# Patient Record
Sex: FEMALE | Race: OTHER | HISPANIC OR LATINO | Employment: OTHER | URBAN - METROPOLITAN AREA
[De-identification: names, ages, dates, MRNs, and addresses within clinical notes are randomized per-mention and may not be internally consistent; named-entity substitution may affect disease eponyms.]

---

## 2017-02-14 ENCOUNTER — ALLSCRIPTS OFFICE VISIT (OUTPATIENT)
Dept: OTHER | Facility: OTHER | Age: 67
End: 2017-02-14

## 2017-02-15 ENCOUNTER — GENERIC CONVERSION - ENCOUNTER (OUTPATIENT)
Dept: OTHER | Facility: OTHER | Age: 67
End: 2017-02-15

## 2017-03-01 ENCOUNTER — GENERIC CONVERSION - ENCOUNTER (OUTPATIENT)
Dept: OTHER | Facility: OTHER | Age: 67
End: 2017-03-01

## 2017-03-09 ENCOUNTER — GENERIC CONVERSION - ENCOUNTER (OUTPATIENT)
Dept: OTHER | Facility: OTHER | Age: 67
End: 2017-03-09

## 2017-03-22 ENCOUNTER — GENERIC CONVERSION - ENCOUNTER (OUTPATIENT)
Dept: OTHER | Facility: OTHER | Age: 67
End: 2017-03-22

## 2017-03-27 ENCOUNTER — GENERIC CONVERSION - ENCOUNTER (OUTPATIENT)
Dept: OTHER | Facility: OTHER | Age: 67
End: 2017-03-27

## 2017-04-25 ENCOUNTER — ALLSCRIPTS OFFICE VISIT (OUTPATIENT)
Dept: OTHER | Facility: OTHER | Age: 67
End: 2017-04-25

## 2017-08-15 ENCOUNTER — GENERIC CONVERSION - ENCOUNTER (OUTPATIENT)
Dept: OTHER | Facility: OTHER | Age: 67
End: 2017-08-15

## 2017-10-05 ENCOUNTER — TRANSCRIBE ORDERS (OUTPATIENT)
Dept: ADMINISTRATIVE | Facility: HOSPITAL | Age: 67
End: 2017-10-05

## 2017-10-05 DIAGNOSIS — Z12.39 SCREENING BREAST EXAMINATION: Primary | ICD-10-CM

## 2017-10-11 ENCOUNTER — HOSPITAL ENCOUNTER (OUTPATIENT)
Dept: MAMMOGRAPHY | Facility: HOSPITAL | Age: 67
Discharge: HOME | End: 2017-10-11
Payer: MEDICARE

## 2017-10-11 DIAGNOSIS — Z12.39 SCREENING BREAST EXAMINATION: ICD-10-CM

## 2017-10-11 PROCEDURE — G0202 SCR MAMMO BI INCL CAD: HCPCS

## 2017-12-19 ENCOUNTER — GENERIC CONVERSION - ENCOUNTER (OUTPATIENT)
Dept: FAMILY MEDICINE CLINIC | Facility: CLINIC | Age: 67
End: 2017-12-19

## 2018-01-13 VITALS
DIASTOLIC BLOOD PRESSURE: 82 MMHG | WEIGHT: 162 LBS | BODY MASS INDEX: 30.58 KG/M2 | SYSTOLIC BLOOD PRESSURE: 138 MMHG | TEMPERATURE: 97.3 F | HEIGHT: 61 IN | HEART RATE: 80 BPM

## 2018-01-15 VITALS
OXYGEN SATURATION: 96 % | BODY MASS INDEX: 30.99 KG/M2 | RESPIRATION RATE: 16 BRPM | WEIGHT: 164.13 LBS | SYSTOLIC BLOOD PRESSURE: 126 MMHG | TEMPERATURE: 97.6 F | HEIGHT: 61 IN | DIASTOLIC BLOOD PRESSURE: 84 MMHG | HEART RATE: 78 BPM

## 2018-02-09 ENCOUNTER — OFFICE VISIT (OUTPATIENT)
Dept: FAMILY MEDICINE CLINIC | Facility: CLINIC | Age: 68
End: 2018-02-09
Payer: MEDICARE

## 2018-02-09 VITALS
HEART RATE: 80 BPM | DIASTOLIC BLOOD PRESSURE: 78 MMHG | TEMPERATURE: 96.7 F | SYSTOLIC BLOOD PRESSURE: 128 MMHG | BODY MASS INDEX: 31.37 KG/M2 | WEIGHT: 166 LBS | RESPIRATION RATE: 16 BRPM

## 2018-02-09 DIAGNOSIS — H00.022 HORDEOLUM INTERNUM OF RIGHT LOWER EYELID: ICD-10-CM

## 2018-02-09 DIAGNOSIS — Z23 NEED FOR INFLUENZA VACCINATION: Primary | ICD-10-CM

## 2018-02-09 PROBLEM — J30.9 ALLERGIC RHINITIS: Status: ACTIVE | Noted: 2017-04-25

## 2018-02-09 PROBLEM — N39.3 STRESS INCONTINENCE, FEMALE: Status: ACTIVE | Noted: 2017-04-25

## 2018-02-09 PROCEDURE — 90662 IIV NO PRSV INCREASED AG IM: CPT | Performed by: NURSE PRACTITIONER

## 2018-02-09 PROCEDURE — 90471 IMMUNIZATION ADMIN: CPT | Performed by: NURSE PRACTITIONER

## 2018-02-09 PROCEDURE — 99213 OFFICE O/P EST LOW 20 MIN: CPT | Performed by: NURSE PRACTITIONER

## 2018-02-09 RX ORDER — TOBRAMYCIN 3 MG/ML
2 SOLUTION/ DROPS OPHTHALMIC
Qty: 1 BOTTLE | Refills: 0 | Status: SHIPPED | OUTPATIENT
Start: 2018-02-09 | End: 2018-05-26

## 2018-02-09 RX ORDER — INSULIN GLARGINE 100 [IU]/ML
30 INJECTION, SOLUTION SUBCUTANEOUS
COMMUNITY
Start: 2012-03-06 | End: 2021-10-11

## 2018-02-09 RX ORDER — FLUTICASONE PROPIONATE 50 MCG
2 SPRAY, SUSPENSION (ML) NASAL DAILY
COMMUNITY
Start: 2017-04-25 | End: 2019-04-08

## 2018-02-09 RX ORDER — SERTRALINE HYDROCHLORIDE 100 MG/1
1 TABLET, FILM COATED ORAL DAILY
COMMUNITY
Start: 2013-07-30 | End: 2018-04-09 | Stop reason: SDUPTHER

## 2018-02-09 RX ORDER — BLOOD SUGAR DIAGNOSTIC
STRIP MISCELLANEOUS
COMMUNITY
Start: 2015-10-01 | End: 2021-10-11

## 2018-02-09 RX ORDER — FLUTICASONE FUROATE AND VILANTEROL 200; 25 UG/1; UG/1
POWDER RESPIRATORY (INHALATION)
COMMUNITY
Start: 2016-03-24 | End: 2018-09-11 | Stop reason: SDUPTHER

## 2018-02-09 RX ORDER — SIMVASTATIN 20 MG
1 TABLET ORAL DAILY
COMMUNITY
Start: 2015-10-01

## 2018-02-09 RX ORDER — LOSARTAN POTASSIUM 25 MG/1
50 TABLET ORAL 2 TIMES DAILY
COMMUNITY
Start: 2016-01-20 | End: 2022-07-27 | Stop reason: SDUPTHER

## 2018-02-09 RX ORDER — ALBUTEROL SULFATE 90 UG/1
2 AEROSOL, METERED RESPIRATORY (INHALATION)
COMMUNITY
Start: 2017-04-25 | End: 2019-04-08

## 2018-02-09 NOTE — PROGRESS NOTES
FAMILY PRACTICE OFFICE VISIT       NAME: Becki Roman  AGE: 78 y o  SEX: female       : 1950        MRN: [id number]    DATE: 2018  TIME: 6:41 PM    Assessment and Plan     Problem List Items Addressed This Visit     None      Visit Diagnoses     Need for influenza vaccination    -  Primary    Relevant Orders    Flu Vaccine High Dose Split Preservative Free IM (Completed)    Hordeolum internum of right lower eyelid        Relevant Medications    tobramycin (TOBREX) 0 3 % SOLN    Other Relevant Orders    Ambulatory referral to Ophthalmology            There are no Patient Instructions on file for this visit  1  Need for influenza vaccination  Flu Vaccine High Dose Split Preservative Free IM   2  Hordeolum internum of right lower eyelid  tobramycin (TOBREX) 0 3 % SOLN    Ambulatory referral to Ophthalmology     Suspect hordeolum that has spontaneously drained  Symptoms present for the past 2 weeks, therefore will treat with Tobramycin 0 3% ophthalmic solution, 2 drops every 4 hours while awake for 5 days  Warm compresses 15 minutes 4 times per day  If symptoms worsen over the weekend, she will go to the emergency room  If symptoms do not improve by Monday, she will call Dr Jeff Pérez office, as she is already an established patient  Chief Complaint     Chief Complaint   Patient presents with    Eye Pain     sty going on for over a week       History of Present Illness     Patient presents today with soreness in her left lower eyelid that has been present for 2 weeks  She pulled her eyelid down a few days ago and saw what looked like a stye that was coming to a head  Her eye feels itchy  Some increased tearing  She has not used warm compresses  She did use her grandson's old eye drops a few times that were prescribed for treating conjunctivitis, which seemed to help, but she has none left  Review of Systems   Review of Systems   Constitutional: Negative for chills, fatigue and fever  HENT: Negative for congestion, postnasal drip, rhinorrhea, sinus pressure and sore throat  Eyes:        As noted in HPI   Respiratory: Negative for cough, chest tightness and shortness of breath  Cardiovascular: Negative for chest pain  Active Problem List     Patient Active Problem List   Diagnosis    Allergic rhinitis    Asthma    Generalized anxiety disorder    Stress incontinence, female    Lumbar radiculopathy    [facility] hyperglycemia ([facility] )       Past Medical History:  Past Medical History:   Diagnosis Date    Diabetes mellitus ([facility] )     Hyperlipidemia        Past Surgical History:  No past surgical history on file  Family History:  No family history on file  Social History:  Social History     Social History    Marital status:      Spouse name: N/A    Number of children: N/A    Years of education: N/A     Occupational History    Not on file  Social History Main Topics    Smoking status: Former Smoker    Smokeless tobacco: Not on file    Alcohol use No    Drug use: No    Sexual activity: Not on file     Other Topics Concern    Not on file     Social History Narrative    No narrative on file       I have reviewed the patient's medical history in detail; there are no changes to the history as noted in the electronic medical record  Objective     Vitals:    02/09/18 1304   BP: 128/78   BP Location: Left arm   Patient Position: Sitting   Cuff Size: Standard   Pulse: 80   Resp: 16   Temp: (!) 96 7 °F (35 9 °C)   TempSrc: Tympanic   Weight: 75 3 kg (166 lb)     Wt Readings from Last 3 Encounters:   02/09/18 75 3 kg (166 lb)   04/25/17 73 5 kg (162 lb)   02/14/17 74 4 kg (164 lb 2 1 oz)       Physical Exam   Constitutional: She appears well-developed and well-nourished  Eyes: Pupils are equal, round, and reactive to light  Left eye exhibits hordeolum (inside of left lower lid with 1-2 mm erythematous base with whitish center,  )   Left conjunctiva is not injected  Cardiovascular: Normal rate, regular rhythm and normal heart sounds  Pulmonary/Chest: Effort normal and breath sounds normal    Nursing note and vitals reviewed  ALLERGIES:  Allergies   Allergen Reactions    Augmentin [Amoxicillin-Pot Clavulanate]     Codeine Drowsiness    Oxycodone-Acetaminophen Diarrhea and Vomiting       Current Medications     Current Outpatient Prescriptions   Medication Sig Dispense Refill    albuterol (PROAIR HFA) 90 mcg/act inhaler Inhale 2 puffs      fluticasone (FLONASE) 50 mcg/act nasal spray 2 sprays into each nostril daily      fluticasone furoate-vilanterol (BREO ELLIPTA) 200-25 MCG/INH inhaler Inhale      Glucose Blood (ONETOUCH ULTRA BLUE VI) 1 Squirt by In Vitro route 3 (three) times a day      insulin glargine (LANTUS) 100 units/mL subcutaneous injection Inject under the skin      Insulin Syringe-Needle U-100 (B-D INS SYRINGE 0 5CC/31GX5/16) 31G X 5/16" 0 5 ML MISC by Does not apply route      losartan (COZAAR) 25 mg tablet Take 1 tablet by mouth daily      metFORMIN (GLUCOPHAGE) 1000 MG tablet Take by mouth      sertraline (ZOLOFT) 100 mg tablet Take 1 tablet by mouth daily      simvastatin (ZOCOR) 20 mg tablet Take 1 tablet by mouth daily      insulin lispro (HUMALOG) 100 units/mL injection Inject under the skin      tobramycin (TOBREX) 0 3 % SOLN Administer 2 drops into the left eye every 4 (four) hours while awake 1 Bottle 0     No current facility-administered medications for this visit            Health Maintenance     Health Maintenance   Topic Date Due    Hepatitis C Screening  1950    DTaP,Tdap,and Td Vaccines (1 - Tdap) 10/29/1958    PNEUMOCOCCAL POLYSACCHARIDE VACCINE AGE 65 AND OVER  10/29/2016    INFLUENZA VACCINE  09/01/2017     Immunization History   Administered Date(s) Administered    Influenza Quadrivalent Preservative Free 3 years and older IM 11/08/2014    Influenza Split High Dose Preservative Free IM 02/09/2018       PAWAN Pérez

## 2018-02-23 ENCOUNTER — OFFICE VISIT (OUTPATIENT)
Dept: FAMILY MEDICINE CLINIC | Facility: CLINIC | Age: 68
End: 2018-02-23
Payer: MEDICARE

## 2018-02-23 VITALS
TEMPERATURE: 97.1 F | RESPIRATION RATE: 16 BRPM | SYSTOLIC BLOOD PRESSURE: 132 MMHG | BODY MASS INDEX: 30.84 KG/M2 | HEART RATE: 76 BPM | WEIGHT: 163.2 LBS | DIASTOLIC BLOOD PRESSURE: 88 MMHG

## 2018-02-23 DIAGNOSIS — G89.29 CHRONIC BILATERAL LOW BACK PAIN, WITH SCIATICA PRESENCE UNSPECIFIED: ICD-10-CM

## 2018-02-23 DIAGNOSIS — R53.82 CHRONIC FATIGUE: ICD-10-CM

## 2018-02-23 DIAGNOSIS — R53.83 FATIGUE, UNSPECIFIED TYPE: Primary | ICD-10-CM

## 2018-02-23 DIAGNOSIS — M54.5 CHRONIC BILATERAL LOW BACK PAIN, WITH SCIATICA PRESENCE UNSPECIFIED: ICD-10-CM

## 2018-02-23 PROCEDURE — 99214 OFFICE O/P EST MOD 30 MIN: CPT | Performed by: FAMILY MEDICINE

## 2018-02-23 NOTE — ASSESSMENT & PLAN NOTE
Fatigue  I discussed with the patient the fact that her symptoms could be multifactorial   They may be related to her abrupt stopping of her sertraline  She will obtain blood work as ordered for further evaluation  She does not appear to have symptoms related to sleep apnea    She will continue follow-up with her endocrinologist to continue monitoring her blood sugars for diabetes

## 2018-02-23 NOTE — PROGRESS NOTES
FAMILY PRACTICE OFFICE VISIT       NAME: Becki Roman  AGE: 78 y o  SEX: female       : 1950        MRN: [id number]    DATE: 2018  TIME: 5:27 PM    Assessment and Plan     Problem List Items Addressed This Visit     Chronic fatigue     Fatigue  I discussed with the patient the fact that her symptoms could be multifactorial   They may be related to her abrupt stopping of her sertraline  She will obtain blood work as ordered for further evaluation  She does not appear to have symptoms related to sleep apnea  She will continue follow-up with her endocrinologist to continue monitoring her blood sugars for diabetes         Fatigue - Primary    Relevant Orders    TSH, 3rd generation    T3, free    T4, free    Iron Panel    Lyme disease, western blot    Sedimentation rate, automated    SHAWNA Screen w/ Reflex to Titer/Pattern    RF Screen w/ Reflex to Titer      Other Visit Diagnoses     Chronic bilateral low back pain, with sciatica presence unspecified        Relevant Orders    TSH, 3rd generation    T3, free    T4, free    Iron Panel    Lyme disease, western blot    Sedimentation rate, automated    SHAWNA Screen w/ Reflex to Titer/Pattern    RF Screen w/ Reflex to Titer            There are no Patient Instructions on file for this visit  Chief Complaint     Chief Complaint   Patient presents with    Fatigue     Patient is here c/o fatigue x's 1 wk which is getting progressively worse  History of Present Illness     Patient states she has been feeling fatigued for the past couple weeks  She does get intermittent arthralgias of her knees back and shoulders  She does have a history of herniated disc of lumbar spine  Normally she sleeps from 9 p m  until 6 a m  Shelton Anguiano She does have nocturia x2 but feels she falls back asleep quickly after going to the bathroom  She denies any significant changes in weight  She denies any new medications    She does see her endocrinologist for her diabetes however her most recent hemoglobin A1c was 10 2  Patient describes feeling the need to rest or sleep after minimal activities but denies any shortness of breath or chest pain  Patient had an unremarkable nuclear stress test December 2016  Patient also reports that she abruptly stopped taking her sertraline medication 100 milligrams daily when she ran out of refills  Overall she had been feeling well and did not feel medication was needed or if active  We        Review of Systems   Review of Systems   Constitutional: Positive for activity change and fatigue  Negative for appetite change and fever  HENT: Negative  Respiratory: Negative  Cardiovascular: Negative  Gastrointestinal: Negative  Genitourinary: Negative  Musculoskeletal: Positive for arthralgias, back pain and myalgias  Active Problem List     Patient Active Problem List   Diagnosis    Allergic rhinitis    Asthma    Generalized anxiety disorder    Stress incontinence, female    Lumbar radiculopathy    Type 2 diabetes mellitus with hyperglycemia ([facility] )    Colitis    Chronic fatigue    Fatigue       Past Medical History:  Past Medical History:   Diagnosis Date    Diabetes mellitus ([facility] )     Hyperlipidemia        Past Surgical History:  No past surgical history on file  Family History:  No family history on file  Social History:  Social History     Social History    Marital status:      Spouse name: N/A    Number of children: N/A    Years of education: N/A     Occupational History    Not on file       Social History Main Topics    Smoking status: Former Smoker    Smokeless tobacco: Never Used    Alcohol use No    Drug use: No    Sexual activity: Not on file     Other Topics Concern    Not on file     Social History Narrative    No narrative on file       Objective     Vitals:    02/23/18 1503   BP: 132/88   Pulse: 76   Resp: 16   Temp: (!) 97 1 °F (36 2 °C)     Wt Readings from Last 3 Encounters:   02/23/18 74 kg (163 lb 3 2 oz)   02/09/18 75 3 kg (166 lb)   04/25/17 73 5 kg (162 lb)       Physical Exam   Constitutional:   Patient is in no acute distress   Cardiovascular:   Regular rate and rhythm with no murmurs   Pulmonary/Chest:   Lungs are clear to auscultation without wheezes,rales, or rhonchi   Abdominal:   Abdomen is soft, nontender with positive bowel sounds  There is no rebound or guarding  No masses palpated   Lymphadenopathy:     She has no cervical adenopathy  Psychiatric: She has a normal mood and affect  Her behavior is normal  Judgment and thought content normal        Pertinent Laboratory/Diagnostic Studies:  Lab Results   Component Value Date    GLUCOSE 105 11/10/2015    BUN 10 11/10/2015    CREATININE 0 63 11/10/2015    CALCIUM 8 8 11/10/2015     11/10/2015    K 4 0 11/10/2015    CO2 24 2 11/10/2015     11/10/2015     No results found for: ALT, AST, GGT, ALKPHOS, BILITOT    Lab Results   Component Value Date    WBC 8 02 11/10/2015    HGB 11 1 (L) 11/10/2015    HCT 35 8 11/10/2015    MCV 75 (L) 11/10/2015     11/10/2015       No results found for: TSH    No results found for: CHOL  No results found for: TRIG  No results found for: HDL  No results found for: LDLCALC  No results found for: HGBA1C    Results for orders placed or performed during the hospital encounter of 12/12/16   Stress strip   Result Value Ref Range    Protocol Name [address]            Time In Exercise Phase 180 S    MAX   SYSTOLIC  mmHg    Max Diastolic Bp 90 mmHg    Max Heart Rate 109 BPM    Max Predicted Heart Rate 155 BPM    Reason for Termination Test Complete     Test Indication Screening for CAD     Target Hr Formular (220 - Age)*100%     Arrhy During Ex      ECG Interp Before Ex      ECG Interp during Ex      Ex Summary Comment      Chest Pain Statement none     Overall Hr Response To Exercise      Overall BP Response To Exercise         Orders Placed This Encounter   Procedures    TSH, 3rd generation  T3, free    T4, free    Lyme disease, western blot    Sedimentation rate, automated    SHAWNA Screen w/ Reflex to Titer/Pattern    RF Screen w/ Reflex to Titer       ALLERGIES:  Allergies   Allergen Reactions    Augmentin [Amoxicillin-Pot Clavulanate]     Codeine Drowsiness    Oxycodone-Acetaminophen Diarrhea and Vomiting       Current Medications     Current Outpatient Prescriptions   Medication Sig Dispense Refill    albuterol (PROAIR HFA) 90 mcg/act inhaler Inhale 2 puffs      fluticasone (FLONASE) 50 mcg/act nasal spray 2 sprays into each nostril daily      fluticasone furoate-vilanterol (BREO ELLIPTA) 200-25 MCG/INH inhaler Inhale      Glucose Blood (ONETOUCH ULTRA BLUE VI) 1 Squirt by In Vitro route 3 (three) times a day      insulin glargine (LANTUS) 100 units/mL subcutaneous injection Inject under the skin      insulin lispro (HUMALOG) 100 units/mL injection Inject under the skin      Insulin Syringe-Needle U-100 (B-D INS SYRINGE 0 5CC/31GX5/16) 31G X 5/16" 0 5 ML MISC by Does not apply route      losartan (COZAAR) 25 mg tablet Take 1 tablet by mouth daily      metFORMIN (GLUCOPHAGE) 1000 MG tablet Take by mouth      sertraline (ZOLOFT) 100 mg tablet Take 1 tablet by mouth daily      simvastatin (ZOCOR) 20 mg tablet Take 1 tablet by mouth daily      tobramycin (TOBREX) 0 3 % SOLN Administer 2 drops into the left eye every 4 (four) hours while awake 1 Bottle 0     No current facility-administered medications for this visit            Health Maintenance     Health Maintenance   Topic Date Due    Hepatitis C Screening  1950    DTaP,Tdap,and Td Vaccines (1 - Tdap) 10/29/1958    Diabetic Foot Exam  10/29/1961    URINE MICROALBUMIN  10/29/1961    Depression Screening PHQ-9  10/29/1963    Fall Risk  10/29/2016    Urinary Incontinence Screening  10/29/2016    PNEUMOCOCCAL POLYSACCHARIDE VACCINE AGE 72 AND OVER  10/29/2016    OPHTHALMOLOGY EXAM  03/27/2018    INFLUENZA VACCINE Completed     Immunization History   Administered Date(s) Administered    Influenza Quadrivalent Preservative Free 3 years and older IM 11/08/2014    Influenza Split High Dose Preservative Free IM 28/03/0021       Sean Reynoso MD

## 2018-02-28 LAB
ANA PAT SER IF-IMP: ABNORMAL
ANA SER QL IF: POSITIVE
ANA TITR SER IF: ABNORMAL TITER
B BURGDOR IGG SER QL IB: POSITIVE
B BURGDOR IGM SER QL IB: POSITIVE
B BURGDOR18KD IGG SER QL IB: REACTIVE
B BURGDOR23KD IGG SER QL IB: REACTIVE
B BURGDOR23KD IGM SER QL IB: REACTIVE
B BURGDOR28KD IGG SER QL IB: REACTIVE
B BURGDOR30KD IGG SER QL IB: ABNORMAL
B BURGDOR39KD IGG SER QL IB: ABNORMAL
B BURGDOR39KD IGM SER QL IB: ABNORMAL
B BURGDOR41KD IGG SER QL IB: REACTIVE
B BURGDOR41KD IGM SER QL IB: REACTIVE
B BURGDOR45KD IGG SER QL IB: ABNORMAL
B BURGDOR58KD IGG SER QL IB: ABNORMAL
B BURGDOR66KD IGG SER QL IB: REACTIVE
B BURGDOR93KD IGG SER QL IB: ABNORMAL
ERYTHROCYTE [SEDIMENTATION RATE] IN BLOOD BY WESTERGREN METHOD: 51 MM/H
IRON SATN MFR SERPL: 6 % (CALC) (ref 11–50)
IRON SERPL-MCNC: 29 MCG/DL (ref 45–160)
RHEUMATOID FACT SERPL-ACNC: <14 IU/ML
T3FREE SERPL-MCNC: 2.8 PG/ML (ref 2.3–4.2)
T4 FREE SERPL-MCNC: 1.2 NG/DL (ref 0.8–1.8)
TIBC SERPL-MCNC: 465 MCG/DL (CALC) (ref 250–450)
TSH SERPL-ACNC: 3.19 MIU/L (ref 0.4–4.5)

## 2018-03-01 ENCOUNTER — TELEPHONE (OUTPATIENT)
Dept: FAMILY MEDICINE CLINIC | Facility: CLINIC | Age: 68
End: 2018-03-01

## 2018-03-01 NOTE — TELEPHONE ENCOUNTER
----- Message from Lyn Brown MD sent at 8/0/3788  7:37 AM EST -----  Patient's blood work had several abnormalities: 1) blood work was positive for Lyme disease  It appears she was infected some time within the past 3 months  I recommend taking doxycycline 100 mg twice daily for 30 days  I will send to pharmacy  2) blood work for anti nuclear antibody test was highly positive  This test could be positive with several autoimmune diseases such as lupus  I recommend consultation with rheumatologist such as Frederic Enciso at [facility]  Please give number for patient to make appointment  3) blood work for iron showed low level at 29  I recommend try to start over-the-counter ferrous sulfate 65 mg tablet once daily    Repeat blood work in 3 months

## 2018-03-02 ENCOUNTER — TELEPHONE (OUTPATIENT)
Dept: FAMILY MEDICINE CLINIC | Facility: CLINIC | Age: 68
End: 2018-03-02

## 2018-04-02 LAB
LEFT EYE DIABETIC RETINOPATHY: NORMAL
RIGHT EYE DIABETIC RETINOPATHY: NORMAL

## 2018-04-09 ENCOUNTER — TELEPHONE (OUTPATIENT)
Dept: FAMILY MEDICINE CLINIC | Facility: CLINIC | Age: 68
End: 2018-04-09

## 2018-04-09 DIAGNOSIS — F41.9 ANXIETY: Primary | ICD-10-CM

## 2018-04-09 NOTE — TELEPHONE ENCOUNTER
Patient called stating she would like to go back on Sertraline again she is having panick attacks  She was wondering if she could take 50 mg instead of 100mg  Patient uses [facility] in [place]  Patient can be reached at [phone]

## 2018-05-10 DIAGNOSIS — K52.9 COLITIS: Primary | ICD-10-CM

## 2018-05-10 RX ORDER — DOXYCYCLINE HYCLATE 100 MG/1
100 CAPSULE ORAL 2 TIMES DAILY
Refills: 0 | COMMUNITY
Start: 2018-03-02 | End: 2018-05-10 | Stop reason: SDUPTHER

## 2018-05-10 RX ORDER — DOXYCYCLINE HYCLATE 100 MG/1
100 CAPSULE ORAL 2 TIMES DAILY
Qty: 180 CAPSULE | Refills: 0 | Status: SHIPPED | OUTPATIENT
Start: 2018-05-10 | End: 2018-05-26

## 2018-05-26 ENCOUNTER — OFFICE VISIT (OUTPATIENT)
Dept: URGENT CARE | Age: 68
End: 2018-05-26
Payer: MEDICARE

## 2018-05-26 VITALS
HEIGHT: 63 IN | DIASTOLIC BLOOD PRESSURE: 76 MMHG | HEART RATE: 84 BPM | TEMPERATURE: 98.6 F | SYSTOLIC BLOOD PRESSURE: 139 MMHG | BODY MASS INDEX: 28.35 KG/M2 | WEIGHT: 160 LBS | OXYGEN SATURATION: 98 % | RESPIRATION RATE: 20 BRPM

## 2018-05-26 DIAGNOSIS — Z18.39: ICD-10-CM
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
## 2018-05-26 DIAGNOSIS — S80.852A: ICD-10-CM

## 2018-05-26 DIAGNOSIS — B37.3 VAGINAL YEAST INFECTION: ICD-10-CM

## 2018-05-26 PROCEDURE — G0463 HOSPITAL OUTPT CLINIC VISIT: HCPCS | Performed by: FAMILY MEDICINE

## 2018-05-26 PROCEDURE — 99214 OFFICE O/P EST MOD 30 MIN: CPT | Performed by: FAMILY MEDICINE

## 2018-05-26 RX ORDER — DOXYCYCLINE 100 MG/1
100 TABLET ORAL 2 TIMES DAILY
Qty: 28 TABLET | Refills: 0 | Status: SHIPPED | OUTPATIENT
Start: 2018-05-26 | End: 2018-06-09

## 2018-05-26 RX ORDER — FLUCONAZOLE 150 MG/1
150 TABLET ORAL ONCE
Qty: 2 TABLET | Refills: 0 | Status: SHIPPED | OUTPATIENT
Start: 2018-05-26 | End: 2018-05-26

## 2018-05-26 NOTE — PROGRESS NOTES
[address]        NAME: Leonard Ho is a 78 y o  female  : 1950    MRN: [id number]  DATE: May 26, 2018  TIME: 11:59 AM    Assessment and Plan   Tick bite, initial encounter [W57  XXXA]  1  Tick bite, initial encounter  doxycycline (ADOXA) 100 MG tablet   2  Embedded tick of lower leg, left, initial encounter  doxycycline (ADOXA) 100 MG tablet   3  Vaginal yeast infection  fluconazole (DIFLUCAN) 150 mg tablet         Patient Instructions       Take doxycycline twice daily for 14 days  Please take probiotics daily  Avoid excessive sun exposure  Follow up with your family physician in one week, or sooner if any signs or symptoms of lyme disease develop   Take diflucan if needed for yeast infection- repeat in one week if needed    Chief Complaint     Chief Complaint   Patient presents with    Tick Removal     Monday on her left lower leg she noticed a dark spot- she thinks the tick is still present- has had Lyme Disease in the past had a flare uop this March         History of Present Illness       77 y/o female presents with an embedded tick in her left lower leg x 5 days  She states she removed the tick, but the head is still embedded  H/o lyme with recent "flare up"  Requests diflucan with abx rx  Last Td 4 years ago  Review of Systems   Review of Systems   Constitutional: Negative for chills, fatigue and fever  Respiratory: Negative for cough and shortness of breath  Cardiovascular: Negative for chest pain  Musculoskeletal: Negative for arthralgias, back pain and joint swelling  Skin:        Embedded tick left ankle   Neurological: Negative for dizziness and headaches  All other systems reviewed and are negative          Current Medications       Current Outpatient Prescriptions:     albuterol (PROAIR HFA) 90 mcg/act inhaler, Inhale 2 puffs, Disp: , Rfl:     fluticasone (FLONASE) 50 mcg/act nasal spray, 2 sprays into each nostril daily, Disp: , Rfl:     fluticasone furoate-vilanterol (BREO ELLIPTA) 200-25 MCG/INH inhaler, Inhale, Disp: , Rfl:     Glucose Blood (ONETOUCH ULTRA BLUE VI), 1 Squirt by In Vitro route 3 (three) times a day, Disp: , Rfl:     insulin glargine (LANTUS) 100 units/mL subcutaneous injection, Inject under the skin, Disp: , Rfl:     insulin lispro (HUMALOG) 100 units/mL injection, Inject under the skin, Disp: , Rfl:     Insulin Syringe-Needle U-100 (B-D INS SYRINGE 0 5CC/31GX5/16) 31G X 5/16" 0 5 ML MISC, by Does not apply route, Disp: , Rfl:     losartan (COZAAR) 25 mg tablet, Take 1 tablet by mouth daily, Disp: , Rfl:     metFORMIN (GLUCOPHAGE) 1000 MG tablet, Take by mouth, Disp: , Rfl:     sertraline (ZOLOFT) 50 mg tablet, One tablet p o  q day, Disp: 30 tablet, Rfl: 5    simvastatin (ZOCOR) 20 mg tablet, Take 1 tablet by mouth daily, Disp: , Rfl:     doxycycline (ADOXA) 100 MG tablet, Take 1 tablet (100 mg total) by mouth 2 (two) times a day for 14 days, Disp: 28 tablet, Rfl: 0    fluconazole (DIFLUCAN) 150 mg tablet, Take 1 tablet (150 mg total) by mouth once for 1 dose, Disp: 2 tablet, Rfl: 0    Current Allergies     Allergies as of 05/26/2018 - Reviewed 05/26/2018   Allergen Reaction Noted    Augmentin [amoxicillin-pot clavulanate]  12/12/2016    Codeine Drowsiness 12/20/2012    Oxycodone-acetaminophen Diarrhea and Vomiting 12/20/2012            The following portions of the patient's history were reviewed and updated as appropriate: allergies, current medications, past family history, past medical history, past social history, past surgical history and problem list    Past Medical History:   Diagnosis Date    Diabetes mellitus ([address] )     Hyperlipidemia      No past surgical history on file          Objective   /76 (BP Location: Right arm, Patient Position: Sitting, Cuff Size: Standard)   Pulse 84   Temp 98 6 °F (37 °C) (Temporal)   Resp 20   Ht 5' 3" (1 6 m)   Wt 72 6 kg (160 lb)   SpO2 98%   BMI 28 34 kg/m² Physical Exam     Physical Exam   Constitutional: She is oriented to person, place, and time  She appears well-developed and well-nourished  Cardiovascular: Normal rate, regular rhythm and normal heart sounds  Pulmonary/Chest: Effort normal and breath sounds normal    Neurological: She is alert and oriented to person, place, and time  Skin:        Psychiatric: She has a normal mood and affect  Her behavior is normal    Nursing note and vitals reviewed  Foreign body removal  Date/Time: 5/26/2018 12:05 PM  Performed by: Yaa Westfall  Authorized by: Yaa Westfall   Consent: Verbal consent obtained    Risks and benefits: risks, benefits and alternatives were discussed  Consent given by: patient  Body area: skin  General location: lower extremity  Location details: left ankle  Anesthesia: local infiltration    Anesthesia:  Local Anesthetic: lidocaine 1% without epinephrine  Anesthetic total: 1 mL  Localization method: visualized  Removal mechanism: alligator forceps  Dressing: dressing applied  Tendon involvement: none  Depth: subcutaneous  Complexity: simple  Post-procedure assessment: foreign body removed  Patient tolerance: Patient tolerated the procedure well with no immediate complications

## 2018-05-26 NOTE — PATIENT INSTRUCTIONS
Take doxycycline twice daily for 14 days  Please take probiotics daily  Avoid excessive sun exposure  Follow up with your family physician in one week, or sooner if any signs or symptoms of lyme disease develop   Take diflucan if needed for yeast infection- repeat in one week if needed

## 2018-08-20 ENCOUNTER — OFFICE VISIT (OUTPATIENT)
Dept: FAMILY MEDICINE CLINIC | Facility: CLINIC | Age: 68
End: 2018-08-20
Payer: MEDICARE

## 2018-08-20 VITALS
DIASTOLIC BLOOD PRESSURE: 86 MMHG | SYSTOLIC BLOOD PRESSURE: 136 MMHG | BODY MASS INDEX: 28.84 KG/M2 | TEMPERATURE: 95.9 F | WEIGHT: 162.8 LBS | HEART RATE: 72 BPM | HEIGHT: 63 IN | RESPIRATION RATE: 16 BRPM

## 2018-08-20 DIAGNOSIS — N39.0 URINARY TRACT INFECTION WITHOUT HEMATURIA, SITE UNSPECIFIED: Primary | ICD-10-CM

## 2018-08-20 PROCEDURE — 81002 URINALYSIS NONAUTO W/O SCOPE: CPT | Performed by: FAMILY MEDICINE

## 2018-08-20 PROCEDURE — 99213 OFFICE O/P EST LOW 20 MIN: CPT | Performed by: FAMILY MEDICINE

## 2018-08-20 RX ORDER — NITROFURANTOIN 25; 75 MG/1; MG/1
100 CAPSULE ORAL 2 TIMES DAILY
Qty: 14 CAPSULE | Refills: 0 | Status: SHIPPED | OUTPATIENT
Start: 2018-08-20 | End: 2019-04-08

## 2018-08-20 NOTE — PROGRESS NOTES
FAMILY PRACTICE OFFICE VISIT       NAME: Becki Roman  AGE: 78 y o  SEX: female       : 1950        MRN: [id number]    DATE: 2018  TIME: 4:13 PM    Assessment and Plan     Problem List Items Addressed This Visit     Urinary tract infection without hematuria - Primary    Relevant Medications    nitrofurantoin (MACROBID) 100 mg capsule        Patient with suspected symptoms of urinary tract infection  Unfortunately she was unable to given of a urine sample to send for urine culture  Patient will use Macrobid 1 b i d  for 7 days  If symptoms persist she will repeat urinalysis for further evaluation  There are no Patient Instructions on file for this visit  Fall Risk Assesment      Most Recent Value   Fall Risk   One or more falls in the last year:  Yes   Advised to use a cane or walker to get around safely:  No   Feels unsteady when walking:  No   Steadies self on furniture while walking at home:  No   Worried about falling:  No          Chief Complaint     Chief Complaint   Patient presents with    Urinary Tract Infection     Patient c/o lower abdominal pain and urinary hesistancy x's 3+ days  History of Present Illness     Patient describes 2-3 day history of lower pelvic discomfort  She has also noticed decrease frequency of urinating despite feeling the urge to urinate  She denies any fevers  There has been no changes in bowel movements        Review of Systems   Review of Systems   Constitutional: Negative  Respiratory: Negative  Cardiovascular: Negative  Gastrointestinal: Negative      Genitourinary:        As per HPI       Active Problem List     Patient Active Problem List   Diagnosis    Allergic rhinitis    Asthma    Generalized anxiety disorder    Stress incontinence, female    Lumbar radiculopathy    Type 2 diabetes mellitus with hyperglycemia ([address] )    Colitis    Chronic fatigue    Fatigue    Urinary tract infection without hematuria       Past Medical History:  Past Medical History:   Diagnosis Date    Cellulitis     Contact dermatitis     Diabetes mellitus ([address] )     Hyperlipidemia     Lyme disease        Past Surgical History:  No past surgical history on file  Family History:  No family history on file  Social History:  Social History     Social History    Marital status:      Spouse name: N/A    Number of children: N/A    Years of education: N/A     Occupational History    Not on file  Social History Main Topics    Smoking status: Former Smoker    Smokeless tobacco: Never Used    Alcohol use No    Drug use: No    Sexual activity: Not on file     Other Topics Concern    Not on file     Social History Narrative    No narrative on file       Objective     Vitals:    08/20/18 1541   BP: 136/86   Pulse: 72   Resp: 16   Temp: (!) 95 9 °F (35 5 °C)     Wt Readings from Last 3 Encounters:   08/20/18 73 8 kg (162 lb 12 8 oz)   05/26/18 72 6 kg (160 lb)   02/23/18 74 kg (163 lb 3 2 oz)       Physical Exam   Constitutional: No distress  Cardiovascular:   Regular rate and rhythm with no murmurs   Pulmonary/Chest:   Lungs are clear to auscultation without wheezes,rales, or rhonchi   Abdominal:   Abdomen is soft, nontender with positive bowel sounds  There is no rebound or guarding  No masses palpated  Negative CVA tenderness to palpation   Musculoskeletal: She exhibits no edema         Pertinent Laboratory/Diagnostic Studies:  Lab Results   Component Value Date    GLUCOSE 105 11/10/2015    BUN 10 11/10/2015    CREATININE 0 63 11/10/2015    CALCIUM 8 8 11/10/2015     11/10/2015    K 4 0 11/10/2015    CO2 24 2 11/10/2015     11/10/2015     No results found for: ALT, AST, GGT, ALKPHOS, BILITOT    Lab Results   Component Value Date    WBC 8 02 11/10/2015    HGB 11 1 (L) 11/10/2015    HCT 35 8 11/10/2015    MCV 75 (L) 11/10/2015     11/10/2015       Lab Results   Component Value Date    TSH 3 19 02/26/2018       No results found for: CHOL  No results found for: TRIG  No results found for: HDL  No results found for: LDLCALC  No results found for: HGBA1C    Results for orders placed or performed in visit on 02/26/18   TIBC   Result Value Ref Range    Iron, Serum 29 (L) 45 - 160 mcg/dL    Total Iron Binding Capacity (TIBC) 465 (H) 250 - 450 mcg/dL (calc)    Iron Saturation 6 (L) 11 - 50 % (calc)   Sedimentation rate, automated   Result Value Ref Range    SL AMB SED RATE BY MODIFIED WESTERGREN 51 (H) < OR = 30 mm/h   SHAWNA Screen w/ Reflex to Titer/Pattern   Result Value Ref Range    SL SHAWNA SCREEN, IFA POSITIVE (A) NEGATIVE   Rheumatoid Factor   Result Value Ref Range    Rheumatoid Factor <14 <14 IU/mL   Lyme disease, western blot   Result Value Ref Range    Lyme Disease Ab (IgG), Blot POSITIVE (A) NEGATIVE    Lyme 18 kD IgG REACTIVE (A)     Lyme 23 kD IgG REACTIVE (A)     Lyme 28 kD IgG REACTIVE (A)     Lyme 30 kD IgG NON-REACTIVE     Lyme 39 kD IgG NON-REACTIVE     Lyme 41 kD IgG REACTIVE (A)     Lyme 45 kD IgG NON-REACTIVE     Lyme 58 kD IgG NON-REACTIVE     Lyme 66 kD IgG REACTIVE (A)     Lyme 93 kD IgG NON-REACTIVE     Lyme Disease Ab (IgM), Blot POSITIVE (A) NEGATIVE    Lyme 23 kD IgM REACTIVE (A)     Lyme 39 kD IgM NON-REACTIVE     Lyme 41 kD IgM REACTIVE (A)    T4, free   Result Value Ref Range    Free t4 1 2 0 8 - 1 8 ng/dL   TSH, 3rd generation   Result Value Ref Range    TSH 3 19 0 40 - 4 50 mIU/L   T3, free   Result Value Ref Range    Free T3 2 8 2 3 - 4 2 pg/mL   SHAWNA titer   Result Value Ref Range    SL AMB SHAWNA PATTERN HOMOGENEOUS (A)     SL AMB SHAWNA TITER > OR = 1:1280 (A) titer       No orders of the defined types were placed in this encounter        ALLERGIES:  Allergies   Allergen Reactions    Augmentin [Amoxicillin-Pot Clavulanate]     Codeine Drowsiness    Oxycodone-Acetaminophen Diarrhea and Vomiting       Current Medications     Current Outpatient Prescriptions   Medication Sig Dispense Refill    albuterol (PROAIR HFA) 90 mcg/act inhaler Inhale 2 puffs      fluticasone (FLONASE) 50 mcg/act nasal spray 2 sprays into each nostril daily      fluticasone furoate-vilanterol (BREO ELLIPTA) 200-25 MCG/INH inhaler Inhale      Glucose Blood (ONETOUCH ULTRA BLUE VI) 1 Squirt by In Vitro route 3 (three) times a day      insulin glargine (LANTUS) 100 units/mL subcutaneous injection Inject under the skin Inject 28 units at bedtime       insulin lispro (HUMALOG) 100 units/mL injection Inject under the skin Injection 10 units 3 times daily       Insulin Syringe-Needle U-100 (B-D INS SYRINGE 0 5CC/31GX5/16) 31G X 5/16" 0 5 ML MISC by Does not apply route      losartan (COZAAR) 25 mg tablet Take 1 tablet by mouth daily      metFORMIN (GLUCOPHAGE) 1000 MG tablet Take by mouth      sertraline (ZOLOFT) 50 mg tablet One tablet p o  q day 30 tablet 5    simvastatin (ZOCOR) 20 mg tablet Take 1 tablet by mouth daily      nitrofurantoin (MACROBID) 100 mg capsule Take 1 capsule (100 mg total) by mouth 2 (two) times a day 14 capsule 0     No current facility-administered medications for this visit            Health Maintenance     Health Maintenance   Topic Date Due    Hepatitis C Screening  1950    Depression Screening PHQ-9  1950    HEMOGLOBIN A1C  1950    CRC Screening: Colonoscopy  1950    Diabetic Foot Exam  10/29/1961    URINE MICROALBUMIN  10/29/1961    DTaP,Tdap,and Td Vaccines (1 - Tdap) 10/29/1972    Fall Risk  10/29/2016    Urinary Incontinence Screening  10/29/2016    Pneumococcal PPSV23/PCV13 65+ Years / Low and Medium Risk (1 of 2 - PCV13) 10/29/2016    DM Eye Exam  03/27/2018    INFLUENZA VACCINE  09/01/2018     Immunization History   Administered Date(s) Administered    Influenza Quadrivalent Preservative Free 3 years and older IM 11/08/2014    Influenza Split High Dose Preservative Free IM 35/12/4533       Josafat Hartmann MD

## 2018-08-21 ENCOUNTER — TELEPHONE (OUTPATIENT)
Dept: FAMILY MEDICINE CLINIC | Facility: CLINIC | Age: 68
End: 2018-08-21

## 2018-08-21 DIAGNOSIS — N39.0 URINARY TRACT INFECTION WITHOUT HEMATURIA, SITE UNSPECIFIED: Primary | ICD-10-CM

## 2018-08-21 LAB
SL AMB  POCT GLUCOSE, UA: NEGATIVE
SL AMB LEUKOCYTE ESTERASE,UA: NEGATIVE
SL AMB POCT BILIRUBIN,UA: NEGATIVE
SL AMB POCT BLOOD,UA: ABNORMAL
SL AMB POCT CLARITY,UA: CLEAR
SL AMB POCT COLOR,UA: YELLOW
SL AMB POCT KETONES,UA: NEGATIVE
SL AMB POCT NITRITE,UA: NEGATIVE
SL AMB POCT PH,UA: 7.5
SL AMB POCT SPECIFIC GRAVITY,UA: 1.02
SL AMB POCT URINE PROTEIN: NEGATIVE
SL AMB POCT UROBILINOGEN: 0.2

## 2018-08-21 RX ORDER — CIPROFLOXACIN 500 MG/1
500 TABLET, FILM COATED ORAL EVERY 12 HOURS SCHEDULED
Qty: 10 TABLET | Refills: 0 | Status: SHIPPED | OUTPATIENT
Start: 2018-08-21 | End: 2018-08-26

## 2018-09-11 DIAGNOSIS — R06.9 RESPIRATORY ABNORMALITIES: Primary | ICD-10-CM

## 2018-09-11 RX ORDER — FLUTICASONE FUROATE AND VILANTEROL 200; 25 UG/1; UG/1
1 POWDER RESPIRATORY (INHALATION) DAILY
Qty: 1 INHALER | Refills: 5 | Status: SHIPPED | OUTPATIENT
Start: 2018-09-11 | End: 2018-10-01 | Stop reason: SDUPTHER

## 2018-10-01 ENCOUNTER — TELEPHONE (OUTPATIENT)
Dept: FAMILY MEDICINE CLINIC | Facility: CLINIC | Age: 68
End: 2018-10-01

## 2018-10-01 DIAGNOSIS — R06.9 RESPIRATORY ABNORMALITIES: ICD-10-CM

## 2018-10-01 RX ORDER — FLUTICASONE FUROATE AND VILANTEROL 200; 25 UG/1; UG/1
1 POWDER RESPIRATORY (INHALATION) DAILY
Qty: 3 INHALER | Refills: 3 | Status: SHIPPED | OUTPATIENT
Start: 2018-10-01 | End: 2019-07-26 | Stop reason: SDUPTHER

## 2018-10-01 NOTE — TELEPHONE ENCOUNTER
Becki said her fluticasone furoate vilanterol 200-25 mcg never got faxed from 9/11/18  shei is now without for 2 weeks and needs it  Please resend to the correct fax number I put in    I spoke to william about this

## 2018-10-01 NOTE — TELEPHONE ENCOUNTER
Patient called back and stated she is very disappointed that she did not get her breo and that she is very much in need of this  Please call to advise when this is sent over to her mail order  Also can you please print out a script  to hold her over until she gets the mail order?  She needs to shop around

## 2018-10-03 DIAGNOSIS — F41.9 ANXIETY: ICD-10-CM

## 2018-10-04 ENCOUNTER — CLINICAL SUPPORT (OUTPATIENT)
Dept: FAMILY MEDICINE CLINIC | Facility: CLINIC | Age: 68
End: 2018-10-04
Payer: MEDICARE

## 2018-10-04 DIAGNOSIS — Z23 ENCOUNTER FOR IMMUNIZATION: ICD-10-CM

## 2018-10-04 PROCEDURE — G0008 ADMIN INFLUENZA VIRUS VAC: HCPCS

## 2018-10-04 PROCEDURE — 90662 IIV NO PRSV INCREASED AG IM: CPT

## 2018-10-06 ENCOUNTER — HOSPITAL ENCOUNTER (EMERGENCY)
Facility: HOSPITAL | Age: 68
Discharge: HOME | End: 2018-10-06
Attending: EMERGENCY MEDICINE | Admitting: EMERGENCY MEDICINE
Payer: MEDICARE

## 2018-10-06 ENCOUNTER — APPOINTMENT (EMERGENCY)
Dept: RADIOLOGY | Facility: HOSPITAL | Age: 68
End: 2018-10-06
Payer: MEDICARE

## 2018-10-06 VITALS
DIASTOLIC BLOOD PRESSURE: 83 MMHG | BODY MASS INDEX: 29.05 KG/M2 | WEIGHT: 164 LBS | RESPIRATION RATE: 18 BRPM | SYSTOLIC BLOOD PRESSURE: 159 MMHG | OXYGEN SATURATION: 95 % | HEART RATE: 74 BPM | TEMPERATURE: 98.1 F

## 2018-10-06 DIAGNOSIS — M17.11 ARTHRITIS OF RIGHT KNEE: Primary | ICD-10-CM

## 2018-10-06 DIAGNOSIS — S83.90XA KNEE SPRAIN: ICD-10-CM

## 2018-10-06 PROCEDURE — 96372 THER/PROPH/DIAG INJ SC/IM: CPT

## 2018-10-06 PROCEDURE — 99283 EMERGENCY DEPT VISIT LOW MDM: CPT

## 2018-10-06 PROCEDURE — 73560 X-RAY EXAM OF KNEE 1 OR 2: CPT

## 2018-10-06 RX ORDER — KETOROLAC TROMETHAMINE 30 MG/ML
15 INJECTION, SOLUTION INTRAMUSCULAR; INTRAVENOUS ONCE
Status: COMPLETED | OUTPATIENT
Start: 2018-10-06 | End: 2018-10-06

## 2018-10-06 RX ADMIN — KETOROLAC TROMETHAMINE 15 MG: 30 INJECTION, SOLUTION INTRAMUSCULAR at 12:39

## 2018-10-06 NOTE — ED NOTES
Requesting to have her R knee drained, no trauma to the extremity   States she has an appointment next Thursday to have it drained but the pain is too bad     Amairani Mesa, RN  10/06/18 3251

## 2018-10-06 NOTE — ED PROVIDER NOTES
History  Chief Complaint   Patient presents with    Knee Pain     right knee pain X one week, constant and throbbing in nature,"I think I have fluid" had other knee drained     Patient presents emergency department with right-sided knee pain over the past week  She reports she has had problems with her left knee she had been through PT and seen Orthopedics and ultimately had fluid drained and something injected into her knee which resolved her symptoms  Patient states that her right knee feels like the left did prior  No fevers or other symptoms, no fall or trauma  occ taking tylenol or alive   No injury or trauma  Prior to Admission Medications   Prescriptions Last Dose Informant Patient Reported? Taking?    Glucose Blood (ONETOUCH ULTRA BLUE VI)   Yes No   Si Squirt by In Vitro route 3 (three) times a day   Insulin Syringe-Needle U-100 (B-D INS SYRINGE 0 5CC/31GX5/16) 31G X 5/16" 0 5 ML MISC   Yes No   Sig: by Does not apply route   albuterol (PROAIR HFA) 90 mcg/act inhaler   Yes No   Sig: Inhale 2 puffs   fluticasone (FLONASE) 50 mcg/act nasal spray   Yes No   Si sprays into each nostril daily   fluticasone-vilanterol (BREO ELLIPTA) 200-25 MCG/INH inhaler   No No   Sig: Inhale 1 puff daily   insulin glargine (LANTUS) 100 units/mL subcutaneous injection  Self Yes No   Sig: Inject under the skin Inject 28 units at bedtime    insulin lispro (HUMALOG) 100 units/mL injection  Self Yes No   Sig: Inject under the skin Injection 10 units 3 times daily    losartan (COZAAR) 25 mg tablet   Yes No   Sig: Take 1 tablet by mouth daily   metFORMIN (GLUCOPHAGE) 1000 MG tablet   Yes No   Sig: Take by mouth   nitrofurantoin (MACROBID) 100 mg capsule   No No   Sig: Take 1 capsule (100 mg total) by mouth 2 (two) times a day   sertraline (ZOLOFT) 50 mg tablet   No No   Sig: TAKE 1 TABLET EVERY DAY   simvastatin (ZOCOR) 20 mg tablet   Yes No   Sig: Take 1 tablet by mouth daily      Facility-Administered Medications: None       Past Medical History:   Diagnosis Date    Cellulitis     Contact dermatitis     Diabetes mellitus ([address] )     Hyperlipidemia     Lyme disease        History reviewed  No pertinent surgical history  History reviewed  No pertinent family history  I have reviewed and agree with the history as documented  Social History   Substance Use Topics    Smoking status: Former Smoker    Smokeless tobacco: Never Used    Alcohol use No        Review of Systems   All other systems reviewed and are negative  Physical Exam  Physical Exam   Constitutional: She appears well-developed and well-nourished  HENT:   Head: Normocephalic and atraumatic  Mouth/Throat: Oropharynx is clear and moist    Eyes: Conjunctivae are normal    Neck: Neck supple  Cardiovascular: Normal rate, regular rhythm and normal heart sounds  Pulmonary/Chest: Effort normal and breath sounds normal    Musculoskeletal:        Right knee: She exhibits decreased range of motion, swelling, bony tenderness and abnormal meniscus  She exhibits no effusion, no ecchymosis, no laceration, no erythema, normal alignment, no LCL laxity and no MCL laxity  Tenderness found  Neurological: She is alert  Skin: Skin is warm  Psychiatric: She has a normal mood and affect  Her behavior is normal    Nursing note and vitals reviewed        Vital Signs  ED Triage Vitals   Temperature Pulse Respirations Blood Pressure SpO2   10/06/18 1042 10/06/18 1043 10/06/18 1043 10/06/18 1043 10/06/18 1043   98 1 °F (36 7 °C) 74 18 159/83 95 %      Temp Source Heart Rate Source Patient Position - Orthostatic VS BP Location FiO2 (%)   10/06/18 1042 -- -- -- --   Oral          Pain Score       10/06/18 1043       9           Vitals:    10/06/18 1043   BP: 159/83   Pulse: 74       Visual Acuity      ED Medications  Medications   ketorolac (TORADOL) injection 15 mg (15 mg Intramuscular Given 10/6/18 1239)       Diagnostic Studies  Results Reviewed None                 XR knee 1 or 2 vw right   ED Interpretation by Juliana Medina PA-C (10/06 1311)   djd no acute pathology   Possible small effusion      Final Result by Tiffany Eddy MD (10/06 4391)      Suspect patellofemoral osteoarthritis  If confirmation is needed, sunrise view may be helpful  Workstation performed: [id number]                    Procedures  Procedures       Phone Contacts  ED Phone Contact    ED Course  ED Course as of Oct 07 2238   Sat Oct 06, 2018   1121 Went to see patient  She is not in the room  I am told that she went to go see someone upstairs that is in the hospital currently and will come back soon  MDM  Number of Diagnoses or Management Options  Arthritis of right knee: new and requires workup  Knee sprain: new and requires workup  Risk of Complications, Morbidity, and/or Mortality  General comments: Advised cane Lonnie Snuffer  Offered knee imobilizer  Pt declined  Ace wrap applied to the right knee  Patient Progress  Patient progress: improved    CritCare Time    Disposition  Final diagnoses:   Arthritis of right knee   Knee sprain     Time reflects when diagnosis was documented in both MDM as applicable and the Disposition within this note     Time User Action Codes Description Comment    10/6/2018  1:29 PM Juliana Medina [M17 [id number]] Arthritis of right knee     10/6/2018  1:30 PM Juliana Medina [[id number]] Knee sprain       ED Disposition     ED Disposition Condition Comment    Discharge  Becki Abel discharge to home/self care      Condition at discharge: Good        Follow-up Information     Follow up With Specialties Details Why Contact Info Additional Information    Genna Bobo MD [address] [place] [address]  [phone] [address] [facility] Orthopedic Surgery   [address] [facility] [address]  [address] [facility]    [address] [place] [address]  [phone] [facility], [address], [place], [place], [address]          Discharge Medication List as of 10/6/2018  1:31 PM      CONTINUE these medications which have NOT CHANGED    Details   albuterol (PROAIR HFA) 90 mcg/act inhaler Inhale 2 puffs, Starting Tue 4/25/2017, Historical Med      fluticasone (FLONASE) 50 mcg/act nasal spray 2 sprays into each nostril daily, Starting Tue 4/25/2017, Historical Med      fluticasone-vilanterol (BREO ELLIPTA) 200-25 MCG/INH inhaler Inhale 1 puff daily, Starting Mon 10/1/2018, Normal      Glucose Blood (ONETOUCH ULTRA BLUE VI) 1 Squirt by In Vitro route 3 (three) times a day, Starting Thu 10/1/2015, Historical Med      insulin glargine (LANTUS) 100 units/mL subcutaneous injection Inject under the skin Inject 28 units at bedtime , Starting Tue 3/6/2012, Historical Med      insulin lispro (HUMALOG) 100 units/mL injection Inject under the skin Injection 10 units 3 times daily , Historical Med      Insulin Syringe-Needle U-100 (B-D INS SYRINGE 0 5CC/31GX5/16) 31G X 5/16" 0 5 ML MISC by Does not apply route, Starting Thu 10/1/2015, Historical Med      losartan (COZAAR) 25 mg tablet Take 1 tablet by mouth daily, Starting Wed 1/20/2016, Historical Med      metFORMIN (GLUCOPHAGE) 1000 MG tablet Take by mouth, Starting Tue 8/13/2013, Historical Med      nitrofurantoin (MACROBID) 100 mg capsule Take 1 capsule (100 mg total) by mouth 2 (two) times a day, Starting Mon 8/20/2018, Print      sertraline (ZOLOFT) 50 mg tablet TAKE 1 TABLET EVERY DAY, Normal      simvastatin (ZOCOR) 20 mg tablet Take 1 tablet by mouth daily, Starting Thu 10/1/2015, Historical Med             Outpatient Discharge Orders  Arlis Pallas         ED Provider  Electronically Signed by           Tatum Bonner PA-C  10/07/18 6146

## 2018-10-06 NOTE — DISCHARGE INSTRUCTIONS
Rest and ice area  Ibuprofen/alive  as needed for pain  Ace wrap for support  FU with your doctor/sports med  Return to the ED for worsening symptoms  Knee Sprain   WHAT YOU NEED TO KNOW:   A knee sprain occurs when one or more ligaments in your knee are suddenly stretched or torn  Ligaments are tissues that hold bones together  Ligaments support the knee and keep the joint and bones in the correct position  DISCHARGE INSTRUCTIONS:   Return to the emergency department if:   · Any part of your leg feels cold, numb, or looks pale     Contact your healthcare provider if:   · You have new or increased swelling, bruising, or pain in your knee  · Your symptoms do not improve within 6 weeks, even with treatment  · You have questions or concerns about your condition or care  Medicines:   · NSAIDs , such as ibuprofen, help decrease swelling, pain, and fever  This medicine is available with or without a doctor's order  NSAIDs can cause stomach bleeding or kidney problems in certain people  If you take blood thinner medicine, always ask your healthcare provider if NSAIDs are safe for you  Always read the medicine label and follow directions  · Acetaminophen  decreases pain and fever  It is available without a doctor's order  Ask how much to take and how often to take it  Follow directions  Read the labels of all other medicines you are using to see if they also contain acetaminophen, or ask your doctor or pharmacist  Acetaminophen can cause liver damage if not taken correctly  Do not use more than 4 grams (4,000 milligrams) total of acetaminophen in one day  · Prescription pain medicine  may be given  Ask how to take this medicine safely  · Take your medicine as directed  Contact your healthcare provider if you think your medicine is not helping or if you have side effects  Tell him or her if you are allergic to any medicine  Keep a list of the medicines, vitamins, and herbs you take   Include the amounts, and when and why you take them  Bring the list or the pill bottles to follow-up visits  Carry your medicine list with you in case of an emergency  Self-care:   · Rest  your knee and do not exercise  You may be told to keep weight off your knee  This means that you should not walk on your injured leg  Rest helps decrease swelling and allows the injury to heal  You can do gentle range of motion (ROM) exercises as directed  This will prevent stiffness  · Apply ice  on your knee for 15 to 20 minutes every hour or as directed  Use an ice pack, or put crushed ice in a plastic bag  Cover it with a towel  Ice helps prevent tissue damage and decreases swelling and pain  · Apply compression to your knee as directed  You may need to wear an elastic bandage  This helps keep your injured knee from moving too much while it heals  You can loosen or tighten the elastic bandage to make it comfortable  It should be tight enough for you to feel support  It should not be so tight that it causes your toes to feel numb or tingly  If you are wearing an elastic bandage, take it off and rewrap it once a day  · Elevate your knee  above the level of your heart as often as you can  This will help decrease swelling and pain  Prop your leg on pillows or blankets to keep it elevated comfortably  Do not put pillows directly behind your knee  · Use support devices as directed:  Support devices such as a splint or brace may be needed  These devices limit movement and protect your joint while it heals  You may be given crutches to use until you can stand on your injured leg without pain  Use devices as directed  Physical therapy:  A physical therapist teaches you exercises to help improve movement and strength, and to decrease pain  Prevent another knee sprain:  Exercise your legs to keep your muscles strong  Strong leg muscles help protect your knee and prevent strain   The following may also prevent a knee sprain:  · Slowly start your exercise or training program   Slowly increase the time, distance, and intensity of your exercise  Sudden increases in training may cause you to injure your knee again  · Wear protective braces and equipment as directed  Braces may prevent your knee from moving the wrong way and causing another sprain  Protective equipment may support your bones and ligaments to prevent injury  · Warm up and stretch before exercise  Warm up by walking or using an exercise bike before starting your regular exercise  Do gentle stretches after warming up  This helps to loosen your muscles and decrease stress on your knee  Cool down and stretch after you exercise  · Wear shoes that fit correctly and support your feet  Replace your running or exercise shoes before the padding or shock absorption is worn out  Ask your healthcare provider which exercise shoes are best for you  Ask if you should wear special shoe inserts  Shoe inserts can help support your heels and arches or keep your foot lined up correctly in your shoes  Exercise on flat surfaces  Follow up with your healthcare provider as directed:  Write down your questions so you remember to ask them during your visits  © 2017 [facility] [facility] Information is for End User's use only and may not be sold, redistributed or otherwise used for commercial purposes  All illustrations and images included in CareNotes® are the copyrighted property of [employer] D [employer] M [employer]  or Jayme Rose  The above information is an  only  It is not intended as medical advice for individual conditions or treatments  Talk to your doctor, nurse or pharmacist before following any medical regimen to see if it is safe and effective for you  Osteoarthritis   WHAT YOU NEED TO KNOW:   Osteoarthritis (OA) occurs when cartilage (tissue that cushions a joint) wears away slowly and causes the bones to rub together   OA is a long-term condition that often affects the hands, neck, lower back, knees, and hips  OA is also called arthrosis or degenerative joint disease  DISCHARGE INSTRUCTIONS:   Return to the emergency department if:   · You have severe pain  · You cannot move your joint  Contact your healthcare provider if:   · You have a fever  · Your joint is red and tender  · You have questions or concerns about your condition or care  Medicines:   · Acetaminophen  is used to decrease pain  It is available without a doctor's order  Ask how much to take and how often to take it  Follow directions  Acetaminophen can cause liver damage if not taken correctly  · NSAIDs , such as ibuprofen, help decrease swelling, pain, and fever  This medicine is available with or without a doctor's order  NSAIDs can cause stomach bleeding or kidney problems in certain people  If you take blood thinner medicine, always ask your healthcare provider if NSAIDs are safe for you  Always read the medicine label and follow directions  · Prescription pain medicine  may be given to decrease severe pain if other medicines do not work  Take the medicine as directed  Do not wait until the pain is severe before you take your medicine  · Take your medicine as directed  Contact your healthcare provider if you think your medicine is not helping or if you have side effects  Tell him or her if you are allergic to any medicine  Keep a list of the medicines, vitamins, and herbs you take  Include the amounts, and when and why you take them  Bring the list or the pill bottles to follow-up visits  Carry your medicine list with you in case of an emergency  Follow up with your healthcare provider as directed:  Write down your questions so you remember to ask them during your visits  Go to physical therapy as directed:  A physical therapist teaches you exercises to help improve movement and strength, and to decrease pain in your joints   The exercises also help lower your risk for loss of function  Manage your OA:   · Stay active  Physical activity may reduce your pain and improve your ability to do daily activities  Avoid activities that cause pain  Ask your healthcare provider what type of exercise would be best for you  · Maintain a healthy weight  This helps decrease the strain on the joints in your back, hips, knees, ankles, and feet  Ask your healthcare provider how much you should weigh  Ask him to help you create a weight loss plan if you are overweight  · Use heat or ice  on your joints as directed  Heat and ice help decrease pain, swelling, and muscle spasms  Use a heating pad on a low setting or take a warm bath  Use an ice pack, or put crushed ice in a plastic bag  Cover it with a towel  · Massage  the muscles around the joint to relieve pain and stiffness  · Use a cane, crutches, or a walker  to protect and relieve pressure on your ankle, knee, and hip joints  You may also be prescribed shoe inserts to decrease pressure in your joints  · Wear flat or low-heeled shoes  This will help decrease pain and reduce pressure on your ankle, knee, and hip joints  © 2017 [facility] Information is for End User's use only and may not be sold, redistributed or otherwise used for commercial purposes  All illustrations and images included in CareNotes® are the copyrighted property of [employer]  or Jayme Rose  The above information is an  only  It is not intended as medical advice for individual conditions or treatments  Talk to your doctor, nurse or pharmacist before following any medical regimen to see if it is safe and effective for you

## 2019-01-31 DIAGNOSIS — F41.9 ANXIETY: ICD-10-CM

## 2019-02-01 DIAGNOSIS — F41.9 ANXIETY: ICD-10-CM

## 2019-02-06 ENCOUNTER — TRANSCRIBE ORDERS (OUTPATIENT)
Dept: ADMINISTRATIVE | Facility: HOSPITAL | Age: 69
End: 2019-02-06

## 2019-02-06 DIAGNOSIS — Z12.39 SCREENING BREAST EXAMINATION: Primary | ICD-10-CM

## 2019-03-06 ENCOUNTER — HOSPITAL ENCOUNTER (OUTPATIENT)
Dept: MAMMOGRAPHY | Facility: HOSPITAL | Age: 69
Discharge: HOME | End: 2019-03-06
Payer: MEDICARE

## 2019-03-06 VITALS — HEIGHT: 63 IN | WEIGHT: 164 LBS | BODY MASS INDEX: 29.06 KG/M2

## 2019-03-06 DIAGNOSIS — Z12.39 SCREENING BREAST EXAMINATION: ICD-10-CM

## 2019-03-06 PROCEDURE — 77067 SCR MAMMO BI INCL CAD: CPT

## 2019-04-08 ENCOUNTER — OFFICE VISIT (OUTPATIENT)
Dept: FAMILY MEDICINE CLINIC | Facility: CLINIC | Age: 69
End: 2019-04-08
Payer: MEDICARE

## 2019-04-08 VITALS
DIASTOLIC BLOOD PRESSURE: 80 MMHG | WEIGHT: 163.6 LBS | SYSTOLIC BLOOD PRESSURE: 120 MMHG | BODY MASS INDEX: 28.99 KG/M2 | TEMPERATURE: 98.3 F | RESPIRATION RATE: 16 BRPM | HEART RATE: 80 BPM | HEIGHT: 63 IN

## 2019-04-08 DIAGNOSIS — J40 BRONCHITIS: Primary | ICD-10-CM

## 2019-04-08 PROCEDURE — 94640 AIRWAY INHALATION TREATMENT: CPT | Performed by: FAMILY MEDICINE

## 2019-04-08 PROCEDURE — 99214 OFFICE O/P EST MOD 30 MIN: CPT | Performed by: FAMILY MEDICINE

## 2019-04-08 RX ORDER — AZITHROMYCIN 250 MG/1
TABLET, FILM COATED ORAL
Qty: 6 TABLET | Refills: 0 | Status: SHIPPED | OUTPATIENT
Start: 2019-04-08 | End: 2019-04-13

## 2019-04-08 RX ORDER — HYDROXYCHLOROQUINE SULFATE 200 MG/1
200 TABLET, FILM COATED ORAL 2 TIMES DAILY WITH MEALS
COMMUNITY

## 2019-04-08 RX ORDER — PREDNISOLONE ACETATE 10 MG/ML
SUSPENSION/ DROPS OPHTHALMIC
Refills: 1 | COMMUNITY
Start: 2019-02-08 | End: 2019-12-21 | Stop reason: ALTCHOICE

## 2019-04-08 RX ORDER — PREDNISONE 20 MG/1
TABLET ORAL
Qty: 15 TABLET | Refills: 0 | Status: ON HOLD | OUTPATIENT
Start: 2019-04-08 | End: 2019-05-08 | Stop reason: ALTCHOICE

## 2019-04-08 RX ORDER — ALBUTEROL SULFATE 2.5 MG/3ML
2.5 SOLUTION RESPIRATORY (INHALATION) ONCE
Status: COMPLETED | OUTPATIENT
Start: 2019-04-08 | End: 2019-04-08

## 2019-04-08 RX ORDER — BENZONATATE 100 MG/1
100 CAPSULE ORAL 3 TIMES DAILY PRN
Qty: 20 CAPSULE | Refills: 0 | Status: ON HOLD | OUTPATIENT
Start: 2019-04-08 | End: 2019-05-08 | Stop reason: ALTCHOICE

## 2019-04-08 RX ADMIN — ALBUTEROL SULFATE 2.5 MG: 2.5 SOLUTION RESPIRATORY (INHALATION) at 14:51

## 2019-04-16 ENCOUNTER — OFFICE VISIT (OUTPATIENT)
Dept: GASTROENTEROLOGY | Facility: AMBULARY SURGERY CENTER | Age: 69
End: 2019-04-16
Payer: MEDICARE

## 2019-04-16 VITALS
WEIGHT: 165.8 LBS | DIASTOLIC BLOOD PRESSURE: 62 MMHG | HEIGHT: 63 IN | TEMPERATURE: 97.8 F | BODY MASS INDEX: 29.38 KG/M2 | RESPIRATION RATE: 18 BRPM | HEART RATE: 68 BPM | SYSTOLIC BLOOD PRESSURE: 124 MMHG

## 2019-04-16 DIAGNOSIS — Z12.11 SCREEN FOR COLON CANCER: ICD-10-CM

## 2019-04-16 DIAGNOSIS — K21.9 GASTROESOPHAGEAL REFLUX DISEASE WITHOUT ESOPHAGITIS: Primary | ICD-10-CM

## 2019-04-16 PROCEDURE — 99204 OFFICE O/P NEW MOD 45 MIN: CPT | Performed by: INTERNAL MEDICINE

## 2019-04-16 PROCEDURE — 1124F ACP DISCUSS-NO DSCNMKR DOCD: CPT | Performed by: INTERNAL MEDICINE

## 2019-04-16 RX ORDER — VALACYCLOVIR HYDROCHLORIDE 1 G/1
1000 TABLET, FILM COATED ORAL 2 TIMES DAILY
COMMUNITY

## 2019-04-25 ENCOUNTER — ANESTHESIA EVENT (OUTPATIENT)
Dept: PERIOP | Facility: AMBULARY SURGERY CENTER | Age: 69
End: 2019-04-25
Payer: MEDICARE

## 2019-05-08 ENCOUNTER — ANESTHESIA (OUTPATIENT)
Dept: PERIOP | Facility: AMBULARY SURGERY CENTER | Age: 69
End: 2019-05-08
Payer: MEDICARE

## 2019-05-08 ENCOUNTER — HOSPITAL ENCOUNTER (OUTPATIENT)
Facility: AMBULARY SURGERY CENTER | Age: 69
Setting detail: OUTPATIENT SURGERY
Discharge: HOME | End: 2019-05-08
Attending: INTERNAL MEDICINE | Admitting: INTERNAL MEDICINE
Payer: MEDICARE

## 2019-05-08 VITALS
BODY MASS INDEX: 30.21 KG/M2 | OXYGEN SATURATION: 94 % | SYSTOLIC BLOOD PRESSURE: 134 MMHG | RESPIRATION RATE: 18 BRPM | HEART RATE: 57 BPM | HEIGHT: 61 IN | DIASTOLIC BLOOD PRESSURE: 72 MMHG | TEMPERATURE: 96.5 F | WEIGHT: 160 LBS

## 2019-05-08 DIAGNOSIS — Z12.11 SCREEN FOR COLON CANCER: ICD-10-CM

## 2019-05-08 DIAGNOSIS — K21.9 GASTROESOPHAGEAL REFLUX DISEASE WITHOUT ESOPHAGITIS: ICD-10-CM

## 2019-05-08 LAB — GLUCOSE SERPL-MCNC: 108 MG/DL (ref 65–140)

## 2019-05-08 PROCEDURE — 88305 TISSUE EXAM BY PATHOLOGIST: CPT | Performed by: PATHOLOGY

## 2019-05-08 PROCEDURE — 43239 EGD BIOPSY SINGLE/MULTIPLE: CPT | Performed by: INTERNAL MEDICINE

## 2019-05-08 PROCEDURE — G0121 COLON CA SCRN NOT HI RSK IND: HCPCS | Performed by: INTERNAL MEDICINE

## 2019-05-08 PROCEDURE — NC001 PR NO CHARGE: Performed by: INTERNAL MEDICINE

## 2019-05-08 PROCEDURE — 82948 REAGENT STRIP/BLOOD GLUCOSE: CPT

## 2019-05-08 RX ORDER — ASPIRIN 81 MG/1
81 TABLET, CHEWABLE ORAL DAILY
COMMUNITY

## 2019-05-08 RX ORDER — LIDOCAINE HYDROCHLORIDE 10 MG/ML
INJECTION, SOLUTION INFILTRATION; PERINEURAL AS NEEDED
Status: DISCONTINUED | OUTPATIENT
Start: 2019-05-08 | End: 2019-05-08 | Stop reason: SURG

## 2019-05-08 RX ORDER — SODIUM CHLORIDE, SODIUM LACTATE, POTASSIUM CHLORIDE, CALCIUM CHLORIDE 600; 310; 30; 20 MG/100ML; MG/100ML; MG/100ML; MG/100ML
125 INJECTION, SOLUTION INTRAVENOUS CONTINUOUS
Status: DISCONTINUED | OUTPATIENT
Start: 2019-05-08 | End: 2019-05-08 | Stop reason: HOSPADM

## 2019-05-08 RX ORDER — PANTOPRAZOLE SODIUM 40 MG/1
40 TABLET, DELAYED RELEASE ORAL DAILY
Qty: 30 TABLET | Refills: 11 | Status: SHIPPED | OUTPATIENT
Start: 2019-05-08

## 2019-05-08 RX ORDER — SODIUM CHLORIDE 9 MG/ML
INJECTION, SOLUTION INTRAVENOUS CONTINUOUS PRN
Status: DISCONTINUED | OUTPATIENT
Start: 2019-05-08 | End: 2019-05-08 | Stop reason: SURG

## 2019-05-08 RX ORDER — PROPOFOL 10 MG/ML
INJECTION, EMULSION INTRAVENOUS AS NEEDED
Status: DISCONTINUED | OUTPATIENT
Start: 2019-05-08 | End: 2019-05-08 | Stop reason: SURG

## 2019-05-08 RX ADMIN — PROPOFOL 30 MG: 10 INJECTION, EMULSION INTRAVENOUS at 08:37

## 2019-05-08 RX ADMIN — PROPOFOL 80 MG: 10 INJECTION, EMULSION INTRAVENOUS at 08:23

## 2019-05-08 RX ADMIN — PROPOFOL 40 MG: 10 INJECTION, EMULSION INTRAVENOUS at 08:30

## 2019-05-08 RX ADMIN — PROPOFOL 40 MG: 10 INJECTION, EMULSION INTRAVENOUS at 08:26

## 2019-05-08 RX ADMIN — SODIUM CHLORIDE, SODIUM LACTATE, POTASSIUM CHLORIDE, AND CALCIUM CHLORIDE 125 ML/HR: .6; .31; .03; .02 INJECTION, SOLUTION INTRAVENOUS at 08:01

## 2019-05-08 RX ADMIN — PROPOFOL 40 MG: 10 INJECTION, EMULSION INTRAVENOUS at 08:34

## 2019-05-08 RX ADMIN — PROPOFOL 30 MG: 10 INJECTION, EMULSION INTRAVENOUS at 08:40

## 2019-05-08 RX ADMIN — SODIUM CHLORIDE: 0.9 INJECTION, SOLUTION INTRAVENOUS at 08:20

## 2019-05-08 RX ADMIN — LIDOCAINE HYDROCHLORIDE ANHYDROUS 50 MG: 10 INJECTION, SOLUTION INFILTRATION at 08:23

## 2019-05-09 ENCOUNTER — TELEPHONE (OUTPATIENT)
Dept: GASTROENTEROLOGY | Facility: CLINIC | Age: 69
End: 2019-05-09

## 2019-07-23 ENCOUNTER — OFFICE VISIT (OUTPATIENT)
Dept: GASTROENTEROLOGY | Facility: AMBULARY SURGERY CENTER | Age: 69
End: 2019-07-23
Payer: MEDICARE

## 2019-07-23 VITALS
BODY MASS INDEX: 30.85 KG/M2 | SYSTOLIC BLOOD PRESSURE: 134 MMHG | HEART RATE: 72 BPM | HEIGHT: 61 IN | WEIGHT: 163.4 LBS | TEMPERATURE: 98.1 F | RESPIRATION RATE: 16 BRPM | DIASTOLIC BLOOD PRESSURE: 82 MMHG

## 2019-07-23 DIAGNOSIS — K44.9 HIATAL HERNIA: Primary | ICD-10-CM

## 2019-07-23 PROCEDURE — 99213 OFFICE O/P EST LOW 20 MIN: CPT | Performed by: PHYSICIAN ASSISTANT

## 2019-07-23 RX ORDER — BACLOFEN 10 MG/1
10 TABLET ORAL
Refills: 4 | COMMUNITY
Start: 2019-06-25

## 2019-07-23 NOTE — PROGRESS NOTES
Follow-up Note -  [facility]  Becki Roman 1950 80 y o  female         Reason:  Follow-up; hiatal hernia    HPI:  52-year-old female with history of diabetes who presents for follow-up; she was last seen in the office with Dr Maribel Paulino and April primarily for colorectal cancer screening; patient reported she also was expecting some reflux symptoms and had been diagnosed with a hiatal hernia via EGD about 10 years earlier, and wanted to follow-up on this  EGD and colonoscopy were both done, colonoscopy was normal and showed no polyps  EGD did demonstrate a hiatal hernia measuring approximately 7 cm in size  There is also some superficial duodenal ulcerations  She was started on Protonix  Gastric biopsies were negative for H  Pylori  At this time, the patient says she is feeling relatively well, she denies any worsening of acid reflux symptoms  She first tells me that she is actually not experiencing any significant heartburn, acid regurgitation issues, etc , but goes on to say that she does actually live with low level/minor reflux occurring most days that she tends to minimize  The Protonix does seem to be helping  She denies any difficulties with swallowing  She mentions that her sister actually also had a hiatal hernia which required repair  She says that she is not experiencing any unexplained weight loss, her appetite is good, she denies any disturbances to her bowel habits except some intermittent loose stools which are infrequent and which she attributes to starting Protonix  She denies any blood or mucus in the stools  REVIEW OF SYSTEMS:      CONSTITUTIONAL: Denies any fever, chills, or rigors  Good appetite, and no recent weight loss  HEENT: No earache or tinnitus  Denies hearing loss or visual disturbances  CARDIOVASCULAR: No chest pain or palpitations  RESPIRATORY: Denies any cough, hemoptysis, shortness of breath or dyspnea on exertion    GASTROINTESTINAL: As noted in the History of Present Illness  GENITOURINARY: No problems with urination  Denies any hematuria or dysuria  NEUROLOGIC: No dizziness or vertigo, denies headaches  MUSCULOSKELETAL: Denies any muscle or joint pain  SKIN: Denies skin rashes or itching  ENDOCRINE: Denies excessive thirst  Denies intolerance to heat or cold  PSYCHOSOCIAL: Denies depression or anxiety  Denies any recent memory loss  Past Medical History:   Diagnosis Date    Cellulitis     Contact dermatitis     Diabetes mellitus ([address] )     Hiatal hernia     Hyperlipidemia     Lyme disease       Past Surgical History:   Procedure Laterality Date    ABDOMINAL SURGERY      tummy tuck    HYSTERECTOMY      age 51    IN COLONOSCOPY FLX DX W/COLLJ SPEC WHEN PFRMD N/A 2019    Procedure: COLONOSCOPY;  Surgeon: Sonya Loza MD;  Location: [facility]; Service: Gastroenterology    IN ESOPHAGOGASTRODUODENOSCOPY TRANSORAL DIAGNOSTIC N/A 2019    Procedure: ESOPHAGOGASTRODUODENOSCOPY (EGD); Surgeon: Sonya Loza MD;  Location: [facility]; Service: Gastroenterology    TUBAL LIGATION       Social History     Socioeconomic History    Marital status:       Spouse name: Not on file    Number of children: Not on file    Years of education: Not on file    Highest education level: Not on file   Occupational History    Not on file   Social Needs    Financial resource strain: Not on file    Food insecurity:     Worry: Not on file     Inability: Not on file    Transportation needs:     Medical: Not on file     Non-medical: Not on file   Tobacco Use    Smoking status: Former Smoker     Last attempt to quit: 2008     Years since quittin 0    Smokeless tobacco: Never Used   Substance and Sexual Activity    Alcohol use: Not Currently     Comment: seldom    Drug use: Yes     Types: Marijuana     Comment: smokes weed 3x per week    Sexual activity: Not on file   Lifestyle    Physical activity:     Days per week: Not on file     Minutes per session: Not on file    Stress: Not on file   Relationships    Social connections:     Talks on phone: Not on file     Gets together: Not on file     Attends Nondenominational service: Not on file     Active member of club or organization: Not on file     Attends meetings of clubs or organizations: Not on file     Relationship status: Not on file    Intimate partner violence:     Fear of current or ex partner: Not on file     Emotionally abused: Not on file     Physically abused: Not on file     Forced sexual activity: Not on file   Other Topics Concern    Not on file   Social History Narrative    Not on file     History reviewed  No pertinent family history  Augmentin [amoxicillin-pot clavulanate];  Codeine; and Macrobid [nitrofurantoin]  Current Outpatient Medications   Medication Sig Dispense Refill    aspirin 81 mg chewable tablet Chew 81 mg daily      baclofen 10 mg tablet Take 10 mg by mouth daily at bedtime  4    Cholecalciferol (VITAMIN D PO) Take 1 tablet by mouth daily      fluticasone-vilanterol (BREO ELLIPTA) 200-25 MCG/INH inhaler Inhale 1 puff daily 3 Inhaler 3    Glucose Blood (ONETOUCH ULTRA BLUE VI) 1 Squirt by In Vitro route 3 (three) times a day      hydroxychloroquine (PLAQUENIL) 200 mg tablet Take 200 mg by mouth 2 (two) times a day with meals      insulin glargine (LANTUS) 100 units/mL subcutaneous injection Inject under the skin Inject 28 units at bedtime       insulin lispro (HUMALOG) 100 units/mL injection Inject under the skin Injection 10 units 3 times daily       Insulin Syringe-Needle U-100 (B-D INS SYRINGE 0 5CC/31GX5/16) 31G X 5/16" 0 5 ML MISC by Does not apply route      losartan (COZAAR) 25 mg tablet Take 1 tablet by mouth daily      metFORMIN (GLUCOPHAGE) 1000 MG tablet Take by mouth      pantoprazole (PROTONIX) 40 mg tablet Take 1 tablet (40 mg total) by mouth daily 30 tablet 11    sertraline (ZOLOFT) 50 mg tablet Take 1 tablet (50 mg total) by mouth daily 90 tablet 1    simvastatin (ZOCOR) 20 mg tablet Take 1 tablet by mouth daily      valACYclovir (VALTREX) 1,000 mg tablet Take 1,000 mg by mouth 2 (two) times a day      prednisoLONE acetate (PRED FORTE) 1 % ophthalmic suspension INSTILL 1 DROP INTO BOTH EYES 4 TIMES A DAY FOR 2 WEEKS  1     No current facility-administered medications for this visit  Blood pressure 134/82, pulse 72, temperature 98 1 °F (36 7 °C), temperature source Tympanic, resp  rate 16, height 5' 1" (1 549 m), weight 74 1 kg (163 lb 6 4 oz)  PHYSICAL EXAM:      General Appearance:   Alert, cooperative, no distress, appears stated age    HEENT:   Normocephalic, atraumatic, anicteric      Neck:  Supple, symmetrical, trachea midline, no adenopathy;    thyroid: no enlargement/tenderness/nodules; no carotid  bruit or JVD    Lungs:   Clear to auscultation bilaterally; no rales, rhonchi or wheezing; respirations unlabored    Heart[de-identified]   S1 and S2 normal; regular rate and rhythm; no murmur, rub, or gallop  Abdomen:   Soft, non-tender, non-distended; normal bowel sounds; no masses, no organomegaly    Extremities: No edema, erythema, wounds, rashes   Rectal:   Deferred                      Lab Results   Component Value Date    WBC 8 02 11/10/2015    HGB 11 1 (L) 11/10/2015    HCT 35 8 11/10/2015    MCV 75 (L) 11/10/2015     11/10/2015     Lab Results   Component Value Date    GLUCOSE 105 11/10/2015    CALCIUM 8 8 11/10/2015     11/10/2015    K 4 0 11/10/2015    CO2 24 2 11/10/2015     11/10/2015    BUN 10 11/10/2015    CREATININE 0 63 11/10/2015     No results found for: ALT, AST, GGT, ALKPHOS, BILITOT  No results found for: INR, PROTIME    No results found  ASSESSMENT & PLAN:    Hiatal hernia  Patient reports some long-standing mild reflux symptoms which appear to be helped with Protonix that was started after her EGD/colonoscopy 3 months ago    She was noted with a significantly sized (approximately 7 cm) hiatal hernia on this EGD    - I had discussion with patient about hiatal hernia repair, we'll refer to thoracic surgery for further discussion, can decide about approach and indication for surgery versus continuing medical treatment    - Will continue Protonix for now    - Avoid NSAIDs    - I advised patient to let us know if she experiences any worsening acid reflux symptoms or any new symptoms such as difficulty swallowing, postprandial abdominal pain, etc   If she develops such symptoms, we can consider increasing her acid reducing regimen    -  Patient was explained about the lifestyle and dietary modifications  Advised to avoid fatty foods, chocolates, caffeine, alcohol and any other triggering foods  Avoid eating for at least 3 hours before going to bed

## 2019-07-26 DIAGNOSIS — R06.9 RESPIRATORY ABNORMALITIES: ICD-10-CM

## 2019-07-26 RX ORDER — FLUTICASONE FUROATE AND VILANTEROL 200; 25 UG/1; UG/1
1 POWDER RESPIRATORY (INHALATION) DAILY
Qty: 3 INHALER | Refills: 0 | Status: SHIPPED | OUTPATIENT
Start: 2019-07-26 | End: 2019-10-25 | Stop reason: SDUPTHER

## 2019-08-13 ENCOUNTER — OFFICE VISIT (OUTPATIENT)
Dept: FAMILY MEDICINE CLINIC | Facility: CLINIC | Age: 69
End: 2019-08-13
Payer: MEDICARE

## 2019-08-13 VITALS
HEART RATE: 88 BPM | OXYGEN SATURATION: 92 % | TEMPERATURE: 98.6 F | RESPIRATION RATE: 18 BRPM | BODY MASS INDEX: 30.62 KG/M2 | WEIGHT: 162.2 LBS | SYSTOLIC BLOOD PRESSURE: 126 MMHG | HEIGHT: 61 IN | DIASTOLIC BLOOD PRESSURE: 80 MMHG

## 2019-08-13 DIAGNOSIS — J45.909 MODERATE ASTHMA WITHOUT COMPLICATION, UNSPECIFIED WHETHER PERSISTENT: ICD-10-CM

## 2019-08-13 DIAGNOSIS — J06.9 UPPER RESPIRATORY TRACT INFECTION, UNSPECIFIED TYPE: Primary | ICD-10-CM

## 2019-08-13 DIAGNOSIS — E11.65 TYPE 2 DIABETES MELLITUS WITH HYPERGLYCEMIA, UNSPECIFIED WHETHER LONG TERM INSULIN USE (HCC): ICD-10-CM

## 2019-08-13 LAB — SL AMB POCT HEMOGLOBIN AIC: 7.1 (ref ?–6.5)

## 2019-08-13 PROCEDURE — 83036 HEMOGLOBIN GLYCOSYLATED A1C: CPT

## 2019-08-13 PROCEDURE — 99213 OFFICE O/P EST LOW 20 MIN: CPT

## 2019-08-13 PROCEDURE — 94640 AIRWAY INHALATION TREATMENT: CPT

## 2019-08-13 PROCEDURE — 1124F ACP DISCUSS-NO DSCNMKR DOCD: CPT

## 2019-08-13 RX ORDER — FLUCONAZOLE 150 MG/1
150 TABLET ORAL ONCE
Qty: 3 TABLET | Refills: 0 | Status: SHIPPED | OUTPATIENT
Start: 2019-08-13 | End: 2019-08-13

## 2019-08-13 RX ORDER — CEFPROZIL 500 MG/1
500 TABLET, FILM COATED ORAL 2 TIMES DAILY
Qty: 20 TABLET | Refills: 0 | Status: SHIPPED | OUTPATIENT
Start: 2019-08-13 | End: 2019-08-23

## 2019-08-13 RX ORDER — ALBUTEROL SULFATE 2.5 MG/3ML
2.5 SOLUTION RESPIRATORY (INHALATION) ONCE
Status: COMPLETED | OUTPATIENT
Start: 2019-08-13 | End: 2019-08-13

## 2019-08-13 RX ORDER — ALBUTEROL SULFATE 2.5 MG/3ML
2.5 SOLUTION RESPIRATORY (INHALATION) EVERY 6 HOURS PRN
Qty: 25 VIAL | Refills: 1 | Status: SHIPPED | OUTPATIENT
Start: 2019-08-13 | End: 2021-12-30 | Stop reason: SDUPTHER

## 2019-08-13 RX ADMIN — ALBUTEROL SULFATE 2.5 MG: 2.5 SOLUTION RESPIRATORY (INHALATION) at 18:21

## 2019-08-13 NOTE — ASSESSMENT & PLAN NOTE
Asthma  Patient was given an albuterol breathing treatment in the office x1  She was also given albuterol solution to use 3-4 times at home as needed for coughing or wheezing

## 2019-08-20 ENCOUNTER — HOSPITAL ENCOUNTER (OUTPATIENT)
Dept: RADIOLOGY | Facility: HOSPITAL | Age: 69
Discharge: HOME | End: 2019-08-20
Payer: MEDICARE

## 2019-08-20 DIAGNOSIS — J06.9 UPPER RESPIRATORY TRACT INFECTION, UNSPECIFIED TYPE: ICD-10-CM

## 2019-08-20 PROCEDURE — 71046 X-RAY EXAM CHEST 2 VIEWS: CPT

## 2019-08-21 ENCOUNTER — TELEPHONE (OUTPATIENT)
Dept: FAMILY MEDICINE CLINIC | Facility: CLINIC | Age: 69
End: 2019-08-21

## 2019-08-21 DIAGNOSIS — J06.9 UPPER RESPIRATORY TRACT INFECTION, UNSPECIFIED TYPE: Primary | ICD-10-CM

## 2019-08-21 RX ORDER — PREDNISONE 10 MG/1
TABLET ORAL
Qty: 15 TABLET | Refills: 0 | Status: SHIPPED | OUTPATIENT
Start: 2019-08-21 | End: 2019-12-21 | Stop reason: ALTCHOICE

## 2019-08-22 NOTE — TELEPHONE ENCOUNTER
Patient called and stated that she received the message and got the medication    She will wait a few days and see how she feels on the medication and call back if needed
Patient called stating since she was here last week she is getting so much worse  She is developing a cough and has a headache  She is taking the antibiotic that was prescribed, although she stopped the breathing treating due to it making her cough worse and dizzy  She went for the chest xray yesterday, the results are not back at this time  Do you have any suggestions for patient  Please contact her at [phone] 
Radiology Reading Room contacted to read CXR  L/M for pt to return this phone call for MD as acknowledgement of receiving this message 
We can add some prednisone for few days however, if feeling worse with chest or SOB she should consider eval at [facility] or ER I will send in
100

## 2019-08-26 ENCOUNTER — TELEPHONE (OUTPATIENT)
Dept: FAMILY MEDICINE CLINIC | Facility: CLINIC | Age: 69
End: 2019-08-26

## 2019-08-26 NOTE — TELEPHONE ENCOUNTER
----- Message from Gia Salas MD sent at 6/06/7229  7:18 AM EDT -----  CXR showed no evidence of pneumonia

## 2019-09-26 ENCOUNTER — OFFICE VISIT (OUTPATIENT)
Dept: CARDIAC SURGERY | Facility: CLINIC | Age: 69
End: 2019-09-26
Payer: MEDICARE

## 2019-09-26 VITALS
SYSTOLIC BLOOD PRESSURE: 185 MMHG | WEIGHT: 161.2 LBS | OXYGEN SATURATION: 95 % | BODY MASS INDEX: 30.43 KG/M2 | HEIGHT: 61 IN | DIASTOLIC BLOOD PRESSURE: 91 MMHG | TEMPERATURE: 97.3 F | HEART RATE: 65 BPM

## 2019-09-26 DIAGNOSIS — K44.9 HIATAL HERNIA: Primary | ICD-10-CM

## 2019-09-26 PROBLEM — R53.83 FATIGUE: Status: RESOLVED | Noted: 2018-02-23 | Resolved: 2019-09-26

## 2019-09-26 PROCEDURE — 99205 OFFICE O/P NEW HI 60 MIN: CPT | Performed by: THORACIC SURGERY (CARDIOTHORACIC VASCULAR SURGERY)

## 2019-09-26 NOTE — PROGRESS NOTES
Thoracic Consult  Assessment/Plan:    Hiatal hernia  We had a discussion with Ms Roman regarding her hiatal hernia seen on EGD  Although, she is mostly asymptomatic, there is concern for this to increase in size over time  We discussed the procedure and post operative course  Dr Michel Wilson is recommending a CT Scan and barium swallow for further evaluation  Once those are completed, we will have her return to the office to further discuss her treatment options  She is agreement with the plan  Diagnoses and all orders for this visit:    Hiatal hernia  -     FL barium swallow; Future  -     CT chest wo contrast; Future          Thoracic History   Diagnosis: Hiatal Hernia    Procedures/Surgeries:    Pathology:    Adjuvant Therapy:          Patient ID: Daniel Gillespie is a 80 y o  female  HPI    Ms Sariah Vidal is a 81 yo female with a h/o DM who was referred by Dr Kristian Culp for a hiatal hernia  She has known about a hiatal hernia for about 10 years, but her most recent EGD demonstrated the measurement to be 7 cm  Gastric biopsies were negative for H Pylori  On discussion, she is somewhat symptomatic  She has had 2 episodes of vomiting in the past month  She does not have any hematemesis, chest pain, nausea, regurgitation, or abdominal pain  She does take Protonix, which helps her heartburn  Past Medical History:   Diagnosis Date    Anxiety     Arthritis of right knee     Cellulitis     Contact dermatitis     Cough     Diabetes mellitus ([address] )     Headache     Hiatal hernia     Hyperlipidemia     Knee sprain     Lyme disease     Tick bite       Past Surgical History:   Procedure Laterality Date    ABDOMINAL SURGERY      tummy tuck    HYSTERECTOMY      age 51    VA COLONOSCOPY FLX DX W/COLLJ SPEC WHEN PFRMD N/A 5/8/2019    Procedure: COLONOSCOPY;  Surgeon: Ruel Shah MD;  [facility];   Service: Gastroenterology    VA ESOPHAGOGASTRODUODENOSCOPY TRANSORAL DIAGNOSTIC N/A 5/8/2019 Procedure: ESOPHAGOGASTRODUODENOSCOPY (EGD); Surgeon: Imani Arnold MD;  [facility]; Service: Gastroenterology    TUBAL LIGATION        Family History   Problem Relation Age of Onset    Mental illness Mother     Heart disease Father     Colon cancer Father 67      Social History     Socioeconomic History    Marital status:       Spouse name: Not on file    Number of children: Not on file    Years of education: Not on file    Highest education level: Not on file   Occupational History    Not on file   Social Needs    Financial resource strain: Not on file    Food insecurity:     Worry: Not on file     Inability: Not on file    Transportation needs:     Medical: Not on file     Non-medical: Not on file   Tobacco Use    Smoking status: Former Smoker     Packs/day: 0 50     Years: 25 00     Pack years: 12 50     Types: Cigarettes     Last attempt to quit: 2008     Years since quittin 1    Smokeless tobacco: Never Used   Substance and Sexual Activity    Alcohol use: Not Currently     Comment: seldom    Drug use: Yes     Types: Marijuana     Comment: smokes weed 3x per week    Sexual activity: Not on file   Lifestyle    Physical activity:     Days per week: Not on file     Minutes per session: Not on file    Stress: Not on file   Relationships    Social connections:     Talks on phone: Not on file     Gets together: Not on file     Attends Gnosticist service: Not on file     Active member of club or organization: Not on file     Attends meetings of clubs or organizations: Not on file     Relationship status: Not on file    Intimate partner violence:     Fear of current or ex partner: Not on file     Emotionally abused: Not on file     Physically abused: Not on file     Forced sexual activity: Not on file   Other Topics Concern    Not on file   Social History Narrative    Not on file      Allergies   Allergen Reactions    Augmentin [Amoxicillin-Pot Clavulanate]     Codeine Drowsiness    Macrobid [Nitrofurantoin]      Current Outpatient Medications on File Prior to Visit   Medication Sig Dispense Refill    albuterol (2 5 mg/3 mL) 0 083 % nebulizer solution Take 1 vial (2 5 mg total) by nebulization every 6 (six) hours as needed for wheezing or shortness of breath 25 vial 1    aspirin 81 mg chewable tablet Chew 81 mg daily      baclofen 10 mg tablet Take 10 mg by mouth daily at bedtime  4    Cholecalciferol (VITAMIN D PO) Take 1 tablet by mouth daily      fluticasone-vilanterol (BREO ELLIPTA) 200-25 MCG/INH inhaler Inhale 1 puff daily 3 Inhaler 0    Glucose Blood (ONETOUCH ULTRA BLUE VI) 1 Squirt by In Vitro route 3 (three) times a day      hydroxychloroquine (PLAQUENIL) 200 mg tablet Take 200 mg by mouth 2 (two) times a day with meals      insulin glargine (LANTUS) 100 units/mL subcutaneous injection Inject under the skin Inject 28 units at bedtime       insulin lispro (HUMALOG) 100 units/mL injection Inject under the skin Injection 10 units 3 times daily       Insulin Syringe-Needle U-100 (B-D INS SYRINGE 0 5CC/31GX5/16) 31G X 5/16" 0 5 ML MISC by Does not apply route      losartan (COZAAR) 25 mg tablet Take 1 tablet by mouth daily      metFORMIN (GLUCOPHAGE) 1000 MG tablet Take by mouth      pantoprazole (PROTONIX) 40 mg tablet Take 1 tablet (40 mg total) by mouth daily 30 tablet 11    prednisoLONE acetate (PRED FORTE) 1 % ophthalmic suspension INSTILL 1 DROP INTO BOTH EYES 4 TIMES A DAY FOR 2 WEEKS  1    predniSONE 10 mg tablet Use 5 tabs day 1, 4 tabs day 2, 3 tabs day 3, 2 tabs day 4, 1 tab day 5 15 tablet 0    sertraline (ZOLOFT) 50 mg tablet Take 1 tablet (50 mg total) by mouth daily 90 tablet 1    simvastatin (ZOCOR) 20 mg tablet Take 1 tablet by mouth daily      valACYclovir (VALTREX) 1,000 mg tablet Take 1,000 mg by mouth 2 (two) times a day       No current facility-administered medications on file prior to visit        Review of Systems Constitutional: Negative for chills, fatigue and unexpected weight change  HENT: Negative for congestion, trouble swallowing and voice change  Respiratory: Negative for shortness of breath and wheezing  Cardiovascular: Negative for chest pain and palpitations  Gastrointestinal: Positive for vomiting  Musculoskeletal: Negative for back pain and gait problem  Neurological: Negative for dizziness  Psychiatric/Behavioral: Negative for agitation and behavioral problems  All other systems reviewed and are negative  Objective:   Physical Exam   Constitutional: She is oriented to person, place, and time  She appears well-developed and well-nourished  HENT:   Head: Normocephalic and atraumatic  Eyes: Pupils are equal, round, and reactive to light  EOM are normal    +glasses   Neck: Normal range of motion  Neck supple  Cardiovascular: Normal rate and regular rhythm  Pulmonary/Chest: Effort normal and breath sounds normal  No stridor  No respiratory distress  Abdominal: Soft  Bowel sounds are normal  She exhibits no distension  Neurological: She is alert and oriented to person, place, and time  Skin: Skin is warm and dry  Psychiatric: She has a normal mood and affect  Her behavior is normal    Vitals reviewed    BP (!) 185/91 (BP Location: Left arm, Patient Position: Sitting, Cuff Size: Large)   Pulse 65   Temp (!) 97 3 °F (36 3 °C)   Ht 5' 1" (1 549 m)   Wt 73 1 kg (161 lb 3 2 oz)   SpO2 95%   BMI 30 46 kg/m²

## 2019-09-26 NOTE — ASSESSMENT & PLAN NOTE
We had a discussion with Ms Roman regarding her hiatal hernia seen on EGD  Although, she is mostly asymptomatic, there is concern for this to increase in size over time  We discussed the procedure and post operative course  Dr Nancy Rust is recommending a CT Scan and barium swallow for further evaluation  Once those are completed, we will have her return to the office to further discuss her treatment options  She is agreement with the plan

## 2019-09-26 NOTE — LETTER
September 26, 2019     Eris Vital MD  [place] [address]  [address] [facility]    Patient: Joceline Henderson   YOB: 1950   Date of Visit: 9/26/2019       Dear Dr Maribel Paulino: Thank you for referring Joceline Henderson to me for evaluation  Below are my notes for this consultation  Ms Shelia Brink is a very pleasant 60-year-old female who presents to my office with a large hiatal hernia  This was diagnosed on an EGD with hiatal hernia was described as approximately 7 cm  In the office today, she is relatively asymptomatic although she endorses 2 episodes of emesis over the past 4-5 months  Otherwise, she denies any reflux, dysphagia, odynophagia, regurgitation or heartburn  Today in the office, we discussed the significance of her hiatal hernia  I explained that the natural history of a hernia is that it will just continue to get bigger  I am recommending surgical repair although I would like her to obtain a CT scan and an upper GI 1st in order to assess her esophageal motility and to further define anatomy with the CT scan  She will return to my office after the above has been completed  If you have questions, please do not hesitate to call me  I look forward to following your patient along with you  Sincerely,        Wisam Craft MD        CC: No Recipients  Katy Perera PA-C  9/26/2019 11:48 AM  Attested  Thoracic Consult  Assessment/Plan:    Hiatal hernia  We had a discussion with Ms Roman regarding her hiatal hernia seen on EGD  Although, she is mostly asymptomatic, there is concern for this to increase in size over time  We discussed the procedure and post operative course  Dr Severo Stokes is recommending a CT Scan and barium swallow for further evaluation  Once those are completed, we will have her return to the office to further discuss her treatment options  She is agreement with the plan          Diagnoses and all orders for this visit:    Hiatal hernia  -     FL barium swallow; Future  -     CT chest wo contrast; Future          Thoracic History   Diagnosis: Hiatal Hernia    Procedures/Surgeries:    Pathology:    Adjuvant Therapy:          Patient ID: Leonard Ho is a 80 y o  female  HPI    Ms Bright Smith is a 79 yo female with a h/o DM who was referred by Dr Gilles Leonardo for a hiatal hernia  She has known about a hiatal hernia for about 10 years, but her most recent EGD demonstrated the measurement to be 7 cm  Gastric biopsies were negative for H Pylori  On discussion, she is somewhat symptomatic  She has had 2 episodes of vomiting in the past month  She does not have any hematemesis, chest pain, nausea, regurgitation, or abdominal pain  She does take Protonix, which helps her heartburn  Past Medical History:   Diagnosis Date    Anxiety     Arthritis of right knee     Cellulitis     Contact dermatitis     Cough     Diabetes mellitus ([address] )     Headache     Hiatal hernia     Hyperlipidemia     Knee sprain     Lyme disease     Tick bite       Past Surgical History:   Procedure Laterality Date    ABDOMINAL SURGERY      tummy tuck    HYSTERECTOMY      age 51    HI COLONOSCOPY FLX DX W/COLLJ SPEC WHEN PFRMD N/A 5/8/2019    Procedure: COLONOSCOPY;  Surgeon: Fernando gN MD;  [facility]; Service: Gastroenterology    HI ESOPHAGOGASTRODUODENOSCOPY TRANSORAL DIAGNOSTIC N/A 5/8/2019    Procedure: ESOPHAGOGASTRODUODENOSCOPY (EGD); Surgeon: Fernando Ng MD;  [facility]; Service: Gastroenterology    TUBAL LIGATION        Family History   Problem Relation Age of Onset    Mental illness Mother     Heart disease Father     Colon cancer Father 67      Social History     Socioeconomic History    Marital status:       Spouse name: Not on file    Number of children: Not on file    Years of education: Not on file    Highest education level: Not on file   Occupational History    Not on file   Social Needs    Financial resource strain: Not on file    Food insecurity:     Worry: Not on file     Inability: Not on file    Transportation needs:     Medical: Not on file     Non-medical: Not on file   Tobacco Use    Smoking status: Former Smoker     Packs/day: 0 50     Years: 25 00     Pack years: 12 50     Types: Cigarettes     Last attempt to quit: 2008     Years since quittin 1    Smokeless tobacco: Never Used   Substance and Sexual Activity    Alcohol use: Not Currently     Comment: seldom    Drug use: Yes     Types: Marijuana     Comment: smokes weed 3x per week    Sexual activity: Not on file   Lifestyle    Physical activity:     Days per week: Not on file     Minutes per session: Not on file    Stress: Not on file   Relationships    Social connections:     Talks on phone: Not on file     Gets together: Not on file     Attends Anabaptism service: Not on file     Active member of club or organization: Not on file     Attends meetings of clubs or organizations: Not on file     Relationship status: Not on file    Intimate partner violence:     Fear of current or ex partner: Not on file     Emotionally abused: Not on file     Physically abused: Not on file     Forced sexual activity: Not on file   Other Topics Concern    Not on file   Social History Narrative    Not on file      Allergies   Allergen Reactions    Augmentin [Amoxicillin-Pot Clavulanate]     Codeine Drowsiness    Macrobid [Nitrofurantoin]      Current Outpatient Medications on File Prior to Visit   Medication Sig Dispense Refill    albuterol (2 5 mg/3 mL) 0 083 % nebulizer solution Take 1 vial (2 5 mg total) by nebulization every 6 (six) hours as needed for wheezing or shortness of breath 25 vial 1    aspirin 81 mg chewable tablet Chew 81 mg daily      baclofen 10 mg tablet Take 10 mg by mouth daily at bedtime  4    Cholecalciferol (VITAMIN D PO) Take 1 tablet by mouth daily      fluticasone-vilanterol (BREO ELLIPTA) 200-25 MCG/INH inhaler Inhale 1 puff daily 3 Inhaler 0    Glucose Blood (ONETOUCH ULTRA BLUE VI) 1 Squirt by In Vitro route 3 (three) times a day      hydroxychloroquine (PLAQUENIL) 200 mg tablet Take 200 mg by mouth 2 (two) times a day with meals      insulin glargine (LANTUS) 100 units/mL subcutaneous injection Inject under the skin Inject 28 units at bedtime       insulin lispro (HUMALOG) 100 units/mL injection Inject under the skin Injection 10 units 3 times daily       Insulin Syringe-Needle U-100 (B-D INS SYRINGE 0 5CC/31GX5/16) 31G X 5/16" 0 5 ML MISC by Does not apply route      losartan (COZAAR) 25 mg tablet Take 1 tablet by mouth daily      metFORMIN (GLUCOPHAGE) 1000 MG tablet Take by mouth      pantoprazole (PROTONIX) 40 mg tablet Take 1 tablet (40 mg total) by mouth daily 30 tablet 11    prednisoLONE acetate (PRED FORTE) 1 % ophthalmic suspension INSTILL 1 DROP INTO BOTH EYES 4 TIMES A DAY FOR 2 WEEKS  1    predniSONE 10 mg tablet Use 5 tabs day 1, 4 tabs day 2, 3 tabs day 3, 2 tabs day 4, 1 tab day 5 15 tablet 0    sertraline (ZOLOFT) 50 mg tablet Take 1 tablet (50 mg total) by mouth daily 90 tablet 1    simvastatin (ZOCOR) 20 mg tablet Take 1 tablet by mouth daily      valACYclovir (VALTREX) 1,000 mg tablet Take 1,000 mg by mouth 2 (two) times a day       No current facility-administered medications on file prior to visit  Review of Systems   Constitutional: Negative for chills, fatigue and unexpected weight change  HENT: Negative for congestion, trouble swallowing and voice change  Respiratory: Negative for shortness of breath and wheezing  Cardiovascular: Negative for chest pain and palpitations  Gastrointestinal: Positive for vomiting  Musculoskeletal: Negative for back pain and gait problem  Neurological: Negative for dizziness  Psychiatric/Behavioral: Negative for agitation and behavioral problems  All other systems reviewed and are negative          Objective:   Physical Exam   Constitutional: She is oriented to person, place, and time  She appears well-developed and well-nourished  HENT:   Head: Normocephalic and atraumatic  Eyes: Pupils are equal, round, and reactive to light  EOM are normal    +glasses   Neck: Normal range of motion  Neck supple  Cardiovascular: Normal rate and regular rhythm  Pulmonary/Chest: Effort normal and breath sounds normal  No stridor  No respiratory distress  Abdominal: Soft  Bowel sounds are normal  She exhibits no distension  Neurological: She is alert and oriented to person, place, and time  Skin: Skin is warm and dry  Psychiatric: She has a normal mood and affect  Her behavior is normal    Vitals reviewed  BP (!) 185/91 (BP Location: Left arm, Patient Position: Sitting, Cuff Size: Large)   Pulse 65   Temp (!) 97 3 °F (36 3 °C)   Ht 5' 1" (1 549 m)   Wt 73 1 kg (161 lb 3 2 oz)   SpO2 95%   BMI 30 46 kg/m²      Attestation signed by Veronica Cortez MD at 9/26/2019 11:52 AM:  I supervised the Advanced Practitioner  I discussed the case with the Advanced Practitioner, reviewed the note and agree  Ms Magi Dan is a very pleasant 59-year-old female who presents to my office with a large hiatal hernia  This was diagnosed on an EGD with hiatal hernia was described as approximately 7 cm  In the office today, she is relatively asymptomatic although she endorses 2 episodes of emesis over the past 4-5 months  Otherwise, she denies any reflux, dysphagia, odynophagia, regurgitation or heartburn  Today in the office, we discussed the significance of her hiatal hernia  I explained that the natural history of a hernia is that it will just continue to get bigger  I am recommending surgical repair although I would like her to obtain a CT scan and an upper GI 1st in order to assess her esophageal motility and to further define anatomy with the CT scan  She will return to my office after the above has been completed      Yonatan Ann MD  Thoracic Surgery  (Available on [employer])  Office: [phone]      Morgan Silva MD 09/26/19

## 2019-09-30 ENCOUNTER — HOSPITAL ENCOUNTER (OUTPATIENT)
Dept: RADIOLOGY | Facility: HOSPITAL | Age: 69
Discharge: HOME | End: 2019-09-30
Payer: MEDICARE

## 2019-09-30 DIAGNOSIS — K44.9 HIATAL HERNIA: ICD-10-CM

## 2019-09-30 PROCEDURE — 74220 X-RAY XM ESOPHAGUS 1CNTRST: CPT

## 2019-10-11 ENCOUNTER — HOSPITAL ENCOUNTER (OUTPATIENT)
Dept: CT IMAGING | Facility: HOSPITAL | Age: 69
Discharge: HOME | End: 2019-10-11
Payer: MEDICARE

## 2019-10-11 DIAGNOSIS — K44.9 HIATAL HERNIA: ICD-10-CM

## 2019-10-11 PROCEDURE — 71250 CT THORAX DX C-: CPT

## 2019-10-25 DIAGNOSIS — F41.9 ANXIETY: ICD-10-CM

## 2019-10-25 DIAGNOSIS — R06.9 RESPIRATORY ABNORMALITIES: ICD-10-CM

## 2019-10-25 RX ORDER — FLUTICASONE FUROATE AND VILANTEROL 200; 25 UG/1; UG/1
1 POWDER RESPIRATORY (INHALATION) DAILY
Qty: 3 INHALER | Refills: 1 | Status: SHIPPED | OUTPATIENT
Start: 2019-10-25 | End: 2020-10-13 | Stop reason: SDUPTHER

## 2019-10-31 ENCOUNTER — OFFICE VISIT (OUTPATIENT)
Dept: CARDIAC SURGERY | Facility: CLINIC | Age: 69
End: 2019-10-31
Payer: MEDICARE

## 2019-10-31 VITALS
HEIGHT: 61 IN | BODY MASS INDEX: 30.62 KG/M2 | DIASTOLIC BLOOD PRESSURE: 80 MMHG | HEART RATE: 75 BPM | SYSTOLIC BLOOD PRESSURE: 161 MMHG | WEIGHT: 162.2 LBS | OXYGEN SATURATION: 93 % | TEMPERATURE: 97.3 F

## 2019-10-31 DIAGNOSIS — K44.9 HIATAL HERNIA: Primary | ICD-10-CM

## 2019-10-31 PROCEDURE — 99213 OFFICE O/P EST LOW 20 MIN: CPT | Performed by: THORACIC SURGERY (CARDIOTHORACIC VASCULAR SURGERY)

## 2019-10-31 RX ORDER — PANTOPRAZOLE SODIUM 40 MG/1
40 TABLET, DELAYED RELEASE ORAL DAILY
Qty: 30 TABLET | Refills: 2 | Status: SHIPPED | OUTPATIENT
Start: 2019-10-31

## 2019-10-31 NOTE — LETTER
October 31, 6283     Manohar [address] [place] [address]    Patient: Susu Ward   YOB: 1950   Date of Visit: 10/31/2019       Dear Dr Liu Wallace: Thank you for referring Susu Ward to me for evaluation  Below are my notes for this consultation  Ms Alberto Ordoñez is a very pleasant 78 yo F who presents back to me to discuss her hiatal hernia  At this time, I am recommending that she undergo repair, but she would like to wait until the spring of 2020 in order to participate in some family activities that are scheduled for over the winter  I explained that this should not be a problem but if any of her symptoms change, she should call and we will fit her in sooner than that  She will come back to see me in April to schedule surgery at that time  If you have questions, please do not hesitate to call me  I look forward to following your patient along with you  Sincerely,        Karla Williamson MD        CC: No Recipients  Kwan Wisdom PA-C  10/31/2019  9:42 AM  Attested  Thoracic Follow-Up  Assessment/Plan:    Hiatal hernia  After reviewing Ms Roman's recent studies, we discussed repair of her moderate paraesophageal hernia  She had normal esophageal motility with normal gastric emptying  Despite her being essentially asymptomatic, Dr Osvaldo Giles is recommending repair, since these do enlarge over time  The patient is in agreement to surgery after the holidays, around May 2020  She can try to decrease the Protonix to every other day and then wean herself off if she does not develop any refractory symptoms  We will provide a 3 month Rx for her Protonix and she will return in April to schedule surgery  She is in agreement with the plan  Diagnoses and all orders for this visit:    Hiatal hernia  -     pantoprazole (PROTONIX) 40 mg tablet;  Take 1 tablet (40 mg total) by mouth daily          Thoracic History       Diagnosis: Moderate hiatal hernia        Patient ID: Paul Keenan is a 77 y o  female  HPI      Ms Ander Brittle is a 79 yo female with a h/o DM who was referred by Dr Nichol Castellanos for a known hiatal hernia  She had a recent EGD, which demonstrated a 7 cm hiatal hernia, gastric biopsies negative for H  Pylori  Her symptoms include vomiting and heartburn  She was seen in our office on 9/26/19, at which point Dr Geo Villavicencio recommended a CT scan and upper GI  She returns today with a barium swallow from 9/30/19  This demonstrated a moderate sliding hiatal hernia, normal esophageal motility and gastric emptying  Transient feline esophagus is noted, but no reflux seen  CT scan from 10/11/19 revealed a moderate hiatal hernia  On discussion, she is feeling well  She denies any current heartburn, nausea, vomiting, regurgitation, weight loss, cough, or early satiety  She states that very seldomly, she experiences some chest discomfort after laying down  Review of Systems   Constitutional: Negative for chills, fever and unexpected weight change  HENT: Negative for congestion, trouble swallowing and voice change  Respiratory: Negative for cough and shortness of breath  Cardiovascular: Positive for chest pain  Negative for palpitations  Gastrointestinal: Negative for abdominal distention, abdominal pain, nausea and vomiting  Musculoskeletal: Negative for back pain and gait problem  Allergic/Immunologic: Negative for immunocompromised state  Psychiatric/Behavioral: Negative for agitation and behavioral problems  All other systems reviewed and are negative  Objective:   Physical Exam   Constitutional: She is oriented to person, place, and time  She appears well-developed and well-nourished  HENT:   Head: Normocephalic and atraumatic  Eyes: Pupils are equal, round, and reactive to light  EOM are normal  No scleral icterus    + glasses   Neck: Normal range of motion  Neck supple  Cardiovascular: Normal rate, regular rhythm and normal heart sounds  Pulmonary/Chest: Effort normal and breath sounds normal  No respiratory distress  Abdominal: Soft  Bowel sounds are normal  She exhibits no distension  Musculoskeletal: Normal range of motion  Neurological: She is alert and oriented to person, place, and time  Skin: Skin is warm and dry  Psychiatric: She has a normal mood and affect  Her behavior is normal    Vitals reviewed  /80 (BP Location: Left arm, Patient Position: Sitting, Cuff Size: Large)   Pulse 75   Temp (!) 97 3 °F (36 3 °C)   Ht 5' 1" (1 549 m)   Wt 73 6 kg (162 lb 3 2 oz)   SpO2 93%   BMI 30 65 kg/m²         Ct Chest Wo Contrast    Result Date: 10/16/2019  Narrative CT CHEST WITHOUT IV CONTRAST INDICATION:   K44 9: Diaphragmatic hernia without obstruction or gangrene  COMPARISON:  None  TECHNIQUE: CT examination of the chest was performed without intravenous contrast   Axial, sagittal, and coronal 2D reformatted images were created from the source data and submitted for interpretation  Radiation dose length product (DLP) for this visit:  329 mGy-cm   This examination, like all CT scans performed in the [facility], was performed utilizing techniques to minimize radiation dose exposure, including the use of iterative reconstruction and automated exposure control  FINDINGS: LUNGS:  Trace bibasilar, right middle lobe, and lingular compressive microatelectasis  Trace subpleural scarring medial bilateral lower lobes  No infiltrate or pneumothorax  3 mm groundglass discoid scar left lower lobe image 65 series 3  No suspicious lung nodule  Central airways are clear  PLEURA:  Unremarkable  HEART/GREAT VESSELS:  Heart is mildly enlarged  No pericardial effusion  Minimal aortic calcification  MEDIASTINUM AND JENNIFER:  Moderate hiatal hernia  Otherwise unremarkable  CHEST WALL AND LOWER NECK:   Unremarkable  VISUALIZED STRUCTURES IN THE UPPER ABDOMEN:  Punctate calcified granulomata in the liver    Lobulated liver surface and mild hypertrophy of the caudate lobe  Correlate with clinical findings to exclude hepatic cirrhosis  OSSEOUS STRUCTURES:  No acute fracture or osseous destructive lesion identified  Degenerative changes of the spine  Impression Moderate hiatal hernia  Mild cardiomegaly  No pericardial effusion  Lobulated liver surface and mild hypertrophy of the caudate lobe  Correlate with clinical findings to exclude hepatic cirrhosis  Workstation performed: [id number]       Fl Barium Swallow    Result Date: 9/30/2019  Narrative BARIUM SWALLOW-ESOPHAGRAM INDICATION:   K44 9: Diaphragmatic hernia without obstruction or gangrene  COMPARISON:  Chest film 8/20/2019 IMAGES:  58 FLUOROSCOPY TIME:   1 7 minutes  TECHNIQUE: The patient was given effervescent granules and barium by mouth and images of the esophagus were obtained  FINDINGS: The esophagus is normal in caliber  Esophageal motility is normal and emptying of contrast from the esophagus is prompt  Feline esophagus appearance is transiently noted, typically associated with gastroesophageal reflux although active reflux was not visualized on this exam  Otherwise, no evidence of mucosal lesion, ulceration or fold thickening is seen  There is a moderate sliding hiatal hernia  Impression Moderate sliding hiatal hernia  Transient feline esophagus appearance, typically associated with gastroesophageal reflux although active reflux not visualized during this exam  Workstation performed: [id number]       Attestation signed by Iban Tatum MD at 10/31/2019 10:31 AM:  I supervised the Advanced Practitioner  I discussed the case with the Advanced Practitioner, reviewed the note and agree  Ms Amish Mccarthy is a very pleasant 76 yo F who presents back to me to discuss her hiatal hernia    At this time, I am recommending that she undergo repair, but she would like to wait until the spring of 2020 in order to participate in some family activities that are scheduled for over the winter  I explained that this should not be a problem but if any of her symptoms change, she should call and we will fit her in sooner than that  She will come back to see me in April to schedule surgery at that time      Michael Uribe MD 10/31/19

## 2019-10-31 NOTE — ASSESSMENT & PLAN NOTE
After reviewing Ms Roman's recent studies, we discussed repair of her moderate paraesophageal hernia  She had normal esophageal motility with normal gastric emptying  Despite her being essentially asymptomatic, Dr Christiano Guzman is recommending repair, since these do enlarge over time  The patient is in agreement to surgery after the holidays, around May 2020  She can try to decrease the Protonix to every other day and then wean herself off if she does not develop any refractory symptoms  We will provide a 3 month Rx for her Protonix and she will return in April to schedule surgery  She is in agreement with the plan

## 2019-10-31 NOTE — PROGRESS NOTES
Thoracic Follow-Up  Assessment/Plan:    Hiatal hernia  After reviewing Ms Roman's recent studies, we discussed repair of her moderate paraesophageal hernia  She had normal esophageal motility with normal gastric emptying  Despite her being essentially asymptomatic, Dr Gerson Godfrey is recommending repair, since these do enlarge over time  The patient is in agreement to surgery after the holidays, around May 2020  She can try to decrease the Protonix to every other day and then wean herself off if she does not develop any refractory symptoms  We will provide a 3 month Rx for her Protonix and she will return in April to schedule surgery  She is in agreement with the plan  Diagnoses and all orders for this visit:    Hiatal hernia  -     pantoprazole (PROTONIX) 40 mg tablet; Take 1 tablet (40 mg total) by mouth daily          Thoracic History       Diagnosis: Moderate hiatal hernia        Patient ID: Amy Hernández is a 77 y o  female  HPI      Ms Warren Nava is a 81 yo female with a h/o DM who was referred by Dr Neelam Izaguirre for a known hiatal hernia  She had a recent EGD, which demonstrated a 7 cm hiatal hernia, gastric biopsies negative for H  Pylori  Her symptoms include vomiting and heartburn  She was seen in our office on 9/26/19, at which point Dr Gerson Godfrey recommended a CT scan and upper GI  She returns today with a barium swallow from 9/30/19  This demonstrated a moderate sliding hiatal hernia, normal esophageal motility and gastric emptying  Transient feline esophagus is noted, but no reflux seen  CT scan from 10/11/19 revealed a moderate hiatal hernia  On discussion, she is feeling well  She denies any current heartburn, nausea, vomiting, regurgitation, weight loss, cough, or early satiety  She states that very seldomly, she experiences some chest discomfort after laying down  Review of Systems   Constitutional: Negative for chills, fever and unexpected weight change     HENT: Negative for congestion, trouble swallowing and voice change  Respiratory: Negative for cough and shortness of breath  Cardiovascular: Positive for chest pain  Negative for palpitations  Gastrointestinal: Negative for abdominal distention, abdominal pain, nausea and vomiting  Musculoskeletal: Negative for back pain and gait problem  Allergic/Immunologic: Negative for immunocompromised state  Psychiatric/Behavioral: Negative for agitation and behavioral problems  All other systems reviewed and are negative  Objective:   Physical Exam   Constitutional: She is oriented to person, place, and time  She appears well-developed and well-nourished  HENT:   Head: Normocephalic and atraumatic  Eyes: Pupils are equal, round, and reactive to light  EOM are normal  No scleral icterus    + glasses   Neck: Normal range of motion  Neck supple  Cardiovascular: Normal rate, regular rhythm and normal heart sounds  Pulmonary/Chest: Effort normal and breath sounds normal  No respiratory distress  Abdominal: Soft  Bowel sounds are normal  She exhibits no distension  Musculoskeletal: Normal range of motion  Neurological: She is alert and oriented to person, place, and time  Skin: Skin is warm and dry  Psychiatric: She has a normal mood and affect  Her behavior is normal    Vitals reviewed  /80 (BP Location: Left arm, Patient Position: Sitting, Cuff Size: Large)   Pulse 75   Temp (!) 97 3 °F (36 3 °C)   Ht 5' 1" (1 549 m)   Wt 73 6 kg (162 lb 3 2 oz)   SpO2 93%   BMI 30 65 kg/m²        Ct Chest Wo Contrast    Result Date: 10/16/2019  Narrative CT CHEST WITHOUT IV CONTRAST INDICATION:   K44 9: Diaphragmatic hernia without obstruction or gangrene  COMPARISON:  None  TECHNIQUE: CT examination of the chest was performed without intravenous contrast   Axial, sagittal, and coronal 2D reformatted images were created from the source data and submitted for interpretation   Radiation dose length product (DLP) for this visit:  329 mGy-cm   This examination, like all CT scans performed in the [facility], was performed utilizing techniques to minimize radiation dose exposure, including the use of iterative reconstruction and automated exposure control  FINDINGS: LUNGS:  Trace bibasilar, right middle lobe, and lingular compressive microatelectasis  Trace subpleural scarring medial bilateral lower lobes  No infiltrate or pneumothorax  3 mm groundglass discoid scar left lower lobe image 65 series 3  No suspicious lung nodule  Central airways are clear  PLEURA:  Unremarkable  HEART/GREAT VESSELS:  Heart is mildly enlarged  No pericardial effusion  Minimal aortic calcification  MEDIASTINUM AND JENNIFER:  Moderate hiatal hernia  Otherwise unremarkable  CHEST WALL AND LOWER NECK:   Unremarkable  VISUALIZED STRUCTURES IN THE UPPER ABDOMEN:  Punctate calcified granulomata in the liver  Lobulated liver surface and mild hypertrophy of the caudate lobe  Correlate with clinical findings to exclude hepatic cirrhosis  OSSEOUS STRUCTURES:  No acute fracture or osseous destructive lesion identified  Degenerative changes of the spine  Impression Moderate hiatal hernia  Mild cardiomegaly  No pericardial effusion  Lobulated liver surface and mild hypertrophy of the caudate lobe  Correlate with clinical findings to exclude hepatic cirrhosis  Workstation performed: [id number]       Fl Barium Swallow    Result Date: 9/30/2019  Narrative BARIUM SWALLOW-ESOPHAGRAM INDICATION:   K44 9: Diaphragmatic hernia without obstruction or gangrene  COMPARISON:  Chest film 8/20/2019 IMAGES:  58 FLUOROSCOPY TIME:   1 7 minutes  TECHNIQUE: The patient was given effervescent granules and barium by mouth and images of the esophagus were obtained  FINDINGS: The esophagus is normal in caliber  Esophageal motility is normal and emptying of contrast from the esophagus is prompt    Feline esophagus appearance is transiently noted, typically associated with gastroesophageal reflux although active reflux was not visualized on this exam  Otherwise, no evidence of mucosal lesion, ulceration or fold thickening is seen  There is a moderate sliding hiatal hernia  Impression Moderate sliding hiatal hernia   Transient feline esophagus appearance, typically associated with gastroesophageal reflux although active reflux not visualized during this exam  Workstation performed: [id number]

## 2019-12-21 ENCOUNTER — OFFICE VISIT (OUTPATIENT)
Dept: URGENT CARE | Facility: CLINIC | Age: 69
End: 2019-12-21
Payer: MEDICARE

## 2019-12-21 ENCOUNTER — APPOINTMENT (OUTPATIENT)
Dept: RADIOLOGY | Facility: CLINIC | Age: 69
End: 2019-12-21
Payer: MEDICARE

## 2019-12-21 VITALS
HEIGHT: 61 IN | TEMPERATURE: 97.9 F | BODY MASS INDEX: 29.64 KG/M2 | SYSTOLIC BLOOD PRESSURE: 118 MMHG | RESPIRATION RATE: 16 BRPM | DIASTOLIC BLOOD PRESSURE: 86 MMHG | HEART RATE: 90 BPM | OXYGEN SATURATION: 95 % | WEIGHT: 157 LBS

## 2019-12-21 DIAGNOSIS — J45.31 MILD PERSISTENT ASTHMA WITH ACUTE EXACERBATION: ICD-10-CM

## 2019-12-21 DIAGNOSIS — J45.31 MILD PERSISTENT ASTHMA WITH ACUTE EXACERBATION: Primary | ICD-10-CM

## 2019-12-21 PROCEDURE — 71046 X-RAY EXAM CHEST 2 VIEWS: CPT

## 2019-12-21 PROCEDURE — G0463 HOSPITAL OUTPT CLINIC VISIT: HCPCS | Performed by: NURSE PRACTITIONER

## 2019-12-21 PROCEDURE — 99213 OFFICE O/P EST LOW 20 MIN: CPT | Performed by: NURSE PRACTITIONER

## 2019-12-21 RX ORDER — HYDROCHLOROTHIAZIDE 12.5 MG/1
12.5 TABLET ORAL DAILY
Refills: 0 | COMMUNITY
Start: 2019-12-14 | End: 2022-08-03 | Stop reason: SDUPTHER

## 2019-12-21 RX ORDER — PREDNISONE 20 MG/1
20 TABLET ORAL DAILY
Qty: 3 TABLET | Refills: 0 | Status: SHIPPED | OUTPATIENT
Start: 2019-12-21 | End: 2019-12-24

## 2019-12-21 RX ORDER — AMLODIPINE BESYLATE 2.5 MG/1
2.5 TABLET ORAL
Refills: 1 | COMMUNITY
Start: 2019-09-26

## 2019-12-21 RX ORDER — AZITHROMYCIN 250 MG/1
TABLET, FILM COATED ORAL
Qty: 6 TABLET | Refills: 0 | Status: SHIPPED | OUTPATIENT
Start: 2019-12-21 | End: 2019-12-25

## 2019-12-21 RX ORDER — FLUCONAZOLE 150 MG/1
150 TABLET ORAL ONCE
Qty: 1 TABLET | Refills: 0 | Status: SHIPPED | OUTPATIENT
Start: 2019-12-21 | End: 2019-12-21

## 2019-12-21 RX ORDER — BENZONATATE 200 MG/1
200 CAPSULE ORAL 3 TIMES DAILY PRN
Qty: 20 CAPSULE | Refills: 0 | Status: SHIPPED | OUTPATIENT
Start: 2019-12-21

## 2019-12-21 RX ORDER — EZETIMIBE 10 MG/1
TABLET ORAL
Refills: 0 | COMMUNITY
Start: 2019-12-14 | End: 2022-06-28 | Stop reason: SDUPTHER

## 2019-12-21 NOTE — PATIENT INSTRUCTIONS
Asthma   WHAT YOU NEED TO KNOW:   Asthma is a lung disease that makes breathing difficult  Chronic inflammation and reactions to triggers narrow the airways in the lungs  Asthma can become life-threatening if it is not managed  DISCHARGE INSTRUCTIONS:   Return to the emergency department if:   · You have severe shortness of breath  · Your lips or nails turn blue or gray  · The skin around your neck and ribs pulls in with each breath  · You have shortness of breath, even after you take your short-term medicine as directed  · Your peak flow numbers are in the red zone of your AAP  Contact your healthcare provider if:   · You run out of medicine before your next refill is due  · Your symptoms get worse  · You need to take more medicine than usual to control your symptoms  · You have questions or concerns about your condition or care  Medicines:   · Medicines  decrease inflammation, open airways, and make it easier to breathe  Medicines may be inhaled, taken as a pill, or injected  Short-term medicines relieve your symptoms quickly  Long-term medicines are used to prevent future attacks  You may also need medicine to help control your allergies  Ask your healthcare provider for more information about the medicine you are given and how to take it safely  · Take your medicine as directed  Contact your healthcare provider if you think your medicine is not helping or if you have side effects  Tell him of her if you are allergic to any medicine  Keep a list of the medicines, vitamins, and herbs you take  Include the amounts, and when and why you take them  Bring the list or the pill bottles to follow-up visits  Carry your medicine list with you in case of an emergency  Follow up with your healthcare provider as directed: You will need to return to make sure your medicine is working and your symptoms are controlled   You may be referred to an asthma specialist  Harriet Longoria may be asked to keep a record of your peak flow values and bring it with you to your appointments  Write down your questions so you remember to ask them during your visits  Manage your symptoms and prevent future attacks:   · Follow your Asthma Action Plan (AAP)  This is a written plan that you and your healthcare provider create  It explains which medicine you need and when to change doses if necessary  It also explains how you can monitor symptoms and use a peak flow meter  The meter measures how well your lungs are working  · Manage other health conditions , such as allergies, acid reflux, and sleep apnea  · Identify and avoid triggers  These may include pets, dust mites, mold, and cockroaches  · Do not smoke or be around others who smoke  Nicotine and other chemicals in cigarettes and cigars can cause lung damage  Ask your healthcare provider for information if you currently smoke and need help to quit  E-cigarettes or smokeless tobacco still contain nicotine  Talk to your healthcare provider before you use these products  · Ask about the flu vaccine  The flu can make your asthma worse  You may need a yearly flu shot  © 2017 [facility] Information is for End User's use only and may not be sold, redistributed or otherwise used for commercial purposes  All illustrations and images included in CareNotes® are the copyrighted property of A D A M [employer] Inc  or Jayme Rose  The above information is an  only  It is not intended as medical advice for individual conditions or treatments  Talk to your doctor, nurse or pharmacist before following any medical regimen to see if it is safe and effective for you

## 2019-12-21 NOTE — PROGRESS NOTES
[address] [employer] [facility]        NAME: Suhas Pfeiffer is a 77 y o  female  : 1950    MRN: [id number]  DATE: 2019  TIME: 1:09 PM    Assessment and Plan   Mild persistent asthma with acute exacerbation [J45 31]  1  Mild persistent asthma with acute exacerbation  XR chest pa & lateral    azithromycin (ZITHROMAX) 250 mg tablet    benzonatate (TESSALON) 200 MG capsule    predniSONE 20 mg tablet    fluconazole (DIFLUCAN) 150 mg tablet         Patient Instructions     Take meds as directed  Diflucan as patient often gets vaginal yeast with use of antibiotics  Continue nebulizer treatment at home  Follow up with PCP in 3-5 days  Proceed to  ER if symptoms worsen  Chief Complaint     Chief Complaint   Patient presents with    Cough     Started last friday with fatigue, cough with white phlegm  Today felt SOB on exertion and brownish/red phlegm with cough  History of Present Illness       HPI   Reports cough and phlegm, with fatigue  Has a Hx of asthma  Using nebulizer treatment at home  Says SOB increased today, this morning  Says daughter had pneumonia recently  Has leroy having phlegm and reddish brown a couple of days ago  Review of Systems   Review of Systems   Constitutional: Negative for chills and fever  HENT: Positive for rhinorrhea  Negative for sore throat and trouble swallowing  Respiratory: Positive for cough, chest tightness, shortness of breath and wheezing  Cardiovascular: Negative for chest pain           Current Medications       Current Outpatient Medications:     albuterol (2 5 mg/3 mL) 0 083 % nebulizer solution, Take 1 vial (2 5 mg total) by nebulization every 6 (six) hours as needed for wheezing or shortness of breath, Disp: 25 vial, Rfl: 1    amLODIPine (NORVASC) 2 5 mg tablet, Take 2 5 mg by mouth daily at bedtime, Disp: , Rfl: 1    aspirin 81 mg chewable tablet, Chew 81 mg daily, Disp: , Rfl:     azithromycin (ZITHROMAX) 250 mg tablet, Take 2 tablets today then 1 tablet daily x 4 days, Disp: 6 tablet, Rfl: 0    baclofen 10 mg tablet, Take 10 mg by mouth daily at bedtime, Disp: , Rfl: 4    benzonatate (TESSALON) 200 MG capsule, Take 1 capsule (200 mg total) by mouth 3 (three) times a day as needed for cough, Disp: 20 capsule, Rfl: 0    Cholecalciferol (VITAMIN D PO), Take 1 tablet by mouth daily, Disp: , Rfl:     ezetimibe (ZETIA) 10 mg tablet, TAKE 1/2 TABLET BY MOUTH NIGHTLY, Disp: , Rfl: 0    fluconazole (DIFLUCAN) 150 mg tablet, Take 1 tablet (150 mg total) by mouth once for 1 dose, Disp: 1 tablet, Rfl: 0    fluticasone-vilanterol (BREO ELLIPTA) 200-25 MCG/INH inhaler, Inhale 1 puff daily, Disp: 3 Inhaler, Rfl: 1    Glucose Blood (ONETOUCH ULTRA BLUE VI), 1 Squirt by In Vitro route 3 (three) times a day, Disp: , Rfl:     hydrochlorothiazide (HYDRODIURIL) 12 5 mg tablet, Take 12 5 mg by mouth daily, Disp: , Rfl: 0    hydroxychloroquine (PLAQUENIL) 200 mg tablet, Take 200 mg by mouth 2 (two) times a day with meals, Disp: , Rfl:     insulin glargine (LANTUS) 100 units/mL subcutaneous injection, Inject under the skin Inject 28 units at bedtime , Disp: , Rfl:     insulin lispro (HUMALOG) 100 units/mL injection, Inject under the skin Injection 10 units 3 times daily , Disp: , Rfl:     Insulin Syringe-Needle U-100 (B-D INS SYRINGE 0 5CC/31GX5/16) 31G X 5/16" 0 5 ML MISC, by Does not apply route, Disp: , Rfl:     losartan (COZAAR) 25 mg tablet, Take 1 tablet by mouth daily, Disp: , Rfl:     metFORMIN (GLUCOPHAGE) 1000 MG tablet, Take by mouth, Disp: , Rfl:     pantoprazole (PROTONIX) 40 mg tablet, Take 1 tablet (40 mg total) by mouth daily, Disp: 30 tablet, Rfl: 11    pantoprazole (PROTONIX) 40 mg tablet, Take 1 tablet (40 mg total) by mouth daily, Disp: 30 tablet, Rfl: 2    predniSONE 20 mg tablet, Take 1 tablet (20 mg total) by mouth daily for 3 days, Disp: 3 tablet, Rfl: 0    sertraline (ZOLOFT) 50 mg tablet, Take 1 tablet (50 mg total) by mouth daily, Disp: 90 tablet, Rfl: 1    simvastatin (ZOCOR) 20 mg tablet, Take 1 tablet by mouth daily, Disp: , Rfl:     valACYclovir (VALTREX) 1,000 mg tablet, Take 1,000 mg by mouth 2 (two) times a day, Disp: , Rfl:     Current Allergies     Allergies as of 12/21/2019 - Reviewed 12/21/2019   Allergen Reaction Noted    Augmentin [amoxicillin-pot clavulanate]  12/12/2016    Codeine Drowsiness 12/20/2012    Macrobid [nitrofurantoin]  08/21/2018            The following portions of the patient's history were reviewed and updated as appropriate: allergies, current medications, past family history, past medical history, past social history, past surgical history and problem list      Past Medical History:   Diagnosis Date    Anxiety     Arthritis of right knee     Cellulitis     Contact dermatitis     Cough     Diabetes mellitus ([address] )     Headache     Hiatal hernia     Hyperlipidemia     Knee sprain     Lyme disease     Tick bite        Past Surgical History:   Procedure Laterality Date    ABDOMINAL SURGERY      tummy tuck    HYSTERECTOMY      age 51    MA COLONOSCOPY FLX DX W/COLLJ SPEC WHEN PFRMD N/A 5/8/2019    Procedure: COLONOSCOPY;  Surgeon: Rachelle Dominguez MD;  [facility]; Service: Gastroenterology    MA ESOPHAGOGASTRODUODENOSCOPY TRANSORAL DIAGNOSTIC N/A 5/8/2019    Procedure: ESOPHAGOGASTRODUODENOSCOPY (EGD); Surgeon: Rachelle Dominguez MD;  [facility]; Service: Gastroenterology    TUBAL LIGATION         Family History   Problem Relation Age of Onset    Mental illness Mother     Heart disease Father     Colon cancer Father 67         Medications have been verified  Objective   /86   Pulse 90   Temp 97 9 °F (36 6 °C)   Resp 16   Ht 5' 1" (1 549 m)   Wt 71 2 kg (157 lb)   SpO2 95%   BMI 29 66 kg/m²        Physical Exam     Physical Exam   Constitutional: She appears well-developed  No distress     HENT:   Right Ear: External ear normal    Left Ear: External ear normal    Cardiovascular: Normal rate and regular rhythm  Pulmonary/Chest: Effort normal  No respiratory distress  She has wheezes (right sided, anterior aspect)  She exhibits no tenderness  Mild congestion in the lungs   Lymphadenopathy:     She has no cervical adenopathy

## 2020-03-20 DIAGNOSIS — F41.9 ANXIETY: ICD-10-CM

## 2020-03-23 ENCOUNTER — TELEPHONE (OUTPATIENT)
Dept: FAMILY MEDICINE CLINIC | Facility: CLINIC | Age: 70
End: 2020-03-23

## 2020-03-23 DIAGNOSIS — F41.9 ANXIETY: ICD-10-CM

## 2020-03-23 NOTE — TELEPHONE ENCOUNTER
Patient called and stated that her [facility]raline was [facility] and needs to [facility]  She will call [facility] to cancel this prescription  Please call patient when its sent to the right phamacy

## 2020-04-14 ENCOUNTER — TELEPHONE (OUTPATIENT)
Dept: CARDIAC SURGERY | Facility: CLINIC | Age: 70
End: 2020-04-14

## 2020-04-16 ENCOUNTER — TELEMEDICINE (OUTPATIENT)
Dept: CARDIAC SURGERY | Facility: CLINIC | Age: 70
End: 2020-04-16
Payer: MEDICARE

## 2020-04-16 DIAGNOSIS — K21.9 GASTROESOPHAGEAL REFLUX DISEASE WITHOUT ESOPHAGITIS: ICD-10-CM

## 2020-04-16 DIAGNOSIS — K44.9 HIATAL HERNIA: Primary | ICD-10-CM

## 2020-04-16 PROCEDURE — 99214 OFFICE O/P EST MOD 30 MIN: CPT | Performed by: THORACIC SURGERY (CARDIOTHORACIC VASCULAR SURGERY)

## 2020-04-24 DIAGNOSIS — F41.9 ANXIETY: ICD-10-CM

## 2020-06-09 ENCOUNTER — TRANSCRIBE ORDERS (OUTPATIENT)
Dept: ADMINISTRATIVE | Facility: HOSPITAL | Age: 70
End: 2020-06-09

## 2020-06-09 DIAGNOSIS — Z12.31 ENCOUNTER FOR SCREENING MAMMOGRAM FOR MALIGNANT NEOPLASM OF BREAST: Primary | ICD-10-CM

## 2020-10-13 DIAGNOSIS — R06.9 RESPIRATORY ABNORMALITIES: ICD-10-CM

## 2020-10-14 ENCOUNTER — TELEPHONE (OUTPATIENT)
Dept: CARDIAC SURGERY | Facility: CLINIC | Age: 70
End: 2020-10-14

## 2020-10-15 ENCOUNTER — OFFICE VISIT (OUTPATIENT)
Dept: CARDIAC SURGERY | Facility: CLINIC | Age: 70
End: 2020-10-15
Payer: MEDICARE

## 2020-10-15 ENCOUNTER — TELEPHONE (OUTPATIENT)
Dept: CARDIAC SURGERY | Facility: CLINIC | Age: 70
End: 2020-10-15

## 2020-10-15 VITALS
DIASTOLIC BLOOD PRESSURE: 66 MMHG | OXYGEN SATURATION: 95 % | BODY MASS INDEX: 30.4 KG/M2 | HEIGHT: 61 IN | WEIGHT: 161 LBS | HEART RATE: 86 BPM | TEMPERATURE: 96.6 F | SYSTOLIC BLOOD PRESSURE: 127 MMHG

## 2020-10-15 DIAGNOSIS — K44.9 HIATAL HERNIA: Primary | ICD-10-CM

## 2020-10-15 PROCEDURE — 99213 OFFICE O/P EST LOW 20 MIN: CPT | Performed by: PHYSICIAN ASSISTANT

## 2020-10-16 ENCOUNTER — HOSPITAL ENCOUNTER (OUTPATIENT)
Dept: MAMMOGRAPHY | Facility: HOSPITAL | Age: 70
Discharge: HOME | End: 2020-10-16
Payer: MEDICARE

## 2020-10-16 VITALS — WEIGHT: 161 LBS | HEIGHT: 61 IN | BODY MASS INDEX: 30.4 KG/M2

## 2020-10-16 DIAGNOSIS — Z12.31 ENCOUNTER FOR SCREENING MAMMOGRAM FOR MALIGNANT NEOPLASM OF BREAST: ICD-10-CM

## 2020-10-16 PROCEDURE — 77063 BREAST TOMOSYNTHESIS BI: CPT

## 2020-10-16 PROCEDURE — 77067 SCR MAMMO BI INCL CAD: CPT

## 2020-10-22 ENCOUNTER — HOSPITAL ENCOUNTER (OUTPATIENT)
Dept: MAMMOGRAPHY | Facility: CLINIC | Age: 70
Discharge: HOME | End: 2020-10-22
Payer: MEDICARE

## 2020-10-22 ENCOUNTER — HOSPITAL ENCOUNTER (OUTPATIENT)
Dept: ULTRASOUND IMAGING | Facility: CLINIC | Age: 70
Discharge: HOME | End: 2020-10-22
Payer: MEDICARE

## 2020-10-22 VITALS — WEIGHT: 161 LBS | BODY MASS INDEX: 30.4 KG/M2 | HEIGHT: 61 IN | TEMPERATURE: 97.2 F

## 2020-10-22 DIAGNOSIS — R92.8 ABNORMAL MAMMOGRAM: ICD-10-CM

## 2020-10-22 PROCEDURE — 77065 DX MAMMO INCL CAD UNI: CPT

## 2020-10-22 PROCEDURE — 76642 ULTRASOUND BREAST LIMITED: CPT

## 2020-10-22 PROCEDURE — G0279 TOMOSYNTHESIS, MAMMO: HCPCS

## 2020-10-23 ENCOUNTER — TRANSCRIBE ORDERS (OUTPATIENT)
Dept: ADMINISTRATIVE | Facility: HOSPITAL | Age: 70
End: 2020-10-23

## 2020-10-23 DIAGNOSIS — R92.8 ABNORMAL MAMMOGRAM: Primary | ICD-10-CM

## 2021-01-25 ENCOUNTER — IMMUNIZATIONS (OUTPATIENT)
Dept: FAMILY MEDICINE CLINIC | Facility: HOSPITAL | Age: 71
End: 2021-01-25

## 2021-01-25 DIAGNOSIS — Z23 ENCOUNTER FOR IMMUNIZATION: Primary | ICD-10-CM

## 2021-01-25 PROCEDURE — 0011A SARS-COV-2 / COVID-19 MRNA VACCINE (MODERNA) 100 MCG: CPT

## 2021-01-25 PROCEDURE — 91301 SARS-COV-2 / COVID-19 MRNA VACCINE (MODERNA) 100 MCG: CPT

## 2021-02-22 ENCOUNTER — IMMUNIZATIONS (OUTPATIENT)
Dept: FAMILY MEDICINE CLINIC | Facility: HOSPITAL | Age: 71
End: 2021-02-22

## 2021-02-22 DIAGNOSIS — Z23 ENCOUNTER FOR IMMUNIZATION: Primary | ICD-10-CM

## 2021-02-22 PROCEDURE — 91301 SARS-COV-2 / COVID-19 MRNA VACCINE (MODERNA) 100 MCG: CPT

## 2021-02-22 PROCEDURE — 0012A SARS-COV-2 / COVID-19 MRNA VACCINE (MODERNA) 100 MCG: CPT

## 2021-04-05 ENCOUNTER — HOSPITAL ENCOUNTER (OUTPATIENT)
Dept: MAMMOGRAPHY | Facility: CLINIC | Age: 71
Discharge: HOME | End: 2021-04-05
Payer: MEDICARE

## 2021-04-05 ENCOUNTER — HOSPITAL ENCOUNTER (OUTPATIENT)
Dept: ULTRASOUND IMAGING | Facility: CLINIC | Age: 71
Discharge: HOME | End: 2021-04-05
Payer: MEDICARE

## 2021-04-05 VITALS — BODY MASS INDEX: 30.4 KG/M2 | WEIGHT: 161 LBS | HEIGHT: 61 IN

## 2021-04-05 DIAGNOSIS — R92.8 ABNORMAL MAMMOGRAM: ICD-10-CM

## 2021-04-05 PROCEDURE — 76642 ULTRASOUND BREAST LIMITED: CPT

## 2021-04-05 PROCEDURE — 77065 DX MAMMO INCL CAD UNI: CPT

## 2021-04-05 PROCEDURE — G0279 TOMOSYNTHESIS, MAMMO: HCPCS

## 2021-04-14 ENCOUNTER — TRANSCRIBE ORDERS (OUTPATIENT)
Dept: ADMINISTRATIVE | Facility: HOSPITAL | Age: 71
End: 2021-04-14

## 2021-04-14 DIAGNOSIS — N64.59 SYMPTOMS IN BREAST: Primary | ICD-10-CM

## 2021-04-26 ENCOUNTER — OFFICE VISIT (OUTPATIENT)
Dept: PODIATRY | Facility: CLINIC | Age: 71
End: 2021-04-26
Payer: MEDICARE

## 2021-04-26 VITALS
HEIGHT: 61 IN | WEIGHT: 168.2 LBS | HEART RATE: 62 BPM | SYSTOLIC BLOOD PRESSURE: 135 MMHG | DIASTOLIC BLOOD PRESSURE: 85 MMHG | BODY MASS INDEX: 31.75 KG/M2

## 2021-04-26 DIAGNOSIS — L60.3 NAIL DYSTROPHY: ICD-10-CM

## 2021-04-26 DIAGNOSIS — E11.9 CONTROLLED TYPE 2 DIABETES MELLITUS WITHOUT COMPLICATION, WITHOUT LONG-TERM CURRENT USE OF INSULIN (HCC): Primary | ICD-10-CM

## 2021-04-26 PROCEDURE — 99203 OFFICE O/P NEW LOW 30 MIN: CPT | Performed by: PODIATRIST

## 2021-04-26 RX ORDER — TRAMADOL HYDROCHLORIDE 50 MG/1
50 TABLET ORAL EVERY 12 HOURS PRN
COMMUNITY
Start: 2021-03-15 | End: 2022-03-15

## 2021-04-26 RX ORDER — MINERAL OIL AND PETROLATUM 150; 830 MG/G; MG/G
OINTMENT OPHTHALMIC
COMMUNITY

## 2021-04-26 NOTE — PROGRESS NOTES
Assessment/Plan:      Discussed principles of diabetic foot care  Vascular status and sensorium is intact  Patient urged to refrain from walking barefoot  There is no significant nail dystrophy  There is a blackish discoloration on the nails that turned out to be old residual nail [place]  It was easily removed  Reappoint p r n  No problem-specific Assessment & Plan notes found for this encounter  Diagnoses and all orders for this visit:    Controlled type 2 diabetes mellitus without complication, without long-term current use of insulin ([facility])    Nail dystrophy    Other orders  -     traMADol (ULTRAM) 50 mg tablet; Take 50 mg by mouth every 12 (twelve) hours as needed  -     Diclofenac Sodium (VOLTAREN) 1 %; Apply 1 application topically 4 (four) times a day  -     Ferrous Sulfate Dried (FERROUS SULFATE CR PO); Iron TABS    Refills: 0       Active  -     artificial tear (LUBRIFRESH P M ) 83-15 % ophthalmic ointment; daily at bedtime          Subjective:      Patient ID: Michelle Edwards is a 72 y o  female  HPI      Patient, a 64-year-old type 2 diabetic in apparent good health presents for examination and care  Her chief concern involves mild thickening of each great toenail  Patient is concerned that she could have a fungus  Patient denies numbness or tingling in her feet  She has been diabetic for approximately 15 years  She states that her blood sugar is under good control  I personally reviewed A1C taken on 08/13/2019  It was 7 1  The following portions of the patient's history were reviewed and updated as appropriate: allergies, current medications, past family history, past medical history, past social history, past surgical history and problem list     Review of Systems   Respiratory: Negative  Cardiovascular: Negative      Gastrointestinal:        History of colitis   Neurological:        History of radiculopathy             Objective:      /85   Pulse 62   Ht 5' 1" (1 549 m) Comment: verbal  Wt 76 3 kg (168 lb 3 2 oz)   BMI 31 78 kg/m²          Physical Exam  Cardiovascular:      Pulses: no weak pulses          Dorsalis pedis pulses are 2+ on the right side and 2+ on the left side  Posterior tibial pulses are 2+ on the right side and 2+ on the left side  Feet:      Right foot:      Skin integrity: No ulcer, skin breakdown, erythema, warmth, callus or dry skin  Left foot:      Skin integrity: No ulcer, skin breakdown, erythema, warmth, callus or dry skin  Diabetic Foot Exam    Patient's shoes and socks removed  Right Foot/Ankle   Right Foot Inspection  Skin Exam: skin normal and skin intact no dry skin, no warmth, no callus, no erythema, no maceration, no abnormal color, no pre-ulcer, no ulcer and no callus                          Toe Exam: ROM and strength within normal limits  Sensory   Vibration: intact  Proprioception: intact   Monofilament testing: intact  Vascular  Capillary refills: < 3 seconds  The right DP pulse is 2+  The right PT pulse is 2+  Right Toe  - Comprehensive Exam  Ecchymosis: none  Arch: normal  Hammertoes: absent  Claw Toes: absent  Swelling: none   Tenderness: none         Left Foot/Ankle  Left Foot Inspection  Skin Exam: skin normal and skin intactno dry skin, no warmth, no erythema, no maceration, normal color, no pre-ulcer, no ulcer and no callus                         Toe Exam: ROM and strength within normal limits                   Sensory   Vibration: intact  Proprioception: intact  Monofilament: intact  Vascular  Capillary refills: < 3 seconds  The left DP pulse is 2+  The left PT pulse is 2+  Left Toe  - Comprehensive Exam  Ecchymosis: none  Arch: normal  Hammertoes: absent  Claw toes: absent  Swelling: none   Tenderness: none       Assign Risk Category:  No deformity present; No loss of protective sensation;  No weak pulses       Risk: 0

## 2021-06-07 NOTE — ASSESSMENT & PLAN NOTE
Orders being requested:   PT  1. 1 x's a weeks x's 4 weeks    Reason service is needed/diagnosis:   Gait  Balance   ADL's    When are orders needed by:   ASAP    Where to send Orders:   Phone:  [phone]     Okay to leave detailed message?    Yes    Please call with verbal orders.       URI   Patient given prescription for Cefzil 500 mg take 1 b i d  For 10 days    She was also given prescription to obtain chest x-ray for further evaluation

## 2021-07-02 DIAGNOSIS — F41.9 ANXIETY: ICD-10-CM

## 2021-07-02 NOTE — TELEPHONE ENCOUNTER
Medication Refill Request    Request for refill(s) of sertraline (ZOLOFT) 50 mg tablet  to be sent to [facility] by Mail       Last/Next OV: 7/15/2021

## 2021-07-15 ENCOUNTER — OFFICE VISIT (OUTPATIENT)
Dept: FAMILY MEDICINE CLINIC | Facility: CLINIC | Age: 71
End: 2021-07-15
Payer: MEDICARE

## 2021-07-15 VITALS
HEIGHT: 61 IN | TEMPERATURE: 97.9 F | WEIGHT: 161.4 LBS | BODY MASS INDEX: 30.47 KG/M2 | HEART RATE: 76 BPM | DIASTOLIC BLOOD PRESSURE: 78 MMHG | RESPIRATION RATE: 16 BRPM | SYSTOLIC BLOOD PRESSURE: 124 MMHG | OXYGEN SATURATION: 94 %

## 2021-07-15 DIAGNOSIS — E11.65 TYPE 2 DIABETES MELLITUS WITH HYPERGLYCEMIA, UNSPECIFIED WHETHER LONG TERM INSULIN USE (HCC): Primary | ICD-10-CM

## 2021-07-15 DIAGNOSIS — Z00.00 MEDICARE ANNUAL WELLNESS VISIT, SUBSEQUENT: ICD-10-CM

## 2021-07-15 DIAGNOSIS — J45.909 MODERATE ASTHMA WITHOUT COMPLICATION, UNSPECIFIED WHETHER PERSISTENT: ICD-10-CM

## 2021-07-15 PROBLEM — N39.0 URINARY TRACT INFECTION WITHOUT HEMATURIA: Status: RESOLVED | Noted: 2018-08-20 | Resolved: 2021-07-15

## 2021-07-15 PROBLEM — J40 BRONCHITIS: Status: RESOLVED | Noted: 2019-04-08 | Resolved: 2021-07-15

## 2021-07-15 PROBLEM — M06.09 RHEUMATOID ARTHRITIS OF MULTIPLE SITES WITH NEGATIVE RHEUMATOID FACTOR (HCC): Status: ACTIVE | Noted: 2021-07-15

## 2021-07-15 PROBLEM — Z12.11 SCREEN FOR COLON CANCER: Status: RESOLVED | Noted: 2019-04-16 | Resolved: 2021-07-15

## 2021-07-15 PROBLEM — J06.9 UPPER RESPIRATORY TRACT INFECTION: Status: RESOLVED | Noted: 2019-08-13 | Resolved: 2021-07-15

## 2021-07-15 LAB — SL AMB POCT HEMOGLOBIN AIC: 7.7 (ref ?–6.5)

## 2021-07-15 PROCEDURE — 83036 HEMOGLOBIN GLYCOSYLATED A1C: CPT | Performed by: FAMILY MEDICINE

## 2021-07-15 PROCEDURE — G0438 PPPS, INITIAL VISIT: HCPCS | Performed by: FAMILY MEDICINE

## 2021-07-15 PROCEDURE — 1123F ACP DISCUSS/DSCN MKR DOCD: CPT | Performed by: FAMILY MEDICINE

## 2021-07-15 PROCEDURE — 99213 OFFICE O/P EST LOW 20 MIN: CPT | Performed by: FAMILY MEDICINE

## 2021-07-15 RX ORDER — MONTELUKAST SODIUM 10 MG/1
10 TABLET ORAL
Qty: 90 TABLET | Refills: 3 | Status: SHIPPED | OUTPATIENT
Start: 2021-07-15

## 2021-07-15 NOTE — ASSESSMENT & PLAN NOTE
Diabetes  Patient's A1c stable at 7 7  She will continue to follow-up with endocrinologist for monitoring of this condition    Lab Results   Component Value Date    HGBA1C 7 7 (A) 07/15/2021

## 2021-07-15 NOTE — PROGRESS NOTES
Assessment and Plan:     Problem List Items Addressed This Visit     None           Preventive health issues were discussed with patient, and age appropriate screening tests were ordered as noted in patient's After Visit Summary  Personalized health advice and appropriate referrals for health education or preventive services given if needed, as noted in patient's After Visit Summary  History of Present Illness:     Patient presents for Medicare Annual Wellness visit    Patient Care Team:  Helen Mcgarry MD as PCP - General  MD Luigi Noguera MD as Endoscopist  Rosario Carter MD as Surgeon (Thoracic Surgery)  Maricruz Cordova MD (Ophthalmology)     Problem List:     Patient Active Problem List   Diagnosis    Allergic rhinitis    Asthma    Generalized anxiety disorder    Stress incontinence, female    Lumbar radiculopathy    Type 2 diabetes mellitus with hyperglycemia [facility] [address] [facility]    Colitis    Chronic fatigue    Urinary tract infection without hematuria    Bronchitis    Gastroesophageal reflux disease without esophagitis    Screen for colon cancer    Hiatal hernia    Upper respiratory tract infection      Past Medical and Surgical History:     Past Medical History:   Diagnosis Date    Anxiety     Anxiety     Arthritis of right knee     Cellulitis     Contact dermatitis     Cough     Diabetes mellitus ([address] )     GERD without esophagitis     Headache     Hiatal hernia     Hyperlipidemia     Knee sprain     Lyme disease     Tick bite      Past Surgical History:   Procedure Laterality Date    ABDOMINAL SURGERY      tummy tuck    HYSTERECTOMY      age 51    MA COLONOSCOPY FLX DX W/COLLJ [address] WHEN PFRMD N/A 5/8/2019    Procedure: COLONOSCOPY;  Surgeon: Luigi Camacho MD;  [facility]; Service: Gastroenterology    MA ESOPHAGOGASTRODUODENOSCOPY TRANSORAL DIAGNOSTIC N/A 5/8/2019    Procedure: ESOPHAGOGASTRODUODENOSCOPY (EGD);   Surgeon: Luigi Camacho MD;  [facility]; Service: Gastroenterology    TUBAL LIGATION        Family History:     Family History   Problem Relation Age of Onset    Mental illness Mother     Heart disease Father     Colon cancer Father 67    No Known Problems Daughter     No Known Problems Maternal Grandmother     No Known Problems Maternal Grandfather     No Known Problems Paternal Grandmother     No Known Problems Paternal Grandfather     No Known Problems Son     No Known Problems Sister     No Known Problems Sister     No Known Problems Sister     No Known Problems Sister     No Known Problems Sister     Lymphoma Brother     No Known Problems Brother     No Known Problems Paternal Aunt     No Known Problems Paternal Aunt     No Known Problems Paternal Aunt     No Known Problems Paternal Aunt     Breast cancer Neg Hx     Breast cancer additional onset Neg Hx     Endometrial cancer Neg Hx     Ovarian cancer Neg Hx     BRCA 1/2 Neg Hx     BRCA1 Negative Neg Hx     BRCA1 Positive Neg Hx     BRCA2 Negative Neg Hx     BRCA2 Positive Neg Hx       Social History:     Social History     Socioeconomic History    Marital status:       Spouse name: None    Number of children: None    Years of education: None    Highest education level: None   Occupational History    None   Tobacco Use    Smoking status: Former Smoker     Packs/day: 0 50     Years: 25 00     Pack years: 12 50     Types: Cigarettes     Quit date: 2008     Years since quittin 9    Smokeless tobacco: Never Used   Vaping Use    Vaping Use: Never used   Substance and Sexual Activity    Alcohol use: Not Currently     Comment: seldom    Drug use: Yes     Types: Marijuana     Comment: smokes weed 3x per week    Sexual activity: None   Other Topics Concern    None   Social History Narrative    None     Social Determinants of Health     Financial Resource Strain:     Difficulty of Paying Living Expenses:    Food Insecurity:     Worried About Running Out of Food in the Last Year:     Jelena of Food in the Last Year:    Transportation Needs:     Lack of Transportation (Medical):      Lack of Transportation (Non-Medical):    Physical Activity:     Days of Exercise per Week:     Minutes of Exercise per Session:    Stress:     Feeling of Stress :    Social Connections:     Frequency of Communication with Friends and Family:     Frequency of Social Gatherings with Friends and Family:     Attends [employer] Services:     Active Member of Clubs or Organizations:     Attends Club or Organization Meetings:     Marital Status:    Intimate Partner Violence:     Fear of Current or Ex-Partner:     Emotionally Abused:     Physically Abused:     Sexually Abused:       Medications and Allergies:     Current Outpatient Medications   Medication Sig Dispense Refill    albuterol (2 5 mg/3 mL) 0 083 % nebulizer solution Take 1 vial (2 5 mg total) by nebulization every 6 (six) hours as needed for wheezing or shortness of breath 25 vial 1    amLODIPine (NORVASC) 2 5 mg tablet Take 2 5 mg by mouth daily at bedtime  1    aspirin 81 mg chewable tablet Chew 81 mg daily      baclofen 10 mg tablet Take 10 mg by mouth daily at bedtime  4    Cholecalciferol (VITAMIN D PO) Take 1 tablet by mouth daily      Diclofenac Sodium (VOLTAREN) 1 % Apply 1 application topically 4 (four) times a day      ezetimibe (ZETIA) 10 mg tablet TAKE 1/2 TABLET BY MOUTH NIGHTLY  0    Ferrous Sulfate Dried (FERROUS SULFATE CR PO) Iron TABS    Refills: 0       Active      fluticasone-vilanterol (Breo Ellipta) 200-25 MCG/INH inhaler Inhale 1 puff daily 3 Inhaler 1    Glucose Blood (ONETOUCH ULTRA BLUE VI) 1 Squirt by In Vitro route 3 (three) times a day      hydrochlorothiazide (HYDRODIURIL) 12 5 mg tablet Take 12 5 mg by mouth daily  0    hydroxychloroquine (PLAQUENIL) 200 mg tablet Take 200 mg by mouth 2 (two) times a day with meals      insulin glargine (LANTUS) 100 units/mL subcutaneous injection Inject under the skin Inject 28 units at bedtime       insulin lispro (HUMALOG) 100 units/mL injection Inject under the skin Injection 10 units 3 times daily       Insulin Syringe-Needle U-100 (B-D INS SYRINGE 0 5CC/31GX5/16) 31G X 5/16" 0 5 ML MISC by Does not apply route      Lifitegrast (XIIDRA OP) Apply to eye 2 vials a day      losartan (COZAAR) 25 mg tablet Take 1 tablet by mouth daily      metFORMIN (GLUCOPHAGE) 500 mg tablet Take 1,000 mg by mouth 2 (two) times a day with meals       sertraline (ZOLOFT) 50 mg tablet Take 1 tablet (50 mg total) by mouth daily 90 tablet 3    simvastatin (ZOCOR) 20 mg tablet Take 1 tablet by mouth daily      traMADol (ULTRAM) 50 mg tablet Take 50 mg by mouth every 12 (twelve) hours as needed      valACYclovir (VALTREX) 1,000 mg tablet Take 1,000 mg by mouth 2 (two) times a day      artificial tear (LUBRIFRESH P M ) 83-15 % ophthalmic ointment daily at bedtime (Patient not taking: Reported on 7/15/2021)      benzonatate (TESSALON) 200 MG capsule Take 1 capsule (200 mg total) by mouth 3 (three) times a day as needed for cough (Patient not taking: Reported on 4/26/2021) 20 capsule 0    pantoprazole (PROTONIX) 40 mg tablet Take 1 tablet (40 mg total) by mouth daily (Patient not taking: Reported on 7/15/2021) 30 tablet 11    pantoprazole (PROTONIX) 40 mg tablet Take 1 tablet (40 mg total) by mouth daily (Patient not taking: Reported on 4/26/2021) 30 tablet 2     No current facility-administered medications for this visit       Allergies   Allergen Reactions    Augmentin [Amoxicillin-Pot Clavulanate]     Codeine Drowsiness    Macrobid [Nitrofurantoin] Other (See Comments)     Pt does not recall      Immunizations:     Immunization History   Administered Date(s) Administered    Influenza Quadrivalent Preservative Free 3 years and older IM 11/08/2014    Influenza Split High Dose Preservative Free IM 02/09/2018    Influenza, high dose seasonal 0 7 mL 10/04/2018    SARS-CoV-2 / COVID-19 mRNA IM (Moderna) 01/25/2021, 02/22/2021      Health Maintenance:         Topic Date Due    Hepatitis C Screening  Never done    Breast Cancer Screening: Mammogram  04/05/2022    Colorectal Cancer Screening  05/08/2029         Topic Date Due    Pneumococcal Vaccine: 65+ Years (1 of 2 - PPSV23) Never done    DTaP,Tdap,and Td Vaccines (1 - Tdap) Never done    Influenza Vaccine (1) 09/01/2021      Medicare Health Risk Assessment:     /78 (BP Location: Left arm, Patient Position: Sitting, Cuff Size: Standard)   Pulse 76   Temp 97 9 °F (36 6 °C) (Temporal)   Resp 16   Ht 5' 1" (1 549 m)   Wt 73 2 kg (161 lb 6 4 oz)   SpO2 94%   BMI 30 50 kg/m²      Iris is here for her Subsequent Wellness visit  Health Risk Assessment:   Patient rates overall health as fair  Patient feels that their physical health rating is same  Patient is satisfied with their life  Eyesight was rated as much worse  Hearing was rated as same  Patient feels that their emotional and mental health rating is same  Patients states they are sometimes angry  Patient states they are sometimes unusually tired/fatigued  Pain experienced in the last 7 days has been some  Patient's pain rating has been 7/10  Patient states that she has experienced no weight loss or gain in last 6 months  Depression Screening:   PHQ-2 Score: 0      Fall Risk Screening: In the past year, patient has experienced: no history of falling in past year      Urinary Incontinence Screening:   Patient has leaked urine accidently in the last six months  When pt sneezes    Home Safety:  Patient does not have trouble with stairs inside or outside of their home  Patient has working smoke alarms and has working carbon monoxide detector  Home safety hazards include: none  Nutrition:   Current diet is Diabetic and No Added Salt  Medications:   Patient is currently taking over-the-counter supplements   OTC medications include: see medication list  Patient is able to manage medications  Activities of Daily Living (ADLs)/Instrumental Activities of Daily Living (IADLs):   Walk and transfer into and out of bed and chair?: Yes  Dress and groom yourself?: Yes    Bathe or shower yourself?: Yes    Feed yourself? Yes  Do your laundry/housekeeping?: Yes  Manage your money, pay your bills and track your expenses?: Yes  Make your own meals?: Yes    Do your own shopping?: Yes    Previous Hospitalizations:   Any hospitalizations or ED visits within the last 12 months?: No      Advance Care Planning:   Living will: No    Durable POA for healthcare: No    Advanced directive: No      Cognitive Screening:   Provider or family/friend/caregiver concerned regarding cognition?: No    PREVENTIVE SCREENINGS      Cardiovascular Screening:    General: Screening Current      Diabetes Screening:     General: History Diabetes and Screening Current      Colorectal Cancer Screening:     General: Screening Current      Breast Cancer Screening:     General: Screening Current      Cervical Cancer Screening:    General: Screening Not Indicated      Lung Cancer Screening:     General: Screening Not Indicated    Screening, Brief Intervention, and Referral to Treatment (SBIRT)    Screening  Typical number of drinks in a day: 0  Typical number of drinks in a week: 0  Interpretation: Low risk drinking behavior  Single Item Drug Screening:  How often have you used an illegal drug (including marijuana) or a prescription medication for non-medical reasons in the past year? weekly    Single Item Drug Screen Score: 3  Interpretation: POSITIVE screen for possible drug use disorder    Drug Abuse Screening Test (DAST-10):  1) Have you used drugs other than those required for medical reasons? No  2) Do you abuse more than one drug at a time? No  3) Are you always able to stop using drugs when you want to?  Yes  4) Have you had "blackouts" or "flashbacks" as a result of drug use? No  5) Do you ever feel bad or guilty about your drug use? No  6) Does your spouse (or parents) ever complain about your involvement with drugs? No  7) Have you neglected your family because of your use of drugs? No  8) Have you engaged in illegal activities in order to obtain drugs? No  9) Have you ever experienced withdrawal symptoms (felt sick) when you stopped taking drugs? No  10) Have you had medical problems as a result of your drug use (e g , memory loss, hepatitis, convulsions, bleeding, etc )? No    DAST-10 Score: 0  Interpretation: No problems reported    Brief Intervention       Patient has blood work performed by her endocrinologist at an outside laboratory    Latest lipid panel was stable for patient      Gia Salas MD

## 2021-07-15 NOTE — PATIENT INSTRUCTIONS
Medicare Preventive Visit Patient Instructions  Thank you for completing your Welcome to Medicare Visit or Medicare Annual Wellness Visit today  Your next wellness visit will be due in one year (7/16/2022)  The screening/preventive services that you may require over the next 5-10 years are detailed below  Some tests may not apply to you based off risk factors and/or age  Screening tests ordered at today's visit but not completed yet may show as past due  Also, please note that scanned in results may not display below  Preventive Screenings:  Service Recommendations Previous Testing/Comments   Colorectal Cancer Screening  * Colonoscopy    * Fecal Occult Blood Test (FOBT)/Fecal Immunochemical Test (FIT)  * Fecal DNA/Cologuard Test  * Flexible Sigmoidoscopy Age: 54-65 years old   Colonoscopy: every 10 years (may be performed more frequently if at higher risk)  OR  FOBT/FIT: every 1 year  OR  Cologuard: every 3 years  OR  Sigmoidoscopy: every 5 years  Screening may be recommended earlier than age 48 if at higher risk for colorectal cancer  Also, an individualized decision between you and your healthcare provider will decide whether screening between the ages of 74-80 would be appropriate  Colonoscopy: 05/08/2019  FOBT/FIT: Not on file  Cologuard: Not on file  Sigmoidoscopy: Not on file    Screening Current     Breast Cancer Screening Age: 36 years old  Frequency: every 1-2 years  Not required if history of left and right mastectomy Mammogram: 04/05/2021    Screening Current   Cervical Cancer Screening Between the ages of 21-29, pap smear recommended once every 3 years  Between the ages of 33-67, can perform pap smear with HPV co-testing every 5 years     Recommendations may differ for women with a history of total hysterectomy, cervical cancer, or abnormal pap smears in past  Pap Smear: Not on file    Screening Not Indicated   Hepatitis C Screening Once for adults born between 1945 and 1965  More frequently in patients at high risk for Hepatitis C Hep C Antibody: Not on file        Diabetes Screening 1-2 times per year if you're at risk for diabetes or have pre-diabetes Fasting glucose: No results in last 5 years   A1C: 7 1    Screening Not Indicated  History Diabetes   Cholesterol Screening Once every 5 years if you don't have a lipid disorder  May order more often based on risk factors  Lipid panel: Not on file          Other Preventive Screenings Covered by Medicare:  1  Abdominal Aortic Aneurysm (AAA) Screening: covered once if your at risk  You're considered to be at risk if you have a family history of AAA  2  Lung Cancer Screening: covers low dose CT scan once per year if you meet all of the following conditions: (1) Age 50-69; (2) No signs or symptoms of lung cancer; (3) Current smoker or have quit smoking within the last 15 years; (4) You have a tobacco smoking history of at least 30 pack years (packs per day multiplied by number of years you smoked); (5) You get a written order from a healthcare provider  3  Glaucoma Screening: covered annually if you're considered high risk: (1) You have diabetes OR (2) Family history of glaucoma OR (3)  aged 48 and older OR (3)  American aged 72 and older  3  Osteoporosis Screening: covered every 2 years if you meet one of the following conditions: (1) You're estrogen deficient and at risk for osteoporosis based off medical history and other findings; (2) Have a vertebral abnormality; (3) On glucocorticoid therapy for more than 3 months; (4) Have primary hyperparathyroidism; (5) On osteoporosis medications and need to assess response to drug therapy  · Last bone density test (DXA Scan): Not on file  5  HIV Screening: covered annually if you're between the age of 12-76  Also covered annually if you are younger than 13 and older than 72 with risk factors for HIV infection   For pregnant patients, it is covered up to 3 times per pregnancy  Immunizations:  Immunization Recommendations   Influenza Vaccine Annual influenza vaccination during flu season is recommended for all persons aged >= 6 months who do not have contraindications   Pneumococcal Vaccine (Prevnar and Pneumovax)  * Prevnar = PCV13  * Pneumovax = PPSV23   Adults 25-60 years old: 1-3 doses may be recommended based on certain risk factors  Adults 72 years old: Prevnar (PCV13) vaccine recommended followed by Pneumovax (PPSV23) vaccine  If already received PPSV23 since turning 65, then PCV13 recommended at least one year after PPSV23 dose  Hepatitis B Vaccine 3 dose series if at intermediate or high risk (ex: diabetes, end stage renal disease, liver disease)   Tetanus (Td) Vaccine - COST NOT COVERED BY MEDICARE PART B Following completion of primary series, a booster dose should be given every 10 years to maintain immunity against tetanus  Td may also be given as tetanus wound prophylaxis  Tdap Vaccine - COST NOT COVERED BY MEDICARE PART B Recommended at least once for all adults  For pregnant patients, recommended with each pregnancy  Shingles Vaccine (Shingrix) - COST NOT COVERED BY MEDICARE PART B  2 shot series recommended in those aged 48 and above     Health Maintenance Due:      Topic Date Due    Hepatitis C Screening  Never done    Breast Cancer Screening: Mammogram  04/05/2022    Colorectal Cancer Screening  05/08/2029     Immunizations Due:      Topic Date Due    Pneumococcal Vaccine: 65+ Years (1 of 2 - PPSV23) Never done    DTaP,Tdap,and Td Vaccines (1 - Tdap) Never done    Influenza Vaccine (1) 09/01/2021     Advance Directives   What are advance directives? Advance directives are legal documents that state your wishes and plans for medical care  These plans are made ahead of time in case you lose your ability to make decisions for yourself   Advance directives can apply to any medical decision, such as the treatments you want, and if you want to donate organs  What are the types of advance directives? There are many types of advance directives, and each state has rules about how to use them  You may choose a combination of any of the following:  · Living will: This is a written record of the treatment you want  You can also choose which treatments you do not want, which to limit, and which to stop at a certain time  This includes surgery, medicine, IV fluid, and tube feedings  · Durable power of  for healthcare [facility]): This is a written record that states who you want to make healthcare choices for you when you are unable to make them for yourself  This person, called a proxy, is usually a family member or a friend  You may choose more than 1 proxy  · Do not resuscitate (DNR) order:  A DNR order is used in case your heart stops beating or you stop breathing  It is a request not to have certain forms of treatment, such as CPR  A DNR order may be included in other types of advance directives  · Medical directive: This covers the care that you want if you are in a coma, near death, or unable to make decisions for yourself  You can list the treatments you want for each condition  Treatment may include pain medicine, surgery, blood transfusions, dialysis, IV or tube feedings, and a ventilator (breathing machine)  · Values history: This document has questions about your views, beliefs, and how you feel and think about life  This information can help others choose the care that you would choose  Why are advance directives important? An advance directive helps you control your care  Although spoken wishes may be used, it is better to have your wishes written down  Spoken wishes can be misunderstood, or not followed  Treatments may be given even if you do not want them  An advance directive may make it easier for your family to make difficult choices about your care     Urinary Incontinence   Urinary incontinence (UI)  is when you lose control of your bladder  UI develops because your bladder cannot store or empty urine properly  The 3 most common types of UI are stress incontinence, urge incontinence, or both  Medicines:   · May be given to help strengthen your bladder control  Report any side effects of medication to your healthcare provider  Do pelvic muscle exercises often:  Your pelvic muscles help you stop urinating  Squeeze these muscles tight for 5 seconds, then relax for 5 seconds  Gradually work up to squeezing for 10 seconds  Do 3 sets of 15 repetitions a day, or as directed  This will help strengthen your pelvic muscles and improve bladder control  Train your bladder:  Go to the bathroom at set times, such as every 2 hours, even if you do not feel the urge to go  You can also try to hold your urine when you feel the urge to go  For example, hold your urine for 5 minutes when you feel the urge to go  As that becomes easier, hold your urine for 10 minutes  Self-care:   · Keep a UI record  Write down how often you leak urine and how much you leak  Make a note of what you were doing when you leaked urine  · Drink liquids as directed  You may need to limit the amount of liquid you drink to help control your urine leakage  Do not drink any liquid right before you go to bed  Limit or do not have drinks that contain caffeine or alcohol  · Prevent constipation  Eat a variety of high-fiber foods  Good examples are high-fiber cereals, beans, vegetables, and whole-grain breads  Walking is the best way to trigger your intestines to have a bowel movement  · Exercise regularly and maintain a healthy weight  Weight loss and exercise will decrease pressure on your bladder and help you control your leakage  · Use a catheter as directed  to help empty your bladder  A catheter is a tiny, plastic tube that is put into your bladder to drain your urine  · Go to behavior therapy as directed    Behavior therapy may be used to help you learn to control your urge to urinate  Weight Management   Why it is important to manage your weight:  Being overweight increases your risk of health conditions such as heart disease, high blood pressure, type 2 diabetes, and certain types of cancer  It can also increase your risk for osteoarthritis, sleep apnea, and other respiratory problems  Aim for a slow, steady weight loss  Even a small amount of weight loss can lower your risk of health problems  How to lose weight safely:  A safe and healthy way to lose weight is to eat fewer calories and get regular exercise  You can lose up about 1 pound a week by decreasing the number of calories you eat by 500 calories each day  Healthy meal plan for weight management:  A healthy meal plan includes a variety of foods, contains fewer calories, and helps you stay healthy  A healthy meal plan includes the following:  · Eat whole-grain foods more often  A healthy meal plan should contain fiber  Fiber is the part of grains, fruits, and vegetables that is not broken down by your body  Whole-grain foods are healthy and provide extra fiber in your diet  Some examples of whole-grain foods are whole-wheat breads and pastas, oatmeal, brown rice, and bulgur  · Eat a variety of vegetables every day  Include dark, leafy greens such as spinach, kale, doreen greens, and mustard greens  Eat yellow and orange vegetables such as carrots, sweet potatoes, and winter squash  · Eat a variety of fruits every day  Choose fresh or canned fruit (canned in its own juice or light syrup) instead of juice  Fruit juice has very little or no fiber  · Eat low-fat dairy foods  Drink fat-free (skim) milk or 1% milk  Eat fat-free yogurt and low-fat cottage cheese  Try low-fat cheeses such as mozzarella and other reduced-fat cheeses  · Choose meat and other protein foods that are low in fat  Choose beans or other legumes such as split peas or lentils   Choose fish, skinless poultry (chicken or turkey), or lean cuts of red meat (beef or pork)  Before you cook meat or poultry, cut off any visible fat  · Use less fat and oil  Try baking foods instead of frying them  Add less fat, such as margarine, sour cream, regular salad dressing and mayonnaise to foods  Eat fewer high-fat foods  Some examples of high-fat foods include french fries, doughnuts, ice cream, and cakes  · Eat fewer sweets  Limit foods and drinks that are high in sugar  This includes candy, cookies, regular soda, and sweetened drinks  Exercise:  Exercise at least 30 minutes per day on most days of the week  Some examples of exercise include walking, biking, dancing, and swimming  You can also fit in more physical activity by taking the stairs instead of the elevator or parking farther away from stores  Ask your healthcare provider about the best exercise plan for you  © Copyright [employer] 2018 Information is for End User's use only and may not be sold, redistributed or otherwise used for commercial purposes   All illustrations and images included in CareNotes® are the copyrighted property of [employer]  or [address] [place] [address]

## 2021-07-15 NOTE — PROGRESS NOTES
FAMILY PRACTICE OFFICE VISIT       NAME: Becki Roman  AGE: 72 y o  SEX: female       : 1950        MRN: [id number]    DATE: 7/15/2021  TIME: 12:54 PM    Assessment and Plan     Problem List Items Addressed This Visit        Endocrine    Type 2 diabetes mellitus with hyperglycemia ([address] ) - Primary       Diabetes  Patient's A1c stable at 7 7  She will continue to follow-up with endocrinologist for monitoring of this condition  Lab Results   Component Value Date    HGBA1C 7 7 (A) 07/15/2021            Relevant Orders    POCT hemoglobin A1c (Completed)       Respiratory    Asthma       Asthma  Patient was recommended to initiate Singulair 10 mg 1 p o  q day and continue with current dose of her Breo inhaler  Patient is aware to call the office if no symptom improvement within the 1st 2-4 weeks         Relevant Medications    montelukast (SINGULAIR) 10 mg tablet      Other Visit Diagnoses     Medicare annual wellness visit, subsequent              BMI Counseling: Body mass index is 30 5 kg/m²  The BMI is above normal  Nutrition recommendations include decreasing portion sizes and moderation in carbohydrate intake  Exercise recommendations include exercising 3-5 times per week  Chief Complaint     Chief Complaint   Patient presents with    Medicare Wellness Visit     AWV       History of Present Illness      Patient in the office to review chronic medical condition  She is scheduled to see hair new endocrinologist with [facility] in 2021  Her latest A1c in the office was 7 7  This  Is an improvement from previous value at 8 3  She is compliant with taking her current regimen of medications  She is not as active physically as she used to be in the past due to arthralgias and shortness breath with activities due to wheezing and asthma  Review of Systems   Review of Systems   Constitutional: Negative  HENT: Negative  Eyes: Negative      Respiratory: Positive for cough, chest tightness and wheezing  Cardiovascular: Negative  Gastrointestinal: Negative  Genitourinary: Negative  Musculoskeletal: Positive for arthralgias and back pain  Skin: Negative  Allergic/Immunologic: Positive for environmental allergies  Neurological: Negative  Psychiatric/Behavioral: Negative  Active Problem List     Patient Active Problem List   Diagnosis    Allergic rhinitis    Asthma    Generalized anxiety disorder    Stress incontinence, female    Lumbar radiculopathy    Type 2 diabetes mellitus with hyperglycemia ([facility] )    Colitis    Chronic fatigue    Gastroesophageal reflux disease without esophagitis    Hiatal hernia    Rheumatoid arthritis of multiple sites with negative rheumatoid factor (HCC)       Past Medical History:  Past Medical History:   Diagnosis Date    Anxiety     Anxiety     Arthritis of right knee     Cellulitis     Contact dermatitis     Cough     Diabetes mellitus ([facility]     GERD without esophagitis     Headache     Hiatal hernia     Hyperlipidemia     Knee sprain     Lyme disease     Tick bite        Past Surgical History:  Past Surgical History:   Procedure Laterality Date    ABDOMINAL SURGERY      tummy tuck    HYSTERECTOMY      age 51    AR COLONOSCOPY FLX DX W/COLLJ SPEC WHEN PFRMD N/A 5/8/2019    Procedure: COLONOSCOPY;  Surgeon: Herlinda Bahena MD;  Location: [facility]; Service: Gastroenterology    AR ESOPHAGOGASTRODUODENOSCOPY TRANSORAL DIAGNOSTIC N/A 5/8/2019    Procedure: ESOPHAGOGASTRODUODENOSCOPY (EGD); Surgeon: Herlinda Bahena MD;  Location: [facility];   Service: Gastroenterology    TUBAL LIGATION         Family History:  Family History   Problem Relation Age of Onset    Mental illness Mother     Heart disease Father     Colon cancer Father 67    No Known Problems Daughter     No Known Problems Maternal Grandmother     No Known Problems Maternal Grandfather     No Known Problems Paternal Grandmother     No Known Problems Paternal Grandfather     No Known Problems Son     No Known Problems Sister     No Known Problems Sister     No Known Problems Sister     No Known Problems Sister     No Known Problems Sister     Lymphoma Brother     No Known Problems Brother     No Known Problems Paternal Aunt     No Known Problems Paternal Aunt     No Known Problems Paternal Aunt     No Known Problems Paternal Aunt     Breast cancer Neg Hx     Breast cancer additional onset Neg Hx     Endometrial cancer Neg Hx     Ovarian cancer Neg Hx     BRCA 1/2 Neg Hx     BRCA1 Negative Neg Hx     BRCA1 Positive Neg Hx     BRCA2 Negative Neg Hx     BRCA2 Positive Neg Hx        Social History:  Social History     Socioeconomic History    Marital status:      Spouse name: Not on file    Number of children: Not on file    Years of education: Not on file    Highest education level: Not on file   Occupational History    Not on file   Tobacco Use    Smoking status: Former Smoker     Packs/day: 0 50     Years: 25 00     Pack years: 12 50     Types: Cigarettes     Quit date: 2008     Years since quittin 9    Smokeless tobacco: Never Used   Vaping Use    Vaping Use: Never used   Substance and Sexual Activity    Alcohol use: Not Currently     Comment: seldom    Drug use: Yes     Types: Marijuana     Comment: smokes weed 3x per week    Sexual activity: Not on file   Other Topics Concern    Not on file   Social History Narrative    Not on file     Social Determinants of Health     Financial Resource Strain:     Difficulty of Paying Living Expenses:    Food Insecurity:     Worried About Running Out of Food in the Last Year:     [address] in the Last Year:    Transportation Needs:     Lack of Transportation (Medical):      Lack of Transportation (Non-Medical):    Physical Activity:     Days of Exercise per Week:     Minutes of Exercise per Session:    Stress:     Feeling of Stress :    Social Connections:     Frequency of Communication with Friends and Family:     Frequency of Social Gatherings with Friends and Family:     Attends Taoism Services:     Active Member of Clubs or Organizations:     Attends Club or Organization Meetings:     Marital Status:    Intimate Partner Violence:     Fear of Current or Ex-Partner:     Emotionally Abused:     Physically Abused:     Sexually Abused:        Objective     Vitals:    07/15/21 0936   BP: 124/78   Pulse: 76   Resp: 16   Temp: 97 9 °F (36 6 °C)   SpO2: 94%     Wt Readings from Last 3 Encounters:   07/15/21 73 2 kg (161 lb 6 4 oz)   04/26/21 76 3 kg (168 lb 3 2 oz)   04/05/21 73 kg (161 lb)       Physical Exam  Constitutional:       General: She is not in acute distress  Appearance: Normal appearance  She is not ill-appearing  HENT:      Head: Normocephalic and atraumatic  Eyes:      General:         Right eye: No discharge  Left eye: No discharge  Extraocular Movements: Extraocular movements intact  Conjunctiva/sclera: Conjunctivae normal       Pupils: Pupils are equal, round, and reactive to light  Neck:      Vascular: No carotid bruit  Cardiovascular:      Rate and Rhythm: Normal rate and regular rhythm  Heart sounds: Normal heart sounds  No murmur heard  Pulmonary:      Effort: Pulmonary effort is normal       Breath sounds: Normal breath sounds  No wheezing, rhonchi or rales  Abdominal:      General: Abdomen is flat  Bowel sounds are normal  There is no distension  Palpations: Abdomen is soft  Tenderness: There is no abdominal tenderness  There is no guarding or rebound  Musculoskeletal:      Right lower leg: No edema  Left lower leg: No edema  Lymphadenopathy:      Cervical: No cervical adenopathy  Skin:     Findings: No rash  Neurological:      General: No focal deficit present  Mental Status: She is alert and oriented to person, place, and time  Cranial Nerves:  No cranial nerve deficit  Psychiatric:         Mood and Affect: Mood normal          Behavior: Behavior normal          Thought Content:  Thought content normal          Judgment: Judgment normal          Pertinent Laboratory/Diagnostic Studies:  Lab Results   Component Value Date    GLUCOSE 105 11/10/2015    BUN 10 11/10/2015    CREATININE 0 63 11/10/2015    CALCIUM 8 8 11/10/2015     11/10/2015    K 4 0 11/10/2015    CO2 24 2 11/10/2015     11/10/2015     No results found for: ALT, AST, GGT, ALKPHOS, BILITOT    Lab Results   Component Value Date    WBC 8 02 11/10/2015    HGB 11 1 (L) 11/10/2015    HCT 35 8 11/10/2015    MCV 75 (L) 11/10/2015     11/10/2015       Lab Results   Component Value Date    TSH 3 19 02/26/2018       No results found for: CHOL  No results found for: TRIG  No results found for: HDL  No results found for: [facility]  Lab Results   Component Value Date    HGBA1C 7 7 (A) 07/15/2021       Results for orders placed or performed in visit on 07/15/21   POCT hemoglobin A1c   Result Value Ref Range    Hemoglobin A1C 7 7 (A) 6 5       Orders Placed This Encounter   Procedures    POCT hemoglobin A1c       ALLERGIES:  Allergies   Allergen Reactions    Augmentin [Amoxicillin-Pot Clavulanate]     Codeine Drowsiness    Macrobid [Nitrofurantoin] Other (See Comments)     Pt does not recall       Current Medications     Current Outpatient Medications   Medication Sig Dispense Refill    albuterol (2 5 mg/3 mL) 0 083 % nebulizer solution Take 1 vial (2 5 mg total) by nebulization every 6 (six) hours as needed for wheezing or shortness of breath 25 vial 1    amLODIPine (NORVASC) 2 5 mg tablet Take 2 5 mg by mouth daily at bedtime  1    aspirin 81 mg chewable tablet Chew 81 mg daily      baclofen 10 mg tablet Take 10 mg by mouth daily at bedtime  4    Cholecalciferol (VITAMIN D PO) Take 1 tablet by mouth daily      Diclofenac Sodium (VOLTAREN) 1 % Apply 1 application topically 4 (four) times a day      ezetimibe (ZETIA) 10 mg tablet TAKE 1/2 TABLET BY MOUTH NIGHTLY  0    Ferrous Sulfate Dried (FERROUS SULFATE CR PO) Iron TABS    Refills: 0       Active      fluticasone-vilanterol (Breo Ellipta) 200-25 MCG/INH inhaler Inhale 1 puff daily 3 Inhaler 1    Glucose Blood (ONETOUCH ULTRA BLUE VI) 1 Squirt by In Vitro route 3 (three) times a day      hydrochlorothiazide (HYDRODIURIL) 12 5 mg tablet Take 12 5 mg by mouth daily  0    hydroxychloroquine (PLAQUENIL) 200 mg tablet Take 200 mg by mouth 2 (two) times a day with meals      insulin glargine (LANTUS) 100 units/mL subcutaneous injection Inject under the skin Inject 28 units at bedtime       insulin lispro (HUMALOG) 100 units/mL injection Inject under the skin Injection 10 units 3 times daily       Insulin Syringe-Needle U-100 (B-D INS SYRINGE 0 5CC/31GX5/16) 31G X 5/16" 0 5 ML MISC by Does not apply route      Lifitegrast (XIIDRA OP) Apply to eye 2 vials a day      losartan (COZAAR) 25 mg tablet Take 1 tablet by mouth daily      metFORMIN (GLUCOPHAGE) 500 mg tablet Take 1,000 mg by mouth 2 (two) times a day with meals       sertraline (ZOLOFT) 50 mg tablet Take 1 tablet (50 mg total) by mouth daily 90 tablet 3    simvastatin (ZOCOR) 20 mg tablet Take 1 tablet by mouth daily      traMADol (ULTRAM) 50 mg tablet Take 50 mg by mouth every 12 (twelve) hours as needed      valACYclovir (VALTREX) 1,000 mg tablet Take 1,000 mg by mouth 2 (two) times a day      artificial tear (LUBRIFRESH P M ) 83-15 % ophthalmic ointment daily at bedtime (Patient not taking: Reported on 7/15/2021)      benzonatate (TESSALON) 200 MG capsule Take 1 capsule (200 mg total) by mouth 3 (three) times a day as needed for cough (Patient not taking: Reported on 4/26/2021) 20 capsule 0    montelukast (SINGULAIR) 10 mg tablet Take 1 tablet (10 mg total) by mouth daily at bedtime 90 tablet 3    pantoprazole (PROTONIX) 40 mg tablet Take 1 tablet (40 mg total) by mouth daily (Patient not taking: Reported on 7/15/2021) 30 tablet 11    pantoprazole (PROTONIX) 40 mg tablet Take 1 tablet (40 mg total) by mouth daily (Patient not taking: Reported on 4/26/2021) 30 tablet 2     No current facility-administered medications for this visit           Health Maintenance     Health Maintenance   Topic Date Due    Hepatitis C Screening  Never done    Medicare Annual Wellness Visit (AWV)  Never done    Pneumococcal Vaccine: 65+ Years (1 of 2 - PPSV23) Never done    DTaP,Tdap,and Td Vaccines (1 - Tdap) Never done    DM Eye Exam  04/02/2019    Influenza Vaccine (1) 09/01/2021    HEMOGLOBIN A1C  01/15/2022    Breast Cancer Screening: Mammogram  04/05/2022    Diabetic Foot Exam  04/26/2022    Fall Risk  07/15/2022    Depression Screening PHQ  07/15/2022    BMI: Followup Plan  07/15/2022    BMI: Adult  07/15/2022    Colorectal Cancer Screening  05/08/2029    COVID-19 Vaccine  Completed    HIB Vaccine  Aged Out    Hepatitis B Vaccine  Aged Out    IPV Vaccine  Aged Out    Hepatitis A Vaccine  Aged Out    Meningococcal ACWY Vaccine  Aged Out    HPV Vaccine  Aged Out     Immunization History   Administered Date(s) Administered    Influenza Quadrivalent Preservative Free 3 years and older IM 11/08/2014    Influenza Split High Dose Preservative Free IM 02/09/2018    Influenza, high dose seasonal 0 7 mL 10/04/2018    SARS-CoV-2 / COVID-19 mRNA IM (Jd Salmons) 01/25/2021, 24/49/0753       Jc Bates MD

## 2021-07-15 NOTE — ASSESSMENT & PLAN NOTE
Asthma  Patient was recommended to initiate Singulair 10 mg 1 p o  q day and continue with current dose of her Breo inhaler    Patient is aware to call the office if no symptom improvement within the 1st 2-4 weeks

## 2021-08-03 DIAGNOSIS — R06.9 RESPIRATORY ABNORMALITIES: ICD-10-CM

## 2021-08-03 PROBLEM — L29.9 EAR ITCH: Status: ACTIVE | Noted: 2021-08-03

## 2021-08-03 PROBLEM — R68.2 DRY MOUTH: Status: ACTIVE | Noted: 2021-08-03

## 2021-08-03 PROBLEM — H90.A22 SENSORINEURAL HEARING LOSS (SNHL) OF LEFT EAR WITH RESTRICTED HEARING OF RIGHT EAR: Status: ACTIVE | Noted: 2021-08-03

## 2021-08-05 ENCOUNTER — TELEPHONE (OUTPATIENT)
Dept: FAMILY MEDICINE CLINIC | Facility: CLINIC | Age: 71
End: 2021-08-05

## 2021-08-05 NOTE — TELEPHONE ENCOUNTER
Patient's Breo Ellipta inhaler that was refilled/sent to [facility] by Mail Delivery on 8/3/21 will not be arriving until 15 days from now  Patient said this is the 3rd day of not having her inhaler and is noticing her symptoms are worsening each day of not having her inhaler  Patient asked if a temporary supply could be sent to [address] [place] [address]

## 2021-08-23 ENCOUNTER — FOLLOW UP (OUTPATIENT)
Dept: URBAN - METROPOLITAN AREA CLINIC 6 | Facility: CLINIC | Age: 71
End: 2021-08-23

## 2021-08-23 DIAGNOSIS — H01.003: ICD-10-CM

## 2021-08-23 DIAGNOSIS — H04.123: ICD-10-CM

## 2021-08-23 DIAGNOSIS — H01.006: ICD-10-CM

## 2021-08-23 DIAGNOSIS — H25.813: ICD-10-CM

## 2021-08-23 DIAGNOSIS — Z79.4: ICD-10-CM

## 2021-08-23 DIAGNOSIS — E11.9: ICD-10-CM

## 2021-08-23 LAB
LEFT EYE DIABETIC RETINOPATHY: NORMAL
RIGHT EYE DIABETIC RETINOPATHY: NORMAL
SEVERITY (EYE EXAM): NORMAL

## 2021-08-23 PROCEDURE — 92012 INTRM OPH EXAM EST PATIENT: CPT

## 2021-08-23 ASSESSMENT — TONOMETRY
OD_IOP_MMHG: 10
OS_IOP_MMHG: 11

## 2021-08-23 ASSESSMENT — VISUAL ACUITY
OS_CC: 20/50
OD_CC: 20/30-2
OU_CC: J10
OS_PH: 20/40-2

## 2021-10-06 ENCOUNTER — HOSPITAL ENCOUNTER (OUTPATIENT)
Dept: MAMMOGRAPHY | Facility: CLINIC | Age: 71
Discharge: HOME | End: 2021-10-06
Payer: MEDICARE

## 2021-10-06 ENCOUNTER — HOSPITAL ENCOUNTER (OUTPATIENT)
Dept: ULTRASOUND IMAGING | Facility: CLINIC | Age: 71
Discharge: HOME | End: 2021-10-06
Payer: MEDICARE

## 2021-10-06 VITALS — HEIGHT: 60 IN | WEIGHT: 161 LBS | BODY MASS INDEX: 31.61 KG/M2

## 2021-10-06 DIAGNOSIS — N64.59 SYMPTOMS IN BREAST: ICD-10-CM

## 2021-10-06 PROCEDURE — 76642 ULTRASOUND BREAST LIMITED: CPT

## 2021-10-06 PROCEDURE — G0279 TOMOSYNTHESIS, MAMMO: HCPCS

## 2021-10-06 PROCEDURE — 77066 DX MAMMO INCL CAD BI: CPT

## 2021-10-11 ENCOUNTER — OFFICE VISIT (OUTPATIENT)
Dept: ENDOCRINOLOGY | Facility: CLINIC | Age: 71
End: 2021-10-11
Payer: MEDICARE

## 2021-10-11 VITALS
BODY MASS INDEX: 31.41 KG/M2 | WEIGHT: 160 LBS | SYSTOLIC BLOOD PRESSURE: 140 MMHG | HEIGHT: 60 IN | DIASTOLIC BLOOD PRESSURE: 70 MMHG | HEART RATE: 76 BPM

## 2021-10-11 DIAGNOSIS — Z79.4 TYPE 2 DIABETES MELLITUS WITH HYPOGLYCEMIA WITHOUT COMA, WITH LONG-TERM CURRENT USE OF INSULIN (HCC): Primary | ICD-10-CM

## 2021-10-11 DIAGNOSIS — E11.649 TYPE 2 DIABETES MELLITUS WITH HYPOGLYCEMIA WITHOUT COMA, WITH LONG-TERM CURRENT USE OF INSULIN (HCC): Primary | ICD-10-CM

## 2021-10-11 DIAGNOSIS — Z79.4 TYPE 2 DIABETES MELLITUS WITH HYPERGLYCEMIA, WITH LONG-TERM CURRENT USE OF INSULIN (HCC): ICD-10-CM

## 2021-10-11 DIAGNOSIS — I10 HYPERTENSION, UNSPECIFIED TYPE: ICD-10-CM

## 2021-10-11 DIAGNOSIS — E78.5 HYPERLIPIDEMIA, UNSPECIFIED HYPERLIPIDEMIA TYPE: ICD-10-CM

## 2021-10-11 DIAGNOSIS — E11.65 TYPE 2 DIABETES MELLITUS WITH HYPERGLYCEMIA, WITH LONG-TERM CURRENT USE OF INSULIN (HCC): ICD-10-CM

## 2021-10-11 PROCEDURE — 99205 OFFICE O/P NEW HI 60 MIN: CPT | Performed by: INTERNAL MEDICINE

## 2021-10-11 RX ORDER — INSULIN LISPRO 100 [IU]/ML
INJECTION, SOLUTION INTRAVENOUS; SUBCUTANEOUS
Qty: 45 ML | Refills: 3 | Status: SHIPPED | OUTPATIENT
Start: 2021-10-11 | End: 2021-12-01 | Stop reason: SDUPTHER

## 2021-10-11 RX ORDER — PEN NEEDLE, DIABETIC 32GX 5/32"
NEEDLE, DISPOSABLE MISCELLANEOUS
Qty: 100 EACH | Refills: 3 | Status: SHIPPED | OUTPATIENT
Start: 2021-10-11 | End: 2021-12-01 | Stop reason: SDUPTHER

## 2021-10-11 RX ORDER — INSULIN GLARGINE 100 [IU]/ML
INJECTION, SOLUTION SUBCUTANEOUS
Qty: 45 ML | Refills: 3 | Status: SHIPPED | OUTPATIENT
Start: 2021-10-11 | End: 2021-12-01 | Stop reason: SDUPTHER

## 2021-10-12 ENCOUNTER — TELEPHONE (OUTPATIENT)
Dept: ADMINISTRATIVE | Facility: OTHER | Age: 71
End: 2021-10-12

## 2021-10-12 PROBLEM — E11.649 TYPE 2 DIABETES MELLITUS WITH HYPOGLYCEMIA WITHOUT COMA, WITH LONG-TERM CURRENT USE OF INSULIN (HCC): Status: ACTIVE | Noted: 2021-10-12

## 2021-10-12 PROBLEM — Z79.4 TYPE 2 DIABETES MELLITUS WITH HYPOGLYCEMIA WITHOUT COMA, WITH LONG-TERM CURRENT USE OF INSULIN (HCC): Status: ACTIVE | Noted: 2021-10-12

## 2021-10-25 ENCOUNTER — PRE-OP CATARACT MEASUREMENTS (OUTPATIENT)
Dept: URBAN - METROPOLITAN AREA CLINIC 6 | Facility: CLINIC | Age: 71
End: 2021-10-25

## 2021-10-25 DIAGNOSIS — H25.813: ICD-10-CM

## 2021-10-25 DIAGNOSIS — H01.006: ICD-10-CM

## 2021-10-25 DIAGNOSIS — H01.003: ICD-10-CM

## 2021-10-25 DIAGNOSIS — H04.123: ICD-10-CM

## 2021-10-25 PROCEDURE — 1036F TOBACCO NON-USER: CPT

## 2021-10-25 PROCEDURE — 92136 OPHTHALMIC BIOMETRY: CPT

## 2021-10-25 PROCEDURE — 92012 INTRM OPH EXAM EST PATIENT: CPT

## 2021-10-25 PROCEDURE — G8427 DOCREV CUR MEDS BY ELIG CLIN: HCPCS

## 2021-10-25 ASSESSMENT — KERATOMETRY
OS_K1POWER_DIOPTERS: 42.25
OD_AXISANGLE2_DEGREES: 174
OD_AXISANGLE_DEGREES: 084
OD_K1POWER_DIOPTERS: 43.00
OD_K2POWER_DIOPTERS: 43.50
OS_K2POWER_DIOPTERS: 43.00
OS_AXISANGLE_DEGREES: 104
OS_AXISANGLE2_DEGREES: 14

## 2021-10-25 ASSESSMENT — VISUAL ACUITY
OS_GLARE: 20/80
OS_CC: 20/60-1
OS_PH: 20/50
OD_GLARE: 20/70-1
OD_CC: 20/40

## 2021-10-25 ASSESSMENT — TONOMETRY
OS_IOP_MMHG: 10
OD_IOP_MMHG: 9

## 2021-11-02 ENCOUNTER — OFFICE VISIT (OUTPATIENT)
Dept: FAMILY MEDICINE CLINIC | Facility: CLINIC | Age: 71
End: 2021-11-02
Payer: MEDICARE

## 2021-11-02 VITALS
BODY MASS INDEX: 31.44 KG/M2 | TEMPERATURE: 98 F | RESPIRATION RATE: 18 BRPM | DIASTOLIC BLOOD PRESSURE: 74 MMHG | HEART RATE: 72 BPM | WEIGHT: 160.13 LBS | OXYGEN SATURATION: 96 % | HEIGHT: 60 IN | SYSTOLIC BLOOD PRESSURE: 122 MMHG

## 2021-11-02 DIAGNOSIS — Z01.818 PREOPERATIVE EVALUATION OF A MEDICAL CONDITION TO RULE OUT SURGICAL CONTRAINDICATIONS (TAR REQUIRED): Primary | ICD-10-CM

## 2021-11-02 DIAGNOSIS — E11.65 TYPE 2 DIABETES MELLITUS WITH HYPERGLYCEMIA, UNSPECIFIED WHETHER LONG TERM INSULIN USE (HCC): ICD-10-CM

## 2021-11-02 LAB — SL AMB POCT HEMOGLOBIN AIC: 8.1 (ref ?–6.5)

## 2021-11-02 PROCEDURE — 83036 HEMOGLOBIN GLYCOSYLATED A1C: CPT | Performed by: FAMILY MEDICINE

## 2021-11-02 PROCEDURE — 93000 ELECTROCARDIOGRAM COMPLETE: CPT | Performed by: FAMILY MEDICINE

## 2021-11-02 PROCEDURE — 99214 OFFICE O/P EST MOD 30 MIN: CPT | Performed by: FAMILY MEDICINE

## 2021-11-08 ENCOUNTER — TELEPHONE (OUTPATIENT)
Dept: ENDOCRINOLOGY | Facility: CLINIC | Age: 71
End: 2021-11-08

## 2021-11-09 ENCOUNTER — SURGERY/PROCEDURE (OUTPATIENT)
Dept: URBAN - METROPOLITAN AREA SURGICAL CENTER 6 | Facility: SURGICAL CENTER | Age: 71
End: 2021-11-09

## 2021-11-09 DIAGNOSIS — H25.811: ICD-10-CM

## 2021-11-09 PROCEDURE — 0356T INSERTION OF DRUG-ELUTING IMPLANT (INCLUDING PUNCTAL DILATION AND IMPLANT REMOVAL WHEN PERFORMED) INTO LACRIMAL CANALICULUS, EACH: CPT

## 2021-11-09 PROCEDURE — 66984 XCAPSL CTRC RMVL W/O ECP: CPT

## 2021-11-10 ENCOUNTER — 1 DAY POST-OP (OUTPATIENT)
Dept: URBAN - METROPOLITAN AREA CLINIC 6 | Facility: CLINIC | Age: 71
End: 2021-11-10

## 2021-11-10 DIAGNOSIS — Z96.1: ICD-10-CM

## 2021-11-10 PROCEDURE — G8427 DOCREV CUR MEDS BY ELIG CLIN: HCPCS

## 2021-11-10 PROCEDURE — 1036F TOBACCO NON-USER: CPT

## 2021-11-10 PROCEDURE — 99024 POSTOP FOLLOW-UP VISIT: CPT

## 2021-11-10 ASSESSMENT — TONOMETRY
OS_IOP_MMHG: 12
OD_IOP_MMHG: 12

## 2021-11-10 ASSESSMENT — KERATOMETRY
OS_K1POWER_DIOPTERS: 42.25
OS_K2POWER_DIOPTERS: 43.00
OD_K1POWER_DIOPTERS: 43.00
OS_AXISANGLE_DEGREES: 104
OD_AXISANGLE_DEGREES: 084
OD_AXISANGLE2_DEGREES: 174
OS_AXISANGLE2_DEGREES: 14
OD_K2POWER_DIOPTERS: 43.50

## 2021-11-10 ASSESSMENT — VISUAL ACUITY
OD_PH: 20/30-1
OD_SC: 20/30-2

## 2021-11-12 ASSESSMENT — KERATOMETRY
OS_K1POWER_DIOPTERS: 42.25
OD_AXISANGLE_DEGREES: 084
OS_K2POWER_DIOPTERS: 43.00
OS_AXISANGLE2_DEGREES: 14
OD_K1POWER_DIOPTERS: 43.00
OD_AXISANGLE2_DEGREES: 174
OD_K2POWER_DIOPTERS: 43.50
OS_AXISANGLE_DEGREES: 104

## 2021-11-17 ENCOUNTER — 1 WEEK POST-OP (OUTPATIENT)
Dept: URBAN - METROPOLITAN AREA CLINIC 6 | Facility: CLINIC | Age: 71
End: 2021-11-17

## 2021-11-17 DIAGNOSIS — Z96.1: ICD-10-CM

## 2021-11-17 DIAGNOSIS — H25.812: ICD-10-CM

## 2021-11-17 PROCEDURE — 1036F TOBACCO NON-USER: CPT

## 2021-11-17 PROCEDURE — G8427 DOCREV CUR MEDS BY ELIG CLIN: HCPCS

## 2021-11-17 PROCEDURE — 92136 OPHTHALMIC BIOMETRY: CPT | Mod: 26,LT

## 2021-11-17 PROCEDURE — 99024 POSTOP FOLLOW-UP VISIT: CPT

## 2021-11-17 ASSESSMENT — KERATOMETRY
OS_K1POWER_DIOPTERS: 42.25
OD_AXISANGLE_DEGREES: 084
OD_AXISANGLE2_DEGREES: 174
OS_AXISANGLE_DEGREES: 104
OS_AXISANGLE2_DEGREES: 18
OS_K2POWER_DIOPTERS: 43.00
OS_AXISANGLE_DEGREES: 108
OS_AXISANGLE2_DEGREES: 14
OD_K1POWER_DIOPTERS: 43.00
OD_K2POWER_DIOPTERS: 43.50

## 2021-11-17 ASSESSMENT — VISUAL ACUITY
OD_SC: 20/25
OS_CC: 20/60

## 2021-11-17 ASSESSMENT — TONOMETRY
OD_IOP_MMHG: 10
OS_IOP_MMHG: 12

## 2021-11-30 ENCOUNTER — SURGERY/PROCEDURE (OUTPATIENT)
Dept: URBAN - METROPOLITAN AREA SURGICAL CENTER 6 | Facility: SURGICAL CENTER | Age: 71
End: 2021-11-30

## 2021-11-30 DIAGNOSIS — H25.812: ICD-10-CM

## 2021-11-30 PROCEDURE — 66984 XCAPSL CTRC RMVL W/O ECP: CPT | Mod: 79,LT

## 2021-11-30 PROCEDURE — 0356T INSERTION OF DRUG-ELUTING IMPLANT (INCLUDING PUNCTAL DILATION AND IMPLANT REMOVAL WHEN PERFORMED) INTO LACRIMAL CANALICULUS, EACH: CPT

## 2021-12-01 ENCOUNTER — 1 DAY POST-OP (OUTPATIENT)
Dept: URBAN - METROPOLITAN AREA CLINIC 6 | Facility: CLINIC | Age: 71
End: 2021-12-01

## 2021-12-01 ENCOUNTER — PREPPED CHART (OUTPATIENT)
Dept: URBAN - METROPOLITAN AREA CLINIC 6 | Facility: CLINIC | Age: 71
End: 2021-12-01

## 2021-12-01 DIAGNOSIS — Z96.1: ICD-10-CM

## 2021-12-01 DIAGNOSIS — Z79.4 TYPE 2 DIABETES MELLITUS WITH HYPERGLYCEMIA, WITH LONG-TERM CURRENT USE OF INSULIN (HCC): ICD-10-CM

## 2021-12-01 DIAGNOSIS — E11.65 TYPE 2 DIABETES MELLITUS WITH HYPERGLYCEMIA, WITH LONG-TERM CURRENT USE OF INSULIN (HCC): ICD-10-CM

## 2021-12-01 PROCEDURE — 1036F TOBACCO NON-USER: CPT

## 2021-12-01 PROCEDURE — 99024 POSTOP FOLLOW-UP VISIT: CPT

## 2021-12-01 PROCEDURE — G8427 DOCREV CUR MEDS BY ELIG CLIN: HCPCS

## 2021-12-01 RX ORDER — PEN NEEDLE, DIABETIC 32GX 5/32"
NEEDLE, DISPOSABLE MISCELLANEOUS
Qty: 100 EACH | Refills: 3 | Status: SHIPPED | OUTPATIENT
Start: 2021-12-01 | End: 2022-01-14 | Stop reason: SDUPTHER

## 2021-12-01 RX ORDER — INSULIN LISPRO 100 [IU]/ML
INJECTION, SOLUTION INTRAVENOUS; SUBCUTANEOUS
Qty: 45 ML | Refills: 3 | Status: SHIPPED | OUTPATIENT
Start: 2021-12-01

## 2021-12-01 RX ORDER — INSULIN GLARGINE 100 [IU]/ML
INJECTION, SOLUTION SUBCUTANEOUS
Qty: 45 ML | Refills: 3 | Status: SHIPPED | OUTPATIENT
Start: 2021-12-01 | End: 2022-01-12 | Stop reason: SDUPTHER

## 2021-12-01 ASSESSMENT — VISUAL ACUITY
OS_SC: 20/30-1
OD_SC: 20/30-2
OU_SC: J7

## 2021-12-01 ASSESSMENT — KERATOMETRY
OS_K1POWER_DIOPTERS: 42.25
OS_AXISANGLE_DEGREES: 104
OS_K2POWER_DIOPTERS: 43.00
OS_AXISANGLE2_DEGREES: 18
OD_AXISANGLE_DEGREES: 084
OS_AXISANGLE_DEGREES: 108
OD_K1POWER_DIOPTERS: 43.00
OD_AXISANGLE2_DEGREES: 174
OD_K2POWER_DIOPTERS: 43.50
OS_AXISANGLE2_DEGREES: 14

## 2021-12-01 ASSESSMENT — TONOMETRY
OD_IOP_MMHG: 9
OS_IOP_MMHG: 15

## 2021-12-02 ASSESSMENT — KERATOMETRY
OS_K1POWER_DIOPTERS: 42.25
OS_AXISANGLE_DEGREES: 104
OS_AXISANGLE2_DEGREES: 14
OD_K2POWER_DIOPTERS: 43.50
OD_AXISANGLE2_DEGREES: 174
OD_AXISANGLE_DEGREES: 084
OS_K2POWER_DIOPTERS: 43.00
OD_K1POWER_DIOPTERS: 43.00

## 2021-12-07 ASSESSMENT — KERATOMETRY
OS_K2POWER_DIOPTERS: 43.00
OS_AXISANGLE2_DEGREES: 18
OD_K2POWER_DIOPTERS: 43.50
OD_AXISANGLE_DEGREES: 084
OD_K1POWER_DIOPTERS: 43.00
OS_AXISANGLE2_DEGREES: 14
OS_AXISANGLE_DEGREES: 104
OS_K1POWER_DIOPTERS: 42.25
OD_AXISANGLE2_DEGREES: 174
OS_AXISANGLE_DEGREES: 108

## 2021-12-08 ENCOUNTER — 1 WEEK POST-OP (OUTPATIENT)
Dept: URBAN - METROPOLITAN AREA CLINIC 6 | Facility: CLINIC | Age: 71
End: 2021-12-08

## 2021-12-08 DIAGNOSIS — Z96.1: ICD-10-CM

## 2021-12-08 PROCEDURE — 1036F TOBACCO NON-USER: CPT

## 2021-12-08 PROCEDURE — G8427 DOCREV CUR MEDS BY ELIG CLIN: HCPCS

## 2021-12-08 PROCEDURE — 99024 POSTOP FOLLOW-UP VISIT: CPT

## 2021-12-08 ASSESSMENT — KERATOMETRY
OD_K2POWER_DIOPTERS: 43.50
OS_AXISANGLE2_DEGREES: 18
OS_AXISANGLE_DEGREES: 108
OD_AXISANGLE2_DEGREES: 174
OS_K1POWER_DIOPTERS: 42.25
OS_AXISANGLE2_DEGREES: 14
OS_K2POWER_DIOPTERS: 43.00
OD_K1POWER_DIOPTERS: 43.00
OD_AXISANGLE_DEGREES: 084
OS_AXISANGLE_DEGREES: 104

## 2021-12-08 ASSESSMENT — VISUAL ACUITY
OS_SC: 20/40
OD_SC: 20/40-1

## 2021-12-08 ASSESSMENT — TONOMETRY
OD_IOP_MMHG: 11
OS_IOP_MMHG: 11

## 2021-12-16 DIAGNOSIS — R06.9 RESPIRATORY ABNORMALITIES: ICD-10-CM

## 2021-12-30 ENCOUNTER — OFFICE VISIT (OUTPATIENT)
Dept: FAMILY MEDICINE CLINIC | Facility: CLINIC | Age: 71
End: 2021-12-30
Payer: MEDICARE

## 2021-12-30 VITALS
RESPIRATION RATE: 18 BRPM | TEMPERATURE: 97.5 F | SYSTOLIC BLOOD PRESSURE: 116 MMHG | OXYGEN SATURATION: 95 % | HEART RATE: 80 BPM | WEIGHT: 158.38 LBS | HEIGHT: 60 IN | DIASTOLIC BLOOD PRESSURE: 70 MMHG | BODY MASS INDEX: 31.09 KG/M2

## 2021-12-30 DIAGNOSIS — J06.9 UPPER RESPIRATORY TRACT INFECTION, UNSPECIFIED TYPE: Primary | ICD-10-CM

## 2021-12-30 PROBLEM — F11.20 CONTINUOUS OPIOID DEPENDENCE (HCC): Status: ACTIVE | Noted: 2021-12-30

## 2021-12-30 PROCEDURE — 99213 OFFICE O/P EST LOW 20 MIN: CPT | Performed by: FAMILY MEDICINE

## 2021-12-30 RX ORDER — ALBUTEROL SULFATE 2.5 MG/3ML
2.5 SOLUTION RESPIRATORY (INHALATION) EVERY 6 HOURS PRN
Qty: 75 ML | Refills: 3 | Status: SHIPPED | OUTPATIENT
Start: 2021-12-30

## 2021-12-30 RX ORDER — AZITHROMYCIN 250 MG/1
TABLET, FILM COATED ORAL
Qty: 6 TABLET | Refills: 0 | Status: SHIPPED | OUTPATIENT
Start: 2021-12-30 | End: 2022-01-04

## 2021-12-30 RX ORDER — PREDNISONE 20 MG/1
20 TABLET ORAL DAILY
Qty: 10 TABLET | Refills: 0 | Status: SHIPPED | OUTPATIENT
Start: 2021-12-30 | End: 2022-01-26 | Stop reason: ALTCHOICE

## 2021-12-30 RX ORDER — ALBUTEROL SULFATE 2.5 MG/3ML
2.5 SOLUTION RESPIRATORY (INHALATION) ONCE
Status: COMPLETED | OUTPATIENT
Start: 2021-12-30 | End: 2021-12-30

## 2021-12-30 RX ORDER — FLUCONAZOLE 150 MG/1
150 TABLET ORAL ONCE
Qty: 1 TABLET | Refills: 0 | Status: SHIPPED | OUTPATIENT
Start: 2021-12-30 | End: 2021-12-30

## 2021-12-30 RX ADMIN — ALBUTEROL SULFATE 2.5 MG: 2.5 SOLUTION RESPIRATORY (INHALATION) at 11:47

## 2022-01-06 ENCOUNTER — RX CHECK (OUTPATIENT)
Dept: URBAN - METROPOLITAN AREA CLINIC 6 | Facility: CLINIC | Age: 72
End: 2022-01-06

## 2022-01-06 DIAGNOSIS — H52.12: ICD-10-CM

## 2022-01-06 DIAGNOSIS — Z96.1: ICD-10-CM

## 2022-01-06 PROCEDURE — 92015 DETERMINE REFRACTIVE STATE: CPT

## 2022-01-06 PROCEDURE — 99024 POSTOP FOLLOW-UP VISIT: CPT

## 2022-01-06 ASSESSMENT — KERATOMETRY
OD_K2POWER_DIOPTERS: 43.50
OS_K2POWER_DIOPTERS: 43.00
OS_AXISANGLE_DEGREES: 108
OS_K1POWER_DIOPTERS: 42.25
OD_AXISANGLE_DEGREES: 084
OS_AXISANGLE2_DEGREES: 14
OD_K1POWER_DIOPTERS: 43.00
OS_AXISANGLE_DEGREES: 104
OS_AXISANGLE2_DEGREES: 18
OD_AXISANGLE2_DEGREES: 174

## 2022-01-06 ASSESSMENT — VISUAL ACUITY
OD_SC: 20/25-2
OS_SC: 20/60

## 2022-01-12 ENCOUNTER — TELEPHONE (OUTPATIENT)
Dept: ENDOCRINOLOGY | Facility: CLINIC | Age: 72
End: 2022-01-12

## 2022-01-12 DIAGNOSIS — Z79.4 TYPE 2 DIABETES MELLITUS WITH HYPERGLYCEMIA, WITH LONG-TERM CURRENT USE OF INSULIN (HCC): ICD-10-CM

## 2022-01-12 DIAGNOSIS — E11.65 TYPE 2 DIABETES MELLITUS WITH HYPERGLYCEMIA, WITH LONG-TERM CURRENT USE OF INSULIN (HCC): ICD-10-CM

## 2022-01-13 LAB
ALBUMIN/CREAT UR: 5 MCG/MG CREAT
CHOLEST SERPL-MCNC: 165 MG/DL
CHOLEST/HDLC SERPL: 1.9 (CALC)
CREAT UR-MCNC: 124 MG/DL (ref 20–275)
EST. AVERAGE GLUCOSE BLD GHB EST-MCNC: 206 MG/DL
EST. AVERAGE GLUCOSE BLD GHB EST-SCNC: 11.4 MMOL/L
HBA1C MFR BLD: 8.8 % OF TOTAL HGB
HDLC SERPL-MCNC: 85 MG/DL
LDLC SERPL CALC-MCNC: 63 MG/DL (CALC)
MICROALBUMIN UR-MCNC: 0.6 MG/DL
NONHDLC SERPL-MCNC: 80 MG/DL (CALC)
TRIGL SERPL-MCNC: 83 MG/DL

## 2022-01-13 RX ORDER — LANCETS
EACH MISCELLANEOUS 4 TIMES DAILY
Qty: 400 EACH | Refills: 1 | Status: SHIPPED | OUTPATIENT
Start: 2022-01-13 | End: 2022-01-14 | Stop reason: SDUPTHER

## 2022-01-13 RX ORDER — BLOOD SUGAR DIAGNOSTIC
1 STRIP MISCELLANEOUS 4 TIMES DAILY
Qty: 400 EACH | Refills: 1 | Status: SHIPPED | OUTPATIENT
Start: 2022-01-13 | End: 2022-01-14 | Stop reason: SDUPTHER

## 2022-01-13 RX ORDER — INSULIN GLARGINE 100 [IU]/ML
25 INJECTION, SOLUTION SUBCUTANEOUS
Qty: 23 ML | Refills: 1 | Status: SHIPPED | OUTPATIENT
Start: 2022-01-13 | End: 2022-01-14 | Stop reason: SDUPTHER

## 2022-01-14 DIAGNOSIS — Z79.4 TYPE 2 DIABETES MELLITUS WITH HYPERGLYCEMIA, WITH LONG-TERM CURRENT USE OF INSULIN (HCC): ICD-10-CM

## 2022-01-14 DIAGNOSIS — E11.65 TYPE 2 DIABETES MELLITUS WITH HYPERGLYCEMIA, WITH LONG-TERM CURRENT USE OF INSULIN (HCC): ICD-10-CM

## 2022-01-14 RX ORDER — LANCETS
EACH MISCELLANEOUS 4 TIMES DAILY
Qty: 400 EACH | Refills: 1 | Status: SHIPPED | OUTPATIENT
Start: 2022-01-14

## 2022-01-14 RX ORDER — BLOOD SUGAR DIAGNOSTIC
1 STRIP MISCELLANEOUS 4 TIMES DAILY
Qty: 400 EACH | Refills: 1 | Status: SHIPPED | OUTPATIENT
Start: 2022-01-14 | End: 2022-01-26 | Stop reason: SDUPTHER

## 2022-01-14 RX ORDER — PEN NEEDLE, DIABETIC 32GX 5/32"
NEEDLE, DISPOSABLE MISCELLANEOUS 4 TIMES DAILY
Qty: 400 EACH | Refills: 1 | Status: SHIPPED | OUTPATIENT
Start: 2022-01-14

## 2022-01-14 RX ORDER — INSULIN GLARGINE 100 [IU]/ML
25 INJECTION, SOLUTION SUBCUTANEOUS
Qty: 30 ML | Refills: 1 | Status: SHIPPED | OUTPATIENT
Start: 2022-01-14 | End: 2022-01-26 | Stop reason: SDUPTHER

## 2022-01-19 ENCOUNTER — TELEPHONE (OUTPATIENT)
Dept: OTHER | Facility: OTHER | Age: 72
End: 2022-01-19

## 2022-01-19 NOTE — TELEPHONE ENCOUNTER
Pt calling to cancel her appt for tomorrow due to impending weather  She is requesting someone from the office contact her tomorrow to r/s  Appt cancelled by this RN  Thank you!

## 2022-01-26 ENCOUNTER — OFFICE VISIT (OUTPATIENT)
Dept: ENDOCRINOLOGY | Facility: CLINIC | Age: 72
End: 2022-01-26
Payer: MEDICARE

## 2022-01-26 VITALS
BODY MASS INDEX: 29.45 KG/M2 | HEIGHT: 60 IN | WEIGHT: 150 LBS | SYSTOLIC BLOOD PRESSURE: 136 MMHG | HEART RATE: 73 BPM | DIASTOLIC BLOOD PRESSURE: 88 MMHG

## 2022-01-26 DIAGNOSIS — E11.649 TYPE 2 DIABETES MELLITUS WITH HYPOGLYCEMIA WITHOUT COMA, WITH LONG-TERM CURRENT USE OF INSULIN (HCC): Primary | ICD-10-CM

## 2022-01-26 DIAGNOSIS — Z79.4 TYPE 2 DIABETES MELLITUS WITH HYPOGLYCEMIA WITHOUT COMA, WITH LONG-TERM CURRENT USE OF INSULIN (HCC): Primary | ICD-10-CM

## 2022-01-26 DIAGNOSIS — I10 HYPERTENSION, UNSPECIFIED TYPE: ICD-10-CM

## 2022-01-26 DIAGNOSIS — Z79.4 TYPE 2 DIABETES MELLITUS WITH HYPERGLYCEMIA, WITH LONG-TERM CURRENT USE OF INSULIN (HCC): ICD-10-CM

## 2022-01-26 DIAGNOSIS — E78.5 HYPERLIPIDEMIA, UNSPECIFIED HYPERLIPIDEMIA TYPE: ICD-10-CM

## 2022-01-26 DIAGNOSIS — E11.65 TYPE 2 DIABETES MELLITUS WITH HYPERGLYCEMIA, WITH LONG-TERM CURRENT USE OF INSULIN (HCC): ICD-10-CM

## 2022-01-26 PROCEDURE — 99215 OFFICE O/P EST HI 40 MIN: CPT | Performed by: PHYSICIAN ASSISTANT

## 2022-01-26 RX ORDER — BLOOD SUGAR DIAGNOSTIC
1 STRIP MISCELLANEOUS 4 TIMES DAILY
Qty: 400 EACH | Refills: 1 | Status: SHIPPED | OUTPATIENT
Start: 2022-01-26

## 2022-01-26 RX ORDER — INSULIN GLARGINE 100 [IU]/ML
25 INJECTION, SOLUTION SUBCUTANEOUS
Qty: 30 ML | Refills: 1 | Status: SHIPPED | OUTPATIENT
Start: 2022-01-26

## 2022-01-26 NOTE — ASSESSMENT & PLAN NOTE
Diabetes is poorly controlled with both hypoglycemia and hyperglycemia  She is interested in using CGM- reports her device at home is old 5 years ago and did not work well and gave her many alerts  Will send RX for Freestyle [place] [employer] [place] to [facility] medical supply  We tried to download erasto in office and it looks like her phone is compatible however she could not remember her apple ID so will try to do this at home  Will refer for Diabetes education for [employer] training when she gets supplies  She had episode of hypoglycemia at visit 80- treated with juicebox  I recommended waiting 30 minutes to repeat glucose prior to leaving but she declines  Advised her to check again before driving and treat with second provided juice box if glucose remains less than 80  She reports her blood sugar was 300 at lunch time so ate banana and took higher dose of Humalog 10 units  Recommend referral to dietician for either Medical nutrition therapy or for Flexible insulin regimen  She declines both as she doesn't think she will stick to diet and carb counting regimen would be too complicated  For now advised her to stick with the 6 units of Humalog and avoid increasing dose but if eating much less than usual she can reduce Humalog by a few units to prevent hypoglycemia  Will make additional changes when CGM available for review  She reports mail order pharmacy only giving her 1 box of insulin-- RX was sent for 30ml/2 boxes of pens  Resent this to pharmacy  If she is in danger of running out of insulin she should let us know so we can provide sample     Lab Results   Component Value Date    HGBA1C 8 8 (H) 01/13/2022

## 2022-01-26 NOTE — ASSESSMENT & PLAN NOTE
Diastolic BP elevated today but her BP was 116/70 and 122/74 at recent medical visits  Continue current regimen and will monitor

## 2022-01-26 NOTE — PROGRESS NOTES
Established Patient Progress Note      Chief Complaint   Patient presents with    Diabetes Type 2        History of Present Illness:   Stephen Richter is a 80 y o  female with a history of type 2 diabetes with long term use of insulin since about 15 years ago and has been on insulin for about 10 years  Reports complications of none  Denies recent illness or hospitalizations  Denies recent severe hypoglycemic or severe hyperglycemic episodes  Denies any issues with her current regimen except not getting enough Lantus from Mail order pharmacy (only got 1 box, usually gets 3)  home glucose monitoring: are performed regularly 3x per day and blood sugars are variable  She does have less hypoglycemia than before but blood sugars fluctuate from  but most readings are less than 200  Has met with dietician in the past but doesn't stick to diet  Has a CGM at home from 5 years ago, stopped using it due to frequent beeping  Wants something easier  Previously followed with Dr Yamileth Feredman lower sugar oatmeal or half bagel with cream cheese  Today, had a muffin  Lunch- 1/2 sandwich or whole sandwich     Today at visit glucose 67-- took 10 units of Humalog for lunch and had a banana      Current regimen:   Lantus 25 units at bedtime  Humalog 6-10 unit before meals (will increase if blood sugar high before meal)  Metformin 1000mg twice per day     Last Eye Exam: UTD  Last Foot Exam: UTD    Has hypertension: Taking amlodipine, HCTZ, losartan  Has hyperlipidemia: Taking simvastatin and zetia        Patient Active Problem List   Diagnosis    Allergic rhinitis    Asthma    Generalized anxiety disorder    Stress incontinence, female    Lumbar radiculopathy    Type 2 diabetes mellitus with hyperglycemia ([address] )    Colitis    Chronic fatigue    Gastroesophageal reflux disease without esophagitis    Hiatal hernia    Rheumatoid arthritis of multiple sites with negative rheumatoid factor (HCC)    Ear itch  Sensorineural hearing loss (SNHL) of left ear with restricted hearing of right ear    Dry mouth    Hypertension    Hyperlipidemia    Type 2 diabetes mellitus with hypoglycemia without coma, with long-term current use of insulin (HCC)    Preoperative evaluation of a medical condition to rule out surgical contraindications (TAR required)    Continuous opioid dependence ([address] )    Upper respiratory tract infection      Past Medical History:   Diagnosis Date    Anxiety     Anxiety     Arthritis of right knee     Cellulitis     Contact dermatitis     Cough     Diabetes mellitus ([address] )     GERD without esophagitis     Headache     Hiatal hernia     Hyperlipidemia     Knee sprain     Lyme disease     Tick bite       Past Surgical History:   Procedure Laterality Date    ABDOMINAL SURGERY      tummy tuck    CATARACT EXTRACTION, BILATERAL Bilateral 11/21 and 12/21    HYSTERECTOMY      age 51    NM COLONOSCOPY FLX DX W/COLLJ SPEC WHEN PFRMD N/A 5/8/2019    Procedure: COLONOSCOPY;  Surgeon: Shital Lynch MD;  Location: [facility]; Service: Gastroenterology    NM ESOPHAGOGASTRODUODENOSCOPY TRANSORAL DIAGNOSTIC N/A 5/8/2019    Procedure: ESOPHAGOGASTRODUODENOSCOPY (EGD); Surgeon: Shital Lynch MD;  Location: [facility];   Service: Gastroenterology    TUBAL LIGATION        Family History   Problem Relation Age of Onset    Mental illness Mother     Heart disease Father     Colon cancer Father 67    No Known Problems Sister     No Known Problems Daughter     No Known Problems Maternal Grandmother     No Known Problems Maternal Grandfather     No Known Problems Paternal Grandmother     No Known Problems Paternal Grandfather     No Known Problems Son     No Known Problems Sister     No Known Problems Sister     No Known Problems Sister     No Known Problems Sister     No Known Problems Sister     Lymphoma Brother 45    No Known Problems Brother     No Known Problems Paternal Aunt  No Known Problems Paternal Aunt     No Known Problems Paternal Aunt     No Known Problems Paternal Aunt     Breast cancer Neg Hx     Breast cancer additional onset Neg Hx     Endometrial cancer Neg Hx     Ovarian cancer Neg Hx     BRCA 1/2 Neg Hx     BRCA1 Negative Neg Hx     BRCA1 Positive Neg Hx     BRCA2 Negative Neg Hx     BRCA2 Positive Neg Hx      Social History     Tobacco Use    Smoking status: Former Smoker     Packs/day: 0 50     Years: 25 00     Pack years: 12 50     Types: Cigarettes     Quit date: 2008     Years since quittin 5    Smokeless tobacco: Never Used   Substance Use Topics    Alcohol use: Not Currently     Comment: seldom     Allergies   Allergen Reactions    Augmentin [Amoxicillin-Pot Clavulanate]     Codeine Drowsiness    Macrobid [Nitrofurantoin] Other (See Comments)     Pt does not recall         Current Outpatient Medications:     amLODIPine (NORVASC) 2 5 mg tablet, Take 2 5 mg by mouth daily at bedtime, Disp: , Rfl: 1    aspirin 81 mg chewable tablet, Chew 81 mg daily, Disp: , Rfl:     baclofen 10 mg tablet, Take 10 mg by mouth daily at bedtime, Disp: , Rfl: 4    Breo Ellipta 200-25 MCG/INH inhaler, INHALE ONE INHALATION BY MOUTH EVERY DAY - (RINSE MOUTH AND SPIT AFTER USE, DISCARD SIX WEEKS AFTER REMOVAL FROM FOIL POUCH), Disp: 2 each, Rfl: 1    Cholecalciferol (VITAMIN D PO), Take 1 tablet by mouth daily, Disp: , Rfl:     Diclofenac Sodium (VOLTAREN) 1 %, Apply 1 application topically 4 (four) times a day, Disp: , Rfl:     ezetimibe (ZETIA) 10 mg tablet, TAKE 1/2 TABLET BY MOUTH NIGHTLY, Disp: , Rfl: 0    Ferrous Sulfate Dried (FERROUS SULFATE CR PO), Iron TABS   Refills: 0     Active, Disp: , Rfl:     glucose blood (Contour Next Test) test strip, Use 1 each 4 (four) times a day, Disp: 400 each, Rfl: 1    hydrochlorothiazide (HYDRODIURIL) 12 5 mg tablet, Take 12 5 mg by mouth daily, Disp: , Rfl: 0    hydroxychloroquine (PLAQUENIL) 200 mg tablet, Take 200 mg by mouth 2 (two) times a day with meals, Disp: , Rfl:     insulin glargine (Lantus SoloStar) 100 units/mL injection pen, Inject 25 Units under the skin daily at bedtime, Disp: 30 mL, Rfl: 1    insulin lispro (HumaLOG KwikPen) 100 units/mL injection pen, 6 units before meals, Disp: 45 mL, Rfl: 3    Insulin Pen Needle (BD Pen Needle Ester U/F) 32G X 4 MM MISC, Inject under the skin 4 (four) times a day, Disp: 400 each, Rfl: 1    Lifitegrast (Magdalena Guarneri OP), Apply to eye 2 vials a day  , Disp: , Rfl:     losartan (COZAAR) 25 mg tablet, Take 50 mg by mouth 2 (two) times a day , Disp: , Rfl:     metFORMIN (GLUCOPHAGE) 500 mg tablet, Take 2 tablets (1,000 mg total) by mouth 2 (two) times a day with meals, Disp: 360 tablet, Rfl: 1    Microlet Lancets MISC, Use 4 (four) times a day, Disp: 400 each, Rfl: 1    sertraline (ZOLOFT) 50 mg tablet, Take 1 tablet (50 mg total) by mouth daily, Disp: 90 tablet, Rfl: 3    simvastatin (ZOCOR) 20 mg tablet, Take 1 tablet by mouth daily, Disp: , Rfl:     traMADol (ULTRAM) 50 mg tablet, Take 50 mg by mouth every 12 (twelve) hours as needed, Disp: , Rfl:     valACYclovir (VALTREX) 1,000 mg tablet, Take 1,000 mg by mouth 2 (two) times a day, Disp: , Rfl:     albuterol (2 5 mg/3 mL) 0 083 % nebulizer solution, Take 3 mL (2 5 mg total) by nebulization every 6 (six) hours as needed for wheezing or shortness of breath (Patient not taking: Reported on 1/26/2022 ), Disp: 75 mL, Rfl: 3    artificial tear (LUBRIFRESH P M ) 83-15 % ophthalmic ointment, daily at bedtime (Patient not taking: Reported on 10/11/2021), Disp: , Rfl:     benzonatate (TESSALON) 200 MG capsule, Take 1 capsule (200 mg total) by mouth 3 (three) times a day as needed for cough (Patient not taking: Reported on 4/26/2021), Disp: 20 capsule, Rfl: 0    montelukast (SINGULAIR) 10 mg tablet, Take 1 tablet (10 mg total) by mouth daily at bedtime (Patient not taking: Reported on 12/30/2021 ), Disp: 90 tablet, Rfl: 3    pantoprazole (PROTONIX) 40 mg tablet, Take 1 tablet (40 mg total) by mouth daily (Patient not taking: Reported on 7/15/2021), Disp: 30 tablet, Rfl: 11    pantoprazole (PROTONIX) 40 mg tablet, Take 1 tablet (40 mg total) by mouth daily (Patient not taking: Reported on 4/26/2021), Disp: 30 tablet, Rfl: 2    Review of Systems   Constitutional: Negative for activity change, appetite change, chills, diaphoresis, fatigue, fever and unexpected weight change  HENT: Negative for trouble swallowing and voice change  Eyes: Negative for visual disturbance  Respiratory: Negative for shortness of breath  Cardiovascular: Negative for chest pain and palpitations  Gastrointestinal: Negative for abdominal pain, constipation and diarrhea  Endocrine: Negative for cold intolerance, heat intolerance, polydipsia, polyphagia and polyuria  Genitourinary: Negative for frequency and menstrual problem  Musculoskeletal: Negative for arthralgias and myalgias  Skin: Negative for rash  Allergic/Immunologic: Negative for food allergies  Neurological: Negative for dizziness and tremors  Hematological: Negative for adenopathy  Psychiatric/Behavioral: Negative for sleep disturbance  All other systems reviewed and are negative  Physical Exam:  Body mass index is 29 29 kg/m²  /88 (BP Location: Left arm, Patient Position: Sitting)   Pulse 73   Ht 5' (1 524 m)   Wt 68 kg (150 lb)   BMI 29 29 kg/m²    Wt Readings from Last 3 Encounters:   01/26/22 68 kg (150 lb)   12/30/21 71 8 kg (158 lb 6 oz)   11/02/21 72 6 kg (160 lb 2 oz)       Physical Exam  Vitals reviewed  Constitutional:       General: She is not in acute distress  Appearance: She is well-developed  HENT:      Head: Normocephalic and atraumatic  Eyes:      Conjunctiva/sclera: Conjunctivae normal       Pupils: Pupils are equal, round, and reactive to light  Neck:      Thyroid: No thyromegaly     Cardiovascular:      Rate and Rhythm: Normal rate and regular rhythm  Heart sounds: Normal heart sounds  Pulmonary:      Effort: Pulmonary effort is normal  No respiratory distress  Breath sounds: Normal breath sounds  No wheezing or rales  Abdominal:      General: Bowel sounds are normal  There is no distension  Palpations: Abdomen is soft  Tenderness: There is no abdominal tenderness  Musculoskeletal:         General: Normal range of motion  Cervical back: Normal range of motion and neck supple  Skin:     General: Skin is warm and dry  Neurological:      Mental Status: She is alert and oriented to person, place, and time  Labs:   Lab Results   Component Value Date    HGBA1C 8 8 (H) 01/13/2022    HGBA1C 8 1 (A) 11/02/2021    HGBA1C 7 7 (A) 07/15/2021     Lab Results   Component Value Date    CREATININE 0 63 11/10/2015    BUN 10 11/10/2015     11/10/2015    K 4 0 11/10/2015     11/10/2015    CO2 24 2 11/10/2015     No results found for: EGFR  Lab Results   Component Value Date    HDL 85 01/13/2022    TRIG 83 01/13/2022     No results found for: ALT, AST, GGT, ALKPHOS, BILITOT  No results found for: [id number]  Lab Results   Component Value Date    FREET4 1 2 02/26/2018       Impression & Plan:    Problem List Items Addressed This Visit        Endocrine    Type 2 diabetes mellitus with hyperglycemia (HCC)    Relevant Medications    insulin glargine (Lantus SoloStar) 100 units/mL injection pen    glucose blood (Contour Next Test) test strip    Other Relevant Orders    Hemoglobin I7X    Basic metabolic panel    Type 2 diabetes mellitus with hypoglycemia without coma, with long-term current use of insulin ([address] ) - Primary     Diabetes is poorly controlled with both hypoglycemia and hyperglycemia  She is interested in using CGM- reports her device at home is old 5 years ago and did not work well and gave her many alerts    Will send RX for Fre[employer] [place] to [facility]  We tried to download erasto in office and it looks like her phone is compatible however she could not remember her apple ID so will try to do this at home  Will refer for Diabetes education for [employer] training when she gets supplies  She had episode of hypoglycemia at visit 80- treated with juicebox  I recommended waiting 30 minutes to repeat glucose prior to leaving but she declines  Advised her to check again before driving and treat with second provided juice box if glucose remains less than 80  She reports her blood sugar was 300 at lunch time so ate banana and took higher dose of Humalog 10 units  Recommend referral to dietician for either Medical nutrition therapy or for Flexible insulin regimen  She declines both as she doesn't think she will stick to diet and carb counting regimen would be too complicated  For now advised her to stick with the 6 units of Humalog and avoid increasing dose but if eating much less than usual she can reduce Humalog by a few units to prevent hypoglycemia  Will make additional changes when CGM available for review  She reports mail order pharmacy only giving her 1 box of insulin-- RX was sent for 30ml/2 boxes of pens  Resent this to pharmacy  If she is in danger of running out of insulin she should let us know so we can provide sample  Lab Results   Component Value Date    HGBA1C 8 8 (H) 01/13/2022            Relevant Medications    insulin glargine (Lantus SoloStar) 100 units/mL injection pen    Other Relevant Orders    Ambulatory referral to Diabetic Education       Cardiovascular and Mediastinum    Hypertension     Diastolic BP elevated today but her BP was 116/70 and 122/74 at recent medical visits  Continue current regimen and will monitor  Other    Hyperlipidemia     Continue simvastatin and zetia                  Orders Placed This Encounter   Procedures    Hemoglobin A1C     Standing Status:   Future     Standing Expiration Date: 1/26/2023    Basic metabolic panel     This is a patient instruction: Patient fasting for 8 hours or longer recommended  Standing Status:   Future     Standing Expiration Date:   1/26/2023    Ambulatory referral to Diabetic Education     Standing Status:   Future     Standing Expiration Date:   1/26/2023     Referral Priority:   Routine     Referral Type:   Consult - AMB     Referral Reason:   Specialty Services Required     Requested Specialty:   Diabetes Services     Number of Visits Requested:   1     Expiration Date:   1/26/2023       There are no Patient Instructions on file for this visit  Discussed with the patient and all questioned fully answered  She will call me if any problems arise  Follow-up appointment in 3 months       Counseled patient on diagnostic results, prognosis, risk and benefit of treatment options, instruction for management, importance of treatment compliance, Risk  factor reduction and impressions    Jayson Fam PA-C

## 2022-01-28 ENCOUNTER — TELEPHONE (OUTPATIENT)
Dept: ENDOCRINOLOGY | Facility: CLINIC | Age: 72
End: 2022-01-28

## 2022-01-28 NOTE — TELEPHONE ENCOUNTER
----- Message from Fabi Rob PA-C sent at 1/26/2022  5:14 PM EST -----  Please send order to [address] for [employer] sensors and reader  Thanks

## 2022-03-15 ENCOUNTER — OFFICE VISIT (OUTPATIENT)
Dept: PODIATRY | Facility: CLINIC | Age: 72
End: 2022-03-15
Payer: MEDICARE

## 2022-03-15 VITALS
HEART RATE: 89 BPM | HEIGHT: 60 IN | SYSTOLIC BLOOD PRESSURE: 111 MMHG | BODY MASS INDEX: 30.82 KG/M2 | WEIGHT: 157 LBS | DIASTOLIC BLOOD PRESSURE: 72 MMHG

## 2022-03-15 DIAGNOSIS — E11.9 CONTROLLED TYPE 2 DIABETES MELLITUS WITHOUT COMPLICATION, WITHOUT LONG-TERM CURRENT USE OF INSULIN (HCC): Primary | ICD-10-CM

## 2022-03-15 PROCEDURE — 99213 OFFICE O/P EST LOW 20 MIN: CPT | Performed by: PODIATRIST

## 2022-03-15 NOTE — PROGRESS NOTES
Assessment/Plan:    Discussed principles of diabetic foot care  Vascular status is within normal limits and sensorium is intact  Recommended nocturnal use of bag balm for the dry skin on each heel  Debrided right 2nd toenail reducing nail in thickness and removing devitalized tissue and debris  Urged patient to refrain from walking barefoot  Yearly evaluation is recommended  No problem-specific Assessment & Plan notes found for this encounter  Diagnoses and all orders for this visit:    Controlled type 2 diabetes mellitus without complication, without long-term current use of insulin (HCC)          Subjective:      Patient ID: Mohit Juares is a 80 y o  female  HPI     Patient, a 71-year-old diabetic female presents for diabetic foot examination and care  Her chief complaint involves dry thick skin on each heel  Also a thick right 2nd toenail  No pain reported  No numbness is related  Patient's blood sugar has not been under good control  I personally reviewed A1c dated 01/13/2022  It was 8 8  I personally reviewed A1c dated 11/02/2021  It was 8 1  The following portions of the patient's history were reviewed and updated as appropriate: allergies, current medications, past family history, past medical history, past social history, past surgical history and problem list     Review of Systems   Gastrointestinal:        GERD   Musculoskeletal: Positive for arthralgias  Neurological: Negative for numbness  Objective:      /72   Pulse 89   Ht 5' (1 524 m) Comment: verbal  Wt 71 2 kg (157 lb)   BMI 30 66 kg/m²          Physical Exam  Cardiovascular:      Pulses: no weak pulses          Dorsalis pedis pulses are 2+ on the right side and 2+ on the left side  Posterior tibial pulses are 2+ on the right side and 2+ on the left side  Feet:      Right foot:      Skin integrity: Dry skin present  No ulcer, skin breakdown, erythema, warmth or callus        Left foot:      Skin integrity: Dry skin present  No ulcer, skin breakdown, erythema, warmth or callus  Diabetic Foot Exam    Patient's shoes and socks removed  Right Foot/Ankle   Right Foot Inspection  Skin Exam: skin normal and dry skin  No warmth, no callus, no erythema, no maceration, no abnormal color, no pre-ulcer, no ulcer and no callus  Toe Exam: ROM and strength within normal limits  Sensory   Vibration: intact  Proprioception: intact  Monofilament testing: intact    Vascular  Capillary refills: < 3 seconds  The right DP pulse is 2+  The right PT pulse is 2+  Right Toe  - Comprehensive Exam  Ecchymosis: none  Arch: normal  Hammertoes: absent  Claw Toes: absent  Swelling: none   Tenderness: none         Left Foot/Ankle  Left Foot Inspection  Skin Exam: skin normal and dry skin  No warmth, no erythema, no maceration, normal color, no pre-ulcer, no ulcer and no callus  Toe Exam: ROM and strength within normal limits  Sensory   Vibration: intact  Proprioception: intact  Monofilament testing: intact    Vascular  Capillary refills: < 3 seconds  The left DP pulse is 2+  The left PT pulse is 2+       Left Toe  - Comprehensive Exam  Ecchymosis: none  Arch: normal  Hammertoes: absent  Claw toes: absent  Swelling: none   Tenderness: none           Assign Risk Category  No deformity present  No loss of protective sensation  No weak pulses  Risk: 0

## 2022-04-25 DIAGNOSIS — R06.9 RESPIRATORY ABNORMALITIES: ICD-10-CM

## 2022-05-01 LAB
BUN SERPL-MCNC: 11 MG/DL (ref 7–25)
BUN/CREAT SERPL: 20 (CALC) (ref 6–22)
CALCIUM SERPL-MCNC: 9.5 MG/DL (ref 8.6–10.4)
CHLORIDE SERPL-SCNC: 104 MMOL/L (ref 98–110)
CO2 SERPL-SCNC: 31 MMOL/L (ref 20–32)
CREAT SERPL-MCNC: 0.54 MG/DL (ref 0.6–0.93)
GLUCOSE SERPL-MCNC: 116 MG/DL (ref 65–99)
HBA1C MFR BLD: 8.7 % OF TOTAL HGB
POTASSIUM SERPL-SCNC: 3.9 MMOL/L (ref 3.5–5.3)
SL AMB EGFR AFRICAN AMERICAN: 111 ML/MIN/1.73M2
SL AMB EGFR NON AFRICAN AMERICAN: 96 ML/MIN/1.73M2
SODIUM SERPL-SCNC: 143 MMOL/L (ref 135–146)

## 2022-05-09 ENCOUNTER — OFFICE VISIT (OUTPATIENT)
Dept: ENDOCRINOLOGY | Facility: CLINIC | Age: 72
End: 2022-05-09
Payer: MEDICARE

## 2022-05-09 VITALS
HEIGHT: 60 IN | SYSTOLIC BLOOD PRESSURE: 130 MMHG | WEIGHT: 159.4 LBS | DIASTOLIC BLOOD PRESSURE: 80 MMHG | BODY MASS INDEX: 31.29 KG/M2 | HEART RATE: 78 BPM

## 2022-05-09 DIAGNOSIS — E11.649 TYPE 2 DIABETES MELLITUS WITH HYPOGLYCEMIA WITHOUT COMA, WITH LONG-TERM CURRENT USE OF INSULIN (HCC): Primary | ICD-10-CM

## 2022-05-09 DIAGNOSIS — Z79.4 TYPE 2 DIABETES MELLITUS WITH HYPERGLYCEMIA, WITH LONG-TERM CURRENT USE OF INSULIN (HCC): ICD-10-CM

## 2022-05-09 DIAGNOSIS — E78.5 HYPERLIPIDEMIA, UNSPECIFIED HYPERLIPIDEMIA TYPE: ICD-10-CM

## 2022-05-09 DIAGNOSIS — Z79.4 TYPE 2 DIABETES MELLITUS WITH HYPOGLYCEMIA WITHOUT COMA, WITH LONG-TERM CURRENT USE OF INSULIN (HCC): Primary | ICD-10-CM

## 2022-05-09 DIAGNOSIS — I10 HYPERTENSION, UNSPECIFIED TYPE: ICD-10-CM

## 2022-05-09 DIAGNOSIS — E11.65 TYPE 2 DIABETES MELLITUS WITH HYPERGLYCEMIA, WITH LONG-TERM CURRENT USE OF INSULIN (HCC): ICD-10-CM

## 2022-05-09 PROCEDURE — 99214 OFFICE O/P EST MOD 30 MIN: CPT | Performed by: INTERNAL MEDICINE

## 2022-05-09 NOTE — PROGRESS NOTES
Becki Roman 80 y o  female MRN: [id number]    Encounter: [id number]      Assessment/Plan     Problem List Items Addressed This Visit        Endocrine    Type 2 diabetes mellitus with hyperglycemia, with long-term current use of insulin ([facility] )       Lab Results   Component Value Date    HGBA1C 8 7 (H) 04/30/2022   A1c above goal-she is having episodes of both hypo and hyperglycemia-aortic fingersticks likely due to inconsistent carbohydrate intake at meals  Again encouraged to see the nutritionist so that she can be consistent with carbohydrate intake at meals but she has declined  She states insurance is not covering continuous glucose monitor reader as she is not due for an upgrade         Relevant Orders    Comprehensive metabolic panel Lab Collect    HEMOGLOBIN A1C W/ EAG ESTIMATION Lab Collect    Microalbumin / creatinine urine ratio Lab Collect    TSH, 3rd generation Lab Collect    T4, free Lab Collect    Type 2 diabetes mellitus with hypoglycemia without coma, with long-term current use of insulin ([facility]) - Primary       Lab Results   Component Value Date    HGBA1C 8 7 (H) 04/30/2022   Occasionally due to delayed missed meals  Advised to take an extra snack if she is going to be more physically active         Relevant Orders    Comprehensive metabolic panel Lab Collect       Cardiovascular and Mediastinum    Hypertension    Relevant Orders    Comprehensive metabolic panel Lab Collect       Other    Hyperlipidemia    Relevant Orders    Comprehensive metabolic panel Lab Collect    Lipid Panel with Direct LDL reflex Lab Collect          CC: Diabetes    History of Present Illness     HPI:  59-year-old female with type 2 diabetes on insulin therapy seen in follow-up     Current regimen   metformin   Lantus -25 units between 10-11 pm    Humalog- 3-4 units before breakfast if fasting glucose less than 100   Otherwise 6-6-6-0  Checks fingersticks 3 times a day  Fasting   Hypoglycemia once a week  Pre szaox-  predinner- 100-130s  With excursions in 200s -300s  Bedtime  Doesn't check     No polyuria , polydpsia , no blurry vision , no numbness and tingling in feet   Weight stable                   Review of Systems    Historical Information   Past Medical History:   Diagnosis Date    Anxiety     Anxiety     Arthritis of right knee     Cellulitis     Contact dermatitis     Cough     Diabetes mellitus ([address] )     GERD without esophagitis     Headache     Hiatal hernia     Hyperlipidemia     Knee sprain     Lyme disease     Tick bite      Past Surgical History:   Procedure Laterality Date    ABDOMINAL SURGERY      tummy tuck    CATARACT EXTRACTION, BILATERAL Bilateral  and     HYSTERECTOMY      age 51    NH COLONOSCOPY FLX DX W/COLLJ SPEC WHEN PFRMD N/A 2019    Procedure: COLONOSCOPY;  Surgeon: Kiley Garrison MD;  Location[facility]; Service: Gastroenterology    NH ESOPHAGOGASTRODUODENOSCOPY TRANSORAL DIAGNOSTIC N/A 2019    Procedure: ESOPHAGOGASTRODUODENOSCOPY (EGD); Surgeon: Kiley Garrison MD;  Location: [facility];   Service: Gastroenterology    TUBAL LIGATION       Social History   Social History     Substance and Sexual Activity   Alcohol Use Not Currently    Comment: seldom     Social History     Substance and Sexual Activity   Drug Use Yes    Types: Marijuana    Comment: smokes weed 3x per week     Social History     Tobacco Use   Smoking Status Former Smoker    Packs/day: 0 50    Years: 25 00    Pack years: 12 50    Types: Cigarettes    Quit date: 2008    Years since quittin 8   Smokeless Tobacco Never Used     Family History:   Family History   Problem Relation Age of Onset    Mental illness Mother     Heart disease Father     Colon cancer Father 67    No Known Problems Sister     No Known Problems Daughter     No Known Problems Maternal Grandmother     No Known Problems Maternal Grandfather     No Known Problems Paternal Grandmother  No Known Problems Paternal Grandfather     No Known Problems Son     No Known Problems Sister     No Known Problems Sister     No Known Problems Sister     No Known Problems Sister     No Known Problems Sister     Lymphoma Brother 45    No Known Problems Brother     No Known Problems Paternal Aunt     No Known Problems Paternal Aunt     No Known Problems Paternal Aunt     No Known Problems Paternal Aunt     Breast cancer Neg Hx     Breast cancer additional onset Neg Hx     Endometrial cancer Neg Hx     Ovarian cancer Neg Hx     BRCA 1/2 Neg Hx     BRCA1 Negative Neg Hx     BRCA1 Positive Neg Hx     BRCA2 Negative Neg Hx     BRCA2 Positive Neg Hx        Meds/Allergies   Current Outpatient Medications   Medication Sig Dispense Refill    amLODIPine (NORVASC) 2 5 mg tablet Take 2 5 mg by mouth daily at bedtime  1    aspirin 81 mg chewable tablet Chew 81 mg daily      baclofen 10 mg tablet Take 10 mg by mouth daily at bedtime  4    Breo Ellipta 200-25 MCG/INH inhaler INHALE ONE INHALATION BY MOUTH EVERY DAY - (RINSE MOUTH AND SPIT AFTER USE, DISCARD SIX WEEKS AFTER REMOVAL FROM FOIL POUCH) 2 each 1    Cholecalciferol (VITAMIN D PO) Take 1 tablet by mouth daily      Diclofenac Sodium (VOLTAREN) 1 % Apply 1 application topically 4 (four) times a day      Ferrous Sulfate Dried (FERROUS SULFATE CR PO) Iron TABS    Refills: 0       Active      glucose blood (Contour Next Test) test strip Use 1 each 4 (four) times a day 400 each 1    hydrochlorothiazide (HYDRODIURIL) 12 5 mg tablet Take 12 5 mg by mouth daily  0    hydroxychloroquine (PLAQUENIL) 200 mg tablet Take 200 mg by mouth 2 (two) times a day with meals      insulin glargine (Lantus SoloStar) 100 units/mL injection pen Inject 25 Units under the skin daily at bedtime 30 mL 1    insulin lispro (HumaLOG KwikPen) 100 units/mL injection pen 6 units before meals 45 mL 3    Insulin Pen Needle (BD Pen Needle Ester U/F) 32G X 4 MM MISC Inject under the skin 4 (four) times a day 400 each 1    Lifitegrast (Lauren Muscat OP) Apply to eye 2 vials a day        losartan (COZAAR) 25 mg tablet Take 50 mg by mouth 2 (two) times a day       metFORMIN (GLUCOPHAGE) 500 mg tablet Take 2 tablets (1,000 mg total) by mouth 2 (two) times a day with meals 360 tablet 1    Microlet Lancets MISC Use 4 (four) times a day 400 each 1    sertraline (ZOLOFT) 50 mg tablet Take 1 tablet (50 mg total) by mouth daily 90 tablet 3    simvastatin (ZOCOR) 20 mg tablet Take 1 tablet by mouth daily      valACYclovir (VALTREX) 1,000 mg tablet Take 1,000 mg by mouth 2 (two) times a day      albuterol (2 5 mg/3 mL) 0 083 % nebulizer solution Take 3 mL (2 5 mg total) by nebulization every 6 (six) hours as needed for wheezing or shortness of breath (Patient not taking: Reported on 1/26/2022 ) 75 mL 3    artificial tear (LUBRIFRESH P M ) 83-15 % ophthalmic ointment daily at bedtime (Patient not taking: Reported on 10/11/2021)      benzonatate (TESSALON) 200 MG capsule Take 1 capsule (200 mg total) by mouth 3 (three) times a day as needed for cough (Patient not taking: Reported on 4/26/2021) 20 capsule 0    ezetimibe (ZETIA) 10 mg tablet TAKE 1/2 TABLET BY MOUTH NIGHTLY  0    montelukast (SINGULAIR) 10 mg tablet Take 1 tablet (10 mg total) by mouth daily at bedtime (Patient not taking: Reported on 12/30/2021 ) 90 tablet 3    pantoprazole (PROTONIX) 40 mg tablet Take 1 tablet (40 mg total) by mouth daily (Patient not taking: Reported on 7/15/2021) 30 tablet 11    pantoprazole (PROTONIX) 40 mg tablet Take 1 tablet (40 mg total) by mouth daily (Patient not taking: Reported on 4/26/2021) 30 tablet 2     No current facility-administered medications for this visit       Allergies   Allergen Reactions    Augmentin [Amoxicillin-Pot Clavulanate]     Codeine Drowsiness    Macrobid [Nitrofurantoin] Other (See Comments)     Pt does not recall       Objective   Vitals: Blood pressure 130/80, pulse 78, height 5' (1 524 m), weight 72 3 kg (159 lb 6 4 oz)  Physical Exam  Vitals reviewed  Constitutional:       Appearance: Normal appearance  She is obese  She is not ill-appearing or diaphoretic  HENT:      Head: Normocephalic and atraumatic  Eyes:      General: No scleral icterus  Extraocular Movements: Extraocular movements intact  Cardiovascular:      Rate and Rhythm: Normal rate and regular rhythm  Heart sounds: Normal heart sounds  No gallop  Pulmonary:      Effort: Pulmonary effort is normal  No respiratory distress  Breath sounds: Normal breath sounds  No wheezing or rales  Abdominal:      General: There is no distension  Palpations: Abdomen is soft  Tenderness: There is no abdominal tenderness  There is no guarding  Musculoskeletal:      Cervical back: Neck supple  Right lower leg: No edema  Left lower leg: No edema  Lymphadenopathy:      Cervical: No cervical adenopathy  Skin:     General: Skin is warm and dry  Neurological:      General: No focal deficit present  Mental Status: She is alert and oriented to person, place, and time  Psychiatric:         Mood and Affect: Mood normal          Behavior: Behavior normal          Thought Content: Thought content normal          Judgment: Judgment normal          The history was obtained from the review of the chart, patient  Lab Results:   Lab Results   Component Value Date/Time    Hemoglobin A1C 8 7 (H) 04/30/2022 08:05 AM    Hemoglobin A1C 8 8 (H) 01/13/2022 08:04 AM    Hemoglobin A1C 8 1 (A) 11/02/2021 10:32 AM    Hemoglobin A1C 7 7 (A) 07/15/2021 10:31 AM    BUN 11 04/30/2022 08:05 AM    Potassium 3 9 04/30/2022 08:05 AM    Chloride 104 04/30/2022 08:05 AM    CO2 31 04/30/2022 08:05 AM    Creatinine 0 54 (L) 04/30/2022 08:05 AM    HDL 85 01/13/2022 08:04 AM    Triglycerides 83 01/13/2022 08:04 AM           Portions of the record may have been created with voice recognition software  Occasional wrong word or "sound a like" substitutions may have occurred due to the inherent limitations of voice recognition software  Read the chart carefully and recognize, using context, where substitutions have occurred

## 2022-05-09 NOTE — PATIENT INSTRUCTIONS
Hypoglycemia in a Person with Diabetes   WHAT YOU NEED TO KNOW:   Hypoglycemia is a serious condition that happens when your blood glucose (sugar) level drops too low  The blood sugar level is usually too high in a person with diabetes, but the level can also drop too low  It is important to follow your diabetes management plan to keep your blood sugar level steady  DISCHARGE INSTRUCTIONS:   Have someone call your local emergency number (911 in the [address] [facility]) if:   · You have a seizure or pass out  · Your blood sugar is less than 50 mg/dL and does not respond to treatment  · You feel you are going to pass out  · You have trouble thinking clearly  Call your doctor or diabetes care team if:   · You have had symptoms of low blood sugar several times  · You have questions about the amount of insulin or diabetes medicine you are taking  · You have questions or concerns about your condition or care  Medicines:   · Insulin or diabetes medicine  help to keep your blood sugar under control  · Glucagon  may be needed if you have severe hypoglycemia  · Take your medicine as directed  Contact your healthcare provider if you think your medicine is not helping or if you have side effects  Tell him or her if you are allergic to any medicine  Keep a list of the medicines, vitamins, and herbs you take  Include the amounts, and when and why you take them  Bring the list or the pill bottles to follow-up visits  Carry your medicine list with you in case of an emergency  Manage hypoglycemia:   · Check your blood sugar level right away if you have symptoms of hypoglycemia  Hypoglycemia usually happens when your blood sugar level is 70 mg/dL or below  Ask your diabetes care team what blood sugar level is too low for you  · If your blood sugar level is too low, eat or drink 15 grams of fast-acting carbohydrate    Examples of this amount of fast-acting carbohydrate are 4 ounces (½ cup) of fruit juice or 4 ounces of regular soda  Other examples are 2 tablespoons of raisins or 1 tube of glucose gel  Check your blood sugar level 15 minutes later  Sit still as you wait  If the level is still low (less than 100 mg/dL), have another 15 grams of carbohydrate  When the level returns to 100 mg/dL, eat a meal if it is time  If your meal time is more than 1 hour away, eat a snack  The snack should contain carbohydrates, such as the following:    ? 3/4 cup of cereal    ? 1 cup of skim or low fat milk    ? 6 soda crackers    ? 1/2 of a turkey sandwich    ? 15 fat-free chips  This will help prevent another drop in blood sugar  Always carefully follow your diabetes care team's instructions on how to treat low blood sugar levels  · Always carry a source of fast-acting carbohydrate  If you have symptoms of hypoglycemia and you do not have a blood glucose meter, have a source of fast-acting carbohydrate anyway  Avoid carbohydrate foods that are high in fat  The fat content may make the carbohydrate take longer to increase your blood sugar level  Ask your diabetes care team if you should carry a glucagon kit  Glucagon is a medicine that is injected when you develop severe hypoglycemia and become unconscious  Check the expiration date every month and replace it before it expires  · Teach others how to help you if you have symptoms of hypoglycemia  Tell them about the symptoms of hypoglycemia  Ask them to give you a source of fast-acting carbohydrate if you cannot get it yourself  Ask them to give you a glucagon injection if you have signs of hypoglycemia and you become unconscious or have a seizure  Ask them to call the local emergency number (911 in the [address] [facility])   This is an emergency  Tell them never to try to make you swallow anything if you faint or have a seizure  · Wear medical alert jewelry  or carry a card that says you have diabetes  Ask where to get these items         Prevent hypoglycemia:   · Take diabetes medicine as directed  Take your medicine at the right time and in the right amount  Do not  double the amount of medicine you take unless instructed by your diabetes care team  Rigoberto Peoples may need oral diabetes medicine, insulin, or both to help control your blood sugar levels  Your healthcare provider will teach you how and when to take oral diabetes medicine  You will also be taught about side effects oral diabetes medicine can cause  Insulin may be added if oral diabetes medicine becomes less effective over time  Insulin may be injected, or given through a pump or pen  You and your care team will discuss which method is best for you  ? An insulin pump  is an implanted device that gives your insulin 24 hours a day  An insulin pump prevents the need for multiple insulin injections in a day  ? An insulin pen  is a device prefilled with the right amount of insulin  · Eat meals and snacks as directed  Talk to your dietitian or diabetes care team about a meal plan that is right for you  Do not skip meals  · Check your blood sugar level as directed  Ask your diabetes care team what your blood sugar levels should be before and after you eat  Ask when and how often to check your blood sugar level  You may need to check a drop of blood in a glucose test machine  You may need to check at least 3 times each day  Record your blood sugar level results and take the record with you when you see your care team  They may use it to make changes to your medicine, food, or exercise schedules  Your care team provider may recommend a continuous glucose monitor (CGM)  A CGM is a device that is worn at all times  The CGM checks your blood sugar every 5 minutes  It sends results to an electronic device such as a smart phone  · Check your blood sugar level before you exercise  Physical activity, such as exercise, can decrease your blood sugar level   If your blood sugar level is less than 100 mg/dL, have a carbohydrate snack  Examples are 4 to 6 crackers, ½ banana, 8 ounces (1 cup) of nonfat or 1% milk, or 4 ounces (½ cup) of juice  If you will be active for more than 1 hour, you may need to check your blood sugar level every 30 minutes  Your diabetes care team may also recommend that you check your blood sugar level after your activity  · Know the risks if you choose to drink alcohol  Alcohol can cause your blood sugar levels to be low if you use insulin  Alcohol can cause high blood sugar levels and weight gain if you drink too much  Women 21 years or older and men 72 years or older should limit alcohol to 1 drink a day  Men aged 24 to 59 years should limit alcohol to 2 drinks a day  A drink of alcohol is 12 ounces of beer, 5 ounces of wine, or 1½ ounces of liquor  Follow up with your doctor or diabetes care team or specialist as directed: You may need your insulin or diabetes medicine changed if you continue to have hypoglycemia episodes  Write down your questions so you remember to ask them during your visits  © Copyright [employer] 2022 Information is for End User's use only and may not be sold, redistributed or otherwise used for commercial purposes  All illustrations and images included in CareNotes® are the copyrighted property of [employer]  or Melanie Meredith  The above information is an  only  It is not intended as medical advice for individual conditions or treatments  Talk to your doctor, nurse or pharmacist before following any medical regimen to see if it is safe and effective for you

## 2022-05-10 NOTE — ASSESSMENT & PLAN NOTE
Lab Results   Component Value Date    HGBA1C 8 7 (H) 04/30/2022   A1c above goal-she is having episodes of both hypo and hyperglycemia-aortic fingersticks likely due to inconsistent carbohydrate intake at meals  Again encouraged to see the nutritionist so that she can be consistent with carbohydrate intake at meals but she has declined    She states insurance is not covering continuous glucose monitor reader as she is not due for an upgrade

## 2022-05-10 NOTE — ASSESSMENT & PLAN NOTE
Lab Results   Component Value Date    HGBA1C 8 7 (H) 04/30/2022   Occasionally due to delayed missed meals    Advised to take an extra snack if she is going to be more physically active

## 2022-06-13 DIAGNOSIS — R06.9 RESPIRATORY ABNORMALITIES: ICD-10-CM

## 2022-06-22 ENCOUNTER — ESTABLISHED COMPREHENSIVE EXAM (OUTPATIENT)
Dept: URBAN - METROPOLITAN AREA CLINIC 6 | Facility: CLINIC | Age: 72
End: 2022-06-22

## 2022-06-22 DIAGNOSIS — H02.885: ICD-10-CM

## 2022-06-22 DIAGNOSIS — Z79.4: ICD-10-CM

## 2022-06-22 DIAGNOSIS — H04.123: ICD-10-CM

## 2022-06-22 DIAGNOSIS — E11.9: ICD-10-CM

## 2022-06-22 DIAGNOSIS — Z96.1: ICD-10-CM

## 2022-06-22 PROCEDURE — 92014 COMPRE OPH EXAM EST PT 1/>: CPT

## 2022-06-22 ASSESSMENT — KERATOMETRY
OS_AXISANGLE2_DEGREES: 14
OD_AXISANGLE_DEGREES: 084
OS_AXISANGLE2_DEGREES: 18
OD_K2POWER_DIOPTERS: 43.50
OD_AXISANGLE2_DEGREES: 174
OS_AXISANGLE_DEGREES: 108
OS_AXISANGLE_DEGREES: 104
OD_K1POWER_DIOPTERS: 43.00
OS_K1POWER_DIOPTERS: 42.25
OS_K2POWER_DIOPTERS: 43.00

## 2022-06-22 ASSESSMENT — VISUAL ACUITY
OD_SC: 20/25
OS_SC: 20/40-2

## 2022-06-22 ASSESSMENT — TONOMETRY
OD_IOP_MMHG: 11
OS_IOP_MMHG: 11

## 2022-06-28 DIAGNOSIS — F41.9 ANXIETY: ICD-10-CM

## 2022-06-28 DIAGNOSIS — E11.649 TYPE 2 DIABETES MELLITUS WITH HYPOGLYCEMIA WITHOUT COMA, WITH LONG-TERM CURRENT USE OF INSULIN (HCC): Primary | ICD-10-CM

## 2022-06-28 DIAGNOSIS — E78.5 HYPERLIPIDEMIA, UNSPECIFIED HYPERLIPIDEMIA TYPE: ICD-10-CM

## 2022-06-28 DIAGNOSIS — Z79.4 TYPE 2 DIABETES MELLITUS WITH HYPOGLYCEMIA WITHOUT COMA, WITH LONG-TERM CURRENT USE OF INSULIN (HCC): Primary | ICD-10-CM

## 2022-06-28 RX ORDER — EZETIMIBE 10 MG/1
5 TABLET ORAL DAILY
Qty: 45 TABLET | Refills: 1 | Status: SHIPPED | OUTPATIENT
Start: 2022-06-28 | End: 2022-12-25

## 2022-07-27 DIAGNOSIS — I10 HYPERTENSION, UNSPECIFIED TYPE: Primary | ICD-10-CM

## 2022-07-27 RX ORDER — LOSARTAN POTASSIUM 25 MG/1
50 TABLET ORAL 2 TIMES DAILY
Qty: 180 TABLET | Refills: 1 | Status: SHIPPED | OUTPATIENT
Start: 2022-07-27

## 2022-08-03 DIAGNOSIS — I10 HYPERTENSION, UNSPECIFIED TYPE: Primary | ICD-10-CM

## 2022-08-03 RX ORDER — HYDROCHLOROTHIAZIDE 12.5 MG/1
12.5 TABLET ORAL DAILY
Qty: 90 TABLET | Refills: 0 | Status: SHIPPED | OUTPATIENT
Start: 2022-08-03

## 2022-08-18 ENCOUNTER — OFFICE VISIT (OUTPATIENT)
Dept: ENDOCRINOLOGY | Facility: CLINIC | Age: 72
End: 2022-08-18
Payer: MEDICARE

## 2022-08-18 VITALS
DIASTOLIC BLOOD PRESSURE: 70 MMHG | WEIGHT: 159.38 LBS | SYSTOLIC BLOOD PRESSURE: 142 MMHG | BODY MASS INDEX: 31.29 KG/M2 | HEIGHT: 60 IN | HEART RATE: 70 BPM

## 2022-08-18 DIAGNOSIS — I10 HYPERTENSION, UNSPECIFIED TYPE: ICD-10-CM

## 2022-08-18 DIAGNOSIS — E78.5 HYPERLIPIDEMIA, UNSPECIFIED HYPERLIPIDEMIA TYPE: ICD-10-CM

## 2022-08-18 DIAGNOSIS — R53.82 CHRONIC FATIGUE: ICD-10-CM

## 2022-08-18 DIAGNOSIS — N39.3 STRESS INCONTINENCE, FEMALE: ICD-10-CM

## 2022-08-18 DIAGNOSIS — Z79.4 TYPE 2 DIABETES MELLITUS WITH HYPERGLYCEMIA, WITH LONG-TERM CURRENT USE OF INSULIN (HCC): Primary | ICD-10-CM

## 2022-08-18 DIAGNOSIS — E11.65 TYPE 2 DIABETES MELLITUS WITH HYPERGLYCEMIA, WITH LONG-TERM CURRENT USE OF INSULIN (HCC): Primary | ICD-10-CM

## 2022-08-18 LAB — SL AMB POCT HEMOGLOBIN AIC: 8.4 (ref ?–6.5)

## 2022-08-18 PROCEDURE — 99214 OFFICE O/P EST MOD 30 MIN: CPT | Performed by: PHYSICIAN ASSISTANT

## 2022-08-18 PROCEDURE — 83036 HEMOGLOBIN GLYCOSYLATED A1C: CPT | Performed by: PHYSICIAN ASSISTANT

## 2022-08-18 RX ORDER — INSULIN ASPART 100 [IU]/ML
6 INJECTION, SOLUTION INTRAVENOUS; SUBCUTANEOUS
Qty: 30 ML | Refills: 1 | Status: SHIPPED | OUTPATIENT
Start: 2022-08-18 | End: 2022-11-16

## 2022-08-18 RX ORDER — INSULIN GLARGINE 100 [IU]/ML
25 INJECTION, SOLUTION SUBCUTANEOUS
Qty: 30 ML | Refills: 1 | Status: SHIPPED | OUTPATIENT
Start: 2022-08-18

## 2022-08-18 NOTE — ASSESSMENT & PLAN NOTE
She reports fatigue and other symptoms such as easy bruising and skin changes  Advised her to complete lab  Testing as ordered at last visit and will also check CBC  Advised her to follow up with her PCP for evaluation

## 2022-08-18 NOTE — PROGRESS NOTES
Established Patient Progress Note      Chief Complaint   Patient presents with    Diabetes Type 2        History of Present Illness:   Juany Zamorano is a 80 y o  female with a history of type 2 diabetes with long term use of insulin since 15 years ago  Reports complications of none  Denies recent illness or hospitalizations  Denies recent severe hypoglycemic or severe hyperglycemic episodes  Denies any issues with her current regimen  home glucose monitoring: are performed regularly 3x per day  Most blood sugars are in 90s to mid 100s but there are a few excursions over 200, rarely above 300  She reports when eating a smaller meal such as a salad will reduce dose of Humalog  Reports she was on novolog many years, not sure why she was switched, would like to return to Novolog since her insurance will cover it  Has not been able to get [employer] reader, has sensors at home  Has had a lot of stress recently with death of family members this week and sister in hospital with covid  Current regimen:   Metformin 1000mg twice per day   Lantus 25 units daily   Humalog 6 units before meals       Last Eye Exam: UTD  Last Foot Exam: UTD    Has hypertension: Taking losartan  Has hyperlipidemia: Taking simvastatin and zetia       Patient Active Problem List   Diagnosis    Allergic rhinitis    Asthma    Generalized anxiety disorder    Stress incontinence, female    Lumbar radiculopathy    Type 2 diabetes mellitus with hyperglycemia, with long-term current use of insulin ([address] )    Colitis    Chronic fatigue    Gastroesophageal reflux disease without esophagitis    Hiatal hernia    Rheumatoid arthritis of multiple sites with negative rheumatoid factor (HCC)    Ear itch    Sensorineural hearing loss (SNHL) of left ear with restricted hearing of right ear    Dry mouth    Hypertension    Hyperlipidemia    Type 2 diabetes mellitus with hypoglycemia without coma, with long-term current use of insulin ([address] )    Preoperative evaluation of a medical condition to rule out surgical contraindications (TAR required)    Continuous opioid dependence ([facility] )    Upper respiratory tract infection      Past Medical History:   Diagnosis Date    Anxiety     Anxiety     Arthritis of right knee     Cellulitis     Contact dermatitis     Cough     Diabetes mellitus ([facility] )     GERD without esophagitis     Headache     Hiatal hernia     Hyperlipidemia     Knee sprain     Lyme disease     Tick bite       Past Surgical History:   Procedure Laterality Date    ABDOMINAL SURGERY      tummy tuck    CATARACT EXTRACTION, BILATERAL Bilateral 11/21 and 12/21    HYSTERECTOMY      age 51    VA COLONOSCOPY FLX DX W/COLLJ SPEC WHEN PFRMD N/A 5/8/2019    Procedure: COLONOSCOPY;  Surgeon: Kesha Villarreal MD;  Location: [facility]; Service: Gastroenterology    VA ESOPHAGOGASTRODUODENOSCOPY TRANSORAL DIAGNOSTIC N/A 5/8/2019    Procedure: ESOPHAGOGASTRODUODENOSCOPY (EGD); Surgeon: Kesha Villarreal MD;  Location: [facility];   Service: Gastroenterology    TUBAL LIGATION        Family History   Problem Relation Age of Onset    Mental illness Mother     Heart disease Father     Colon cancer Father 67    No Known Problems Sister     No Known Problems Daughter     No Known Problems Maternal Grandmother     No Known Problems Maternal Grandfather     No Known Problems Paternal Grandmother     No Known Problems Paternal Grandfather     No Known Problems Son     No Known Problems Sister     No Known Problems Sister     No Known Problems Sister     No Known Problems Sister     No Known Problems Sister     Lymphoma Brother 45    No Known Problems Brother     No Known Problems Paternal Aunt     No Known Problems Paternal Aunt     No Known Problems Paternal Aunt     No Known Problems Paternal Aunt     Breast cancer Neg Hx     Breast cancer additional onset Neg Hx     Endometrial cancer Neg Hx     Ovarian cancer Neg Hx     BRCA 1/2 Neg Hx     BRCA1 Negative Neg Hx     BRCA1 Positive Neg Hx     BRCA2 Negative Neg Hx     BRCA2 Positive Neg Hx      Social History     Tobacco Use    Smoking status: Former Smoker     Packs/day: 0 50     Years: 25 00     Pack years: 12 50     Types: Cigarettes     Quit date: 2008     Years since quittin 0    Smokeless tobacco: Never Used   Substance Use Topics    Alcohol use: Not Currently     Comment: seldom     Allergies   Allergen Reactions    Augmentin [Amoxicillin-Pot Clavulanate]     Codeine Drowsiness    Macrobid [Nitrofurantoin] Other (See Comments)     Pt does not recall         Current Outpatient Medications:     amLODIPine (NORVASC) 2 5 mg tablet, Take 2 5 mg by mouth daily at bedtime, Disp: , Rfl: 1    aspirin 81 mg chewable tablet, Chew 81 mg daily, Disp: , Rfl:     baclofen 10 mg tablet, Take 10 mg by mouth daily at bedtime, Disp: , Rfl: 4    Breo Ellipta 200-25 MCG/INH inhaler, INHALE ONE INHALATION BY MOUTH EVERY DAY - (RINSE MOUTH AND SPIT AFTER USE, DISCARD SIX WEEKS AFTER REMOVAL FROM FOIL POUCH), Disp: 2 each, Rfl: 1    Cholecalciferol (VITAMIN D PO), Take 1 tablet by mouth daily, Disp: , Rfl:     Diclofenac Sodium (VOLTAREN) 1 %, Apply 1 application topically 4 (four) times a day, Disp: , Rfl:     ezetimibe (ZETIA) 10 mg tablet, Take 0 5 tablets (5 mg total) by mouth daily, Disp: 45 tablet, Rfl: 1    Ferrous Sulfate Dried (FERROUS SULFATE CR PO), Iron TABS   Refills: 0     Active, Disp: , Rfl:     hydrochlorothiazide (HYDRODIURIL) 12 5 mg tablet, Take 1 tablet (12 5 mg total) by mouth daily, Disp: 90 tablet, Rfl: 0    hydroxychloroquine (PLAQUENIL) 200 mg tablet, Take 200 mg by mouth 2 (two) times a day with meals, Disp: , Rfl:     insulin aspart (NovoLOG FlexPen) 100 UNIT/ML injection pen, Inject 6 Units under the skin 3 (three) times a day with meals, Disp: 30 mL, Rfl: 1    Insulin Glargine Solostar (Lantus SoloStar) 100 UNIT/ML SOPN, Inject 25 Units under the skin daily at bedtime, Disp: 30 mL, Rfl: 1    Insulin Pen Needle (BD Pen Needle Ester U/F) 32G X 4 MM MISC, Inject under the skin 4 (four) times a day, Disp: 400 each, Rfl: 1    Lifitegrast ([place] Null OP), Apply to eye 2 vials a day  , Disp: , Rfl:     losartan (COZAAR) 25 mg tablet, Take 2 tablets (50 mg total) by mouth 2 (two) times a day, Disp: 180 tablet, Rfl: 1    metFORMIN (GLUCOPHAGE) 500 mg tablet, Take 2 tablets (1,000 mg total) by mouth 2 (two) times a day with meals, Disp: 360 tablet, Rfl: 1    Microlet Lancets MISC, Use 4 (four) times a day, Disp: 400 each, Rfl: 1    sertraline (ZOLOFT) 50 mg tablet, TAKE ONE TABLET BY MOUTH EVERY DAY, Disp: 90 tablet, Rfl: 3    simvastatin (ZOCOR) 20 mg tablet, Take 1 tablet by mouth daily, Disp: , Rfl:     valACYclovir (VALTREX) 1,000 mg tablet, Take 1,000 mg by mouth daily, Disp: , Rfl:     artificial tear (LUBRIFRESH P M ) 83-15 % ophthalmic ointment, daily at bedtime (Patient not taking: Reported on 8/18/2022), Disp: , Rfl:     benzonatate (TESSALON) 200 MG capsule, Take 1 capsule (200 mg total) by mouth 3 (three) times a day as needed for cough (Patient not taking: No sig reported), Disp: 20 capsule, Rfl: 0    Breo Ellipta 200-25 MCG/INH inhaler, INHALE ONE INHALATION BY MOUTH EVERY DAY - (RINSE MOUTH AND SPIT AFTER USE, DISCARD SIX WEEKS AFTER REMOVAL FROM FOIL POUCH), Disp: 2 each, Rfl: 1    ezetimibe (ZETIA) 10 mg tablet, Take 0 5 tablets (5 mg total) by mouth daily, Disp: 45 tablet, Rfl: 1    glucose blood (Contour Next Test) test strip, Use 1 each 4 (four) times a day, Disp: 400 each, Rfl: 1    hydrochlorothiazide (HYDRODIURIL) 12 5 mg tablet, Take 1 tablet (12 5 mg total) by mouth daily, Disp: 90 tablet, Rfl: 0    insulin aspart (NovoLOG FlexPen) 100 UNIT/ML injection pen, Inject 6 Units under the skin 3 (three) times a day with meals, Disp: 30 mL, Rfl: 1    Insulin Glargine Solostar (Lantus SoloStar) 100 UNIT/ML SOPN, Inject 25 Units under the skin daily at bedtime, Disp: 30 mL, Rfl: 1    losartan (COZAAR) 25 mg tablet, Take 2 tablets (50 mg total) by mouth 2 (two) times a day, Disp: 180 tablet, Rfl: 1    metFORMIN (GLUCOPHAGE) 500 mg tablet, Take 2 tablets (1,000 mg total) by mouth 2 (two) times a day with meals, Disp: 360 tablet, Rfl: 1    sertraline (ZOLOFT) 50 mg tablet, TAKE ONE TABLET BY MOUTH EVERY DAY, Disp: 90 tablet, Rfl: 3    Review of Systems   Constitutional: Negative for activity change, appetite change, chills, diaphoresis, fatigue, fever and unexpected weight change  HENT: Negative for trouble swallowing and voice change  Eyes: Negative for visual disturbance  Respiratory: Negative for shortness of breath  Cardiovascular: Negative for chest pain and palpitations  Gastrointestinal: Negative for abdominal pain, constipation and diarrhea  Endocrine: Negative for cold intolerance, heat intolerance, polydipsia, polyphagia and polyuria  Genitourinary: Negative for frequency and menstrual problem  Musculoskeletal: Negative for arthralgias and myalgias  Skin: Negative for rash  Allergic/Immunologic: Negative for food allergies  Neurological: Negative for dizziness and tremors  Hematological: Negative for adenopathy  Psychiatric/Behavioral: Negative for sleep disturbance  All other systems reviewed and are negative  Physical Exam:  Body mass index is 31 13 kg/m²  /70 (BP Location: Left arm, Patient Position: Sitting, Cuff Size: Standard)   Pulse 70   Ht 5' (1 524 m)   Wt 72 3 kg (159 lb 6 oz)   BMI 31 13 kg/m²    Wt Readings from Last 3 Encounters:   08/18/22 72 3 kg (159 lb 6 oz)   05/09/22 72 3 kg (159 lb 6 4 oz)   03/15/22 71 2 kg (157 lb)       Physical Exam  Vitals reviewed  Constitutional:       General: She is not in acute distress  Appearance: She is well-developed  HENT:      Head: Normocephalic and atraumatic     Eyes:      Conjunctiva/sclera: Conjunctivae normal       Pupils: Pupils are equal, round, and reactive to light  Neck:      Thyroid: No thyromegaly  Cardiovascular:      Rate and Rhythm: Normal rate and regular rhythm  Heart sounds: Normal heart sounds  Pulmonary:      Effort: Pulmonary effort is normal  No respiratory distress  Breath sounds: Normal breath sounds  No wheezing or rales  Abdominal:      General: Bowel sounds are normal  There is no distension  Palpations: Abdomen is soft  Tenderness: There is no abdominal tenderness  Musculoskeletal:         General: Normal range of motion  Cervical back: Normal range of motion and neck supple  Skin:     General: Skin is warm and dry  Neurological:      Mental Status: She is alert and oriented to person, place, and time  Labs:   Lab Results   Component Value Date    HGBA1C 8 4 (A) 08/18/2022    HGBA1C 8 7 (H) 04/30/2022    HGBA1C 8 8 (H) 01/13/2022     Lab Results   Component Value Date    CREATININE 0 54 (L) 04/30/2022    CREATININE 0 63 11/10/2015    BUN 11 04/30/2022     11/10/2015    K 3 9 04/30/2022     04/30/2022    CO2 31 04/30/2022     No results found for: EGFR  Lab Results   Component Value Date    HDL 85 01/13/2022    TRIG 83 01/13/2022     No results found for: ALT, AST, GGT, ALKPHOS, BILITOT  No results found for: [id number]  Lab Results   Component Value Date    FREET4 1 2 02/26/2018       Impression & Plan:    Problem List Items Addressed This Visit        Endocrine    Type 2 diabetes mellitus with hyperglycemia, with long-term current use of insulin ([address] ) - Primary     Diabetes poorly controlled but improving  She is interested in Otto 2 CGM but not yet elligible for new reader through insurance  We were not able to download Otto 2 erasto on cell phone since she does not know her apple ID  Advised to look for her notebook with passwords at home or reset apple ID so she can download Otto 2 erasto and utilize her Otto 2 sensors      Recommend referral to do dietician for MNT or Flexible insulin training- she declines at this time as she does not think she will stick to a consistent carb diet or be diligent with carb counting/insulin adjustments  For now, advised her to increase Humalog by 1-2 units for much larger meals than usual that tend to cause hyperglycemia  She can also reduce by a few units for smaller/low carb meals  She reports taking Novolog for many years and that it is covered by her insurance and would like to change RX to Novolog instead of Humalog  Lab Results   Component Value Date    HGBA1C 8 4 (A) 08/18/2022            Relevant Medications    insulin aspart (NovoLOG FlexPen) 100 UNIT/ML injection pen    Insulin Glargine Solostar (Lantus SoloStar) 100 UNIT/ML SOPN    Other Relevant Orders    POCT hemoglobin A1c (Completed)       Cardiovascular and Mediastinum    Hypertension     BP slightly high today but was 130/80 and 111/72 at last medical visits  She reports recent deaths/illness of family members this week  Will continue current regimen and monitor  Other    Stress incontinence, female    Chronic fatigue     She reports fatigue and other symptoms such as easy bruising and skin changes  Advised her to complete lab  Testing as ordered at last visit and will also check CBC  Advised her to follow up with her PCP for evaluation  Relevant Orders    CBC and differential Lab Collect    Hyperlipidemia     Continue simvastatin and zetia  Orders Placed This Encounter   Procedures    CBC and differential Lab Collect     This is a patient instruction: This test is non-fasting  Please drink two glasses of water morning of bloodwork  Standing Status:   Future     Standing Expiration Date:   8/18/2023    POCT hemoglobin A1c       There are no Patient Instructions on file for this visit  Discussed with the patient and all questioned fully answered  She will call me if any problems arise      Follow-up appointment in 3 months       Counseled patient on diagnostic results, prognosis, risk and benefit of treatment options, instruction for management, importance of treatment compliance, Risk  factor reduction and impressions    Fabi Rob PA-C

## 2022-08-18 NOTE — ASSESSMENT & PLAN NOTE
Diabetes poorly controlled but improving  She is interested in Otto 2 CGM but not yet elligible for new reader through insurance  We were not able to download [place] 2 erasto on cell phone since she does not know her apple ID  Advised to look for her notebook with passwords at home or reset apple ID so she can download Feedo 2 erasto and utilize her Otto 2 sensors  Recommend referral to do dietician for MNT or Flexible insulin training- she declines at this time as she does not think she will stick to a consistent carb diet or be diligent with carb counting/insulin adjustments  For now, advised her to increase Humalog by 1-2 units for much larger meals than usual that tend to cause hyperglycemia  She can also reduce by a few units for smaller/low carb meals  She reports taking Novolog for many years and that it is covered by her insurance and would like to change RX to Novolog instead of Humalog     Lab Results   Component Value Date    HGBA1C 8 4 (A) 08/18/2022

## 2022-08-18 NOTE — ASSESSMENT & PLAN NOTE
BP slightly high today but was 130/80 and 111/72 at last medical visits  She reports recent deaths/illness of family members this week  Will continue current regimen and monitor

## 2022-09-22 ENCOUNTER — OFFICE VISIT (OUTPATIENT)
Dept: FAMILY MEDICINE CLINIC | Facility: CLINIC | Age: 72
End: 2022-09-22
Payer: MEDICARE

## 2022-09-22 VITALS
SYSTOLIC BLOOD PRESSURE: 110 MMHG | TEMPERATURE: 97.5 F | WEIGHT: 159.38 LBS | HEIGHT: 60 IN | DIASTOLIC BLOOD PRESSURE: 80 MMHG | BODY MASS INDEX: 31.29 KG/M2 | RESPIRATION RATE: 18 BRPM | OXYGEN SATURATION: 95 % | HEART RATE: 80 BPM

## 2022-09-22 DIAGNOSIS — M06.09 RHEUMATOID ARTHRITIS OF MULTIPLE SITES WITH NEGATIVE RHEUMATOID FACTOR (HCC): ICD-10-CM

## 2022-09-22 DIAGNOSIS — J06.9 UPPER RESPIRATORY TRACT INFECTION, UNSPECIFIED TYPE: Primary | ICD-10-CM

## 2022-09-22 DIAGNOSIS — F11.20 CONTINUOUS OPIOID DEPENDENCE (HCC): ICD-10-CM

## 2022-09-22 PROCEDURE — 99213 OFFICE O/P EST LOW 20 MIN: CPT | Performed by: FAMILY MEDICINE

## 2022-09-22 PROCEDURE — G0439 PPPS, SUBSEQ VISIT: HCPCS | Performed by: FAMILY MEDICINE

## 2022-09-22 RX ORDER — PREDNISONE 20 MG/1
TABLET ORAL
Qty: 5 TABLET | Refills: 0 | Status: SHIPPED | OUTPATIENT
Start: 2022-09-22

## 2022-09-22 RX ORDER — PREDNISONE 20 MG/1
TABLET ORAL
Qty: 5 TABLET | Refills: 0 | Status: SHIPPED | OUTPATIENT
Start: 2022-09-22 | End: 2022-09-22 | Stop reason: SDUPTHER

## 2022-09-22 RX ORDER — AZITHROMYCIN 250 MG/1
TABLET, FILM COATED ORAL
Qty: 6 TABLET | Refills: 0 | Status: SHIPPED | OUTPATIENT
Start: 2022-09-22 | End: 2022-09-27

## 2022-09-22 RX ORDER — FLUCONAZOLE 150 MG/1
TABLET ORAL
Qty: 2 TABLET | Refills: 0 | Status: SHIPPED | OUTPATIENT
Start: 2022-09-22 | End: 2022-09-24

## 2022-09-22 RX ORDER — AZITHROMYCIN 250 MG/1
TABLET, FILM COATED ORAL
Qty: 6 TABLET | Refills: 0 | Status: SHIPPED | OUTPATIENT
Start: 2022-09-22 | End: 2022-09-22 | Stop reason: SDUPTHER

## 2022-09-22 NOTE — PATIENT INSTRUCTIONS
Medicare Preventive Visit Patient Instructions  Thank you for completing your Welcome to Medicare Visit or Medicare Annual Wellness Visit today  Your next wellness visit will be due in one year (9/23/2023)  The screening/preventive services that you may require over the next 5-10 years are detailed below  Some tests may not apply to you based off risk factors and/or age  Screening tests ordered at today's visit but not completed yet may show as past due  Also, please note that scanned in results may not display below  Preventive Screenings:  Service Recommendations Previous Testing/Comments   Colorectal Cancer Screening  * Colonoscopy    * Fecal Occult Blood Test (FOBT)/Fecal Immunochemical Test (FIT)  * Fecal DNA/Cologuard Test  * Flexible Sigmoidoscopy Age: 39-70 years old   Colonoscopy: every 10 years (may be performed more frequently if at higher risk)  OR  FOBT/FIT: every 1 year  OR  Cologuard: every 3 years  OR  Sigmoidoscopy: every 5 years  Screening may be recommended earlier than age 39 if at higher risk for colorectal cancer  Also, an individualized decision between you and your healthcare provider will decide whether screening between the ages of 74-80 would be appropriate  Colonoscopy: 05/08/2019  FOBT/FIT: Not on file  Cologuard: Not on file  Sigmoidoscopy: Not on file    Screening Current     Breast Cancer Screening Age: 36 years old  Frequency: every 1-2 years  Not required if history of left and right mastectomy Mammogram: 10/06/2021    Screening Current   Cervical Cancer Screening Between the ages of 21-29, pap smear recommended once every 3 years  Between the ages of 33-67, can perform pap smear with HPV co-testing every 5 years     Recommendations may differ for women with a history of total hysterectomy, cervical cancer, or abnormal pap smears in past  Pap Smear: Not on file    Screening Not Indicated   Hepatitis C Screening Once for adults born between 1945 and 1965  More frequently in patients at high risk for Hepatitis C Hep C Antibody: Not on file        Diabetes Screening 1-2 times per year if you're at risk for diabetes or have pre-diabetes Fasting glucose: No results in last 5 years (No results in last 5 years)  A1C: 8 4 (8/18/2022)  Screening Not Indicated  History Diabetes   Cholesterol Screening Once every 5 years if you don't have a lipid disorder  May order more often based on risk factors  Lipid panel: 01/13/2022    Screening Not Indicated  History Lipid Disorder     Other Preventive Screenings Covered by Medicare:  1  Abdominal Aortic Aneurysm (AAA) Screening: covered once if your at risk  You're considered to be at risk if you have a family history of AAA  2  Lung Cancer Screening: covers low dose CT scan once per year if you meet all of the following conditions: (1) Age 50-69; (2) No signs or symptoms of lung cancer; (3) Current smoker or have quit smoking within the last 15 years; (4) You have a tobacco smoking history of at least 20 pack years (packs per day multiplied by number of years you smoked); (5) You get a written order from a healthcare provider  3  Glaucoma Screening: covered annually if you're considered high risk: (1) You have diabetes OR (2) Family history of glaucoma OR (3)  aged 48 and older OR (3)  American aged 72 and older  3  Osteoporosis Screening: covered every 2 years if you meet one of the following conditions: (1) You're estrogen deficient and at risk for osteoporosis based off medical history and other findings; (2) Have a vertebral abnormality; (3) On glucocorticoid therapy for more than 3 months; (4) Have primary hyperparathyroidism; (5) On osteoporosis medications and need to assess response to drug therapy  · Last bone density test (DXA Scan): Not on file  5  HIV Screening: covered annually if you're between the age of 12-76   Also covered annually if you are younger than 13 and older than 72 with risk factors for HIV infection  For pregnant patients, it is covered up to 3 times per pregnancy  Immunizations:  Immunization Recommendations   Influenza Vaccine Annual influenza vaccination during flu season is recommended for all persons aged >= 6 months who do not have contraindications   Pneumococcal Vaccine   * Pneumococcal conjugate vaccine = PCV13 (Prevnar 13), PCV15 (Vaxneuvance), PCV20 (Prevnar 20)  * Pneumococcal polysaccharide vaccine = PPSV23 (Pneumovax) Adults 25-60 years old: 1-3 doses may be recommended based on certain risk factors  Adults 72 years old: 1-2 doses may be recommended based off what pneumonia vaccine you previously received   Hepatitis B Vaccine 3 dose series if at intermediate or high risk (ex: diabetes, end stage renal disease, liver disease)   Tetanus (Td) Vaccine - COST NOT COVERED BY MEDICARE PART B Following completion of primary series, a booster dose should be given every 10 years to maintain immunity against tetanus  Td may also be given as tetanus wound prophylaxis  Tdap Vaccine - COST NOT COVERED BY MEDICARE PART B Recommended at least once for all adults  For pregnant patients, recommended with each pregnancy  Shingles Vaccine (Shingrix) - COST NOT COVERED BY MEDICARE PART B  2 shot series recommended in those aged 48 and above     Health Maintenance Due:      Topic Date Due    Hepatitis C Screening  Never done    Breast Cancer Screening: Mammogram  10/06/2022    Colorectal Cancer Screening  05/08/2029     Immunizations Due:      Topic Date Due    Pneumococcal Vaccine: 65+ Years (1 - PCV) Never done    COVID-19 Vaccine (3 - Booster for Moderna series) 07/22/2021    Influenza Vaccine (1) 09/01/2022     Advance Directives   What are advance directives? Advance directives are legal documents that state your wishes and plans for medical care  These plans are made ahead of time in case you lose your ability to make decisions for yourself   Advance directives can apply to any medical decision, such as the treatments you want, and if you want to donate organs  What are the types of advance directives? There are many types of advance directives, and each state has rules about how to use them  You may choose a combination of any of the following:  · Living will: This is a written record of the treatment you want  You can also choose which treatments you do not want, which to limit, and which to stop at a certain time  This includes surgery, medicine, IV fluid, and tube feedings  · Durable power of  for healthcare [facility]): This is a written record that states who you want to make healthcare choices for you when you are unable to make them for yourself  This person, called a proxy, is usually a family member or a friend  You may choose more than 1 proxy  · Do not resuscitate (DNR) order:  A DNR order is used in case your heart stops beating or you stop breathing  It is a request not to have certain forms of treatment, such as CPR  A DNR order may be included in other types of advance directives  · Medical directive: This covers the care that you want if you are in a coma, near death, or unable to make decisions for yourself  You can list the treatments you want for each condition  Treatment may include pain medicine, surgery, blood transfusions, dialysis, IV or tube feedings, and a ventilator (breathing machine)  · Values history: This document has questions about your views, beliefs, and how you feel and think about life  This information can help others choose the care that you would choose  Why are advance directives important? An advance directive helps you control your care  Although spoken wishes may be used, it is better to have your wishes written down  Spoken wishes can be misunderstood, or not followed  Treatments may be given even if you do not want them  An advance directive may make it easier for your family to make difficult choices about your care     Fall Prevention    Fall prevention  includes ways to make your home and other areas safer  It also includes ways you can move more carefully to prevent a fall  Health conditions that cause changes in your blood pressure, vision, or muscle strength and coordination may increase your risk for falls  Medicines may also increase your risk for falls if they make you dizzy, weak, or sleepy  Fall prevention tips:   · Stand or sit up slowly  · Use assistive devices as directed  · Wear shoes that fit well and have soles that   · Wear a personal alarm  · Stay active  · Manage your medical conditions  Home Safety Tips:  · Add items to prevent falls in the bathroom  · Keep paths clear  · Install bright lights in your home  · Keep items you use often on shelves within reach  · Paint or place reflective tape on the edges of your stairs  Urinary Incontinence   Urinary incontinence (UI)  is when you lose control of your bladder  UI develops because your bladder cannot store or empty urine properly  The 3 most common types of UI are stress incontinence, urge incontinence, or both  Medicines:   · May be given to help strengthen your bladder control  Report any side effects of medication to your healthcare provider  Do pelvic muscle exercises often:  Your pelvic muscles help you stop urinating  Squeeze these muscles tight for 5 seconds, then relax for 5 seconds  Gradually work up to squeezing for 10 seconds  Do 3 sets of 15 repetitions a day, or as directed  This will help strengthen your pelvic muscles and improve bladder control  Train your bladder:  Go to the bathroom at set times, such as every 2 hours, even if you do not feel the urge to go  You can also try to hold your urine when you feel the urge to go  For example, hold your urine for 5 minutes when you feel the urge to go  As that becomes easier, hold your urine for 10 minutes  Self-care:   · Keep a UI record    Write down how often you leak urine and how much you leak  Make a note of what you were doing when you leaked urine  · Drink liquids as directed  You may need to limit the amount of liquid you drink to help control your urine leakage  Do not drink any liquid right before you go to bed  Limit or do not have drinks that contain caffeine or alcohol  · Prevent constipation  Eat a variety of high-fiber foods  Good examples are high-fiber cereals, beans, vegetables, and whole-grain breads  Walking is the best way to trigger your intestines to have a bowel movement  · Exercise regularly and maintain a healthy weight  Weight loss and exercise will decrease pressure on your bladder and help you control your leakage  · Use a catheter as directed  to help empty your bladder  A catheter is a tiny, plastic tube that is put into your bladder to drain your urine  · Go to behavior therapy as directed  Behavior therapy may be used to help you learn to control your urge to urinate  Weight Management   Why it is important to manage your weight:  Being overweight increases your risk of health conditions such as heart disease, high blood pressure, type 2 diabetes, and certain types of cancer  It can also increase your risk for osteoarthritis, sleep apnea, and other respiratory problems  Aim for a slow, steady weight loss  Even a small amount of weight loss can lower your risk of health problems  How to lose weight safely:  A safe and healthy way to lose weight is to eat fewer calories and get regular exercise  You can lose up about 1 pound a week by decreasing the number of calories you eat by 500 calories each day  Healthy meal plan for weight management:  A healthy meal plan includes a variety of foods, contains fewer calories, and helps you stay healthy  A healthy meal plan includes the following:  · Eat whole-grain foods more often  A healthy meal plan should contain fiber   Fiber is the part of grains, fruits, and vegetables that is not broken down by your body  Whole-grain foods are healthy and provide extra fiber in your diet  Some examples of whole-grain foods are whole-wheat breads and pastas, oatmeal, brown rice, and bulgur  · Eat a variety of vegetables every day  Include dark, leafy greens such as spinach, kale, doreen greens, and mustard greens  Eat yellow and orange vegetables such as carrots, sweet potatoes, and winter squash  · Eat a variety of fruits every day  Choose fresh or canned fruit (canned in its own juice or light syrup) instead of juice  Fruit juice has very little or no fiber  · Eat low-fat dairy foods  Drink fat-free (skim) milk or 1% milk  Eat fat-free yogurt and low-fat cottage cheese  Try low-fat cheeses such as mozzarella and other reduced-fat cheeses  · Choose meat and other protein foods that are low in fat  Choose beans or other legumes such as split peas or lentils  Choose fish, skinless poultry (chicken or turkey), or lean cuts of red meat (beef or pork)  Before you cook meat or poultry, cut off any visible fat  · Use less fat and oil  Try baking foods instead of frying them  Add less fat, such as margarine, sour cream, regular salad dressing and mayonnaise to foods  Eat fewer high-fat foods  Some examples of high-fat foods include french fries, doughnuts, ice cream, and cakes  · Eat fewer sweets  Limit foods and drinks that are high in sugar  This includes candy, cookies, regular soda, and sweetened drinks  Exercise:  Exercise at least 30 minutes per day on most days of the week  Some examples of exercise include walking, biking, dancing, and swimming  You can also fit in more physical activity by taking the stairs instead of the elevator or parking farther away from stores  Ask your healthcare provider about the best exercise plan for you  © Copyright [employer] 2018 Information is for End User's use only and may not be sold, redistributed or otherwise used for commercial purposes   All illustrations and images included in CareNotes® are the copyrighted property of [employer]  or Melanie Meredith

## 2022-09-22 NOTE — PROGRESS NOTES
Assessment and Plan:     Problem List Items Addressed This Visit    None          Preventive health issues were discussed with patient, and age appropriate screening tests were ordered as noted in patient's After Visit Summary  Personalized health advice and appropriate referrals for health education or preventive services given if needed, as noted in patient's After Visit Summary  History of Present Illness:     Patient presents for a Medicare Wellness Visit    Patient in the office with 5 day history of symptoms including sinus congestion, coughing with some wheezing, minimal sore throat  She denies any documented fevers  Patient has history of asthma and has had several exacerbations over the years  Patient is up-to-date with COVID vaccination  Patient Care Team:  Rema Demarco MD as PCP - General  MD Kleber Romo MD as Endoscopist  Greer Hanks MD as Surgeon (Thoracic Surgery)  Adeola Leroy MD (Ophthalmology)  Alexandru Wylie MD (Endocrinology)  [facility] (Ophthalmology)  Layla Marr DPM (Podiatry)     Review of Systems:     Review of Systems   Constitutional: Negative  HENT: Positive for congestion and sore throat  Eyes: Negative  Respiratory: Positive for cough and wheezing  Cardiovascular: Negative  Gastrointestinal: Negative  Genitourinary: Negative  Musculoskeletal: Negative  Skin: Negative  Neurological: Negative  Psychiatric/Behavioral: Negative           Problem List:     Patient Active Problem List   Diagnosis    Allergic rhinitis    Asthma    Generalized anxiety disorder    Stress incontinence, female    Lumbar radiculopathy    Type 2 diabetes mellitus with hyperglycemia, with long-term current use of insulin ([facility])    Colitis    Chronic fatigue    Gastroesophageal reflux disease without esophagitis    Hiatal hernia    Rheumatoid arthritis of multiple sites with negative rheumatoid factor ([facility])    Ear itch    Sensorineural hearing loss (SNHL) of left ear with restricted hearing of right ear    Dry mouth    Hypertension    Hyperlipidemia    Type 2 diabetes mellitus with hypoglycemia without coma, with long-term current use of insulin (HCC)    Preoperative evaluation of a medical condition to rule out surgical contraindications (TAR required)    Continuous opioid dependence ([address] )    Upper respiratory tract infection      Past Medical and Surgical History:     Past Medical History:   Diagnosis Date    Anxiety     Anxiety     Arthritis of right knee     Cellulitis     Contact dermatitis     Cough     Diabetes mellitus ([address] )     GERD without esophagitis     Headache     Hiatal hernia     Hyperlipidemia     Knee sprain     Lyme disease     Tick bite      Past Surgical History:   Procedure Laterality Date    ABDOMINAL SURGERY      tummy tuck    CATARACT EXTRACTION, BILATERAL Bilateral 11/21 and 12/21    HYSTERECTOMY      age 51    AL COLONOSCOPY FLX DX W/COLLJ SPEC WHEN PFRMD N/A 5/8/2019    Procedure: COLONOSCOPY;  Surgeon: Michelle Ford MD;  [facility]; Service: Gastroenterology    AL ESOPHAGOGASTRODUODENOSCOPY TRANSORAL DIAGNOSTIC N/A 5/8/2019    Procedure: ESOPHAGOGASTRODUODENOSCOPY (EGD); Surgeon: Michelle Ford MD;  [facility];   Service: Gastroenterology    TUBAL LIGATION        Family History:     Family History   Problem Relation Age of Onset    Mental illness Mother     Heart disease Father     Colon cancer Father 67    No Known Problems Sister     No Known Problems Daughter     No Known Problems Maternal Grandmother     No Known Problems Maternal Grandfather     No Known Problems Paternal Grandmother     No Known Problems Paternal Grandfather     No Known Problems Son     No Known Problems Sister     No Known Problems Sister     No Known Problems Sister     No Known Problems Sister     No Known Problems Sister     Lymphoma Brother 45    No Known Problems Brother     No Known Problems Paternal Aunt     No Known Problems Paternal Aunt     No Known Problems Paternal Aunt     No Known Problems Paternal Aunt     Breast cancer Neg Hx     Breast cancer additional onset Neg Hx     Endometrial cancer Neg Hx     Ovarian cancer Neg Hx     BRCA 1/2 Neg Hx     BRCA1 Negative Neg Hx     BRCA1 Positive Neg Hx     BRCA2 Negative Neg Hx     BRCA2 Positive Neg Hx       Social History:     Social History     Socioeconomic History    Marital status:       Spouse name: None    Number of children: None    Years of education: None    Highest education level: None   Occupational History    None   Tobacco Use    Smoking status: Former Smoker     Packs/day: 0 50     Years: 25 00     Pack years: 12 50     Types: Cigarettes     Quit date: 2008     Years since quittin 1    Smokeless tobacco: Never Used   Vaping Use    Vaping Use: Never used   Substance and Sexual Activity    Alcohol use: Not Currently     Comment: seldom    Drug use: Yes     Types: Marijuana     Comment: smokes weed 3x per week    Sexual activity: Not Currently   Other Topics Concern    None   Social History Narrative    None     Social Determinants of Health     Financial Resource Strain: Not on file   Food Insecurity: Not on file   Transportation Needs: Not on file   Physical Activity: Not on file   Stress: Not on file   Social Connections: Not on file   Intimate Partner Violence: Not on file   Housing Stability: Not on file      Medications and Allergies:     Current Outpatient Medications   Medication Sig Dispense Refill    amLODIPine (NORVASC) 2 5 mg tablet Take 2 5 mg by mouth daily at bedtime  1    aspirin 81 mg chewable tablet Chew 81 mg daily      baclofen 10 mg tablet Take 10 mg by mouth daily at bedtime  4    Breo Ellipta 200-25 MCG/INH inhaler INHALE ONE INHALATION BY MOUTH EVERY DAY - (RINSE MOUTH AND SPIT AFTER USE, DISCARD SIX WEEKS AFTER REMOVAL FROM FOIL POUCH) 2 each 1    Cholecalciferol (VITAMIN D PO) Take 1 tablet by mouth daily      Diclofenac Sodium (VOLTAREN) 1 % Apply 1 application topically 4 (four) times a day      ezetimibe (ZETIA) 10 mg tablet Take 0 5 tablets (5 mg total) by mouth daily 45 tablet 1    Ferrous Sulfate Dried (FERROUS SULFATE CR PO) Iron TABS    Refills: 0       Active      glucose blood (Contour Next Test) test strip Use 1 each 4 (four) times a day 400 each 1    hydrochlorothiazide (HYDRODIURIL) 12 5 mg tablet Take 1 tablet (12 5 mg total) by mouth daily 90 tablet 0    hydroxychloroquine (PLAQUENIL) 200 mg tablet Take 200 mg by mouth 2 (two) times a day with meals      insulin aspart (NovoLOG FlexPen) 100 UNIT/ML injection pen Inject 6 Units under the skin 3 (three) times a day with meals 30 mL 1    Insulin Glargine Solostar (Lantus SoloStar) 100 UNIT/ML SOPN Inject 25 Units under the skin daily at bedtime 30 mL 1    Insulin Pen Needle (BD Pen Needle Ester U/F) 32G X 4 MM MISC Inject under the skin 4 (four) times a day 400 each 1    Lifitegrast (XIIDRA OP) Apply to eye 2 vials a day        losartan (COZAAR) 25 mg tablet Take 2 tablets (50 mg total) by mouth 2 (two) times a day 180 tablet 1    Microlet Lancets MISC Use 4 (four) times a day 400 each 1    sertraline (ZOLOFT) 50 mg tablet TAKE ONE TABLET BY MOUTH EVERY DAY 90 tablet 3    simvastatin (ZOCOR) 20 mg tablet Take 1 tablet by mouth daily      valACYclovir (VALTREX) 1,000 mg tablet Take 1,000 mg by mouth daily      artificial tear (LUBRIFRESH P M ) 83-15 % ophthalmic ointment daily at bedtime (Patient not taking: No sig reported)      benzonatate (TESSALON) 200 MG capsule Take 1 capsule (200 mg total) by mouth 3 (three) times a day as needed for cough (Patient not taking: No sig reported) 20 capsule 0    metFORMIN (GLUCOPHAGE) 500 mg tablet Take 2 tablets (1,000 mg total) by mouth 2 (two) times a day with meals 360 tablet 1     No current facility-administered medications for this visit  Allergies   Allergen Reactions    Augmentin [Amoxicillin-Pot Clavulanate]     Codeine Drowsiness    Macrobid [Nitrofurantoin] Other (See Comments)     Pt does not recall      Immunizations:     Immunization History   Administered Date(s) Administered    COVID-19 MODERNA VACC 0 5 ML IM 01/25/2021, 02/22/2021    Influenza Quadrivalent Preservative Free 3 years and older IM 11/08/2014    Influenza Split High Dose Preservative Free IM 02/09/2018    Influenza, high dose seasonal 0 7 mL 10/04/2018      Health Maintenance:         Topic Date Due    Hepatitis C Screening  Never done    Breast Cancer Screening: Mammogram  10/06/2022    Colorectal Cancer Screening  05/08/2029         Topic Date Due    Pneumococcal Vaccine: 65+ Years (1 - PCV) Never done    COVID-19 Vaccine (3 - Booster for Moderna series) 07/22/2021    Influenza Vaccine (1) 09/01/2022      Medicare Screening Tests and Risk Assessments:         Health Risk Assessment:   Patient rates overall health as good  Patient feels that their physical health rating is same  Patient is satisfied with their life  Eyesight was rated as same  Hearing was rated as slightly worse  Patient feels that their emotional and mental health rating is much better  Patients states they are never, rarely angry  Patient states they are never, rarely unusually tired/fatigued  Pain experienced in the last 7 days has been none  Patient states that she has experienced no weight loss or gain in last 6 months  Depression Screening:   PHQ-2 Score: 0      Fall Risk Screening: In the past year, patient has experienced: history of falling in past year    Number of falls: 1  Injured during fall?: No    Feels unsteady when standing or walking?: No    Worried about falling?: No      Urinary Incontinence Screening:   Patient has leaked urine accidently in the last six months       Home Safety:  Patient does not have trouble with stairs inside or outside of their home  Patient has working smoke alarms and has working carbon monoxide detector  Home safety hazards include: none  Nutrition:   Current diet is Regular  Medications:   Patient is currently taking over-the-counter supplements  OTC medications include: see medication list  Patient is able to manage medications  Activities of Daily Living (ADLs)/Instrumental Activities of Daily Living (IADLs):   Walk and transfer into and out of bed and chair?: Yes  Dress and groom yourself?: Yes    Bathe or shower yourself?: Yes    Feed yourself? Yes  Do your laundry/housekeeping?: Yes  Manage your money, pay your bills and track your expenses?: Yes  Make your own meals?: Yes    Do your own shopping?: Yes    Previous Hospitalizations:   Any hospitalizations or ED visits within the last 12 months?: No      Advance Care Planning:   Living will: No      PREVENTIVE SCREENINGS      Cardiovascular Screening:    General: History Lipid Disorder and Screening Current      Diabetes Screening:     General: History Diabetes and Screening Current      Colorectal Cancer Screening:     General: Screening Current      Breast Cancer Screening:     General: Screening Current      Cervical Cancer Screening:    General: Screening Not Indicated      Lung Cancer Screening:     General: Screening Not Indicated    Screening, Brief Intervention, and Referral to Treatment (SBIRT)    Screening    Typical number of drinks in a week: 0    Single Item Drug Screening:  How often have you used an illegal drug (including marijuana) or a prescription medication for non-medical reasons in the past year? never    Single Item Drug Screen Score: 0  Interpretation: Negative screen for possible drug use disorder    No exam data present     Physical Exam:     Pulse 80   Temp 97 5 °F (36 4 °C)   Resp 18   Ht 5' (1 524 m)   Wt 72 3 kg (159 lb 6 oz)   SpO2 95%   BMI 31 13 kg/m²     Physical Exam  Vitals and nursing note reviewed     Constitutional: General: She is not in acute distress  Appearance: She is well-developed  HENT:      Head: Normocephalic and atraumatic  Eyes:      Extraocular Movements: Extraocular movements intact  Conjunctiva/sclera: Conjunctivae normal    Cardiovascular:      Rate and Rhythm: Normal rate and regular rhythm  Heart sounds: No murmur heard  Pulmonary:      Effort: Pulmonary effort is normal  No respiratory distress  Breath sounds: Normal breath sounds  No wheezing, rhonchi or rales  Comments: Harsh cough  Musculoskeletal:      Cervical back: Neck supple  Right lower leg: No edema  Left lower leg: No edema  Skin:     General: Skin is warm and dry  Neurological:      General: No focal deficit present  Mental Status: She is alert and oriented to person, place, and time  Mental status is at baseline  Cranial Nerves: No cranial nerve deficit  Psychiatric:         Mood and Affect: Mood normal          Behavior: Behavior normal          Thought Content:  Thought content normal          Judgment: Judgment normal           Fam Hermosillo MD

## 2022-10-22 ENCOUNTER — IMMUNIZATIONS (OUTPATIENT)
Dept: FAMILY MEDICINE CLINIC | Facility: CLINIC | Age: 72
End: 2022-10-22
Payer: MEDICARE

## 2022-10-22 DIAGNOSIS — Z23 ENCOUNTER FOR IMMUNIZATION: Primary | ICD-10-CM

## 2022-10-22 PROCEDURE — G0008 ADMIN INFLUENZA VIRUS VAC: HCPCS

## 2022-10-22 PROCEDURE — 90662 IIV NO PRSV INCREASED AG IM: CPT

## 2022-11-18 ENCOUNTER — OFFICE VISIT (OUTPATIENT)
Dept: ENDOCRINOLOGY | Facility: CLINIC | Age: 72
End: 2022-11-18

## 2022-11-18 VITALS
HEIGHT: 60 IN | SYSTOLIC BLOOD PRESSURE: 126 MMHG | WEIGHT: 159 LBS | OXYGEN SATURATION: 97 % | BODY MASS INDEX: 31.22 KG/M2 | HEART RATE: 84 BPM | DIASTOLIC BLOOD PRESSURE: 80 MMHG

## 2022-11-18 DIAGNOSIS — K52.9 COLITIS: ICD-10-CM

## 2022-11-18 DIAGNOSIS — I10 HYPERTENSION, UNSPECIFIED TYPE: ICD-10-CM

## 2022-11-18 DIAGNOSIS — Z79.4 TYPE 2 DIABETES MELLITUS WITH HYPOGLYCEMIA WITHOUT COMA, WITH LONG-TERM CURRENT USE OF INSULIN (HCC): ICD-10-CM

## 2022-11-18 DIAGNOSIS — E11.65 TYPE 2 DIABETES MELLITUS WITH HYPERGLYCEMIA, WITH LONG-TERM CURRENT USE OF INSULIN (HCC): Primary | ICD-10-CM

## 2022-11-18 DIAGNOSIS — E11.649 TYPE 2 DIABETES MELLITUS WITH HYPOGLYCEMIA WITHOUT COMA, WITH LONG-TERM CURRENT USE OF INSULIN (HCC): ICD-10-CM

## 2022-11-18 DIAGNOSIS — E78.5 HYPERLIPIDEMIA, UNSPECIFIED HYPERLIPIDEMIA TYPE: ICD-10-CM

## 2022-11-18 DIAGNOSIS — Z79.4 TYPE 2 DIABETES MELLITUS WITH HYPERGLYCEMIA, WITH LONG-TERM CURRENT USE OF INSULIN (HCC): Primary | ICD-10-CM

## 2022-11-18 LAB — SL AMB POCT HEMOGLOBIN AIC: 8.5 (ref ?–6.5)

## 2022-11-18 RX ORDER — EZETIMIBE 10 MG/1
5 TABLET ORAL DAILY
Qty: 45 TABLET | Refills: 1 | Status: SHIPPED | OUTPATIENT
Start: 2022-11-18 | End: 2023-05-17

## 2022-11-18 RX ORDER — INSULIN GLARGINE 100 [IU]/ML
25 INJECTION, SOLUTION SUBCUTANEOUS
Qty: 45 ML | Refills: 1 | Status: SHIPPED | OUTPATIENT
Start: 2022-11-18

## 2022-11-18 RX ORDER — AMLODIPINE BESYLATE 2.5 MG/1
2.5 TABLET ORAL
Qty: 90 TABLET | Refills: 1 | Status: SHIPPED | OUTPATIENT
Start: 2022-11-18

## 2022-11-18 RX ORDER — INSULIN ASPART 100 [IU]/ML
6 INJECTION, SOLUTION INTRAVENOUS; SUBCUTANEOUS
Qty: 45 ML | Refills: 1 | Status: SHIPPED | OUTPATIENT
Start: 2022-11-18 | End: 2023-02-16

## 2022-11-18 RX ORDER — SIMVASTATIN 20 MG
20 TABLET ORAL DAILY
Qty: 90 TABLET | Refills: 1 | Status: SHIPPED | OUTPATIENT
Start: 2022-11-18

## 2022-11-18 RX ORDER — LOSARTAN POTASSIUM 25 MG/1
50 TABLET ORAL 2 TIMES DAILY
Qty: 180 TABLET | Refills: 1 | Status: SHIPPED | OUTPATIENT
Start: 2022-11-18

## 2022-11-18 NOTE — PROGRESS NOTES
Becki Roman 71 y o  female MRN: [id number]    Encounter: [id number]      Assessment/Plan     Problem List Items Addressed This Visit        Digestive    Colitis     Suggest f/u with GI            Endocrine    Type 2 diabetes mellitus with hyperglycemia, with long-term current use of insulin ([facility] ) - Primary       Lab Results   Component Value Date    HGBA1C 8 5 (A) 11/18/2022   diabetes is poorly controlled - discussed GLP-1 agonist however given ongoing GI issues suggest f/u with GI and will discuss again next visit         Relevant Medications    Insulin Glargine Solostar (Lantus SoloStar) 100 UNIT/ML SOPN    insulin aspart (NovoLOG FlexPen) 100 UNIT/ML injection pen    metFORMIN (GLUCOPHAGE) 500 mg tablet    Other Relevant Orders    POCT hemoglobin A1c (Completed)    Comprehensive metabolic panel Lab Collect    HEMOGLOBIN A1C W/ EAG ESTIMATION Lab Collect    Type 2 diabetes mellitus with hypoglycemia without coma, with long-term current use of insulin ([facility])    Relevant Medications    Insulin Glargine Solostar (Lantus SoloStar) 100 UNIT/ML SOPN    insulin aspart (NovoLOG FlexPen) 100 UNIT/ML injection pen    metFORMIN (GLUCOPHAGE) 500 mg tablet       Cardiovascular and Mediastinum    Hypertension     BP at goal, continue current meds          Relevant Medications    losartan (COZAAR) 25 mg tablet    amLODIPine (NORVASC) 2 5 mg tablet    simvastatin (ZOCOR) 20 mg tablet    Other Relevant Orders    Microalbumin / creatinine urine ratio Lab Collect       Other    Hyperlipidemia     Continue statins          Relevant Medications    simvastatin (ZOCOR) 20 mg tablet    ezetimibe (ZETIA) 10 mg tablet    Other Relevant Orders    Lipid Panel with Direct LDL reflex Lab Collect       CC: Diabetes    History of Present Illness     HPI:  70 y/o female with type 2 DM on insulin therapy seen in f/u   Current regimen:   Metformin 1000mg twice per day   Lantus 25 units daily  10 pm   Humalog 6 units before meals  if f s less than gives 1/2 dose       Had dexcom and lost it -     Checks f s 3/day  F s 80-240s   Hypoglycemia if more physically active     Has not seen nutritionist in a long time   Was on prednisone a month back  No polyuria , polydipsia , no blurry vision , no numbness and tingling in feet   Weight stable     UC - in remission for 10 years however now having bouts of diarrhea , abdominal pain     Review of Systems    Historical Information   Past Medical History:   Diagnosis Date   • Anxiety    • Anxiety    • Arthritis of right knee    • Cellulitis    • Contact dermatitis    • Cough    • Diabetes mellitus ([address] )    • GERD without esophagitis    • Headache    • Hiatal hernia    • Hyperlipidemia    • Knee sprain    • Lyme disease    • Tick bite      Past Surgical History:   Procedure Laterality Date   • ABDOMINAL SURGERY      tummy nancyck   • CATARACT EXTRACTION, BILATERAL Bilateral  and    • HYSTERECTOMY      age 51   • WA COLONOSCOPY FLX DX W/COLLJ SPEC WHEN PFRMD N/A 2019    Procedure: COLONOSCOPY;  Surgeon: Shital Lynch MD;  [facility]; Service: Gastroenterology   • WA ESOPHAGOGASTRODUODENOSCOPY TRANSORAL DIAGNOSTIC N/A 2019    Procedure: ESOPHAGOGASTRODUODENOSCOPY (EGD); Surgeon: Shital Lynch MD;  [facility];   Service: Gastroenterology   • TUBAL LIGATION       Social History   Social History     Substance and Sexual Activity   Alcohol Use Not Currently    Comment: seldom     Social History     Substance and Sexual Activity   Drug Use Yes   • Types: Marijuana    Comment: smokes weed 3x per week     Social History     Tobacco Use   Smoking Status Former   • Packs/day: 0 50   • Years: 25 00   • Pack years: 12 50   • Types: Cigarettes   • Quit date: 2008   • Years since quittin 3   Smokeless Tobacco Never     Family History:   Family History   Problem Relation Age of Onset   • Mental illness Mother    • Heart disease Father    • Colon cancer Father 67   • No Known Problems Sister    • No Known Problems Daughter    • No Known Problems Maternal Grandmother    • No Known Problems Maternal Grandfather    • No Known Problems Paternal Grandmother    • No Known Problems Paternal Grandfather    • No Known Problems Son    • No Known Problems Sister    • No Known Problems Sister    • No Known Problems Sister    • No Known Problems Sister    • No Known Problems Sister    • Lymphoma Brother 45   • No Known Problems Brother    • No Known Problems Paternal Aunt    • No Known Problems Paternal Aunt    • No Known Problems Paternal Aunt    • No Known Problems Paternal Aunt    • Breast cancer Neg Hx    • Breast cancer additional onset Neg Hx    • Endometrial cancer Neg Hx    • Ovarian cancer Neg Hx    • BRCA 1/2 Neg Hx    • BRCA1 Negative Neg Hx    • BRCA1 Positive Neg Hx    • BRCA2 Negative Neg Hx    • BRCA2 Positive Neg Hx        Meds/Allergies   Current Outpatient Medications   Medication Sig Dispense Refill   • amLODIPine (NORVASC) 2 5 mg tablet Take 1 tablet (2 5 mg total) by mouth daily at bedtime 90 tablet 1   • artificial tear (LUBRIFRESH P M ) 83-15 % ophthalmic ointment daily at bedtime     • aspirin 81 mg chewable tablet Chew 81 mg daily     • baclofen 10 mg tablet Take 10 mg by mouth daily at bedtime  4   • Breo Ellipta 200-25 MCG/INH inhaler INHALE ONE INHALATION BY MOUTH EVERY DAY - (RINSE MOUTH AND SPIT AFTER USE, DISCARD SIX WEEKS AFTER REMOVAL FROM FOIL POUCH) 2 each 1   • Cholecalciferol (VITAMIN D PO) Take 1 tablet by mouth daily     • Diclofenac Sodium (VOLTAREN) 1 % Apply 1 application topically 4 (four) times a day     • ezetimibe (ZETIA) 10 mg tablet Take 0 5 tablets (5 mg total) by mouth daily 45 tablet 1   • Ferrous Sulfate Dried (FERROUS SULFATE CR PO) Iron TABS    Refills: 0       Active     • glucose blood (Contour Next Test) test strip Use 1 each 4 (four) times a day 400 each 1   • hydrochlorothiazide (HYDRODIURIL) 12 5 mg tablet Take 1 tablet (12 5 mg total) by mouth daily 90 tablet 0   • hydroxychloroquine (PLAQUENIL) 200 mg tablet Take 200 mg by mouth 2 (two) times a day with meals     • insulin aspart (NovoLOG FlexPen) 100 UNIT/ML injection pen Inject 6 Units under the skin 3 (three) times a day with meals 45 mL 1   • Insulin Glargine Solostar (Lantus SoloStar) 100 UNIT/ML SOPN Inject 0 25 mL (25 Units total) under the skin daily at bedtime 45 mL 1   • Insulin Pen Needle (BD Pen Needle Ester U/F) 32G X 4 MM MISC Inject under the skin 4 (four) times a day 400 each 1   • Lifitegrast (XIIDRA OP) Apply to eye 2 vials a day       • losartan (COZAAR) 25 mg tablet Take 2 tablets (50 mg total) by mouth 2 (two) times a day 180 tablet 1   • metFORMIN (GLUCOPHAGE) 500 mg tablet Take 2 tablets (1,000 mg total) by mouth 2 (two) times a day with meals 360 tablet 1   • Microlet Lancets MISC Use 4 (four) times a day 400 each 1   • sertraline (ZOLOFT) 50 mg tablet TAKE ONE TABLET BY MOUTH EVERY DAY 90 tablet 3   • simvastatin (ZOCOR) 20 mg tablet Take 1 tablet (20 mg total) by mouth daily 90 tablet 1   • valACYclovir (VALTREX) 1,000 mg tablet Take 1,000 mg by mouth daily     • benzonatate (TESSALON) 200 MG capsule Take 1 capsule (200 mg total) by mouth 3 (three) times a day as needed for cough (Patient not taking: Reported on 4/26/2021) 20 capsule 0   • predniSONE 20 mg tablet One po q day (Patient not taking: Reported on 11/18/2022) 5 tablet 0     No current facility-administered medications for this visit  Allergies   Allergen Reactions   • Augmentin [Amoxicillin-Pot Clavulanate]    • Codeine Drowsiness   • Macrobid [Nitrofurantoin] Other (See Comments)     Pt does not recall       Objective   Vitals: Blood pressure 126/80, pulse 84, height 5' (1 524 m), weight 72 1 kg (159 lb), SpO2 97 %  Physical Exam  Vitals reviewed  Constitutional:       General: She is not in acute distress  Appearance: Normal appearance  She is obese   She is not ill-appearing, toxic-appearing or diaphoretic  HENT:      Head: Normocephalic and atraumatic  Eyes:      General: No scleral icterus  Extraocular Movements: Extraocular movements intact  Cardiovascular:      Rate and Rhythm: Normal rate and regular rhythm  Heart sounds: Normal heart sounds  No murmur heard  Pulmonary:      Effort: Pulmonary effort is normal  No respiratory distress  Breath sounds: Normal breath sounds  No wheezing or rales  Abdominal:      General: There is no distension  Palpations: Abdomen is soft  Tenderness: There is no abdominal tenderness  There is no guarding  Musculoskeletal:      Cervical back: Neck supple  Right lower leg: No edema  Left lower leg: No edema  Lymphadenopathy:      Cervical: No cervical adenopathy  Skin:     General: Skin is warm and dry  Neurological:      General: No focal deficit present  Mental Status: She is alert and oriented to person, place, and time  Psychiatric:         Mood and Affect: Mood normal          Behavior: Behavior normal          Thought Content: Thought content normal          Judgment: Judgment normal          The history was obtained from the review of the chart, patient  Lab Results:   Lab Results   Component Value Date/Time    Hemoglobin A1C 8 5 (A) 11/18/2022 10:10 AM    Hemoglobin A1C 8 4 (A) 08/18/2022 10:41 AM    Hemoglobin A1C 8 7 (H) 04/30/2022 08:05 AM    Hemoglobin A1C 8 8 (H) 01/13/2022 08:04 AM    BUN 11 04/30/2022 08:05 AM    Potassium 3 9 04/30/2022 08:05 AM    Chloride 104 04/30/2022 08:05 AM    CO2 31 04/30/2022 08:05 AM    Creatinine 0 54 (L) 04/30/2022 08:05 AM    HDL 85 01/13/2022 08:04 AM    Triglycerides 83 01/13/2022 08:04 AM           Portions of the record may have been created with voice recognition software  Occasional wrong word or "sound a like" substitutions may have occurred due to the inherent limitations of voice recognition software   Read the chart carefully and recognize, using context, where substitutions have occurred  1 person assist/verbal cues/nonverbal cues (demo/gestures)

## 2022-11-19 NOTE — ASSESSMENT & PLAN NOTE
Lab Results   Component Value Date    HGBA1C 8 5 (A) 11/18/2022   diabetes is poorly controlled - discussed GLP-1 agonist however given ongoing GI issues suggest f/u with GI and will discuss again next visit

## 2022-12-05 ENCOUNTER — TELEPHONE (OUTPATIENT)
Dept: ENDOCRINOLOGY | Facility: CLINIC | Age: 72
End: 2022-12-05

## 2022-12-07 ENCOUNTER — OFFICE VISIT (OUTPATIENT)
Dept: FAMILY MEDICINE CLINIC | Facility: CLINIC | Age: 72
End: 2022-12-07

## 2022-12-07 VITALS
WEIGHT: 150.38 LBS | OXYGEN SATURATION: 95 % | HEIGHT: 60 IN | SYSTOLIC BLOOD PRESSURE: 130 MMHG | TEMPERATURE: 97.5 F | BODY MASS INDEX: 29.52 KG/M2 | DIASTOLIC BLOOD PRESSURE: 74 MMHG | HEART RATE: 73 BPM | RESPIRATION RATE: 16 BRPM

## 2022-12-07 DIAGNOSIS — J06.9 UPPER RESPIRATORY TRACT INFECTION, UNSPECIFIED TYPE: Primary | ICD-10-CM

## 2022-12-07 RX ORDER — SULFAMETHOXAZOLE AND TRIMETHOPRIM 800; 160 MG/1; MG/1
1 TABLET ORAL 2 TIMES DAILY
Qty: 20 TABLET | Refills: 0 | Status: SHIPPED | OUTPATIENT
Start: 2022-12-07 | End: 2022-12-17

## 2022-12-07 RX ORDER — PREDNISONE 20 MG/1
TABLET ORAL
Qty: 10 TABLET | Refills: 0 | Status: SHIPPED | OUTPATIENT
Start: 2022-12-07

## 2022-12-07 RX ORDER — ALBUTEROL SULFATE 2.5 MG/3ML
2.5 SOLUTION RESPIRATORY (INHALATION) EVERY 6 HOURS PRN
Qty: 75 ML | Refills: 2 | Status: SHIPPED | OUTPATIENT
Start: 2022-12-07

## 2022-12-07 NOTE — PROGRESS NOTES
FAMILY PRACTICE OFFICE VISIT       NAME: Becki Roman  AGE: 71 y o  SEX: female       : 1950        MRN: [id number]    DATE: 2022  TIME: 12:04 PM    Assessment and Plan     Problem List Items Addressed This Visit        Respiratory    Upper respiratory tract infection - Primary     Upper respiratory infection  Patient given prescription for prednisone to take 40 mg daily for 5 days  She will take Bactrim DS 1 p o  twice daily x10 days  She was given a refill on her albuterol nebulizer solution to use every 6 hours as needed  If symptoms persist without improvement by next Monday she will call the office whereby we may need chest x-ray  If she feels any worsening of symptoms at any time she was instructed to be seen at an emergency room         Relevant Medications    sulfamethoxazole-trimethoprim (BACTRIM DS) 800-160 mg per tablet    predniSONE 20 mg tablet    albuterol (2 5 mg/3 mL) 0 083 % nebulizer solution           Chief Complaint     Chief Complaint   Patient presents with   • CHEST CONGESTION    • Cough   • Headache   • Diarrhea   • Generalized Body Aches       History of Present Illness     Patient in the office with a 5-day history of congestion, coughing, fatigue  She denies any documented fevers  She states her grandson was sick with similar symptoms 1 week prior to her coming down with symptoms  Yelitza Marroquin was checked for flu and COVID and was negative  Patient herself is up-to-date with influenza and COVID vaccination      Review of Systems   Review of Systems   Constitutional: Positive for fatigue  Negative for fever  HENT: Positive for congestion  Respiratory: Positive for cough, chest tightness and wheezing  Gastrointestinal: Positive for diarrhea  Neurological: Positive for headaches         Active Problem List     Patient Active Problem List   Diagnosis   • Allergic rhinitis   • Asthma   • Generalized anxiety disorder   • Stress incontinence, female   • Lumbar radiculopathy   • Type 2 diabetes mellitus with hyperglycemia, with long-term current use of insulin ([facility])   • Colitis   • Chronic fatigue   • Gastroesophageal reflux disease without esophagitis   • Hiatal hernia   • Rheumatoid arthritis of multiple sites with negative rheumatoid factor ([facility])   • Ear itch   • Sensorineural hearing loss (SNHL) of left ear with restricted hearing of right ear   • Dry mouth   • Hypertension   • Hyperlipidemia   • Type 2 diabetes mellitus with hypoglycemia without coma, with long-term current use of insulin ([facility] [address] )   • Preoperative evaluation of a medical condition to rule out surgical contraindications (TAR required)   • Continuous opioid dependence ([address] )   • Upper respiratory tract infection       Past Medical History:  Past Medical History:   Diagnosis Date   • Anxiety    • Anxiety    • Arthritis of right knee    • Cellulitis    • Contact dermatitis    • Cough    • Diabetes mellitus ([facility] [address] )    • GERD without esophagitis    • Headache    • Hiatal hernia    • Hyperlipidemia    • Knee sprain    • Lyme disease    • Tick bite        Past Surgical History:  Past Surgical History:   Procedure Laterality Date   • ABDOMINAL SURGERY      tummy nancyck   • CATARACT EXTRACTION, BILATERAL Bilateral 11/21 and 12/21   • HYSTERECTOMY      age 51   • MO COLONOSCOPY FLX DX W/COLLJ SPEC WHEN PFRMD N/A 5/8/2019    Procedure: COLONOSCOPY;  Surgeon: Luci Saenz MD;  Location: [facility]; Service: Gastroenterology   • MO ESOPHAGOGASTRODUODENOSCOPY TRANSORAL DIAGNOSTIC N/A 5/8/2019    Procedure: ESOPHAGOGASTRODUODENOSCOPY (EGD); Surgeon: Luci Saenz MD;  Location: [facility];   Service: Gastroenterology   • TUBAL LIGATION         Family History:  Family History   Problem Relation Age of Onset   • Mental illness Mother    • Heart disease Father    • Colon cancer Father 67   • No Known Problems Sister    • No Known Problems Daughter    • No Known Problems Maternal Grandmother    • No Known Problems Maternal Grandfather    • No Known Problems Paternal Grandmother    • No Known Problems Paternal Grandfather    • No Known Problems Son    • No Known Problems Sister    • No Known Problems Sister    • No Known Problems Sister    • No Known Problems Sister    • No Known Problems Sister    • Lymphoma Brother 45   • No Known Problems Brother    • No Known Problems Paternal Aunt    • No Known Problems Paternal Aunt    • No Known Problems Paternal Aunt    • No Known Problems Paternal Aunt    • Breast cancer Neg Hx    • Breast cancer additional onset Neg Hx    • Endometrial cancer Neg Hx    • Ovarian cancer Neg Hx    • BRCA 1/2 Neg Hx    • BRCA1 Negative Neg Hx    • BRCA1 Positive Neg Hx    • BRCA2 Negative Neg Hx    • BRCA2 Positive Neg Hx        Social History:  Social History     Socioeconomic History   • Marital status:      Spouse name: Not on file   • Number of children: Not on file   • Years of education: Not on file   • Highest education level: Not on file   Occupational History   • Not on file   Tobacco Use   • Smoking status: Former     Packs/day: 0 50     Years: 25 00     Pack years: 12 50     Types: Cigarettes     Quit date: 2008     Years since quittin 3   • Smokeless tobacco: Never   Vaping Use   • Vaping Use: Never used   Substance and Sexual Activity   • Alcohol use: Not Currently     Comment: seldom   • Drug use: Yes     Types: Marijuana     Comment: smokes weed 3x per week   • Sexual activity: Not Currently   Other Topics Concern   • Not on file   Social History Narrative   • Not on file     Social Determinants of Health     Financial Resource Strain: Low Risk    • Difficulty of Paying Living Expenses: Not very hard   Food Insecurity: Not on file   Transportation Needs: No Transportation Needs   • Lack of Transportation (Medical): No   • Lack of Transportation (Non-Medical):  No   Physical Activity: Not on file   Stress: Not on file   Social Connections: Not on file   Intimate Partner Violence: Not on file   Housing Stability: Not on file       Objective     Vitals:    12/07/22 1133   BP: 130/74   Pulse: 73   Resp: 16   Temp: 97 5 °F (36 4 °C)   SpO2: 95%     Wt Readings from Last 3 Encounters:   12/07/22 68 2 kg (150 lb 6 oz)   11/18/22 72 1 kg (159 lb)   09/22/22 72 3 kg (159 lb 6 oz)       Physical Exam  Constitutional:       General: She is not in acute distress  Appearance: Normal appearance  She is not ill-appearing  HENT:      Right Ear: Tympanic membrane, ear canal and external ear normal  There is no impacted cerumen  Left Ear: Tympanic membrane, ear canal and external ear normal  There is no impacted cerumen  Eyes:      General:         Right eye: No discharge  Left eye: No discharge  Extraocular Movements: Extraocular movements intact  Conjunctiva/sclera: Conjunctivae normal       Pupils: Pupils are equal, round, and reactive to light  Pulmonary:      Effort: Pulmonary effort is normal  No respiratory distress  Breath sounds: Wheezing and rales present  No rhonchi  Comments: Scattered bilateral wheezing and rales at bases  Lymphadenopathy:      Cervical: No cervical adenopathy  Neurological:      Mental Status: She is alert  Mental status is at baseline  Psychiatric:         Mood and Affect: Mood normal          Behavior: Behavior normal          Thought Content:  Thought content normal          Judgment: Judgment normal          Pertinent Laboratory/Diagnostic Studies:  Lab Results   Component Value Date    GLUCOSE 105 11/10/2015    BUN 11 04/30/2022    CREATININE 0 54 (L) 04/30/2022    CALCIUM 9 5 04/30/2022     11/10/2015    K 3 9 04/30/2022    CO2 31 04/30/2022     04/30/2022     No results found for: ALT, AST, GGT, ALKPHOS, BILITOT    Lab Results   Component Value Date    WBC 8 02 11/10/2015    HGB 11 1 (L) 11/10/2015    HCT 35 8 11/10/2015    MCV 75 (L) 11/10/2015     11/10/2015       Lab Results   Component Value Date    TSH 3 19 02/26/2018       No results found for: CHOL  Lab Results   Component Value Date    TRIG 83 01/13/2022     Lab Results   Component Value Date    HDL 85 01/13/2022     Lab Results   Component Value Date    LDLCALC 63 01/13/2022     Lab Results   Component Value Date    HGBA1C 8 5 (A) 11/18/2022       Results for orders placed or performed in visit on 11/18/22   POCT hemoglobin A1c   Result Value Ref Range    Hemoglobin A1C 8 5 (A) 6 5       No orders of the defined types were placed in this encounter        ALLERGIES:  Allergies   Allergen Reactions   • Augmentin [Amoxicillin-Pot Clavulanate]    • Codeine Drowsiness   • Macrobid [Nitrofurantoin] Other (See Comments)     Pt does not recall       Current Medications     Current Outpatient Medications   Medication Sig Dispense Refill   • albuterol (2 5 mg/3 mL) 0 083 % nebulizer solution Take 3 mL (2 5 mg total) by nebulization every 6 (six) hours as needed for wheezing or shortness of breath 75 mL 2   • amLODIPine (NORVASC) 2 5 mg tablet Take 1 tablet (2 5 mg total) by mouth daily at bedtime 90 tablet 1   • artificial tear (LUBRIFRESH P M ) 83-15 % ophthalmic ointment daily at bedtime     • aspirin 81 mg chewable tablet Chew 81 mg daily     • Breo Ellipta 200-25 MCG/INH inhaler INHALE ONE INHALATION BY MOUTH EVERY DAY - (RINSE MOUTH AND SPIT AFTER USE, DISCARD SIX WEEKS AFTER REMOVAL FROM FOIL POUCH) 2 each 1   • Cholecalciferol (VITAMIN D PO) Take 1 tablet by mouth daily     • Diclofenac Sodium (VOLTAREN) 1 % Apply 1 application topically 4 (four) times a day     • ezetimibe (ZETIA) 10 mg tablet Take 0 5 tablets (5 mg total) by mouth daily 45 tablet 1   • Ferrous Sulfate Dried (FERROUS SULFATE CR PO) Iron TABS    Refills: 0       Active     • glucose blood (Contour Next Test) test strip Use 1 each 4 (four) times a day 400 each 1   • hydrochlorothiazide (HYDRODIURIL) 12 5 mg tablet Take 1 tablet (12 5 mg total) by mouth daily 90 tablet 0   • hydroxychloroquine (PLAQUENIL) 200 mg tablet Take 200 mg by mouth 2 (two) times a day with meals     • insulin aspart (NovoLOG FlexPen) 100 UNIT/ML injection pen Inject 6 Units under the skin 3 (three) times a day with meals 45 mL 1   • Insulin Glargine Solostar (Lantus SoloStar) 100 UNIT/ML SOPN Inject 0 25 mL (25 Units total) under the skin daily at bedtime 45 mL 1   • Insulin Pen Needle (BD Pen Needle Ester U/F) 32G X 4 MM MISC Inject under the skin 4 (four) times a day 400 each 1   • Lifitegrast (XIIDRA OP) Apply to eye 2 vials a day       • losartan (COZAAR) 25 mg tablet Take 2 tablets (50 mg total) by mouth 2 (two) times a day 180 tablet 1   • metFORMIN (GLUCOPHAGE) 500 mg tablet Take 2 tablets (1,000 mg total) by mouth 2 (two) times a day with meals 360 tablet 1   • Microlet Lancets MISC Use 4 (four) times a day 400 each 1   • predniSONE 20 mg tablet 2 TABS PO Q DAY WITH FOOD 10 tablet 0   • sertraline (ZOLOFT) 50 mg tablet TAKE ONE TABLET BY MOUTH EVERY DAY 90 tablet 3   • simvastatin (ZOCOR) 20 mg tablet Take 1 tablet (20 mg total) by mouth daily 90 tablet 1   • sulfamethoxazole-trimethoprim (BACTRIM DS) 800-160 mg per tablet Take 1 tablet by mouth 2 (two) times a day for 10 days 20 tablet 0   • valACYclovir (VALTREX) 1,000 mg tablet Take 1,000 mg by mouth daily     • baclofen 10 mg tablet Take 10 mg by mouth daily at bedtime  4   • benzonatate (TESSALON) 200 MG capsule Take 1 capsule (200 mg total) by mouth 3 (three) times a day as needed for cough (Patient not taking: Reported on 4/26/2021) 20 capsule 0   • predniSONE 20 mg tablet One po q day (Patient not taking: Reported on 11/18/2022) 5 tablet 0     No current facility-administered medications for this visit           Health Maintenance     Health Maintenance   Topic Date Due   • Hepatitis C Screening  Never done   • Hepatitis B Vaccine (1 of 3 - 3-dose series) Never done   • Pneumococcal Vaccine: 65+ Years (1 - PCV) Never done   • Osteoporosis Screening Never done   • COVID-19 Vaccine (3 - Booster for Moderna series) 07/22/2021   • BMI: Followup Plan  07/15/2022   • Breast Cancer Screening: Mammogram  10/06/2022   • Diabetic Foot Exam  03/15/2023   • HEMOGLOBIN A1C  05/18/2023   • DM Eye Exam  08/23/2023   • Fall Risk  09/22/2023   • Depression Screening  09/22/2023   • Urinary Incontinence Screening  09/22/2023   • Medicare Annual Wellness Visit (AWV)  09/22/2023   • BMI: Adult  12/07/2023   • Colorectal Cancer Screening  05/08/2029   • Influenza Vaccine  Completed   • HIB Vaccine  Aged Out   • IPV Vaccine  Aged Out   • Hepatitis A Vaccine  Aged Out   • Meningococcal ACWY Vaccine  Aged Out   • HPV Vaccine  Aged Out     Immunization History   Administered Date(s) Administered   • COVID-19 MODERNA VACC 0 5 ML IM 01/25/2021, 02/22/2021   • INFLUENZA 10/22/2022   • Influenza Quadrivalent Preservative Free 3 years and older IM 11/08/2014   • Influenza Split High Dose Preservative Free IM 02/09/2018   • Influenza, high dose seasonal 0 7 mL 10/04/2018, 62/87/1874       Miryam Marquez MD    I spent 25 minutes with this patient of which greater than 50% spent counseling or reviewing chart

## 2022-12-07 NOTE — ASSESSMENT & PLAN NOTE
Upper respiratory infection  Patient given prescription for prednisone to take 40 mg daily for 5 days  She will take Bactrim DS 1 p o  twice daily x10 days  She was given a refill on her albuterol nebulizer solution to use every 6 hours as needed  If symptoms persist without improvement by next Monday she will call the office whereby we may need chest x-ray    If she feels any worsening of symptoms at any time she was instructed to be seen at an emergency room

## 2023-01-13 DIAGNOSIS — R06.9 RESPIRATORY ABNORMALITIES: ICD-10-CM

## 2023-02-08 ENCOUNTER — HOSPITAL ENCOUNTER (OUTPATIENT)
Dept: MAMMOGRAPHY | Facility: CLINIC | Age: 73
Discharge: HOME | End: 2023-02-08

## 2023-02-08 ENCOUNTER — HOSPITAL ENCOUNTER (OUTPATIENT)
Dept: ULTRASOUND IMAGING | Facility: CLINIC | Age: 73
Discharge: HOME | End: 2023-02-08

## 2023-02-08 VITALS — BODY MASS INDEX: 29.45 KG/M2 | WEIGHT: 150 LBS | HEIGHT: 60 IN

## 2023-02-08 DIAGNOSIS — N60.02 SOLITARY CYST OF LEFT BREAST: ICD-10-CM

## 2023-02-14 LAB
ALBUMIN SERPL-MCNC: 3.9 G/DL (ref 3.6–5.1)
ALBUMIN/CREAT UR: 9 MCG/MG CREAT
ALBUMIN/GLOB SERPL: 1.8 (CALC) (ref 1–2.5)
ALP SERPL-CCNC: 70 U/L (ref 37–153)
ALT SERPL-CCNC: 15 U/L (ref 6–29)
AST SERPL-CCNC: 16 U/L (ref 10–35)
BILIRUB SERPL-MCNC: 0.3 MG/DL (ref 0.2–1.2)
BUN SERPL-MCNC: 15 MG/DL (ref 7–25)
BUN/CREAT SERPL: ABNORMAL (CALC) (ref 6–22)
CALCIUM SERPL-MCNC: 9 MG/DL (ref 8.6–10.4)
CHLORIDE SERPL-SCNC: 105 MMOL/L (ref 98–110)
CHOLEST SERPL-MCNC: 160 MG/DL
CHOLEST/HDLC SERPL: 1.9 (CALC)
CO2 SERPL-SCNC: 27 MMOL/L (ref 20–32)
CREAT SERPL-MCNC: 0.62 MG/DL (ref 0.6–1)
CREAT UR-MCNC: 109 MG/DL (ref 20–275)
EST. AVERAGE GLUCOSE BLD GHB EST-MCNC: 212 MG/DL
EST. AVERAGE GLUCOSE BLD GHB EST-SCNC: 11.7 MMOL/L
GFR/BSA.PRED SERPLBLD CYS-BASED-ARV: 95 ML/MIN/1.73M2
GLOBULIN SER CALC-MCNC: 2.2 G/DL (CALC) (ref 1.9–3.7)
GLUCOSE SERPL-MCNC: 212 MG/DL (ref 65–99)
HBA1C MFR BLD: 9 % OF TOTAL HGB
HDLC SERPL-MCNC: 85 MG/DL
LDLC SERPL CALC-MCNC: 59 MG/DL (CALC)
MICROALBUMIN UR-MCNC: 1 MG/DL
NONHDLC SERPL-MCNC: 75 MG/DL (CALC)
POTASSIUM SERPL-SCNC: 4.2 MMOL/L (ref 3.5–5.3)
PROT SERPL-MCNC: 6.1 G/DL (ref 6.1–8.1)
SODIUM SERPL-SCNC: 140 MMOL/L (ref 135–146)
TRIGL SERPL-MCNC: 77 MG/DL

## 2023-02-15 ENCOUNTER — TELEPHONE (OUTPATIENT)
Dept: ENDOCRINOLOGY | Facility: CLINIC | Age: 73
End: 2023-02-15

## 2023-02-15 NOTE — TELEPHONE ENCOUNTER
----- Message from Angella Amaro PA-C sent at 2/14/2023  1:04 PM EST -----  A1C high at 9 0-- Check BG 3-4x per day and bring log to upcoming visit

## 2023-02-17 ENCOUNTER — APPOINTMENT (EMERGENCY)
Dept: RADIOLOGY | Facility: HOSPITAL | Age: 73
End: 2023-02-17

## 2023-02-17 ENCOUNTER — HOSPITAL ENCOUNTER (OUTPATIENT)
Facility: HOSPITAL | Age: 73
Setting detail: OBSERVATION
Discharge: HOME | End: 2023-02-18
Attending: EMERGENCY MEDICINE | Admitting: INTERNAL MEDICINE

## 2023-02-17 DIAGNOSIS — R94.31 ABNORMAL EKG: ICD-10-CM

## 2023-02-17 DIAGNOSIS — K57.92 ACUTE DIVERTICULITIS: ICD-10-CM

## 2023-02-17 DIAGNOSIS — R11.0 NAUSEA: ICD-10-CM

## 2023-02-17 DIAGNOSIS — M54.9 BACK PAIN: ICD-10-CM

## 2023-02-17 DIAGNOSIS — R10.9 ABDOMINAL PAIN: Primary | ICD-10-CM

## 2023-02-17 LAB
2HR DELTA HS TROPONIN: 1 NG/L
4HR DELTA HS TROPONIN: 1 NG/L
ALBUMIN SERPL BCP-MCNC: 3.1 G/DL (ref 3.5–5)
ALP SERPL-CCNC: 83 U/L (ref 46–116)
ALT SERPL W P-5'-P-CCNC: 20 U/L (ref 12–78)
ANION GAP SERPL CALCULATED.3IONS-SCNC: 8 MMOL/L (ref 4–13)
AST SERPL W P-5'-P-CCNC: 22 U/L (ref 5–45)
ATRIAL RATE: 74 BPM
BACTERIA UR QL AUTO: ABNORMAL /HPF
BASOPHILS # BLD AUTO: 0.04 THOUSANDS/ÂΜL (ref 0–0.1)
BASOPHILS NFR BLD AUTO: 0 % (ref 0–1)
BILIRUB SERPL-MCNC: 0.55 MG/DL (ref 0.2–1)
BILIRUB UR QL STRIP: NEGATIVE
BUN SERPL-MCNC: 16 MG/DL (ref 5–25)
CALCIUM ALBUM COR SERPL-MCNC: 9.6 MG/DL (ref 8.3–10.1)
CALCIUM SERPL-MCNC: 8.9 MG/DL (ref 8.3–10.1)
CARDIAC TROPONIN I PNL SERPL HS: 2 NG/L
CARDIAC TROPONIN I PNL SERPL HS: 3 NG/L
CARDIAC TROPONIN I PNL SERPL HS: 3 NG/L
CHLORIDE SERPL-SCNC: 105 MMOL/L (ref 96–108)
CLARITY UR: CLEAR
CO2 SERPL-SCNC: 26 MMOL/L (ref 21–32)
COLOR UR: YELLOW
CREAT SERPL-MCNC: 0.62 MG/DL (ref 0.6–1.3)
EOSINOPHIL # BLD AUTO: 0.09 THOUSAND/ÂΜL (ref 0–0.61)
EOSINOPHIL NFR BLD AUTO: 1 % (ref 0–6)
ERYTHROCYTE [DISTWIDTH] IN BLOOD BY AUTOMATED COUNT: 13 % (ref 11.6–15.1)
FLUAV RNA RESP QL NAA+PROBE: NEGATIVE
FLUBV RNA RESP QL NAA+PROBE: NEGATIVE
GFR SERPL CREATININE-BSD FRML MDRD: 91 ML/MIN/1.73SQ M
GLUCOSE SERPL-MCNC: 120 MG/DL (ref 65–140)
GLUCOSE SERPL-MCNC: 164 MG/DL (ref 65–140)
GLUCOSE SERPL-MCNC: 175 MG/DL (ref 65–140)
GLUCOSE UR STRIP-MCNC: ABNORMAL MG/DL
HCT VFR BLD AUTO: 41.3 % (ref 34.8–46.1)
HGB BLD-MCNC: 13.1 G/DL (ref 11.5–15.4)
HGB UR QL STRIP.AUTO: NEGATIVE
IMM GRANULOCYTES # BLD AUTO: 0.02 THOUSAND/UL (ref 0–0.2)
IMM GRANULOCYTES NFR BLD AUTO: 0 % (ref 0–2)
KETONES UR STRIP-MCNC: ABNORMAL MG/DL
LEUKOCYTE ESTERASE UR QL STRIP: NEGATIVE
LIPASE SERPL-CCNC: 34 U/L (ref 73–393)
LYMPHOCYTES # BLD AUTO: 1.1 THOUSANDS/ÂΜL (ref 0.6–4.47)
LYMPHOCYTES NFR BLD AUTO: 11 % (ref 14–44)
MCH RBC QN AUTO: 29.6 PG (ref 26.8–34.3)
MCHC RBC AUTO-ENTMCNC: 31.7 G/DL (ref 31.4–37.4)
MCV RBC AUTO: 93 FL (ref 82–98)
MONOCYTES # BLD AUTO: 0.43 THOUSAND/ÂΜL (ref 0.17–1.22)
MONOCYTES NFR BLD AUTO: 4 % (ref 4–12)
MUCOUS THREADS UR QL AUTO: ABNORMAL
NEUTROPHILS # BLD AUTO: 7.99 THOUSANDS/ÂΜL (ref 1.85–7.62)
NEUTS SEG NFR BLD AUTO: 84 % (ref 43–75)
NITRITE UR QL STRIP: NEGATIVE
NON-SQ EPI CELLS URNS QL MICRO: ABNORMAL /HPF
NRBC BLD AUTO-RTO: 0 /100 WBCS
P AXIS: 43 DEGREES
PH UR STRIP.AUTO: 6 [PH]
PLATELET # BLD AUTO: 241 THOUSANDS/UL (ref 149–390)
PMV BLD AUTO: 9.1 FL (ref 8.9–12.7)
POTASSIUM SERPL-SCNC: 4.2 MMOL/L (ref 3.5–5.3)
PR INTERVAL: 132 MS
PROT SERPL-MCNC: 6.9 G/DL (ref 6.4–8.4)
PROT UR STRIP-MCNC: ABNORMAL MG/DL
QRS AXIS: -19 DEGREES
QRSD INTERVAL: 64 MS
QT INTERVAL: 366 MS
QTC INTERVAL: 406 MS
RBC # BLD AUTO: 4.43 MILLION/UL (ref 3.81–5.12)
RBC #/AREA URNS AUTO: ABNORMAL /HPF
RSV RNA RESP QL NAA+PROBE: NEGATIVE
SARS-COV-2 RNA RESP QL NAA+PROBE: NEGATIVE
SODIUM SERPL-SCNC: 139 MMOL/L (ref 135–147)
SP GR UR STRIP.AUTO: 1.03 (ref 1–1.03)
T WAVE AXIS: -11 DEGREES
UROBILINOGEN UR STRIP-ACNC: <2 MG/DL
VENTRICULAR RATE: 74 BPM
WBC # BLD AUTO: 9.67 THOUSAND/UL (ref 4.31–10.16)
WBC #/AREA URNS AUTO: ABNORMAL /HPF

## 2023-02-17 RX ORDER — INSULIN LISPRO 100 [IU]/ML
1-5 INJECTION, SOLUTION INTRAVENOUS; SUBCUTANEOUS
Status: DISCONTINUED | OUTPATIENT
Start: 2023-02-18 | End: 2023-02-18 | Stop reason: HOSPADM

## 2023-02-17 RX ORDER — FLUCONAZOLE 200 MG/1
200 TABLET ORAL DAILY
Status: DISCONTINUED | OUTPATIENT
Start: 2023-02-18 | End: 2023-02-18 | Stop reason: HOSPADM

## 2023-02-17 RX ORDER — ASPIRIN 81 MG/1
81 TABLET, CHEWABLE ORAL DAILY
Status: DISCONTINUED | OUTPATIENT
Start: 2023-02-18 | End: 2023-02-18 | Stop reason: HOSPADM

## 2023-02-17 RX ORDER — INSULIN GLARGINE 100 [IU]/ML
20 INJECTION, SOLUTION SUBCUTANEOUS
Status: DISCONTINUED | OUTPATIENT
Start: 2023-02-17 | End: 2023-02-18 | Stop reason: HOSPADM

## 2023-02-17 RX ORDER — EZETIMIBE 10 MG/1
5 TABLET ORAL DAILY
Status: DISCONTINUED | OUTPATIENT
Start: 2023-02-18 | End: 2023-02-18 | Stop reason: HOSPADM

## 2023-02-17 RX ORDER — AMLODIPINE BESYLATE 2.5 MG/1
2.5 TABLET ORAL
Status: DISCONTINUED | OUTPATIENT
Start: 2023-02-17 | End: 2023-02-18 | Stop reason: HOSPADM

## 2023-02-17 RX ORDER — METRONIDAZOLE 500 MG/100ML
500 INJECTION, SOLUTION INTRAVENOUS EVERY 8 HOURS
Status: DISCONTINUED | OUTPATIENT
Start: 2023-02-17 | End: 2023-02-18 | Stop reason: HOSPADM

## 2023-02-17 RX ORDER — FLUTICASONE FUROATE AND VILANTEROL 200; 25 UG/1; UG/1
1 POWDER RESPIRATORY (INHALATION) DAILY
Status: DISCONTINUED | OUTPATIENT
Start: 2023-02-18 | End: 2023-02-18 | Stop reason: HOSPADM

## 2023-02-17 RX ORDER — HEPARIN SODIUM 5000 [USP'U]/ML
5000 INJECTION, SOLUTION INTRAVENOUS; SUBCUTANEOUS EVERY 8 HOURS SCHEDULED
Status: DISCONTINUED | OUTPATIENT
Start: 2023-02-17 | End: 2023-02-18 | Stop reason: HOSPADM

## 2023-02-17 RX ORDER — FLUCONAZOLE 200 MG/1
200 TABLET ORAL DAILY
Status: DISCONTINUED | OUTPATIENT
Start: 2023-02-18 | End: 2023-02-17

## 2023-02-17 RX ORDER — EPINEPHRINE 1 MG/ML
INJECTION, SOLUTION, CONCENTRATE INTRAVENOUS
Status: DISPENSED
Start: 2023-02-17 | End: 2023-02-18

## 2023-02-17 RX ORDER — INSULIN LISPRO 100 [IU]/ML
4 INJECTION, SOLUTION INTRAVENOUS; SUBCUTANEOUS
Status: DISCONTINUED | OUTPATIENT
Start: 2023-02-18 | End: 2023-02-18 | Stop reason: HOSPADM

## 2023-02-17 RX ORDER — ONDANSETRON 2 MG/ML
4 INJECTION INTRAMUSCULAR; INTRAVENOUS ONCE
Status: COMPLETED | OUTPATIENT
Start: 2023-02-17 | End: 2023-02-17

## 2023-02-17 RX ORDER — LOSARTAN POTASSIUM 50 MG/1
50 TABLET ORAL 2 TIMES DAILY
Status: DISCONTINUED | OUTPATIENT
Start: 2023-02-17 | End: 2023-02-18 | Stop reason: HOSPADM

## 2023-02-17 RX ORDER — HYDROCHLOROTHIAZIDE 12.5 MG/1
12.5 TABLET ORAL DAILY
Status: DISCONTINUED | OUTPATIENT
Start: 2023-02-18 | End: 2023-02-18 | Stop reason: HOSPADM

## 2023-02-17 RX ORDER — PRAVASTATIN SODIUM 40 MG
40 TABLET ORAL
Status: DISCONTINUED | OUTPATIENT
Start: 2023-02-17 | End: 2023-02-18 | Stop reason: HOSPADM

## 2023-02-17 RX ORDER — HYDROXYCHLOROQUINE SULFATE 200 MG/1
200 TABLET, FILM COATED ORAL 2 TIMES DAILY WITH MEALS
Status: DISCONTINUED | OUTPATIENT
Start: 2023-02-18 | End: 2023-02-18 | Stop reason: HOSPADM

## 2023-02-17 RX ADMIN — METRONIDAZOLE 500 MG: 500 INJECTION, SOLUTION INTRAVENOUS at 18:32

## 2023-02-17 RX ADMIN — IOHEXOL 90 ML: 350 INJECTION, SOLUTION INTRAVENOUS at 13:04

## 2023-02-17 RX ADMIN — LOSARTAN POTASSIUM 50 MG: 50 TABLET, FILM COATED ORAL at 22:33

## 2023-02-17 RX ADMIN — AMLODIPINE BESYLATE 2.5 MG: 2.5 TABLET ORAL at 22:33

## 2023-02-17 RX ADMIN — HEPARIN SODIUM 5000 UNITS: 5000 INJECTION INTRAVENOUS; SUBCUTANEOUS at 22:34

## 2023-02-17 RX ADMIN — PRAVASTATIN SODIUM 40 MG: 40 TABLET ORAL at 22:33

## 2023-02-17 RX ADMIN — CEFTRIAXONE SODIUM 2000 MG: 10 INJECTION, POWDER, FOR SOLUTION INTRAVENOUS at 17:40

## 2023-02-17 RX ADMIN — SODIUM CHLORIDE 1000 ML: 0.9 INJECTION, SOLUTION INTRAVENOUS at 12:21

## 2023-02-17 RX ADMIN — ONDANSETRON 4 MG: 2 INJECTION INTRAMUSCULAR; INTRAVENOUS at 15:28

## 2023-02-17 RX ADMIN — INSULIN GLARGINE 20 UNITS: 100 INJECTION, SOLUTION SUBCUTANEOUS at 22:32

## 2023-02-17 NOTE — ED PROVIDER NOTES
History  Chief Complaint   Patient presents with   • Abdominal Pain     Generalized weakness starting 5 days ago, lower abdominal pain starting 4 days ago  71 Y O F hx of htn  hld, DM presents for a week of lower abdominal discomfort radiating to the back, nausea , states that she feels worse everyday and that something is not right  She also complains of frontal headache, feels like her normal headache, gradual in onset  Has chronic urinary retention and overflow incontinence, which is getting worse  Denies fever, emesis, chest pain, sob, epigastric pain, burning when urinating  Does not endorse bowel incontinense  Prior to Admission Medications   Prescriptions Last Dose Informant Patient Reported? Taking?    Breo Ellipta 200-25 MCG/ACT inhaler   No No   Sig: INHALE ONE INHALATION BY MOUTH EVERY DAY - (RINSE MOUTH AND SPIT AFTER USE, DISCARD SIX WEEKS AFTER REMOVAL FROM FOIL POUCH)   Cholecalciferol (VITAMIN D PO)   Yes No   Sig: Take 1 tablet by mouth daily   Diclofenac Sodium (VOLTAREN) 1 %   Yes No   Sig: Apply 1 application topically 4 (four) times a day   Ferrous Sulfate Dried (FERROUS SULFATE CR PO)   Yes No   Sig: Iron TABS    Refills: 0       Active   Insulin Glargine Solostar (Lantus SoloStar) 100 UNIT/ML SOPN   No No   Sig: Inject 0 25 mL (25 Units total) under the skin daily at bedtime   Insulin Pen Needle (BD Pen Needle Ester U/F) 32G X 4 MM MISC   No No   Sig: Inject under the skin 4 (four) times a day   Lifitegrast (XIIDRA OP)   Yes No   Sig: Apply to eye 2 vials a day     Microlet Lancets MISC   No No   Sig: Use 4 (four) times a day   albuterol (2 5 mg/3 mL) 0 083 % nebulizer solution   No No   Sig: Take 3 mL (2 5 mg total) by nebulization every 6 (six) hours as needed for wheezing or shortness of breath   amLODIPine (NORVASC) 2 5 mg tablet   No No   Sig: Take 1 tablet (2 5 mg total) by mouth daily at bedtime   artificial tear (LUBRIFRESH P M ) 83-15 % ophthalmic ointment   Yes No   Sig: daily at bedtime   aspirin 81 mg chewable tablet   Yes No   Sig: Chew 81 mg daily   baclofen 10 mg tablet   Yes No   Sig: Take 10 mg by mouth daily at bedtime   benzonatate (TESSALON) 200 MG capsule   No No   Sig: Take 1 capsule (200 mg total) by mouth 3 (three) times a day as needed for cough   Patient not taking: Reported on 2021   ezetimibe (ZETIA) 10 mg tablet   No No   Sig: Take 0 5 tablets (5 mg total) by mouth daily   glucose blood (Contour Next Test) test strip   No No   Sig: Use 1 each 4 (four) times a day   hydrochlorothiazide (HYDRODIURIL) 12 5 mg tablet   No No   Sig: Take 1 tablet (12 5 mg total) by mouth daily   hydroxychloroquine (PLAQUENIL) 200 mg tablet   Yes No   Sig: Take 200 mg by mouth 2 (two) times a day with meals   insulin aspart (NovoLOG FlexPen) 100 UNIT/ML injection pen   No No   Sig: Inject 6 Units under the skin 3 (three) times a day with meals   losartan (COZAAR) 25 mg tablet   No No   Sig: Take 2 tablets (50 mg total) by mouth 2 (two) times a day   metFORMIN (GLUCOPHAGE) 500 mg tablet   No No   Sig: Take 2 tablets (1,000 mg total) by mouth 2 (two) times a day with meals   predniSONE 20 mg tablet   No No   Sig: One po q day   Patient not taking: Reported on 2022   predniSONE 20 mg tablet   No No   Si TABS PO Q DAY WITH FOOD   sertraline (ZOLOFT) 50 mg tablet   No No   Sig: TAKE ONE TABLET BY MOUTH EVERY DAY   simvastatin (ZOCOR) 20 mg tablet   No No   Sig: Take 1 tablet (20 mg total) by mouth daily   valACYclovir (VALTREX) 1,000 mg tablet   Yes No   Sig: Take 1,000 mg by mouth daily      Facility-Administered Medications: None       Past Medical History:   Diagnosis Date   • Anxiety    • Anxiety    • Arthritis of right knee    • Cellulitis    • Contact dermatitis    • Cough    • Diabetes mellitus (HCC)    • GERD without esophagitis    • Headache    • Hiatal hernia    • Hyperlipidemia    • Knee sprain    • Lyme disease    • Tick bite        Past Surgical History: Procedure Laterality Date   • ABDOMINAL SURGERY      tummy tuck   • CATARACT EXTRACTION, BILATERAL Bilateral 11/21 and 12/21   • HYSTERECTOMY      age 51   • AL COLONOSCOPY FLX DX W/COLLJ SPEC WHEN PFRMD N/A 5/8/2019    Procedure: COLONOSCOPY;  Surgeon: Rebecca Cano MD;  Location: [facility]; Service: Gastroenterology   • AL ESOPHAGOGASTRODUODENOSCOPY TRANSORAL DIAGNOSTIC N/A 5/8/2019    Procedure: ESOPHAGOGASTRODUODENOSCOPY (EGD); Surgeon: Rebecca Cano MD;  Location: [facility]; Service: Gastroenterology   • TUBAL LIGATION         Family History   Problem Relation Age of Onset   • Mental illness Mother    • Heart disease Father    • Colon cancer Father 67   • No Known Problems Sister    • No Known Problems Daughter    • No Known Problems Maternal Grandmother    • No Known Problems Maternal Grandfather    • No Known Problems Paternal Grandmother    • No Known Problems Paternal Grandfather    • No Known Problems Son    • No Known Problems Sister    • No Known Problems Sister    • No Known Problems Sister    • No Known Problems Sister    • No Known Problems Sister    • Lymphoma Brother 45   • No Known Problems Brother    • No Known Problems Paternal Aunt    • No Known Problems Paternal Aunt    • No Known Problems Paternal Aunt    • No Known Problems Paternal Aunt    • Breast cancer Neg Hx    • Breast cancer additional onset Neg Hx    • Endometrial cancer Neg Hx    • Ovarian cancer Neg Hx    • BRCA 1/2 Neg Hx    • BRCA1 Negative Neg Hx    • BRCA1 Positive Neg Hx    • BRCA2 Negative Neg Hx    • BRCA2 Positive Neg Hx      I have reviewed and agree with the history as documented      E-Cigarette/Vaping   • E-Cigarette Use Never User      E-Cigarette/Vaping Substances   • Nicotine No    • THC No    • CBD No    • Flavoring No    • Other No    • Unknown No      Social History     Tobacco Use   • Smoking status: Former     Packs/day: 0 50     Years: 25 00     Pack years: 12 50     Types: Cigarettes     Quit date: 2008     Years since quittin 5   • Smokeless tobacco: Never   Vaping Use   • Vaping Use: Never used   Substance Use Topics   • Alcohol use: Not Currently     Comment: seldom   • Drug use: Yes     Types: Marijuana     Comment: smokes weed 3x per week        Review of Systems   Constitutional: Negative for chills and fever  HENT: Negative for ear pain and sore throat  Eyes: Negative for pain and visual disturbance  Respiratory: Negative for cough and shortness of breath  Cardiovascular: Negative for chest pain and palpitations  Gastrointestinal: Positive for abdominal pain and nausea  Negative for vomiting  Genitourinary: Negative for dysuria and hematuria  Musculoskeletal: Negative for arthralgias and back pain  Skin: Negative for color change and rash  Neurological: Positive for headaches  Negative for seizures and syncope  All other systems reviewed and are negative  Physical Exam  ED Triage Vitals [23 1018]   Temperature Pulse Respirations Blood Pressure SpO2   (!) 97 °F (36 1 °C) 88 18 150/92 96 %      Temp Source Heart Rate Source Patient Position - Orthostatic VS BP Location FiO2 (%)   Tympanic Monitor Sitting Right arm --      Pain Score       5             Orthostatic Vital Signs  Vitals:    23 0846 23 0847 23 0900 23 0931   BP: 145/78 137/79 137/79 137/79   Pulse: 74 84 68 68   Patient Position - Orthostatic VS:           Physical Exam  Vitals and nursing note reviewed  Constitutional:       General: She is not in acute distress  Appearance: She is well-developed  She is not ill-appearing, toxic-appearing or diaphoretic  HENT:      Head: Normocephalic and atraumatic  Eyes:      Conjunctiva/sclera: Conjunctivae normal    Cardiovascular:      Rate and Rhythm: Normal rate and regular rhythm  Heart sounds: No murmur heard  No friction rub  No gallop  Pulmonary:      Effort: Pulmonary effort is normal  No respiratory distress  Breath sounds: Normal breath sounds  No wheezing, rhonchi or rales  Abdominal:      Palpations: Abdomen is soft  Tenderness: There is abdominal tenderness in the suprapubic area  There is no guarding or rebound  Negative signs include Dunn's sign and McBurney's sign  Genitourinary:     Adnexa:         Right: No mass  Left: No mass  Musculoskeletal:         General: No swelling  Cervical back: Neck supple  Skin:     General: Skin is warm and dry  Capillary Refill: Capillary refill takes less than 2 seconds  Coloration: Skin is not cyanotic or jaundiced  Neurological:      General: No focal deficit present  Mental Status: She is alert and oriented to person, place, and time     Psychiatric:         Mood and Affect: Mood normal          Behavior: Behavior normal          ED Medications  Medications   EPINEPHrine PF (ADRENALIN) 1 mg/mL injection **ADS Override Pull** (  Not Given 2/17/23 2330)   sodium chloride 0 9 % bolus 1,000 mL (0 mL Intravenous Stopped 2/17/23 1421)   iohexol (OMNIPAQUE) 350 MG/ML injection (SINGLE-DOSE) 100 mL (90 mL Intravenous Given 2/17/23 1304)   ondansetron (ZOFRAN) injection 4 mg (4 mg Intravenous Given 2/17/23 1528)   cefTRIAXone (ROCEPHIN) 2,000 mg in dextrose 5 % 50 mL IVPB (0 mg Intravenous Stopped 2/17/23 1810)       Diagnostic Studies  Results Reviewed     Procedure Component Value Units Date/Time    Basic metabolic panel [[id number]]  (Abnormal) Collected: 02/18/23 0453    Lab Status: Final result Specimen: Blood from Arm, Right Updated: 02/18/23 0657     Sodium 139 mmol/L      Potassium 3 7 mmol/L      Chloride 107 mmol/L      CO2 24 mmol/L      ANION GAP 8 mmol/L      BUN 13 mg/dL      Creatinine 0 57 mg/dL      Glucose 183 mg/dL      Calcium 8 8 mg/dL      eGFR 93 ml/min/1 73sq m     Narrative:      [facility] guidelines for Chronic Kidney Disease (CKD):   •  Stage 1 with normal or high GFR (GFR > 90 mL/min/1 73 square meters)  •  Stage 2 Mild CKD (GFR = 60-89 mL/min/1 73 square meters)  •  Stage 3A Moderate CKD (GFR = 45-59 mL/min/1 73 square meters)  •  Stage 3B Moderate CKD (GFR = 30-44 mL/min/1 73 square meters)  •  Stage 4 Severe CKD (GFR = 15-29 mL/min/1 73 square meters)  •  Stage 5 End Stage CKD (GFR <15 mL/min/1 73 square meters)  Note: GFR calculation is accurate only with a steady state creatinine    Magnesium [[id number]]  (Normal) Collected: 02/18/23 0453    Lab Status: Final result Specimen: Blood from Arm, Right Updated: 02/18/23 0657     Magnesium 1 9 mg/dL     CBC and differential [[id number]]  (Abnormal) Collected: 02/18/23 0453    Lab Status: Final result Specimen: Blood from Arm, Right Updated: 02/18/23 0539     WBC 8 67 Thousand/uL      RBC 3 92 Million/uL      Hemoglobin 11 7 g/dL      Hematocrit 36 3 %      MCV 93 fL      MCH 29 8 pg      MCHC 32 2 g/dL      RDW 12 9 %      MPV 9 2 fL      Platelets 502 Thousands/uL      nRBC 0 /100 WBCs      Neutrophils Relative 80 %      Immat GRANS % 0 %      Lymphocytes Relative 15 %      Monocytes Relative 5 %      Eosinophils Relative 0 %      Basophils Relative 0 %      Neutrophils Absolute 6 93 Thousands/µL      Immature Grans Absolute 0 03 Thousand/uL      Lymphocytes Absolute 1 27 Thousands/µL      Monocytes Absolute 0 40 Thousand/µL      Eosinophils Absolute 0 01 Thousand/µL      Basophils Absolute 0 03 Thousands/µL     HS Troponin I 4hr [[id number]]  (Normal) Collected: 02/17/23 1633    Lab Status: Final result Specimen: Blood from Arm, Left Updated: 02/17/23 1717     hs TnI 4hr 3 ng/L      Delta 4hr hsTnI 1 ng/L     Fingerstick Glucose (POCT) [[id number]]  (Normal) Collected: 02/17/23 1637    Lab Status: Final result Updated: 02/17/23 1639     POC Glucose 120 mg/dl     HS Troponin I 2hr [[id number]]  (Normal) Collected: 02/17/23 1456    Lab Status: Final result Specimen: Blood from Arm, Left Updated: 02/17/23 1533     hs TnI 2hr 3 ng/L      Delta 2hr hsTnI 1 ng/L     Comprehensive metabolic panel [[id number]]  (Abnormal) Collected: 02/17/23 1125    Lab Status: Final result Specimen: Blood from Arm, Left Updated: 02/17/23 1225     Sodium 139 mmol/L      Potassium 4 2 mmol/L      Chloride 105 mmol/L      CO2 26 mmol/L      ANION GAP 8 mmol/L      BUN 16 mg/dL      Creatinine 0 62 mg/dL      Glucose 164 mg/dL      Calcium 8 9 mg/dL      Corrected Calcium 9 6 mg/dL      AST 22 U/L      ALT 20 U/L      Alkaline Phosphatase 83 U/L      Total Protein 6 9 g/dL      Albumin 3 1 g/dL      Total Bilirubin 0 55 mg/dL      eGFR 91 ml/min/1 73sq m     Narrative:      [facility] guidelines for Chronic Kidney Disease (CKD):   •  Stage 1 with normal or high GFR (GFR > 90 mL/min/1 73 square meters)  •  Stage 2 Mild CKD (GFR = 60-89 mL/min/1 73 square meters)  •  Stage 3A Moderate CKD (GFR = 45-59 mL/min/1 73 square meters)  •  Stage 3B Moderate CKD (GFR = 30-44 mL/min/1 73 square meters)  •  Stage 4 Severe CKD (GFR = 15-29 mL/min/1 73 square meters)  •  Stage 5 End Stage CKD (GFR <15 mL/min/1 73 square meters)  Note: GFR calculation is accurate only with a steady state creatinine    Lipase [[id number]]  (Abnormal) Collected: 02/17/23 1125    Lab Status: Final result Specimen: Blood from Arm, Left Updated: 02/17/23 1225     Lipase 34 u/L     COVID/FLU/RSV [[id number]]  (Normal) Collected: 02/17/23 1125    Lab Status: Final result Specimen: Nares from Nose Updated: 02/17/23 1223     SARS-CoV-2 Negative     INFLUENZA A PCR Negative     INFLUENZA B PCR Negative     RSV PCR Negative    Narrative:      FOR PEDIATRIC PATIENTS - copy/paste COVID Guidelines URL to browser: [email or web address]    SARS-CoV-2 assay is a Nucleic Acid Amplification assay intended for the  qualitative detection of nucleic acid from SARS-CoV-2 in nasopharyngeal  swabs   Results are for the presumptive identification of SARS-CoV-2 RNA     Positive results are indicative of infection with SARS-CoV-2, the virus  causing COVID-19, but do not rule out bacterial infection or co-infection  with other viruses  Laboratories within the [place] and its  territories are required to report all positive results to the appropriate  public health authorities  Negative results do not preclude SARS-CoV-2  infection and should not be used as the sole basis for treatment or other  patient management decisions  Negative results must be combined with  clinical observations, patient history, and epidemiological information  This test has not been FDA cleared or approved  This test has been authorized by FDA under an Emergency Use Authorization  (EUA)  This test is only authorized for the duration of time the  declaration that circumstances exist justifying the authorization of the  emergency use of an in vitro diagnostic tests for detection of SARS-CoV-2  virus and/or diagnosis of COVID-19 infection under section 564(b)(1) of  the Act, 21 U  [place] C  [place]-2(A)(9), unless the authorization is terminated  or revoked sooner  The test has been validated but independent review by FDA  and CLIA is pending  Test performed using [employer] GeneXpert: This RT-PCR assay targets N2,  a region unique to SARS-CoV-2  A conserved region in the E-gene was chosen  for pan-Sarbecovirus detection which includes SARS-CoV-2  According to CMS-2020-01-R, this platform meets the definition of high-throughput technology      HS Troponin 0hr (reflex protocol) [[id number]]  (Normal) Collected: 02/17/23 1125    Lab Status: Final result Specimen: Blood from Arm, Left Updated: 02/17/23 1157     hs TnI 0hr 2 ng/L     Urine Microscopic [[id number]]  (Abnormal) Collected: 02/17/23 1124    Lab Status: Final result Specimen: Urine, Clean Catch Updated: 02/17/23 1155     RBC, UA 2-4 /hpf      WBC, UA 1-2 /hpf      Epithelial Cells Occasional /hpf      Bacteria, UA None Seen /hpf      MUCUS THREADS Moderate    UA w Reflex to Microscopic w Reflex to Culture [[id number]]  (Abnormal) Collected: 02/17/23 1124    Lab Status: Final result Specimen: Urine, Clean Catch Updated: 02/17/23 1144     Color, UA Yellow     Clarity, UA Clear     Specific Gravity, UA 1 032     pH, UA 6 0     Leukocytes, UA Negative     Nitrite, UA Negative     Protein, UA 70 (1+) mg/dl      Glucose, UA Trace mg/dl      Ketones, UA 40 (2+) mg/dl      Urobilinogen, UA <2 0 mg/dl      Bilirubin, UA Negative     Occult Blood, UA Negative    CBC and differential [[id number]]  (Abnormal) Collected: 02/17/23 1125    Lab Status: Final result Specimen: Blood from Arm, Left Updated: 02/17/23 1132     WBC 9 67 Thousand/uL      RBC 4 43 Million/uL      Hemoglobin 13 1 g/dL      Hematocrit 41 3 %      MCV 93 fL      MCH 29 6 pg      MCHC 31 7 g/dL      RDW 13 0 %      MPV 9 1 fL      Platelets 904 Thousands/uL      nRBC 0 /100 WBCs      Neutrophils Relative 84 %      Immat GRANS % 0 %      Lymphocytes Relative 11 %      Monocytes Relative 4 %      Eosinophils Relative 1 %      Basophils Relative 0 %      Neutrophils Absolute 7 99 Thousands/µL      Immature Grans Absolute 0 02 Thousand/uL      Lymphocytes Absolute 1 10 Thousands/µL      Monocytes Absolute 0 43 Thousand/µL      Eosinophils Absolute 0 09 Thousand/µL      Basophils Absolute 0 04 Thousands/µL                  CT abdomen pelvis with contrast   Final Result by Miriam Ji MD (02/17 1433)      Findings most compatible with uncomplicated, acute sigmoid diverticulitis  Other stable nonemergent findings above              Workstation performed: [id number]               Procedures  Procedures      ED Course  ED Course as of 02/20/23 1222   Fri Feb 17, 2023   1413 Acute uncomplicated sigmoid diverticulitis on CT abdomen                Identification of Seniors at [address] [place] [address] Most Recent Value   (ISAR) Identification of Seniors at Risk    Before the illness or injury that brought you to the Emergency, did you need someone to help you on a regular basis? 0 Filed at: 02/17/2023 1020   In the last 24 hours, have you needed more help than usual? 0 Filed at: 02/17/2023 1020   Have you been hospitalized for one or more nights during the past 6 months? 0 Filed at: 02/17/2023 1020   In general, do you see well? 0 Filed at: 02/17/2023 1020   In general, do you have serious problems with your memory? 0 Filed at: 02/17/2023 1020   Do you take more than three different medications every day? 1 Filed at: 02/17/2023 1020   ISAR Score 1 Filed at: 02/17/2023 1020                      ALEXY Risk Score    Flowsheet Row Most Recent Value   Age >= 72 1 Filed at: 02/17/2023 1920   Known CAD (stenosis >= 50%) 0 Filed at: 02/17/2023 1920   Recent (<=24 hrs) Service Angina 0 Filed at: 02/17/2023 1920   ST Deviation >= 0 5 mm 1 Filed at: 02/17/2023 1920   3+ CAD Risk Factors (FHx, HTN, HLP, DM, Smoker) 1 Filed at: 02/17/2023 1920   Aspirin Use Past 7 Days 1 Filed at: 02/17/2023 1920   Elevated Cardiac Markers 0 Filed at: 02/17/2023 1920   ALEXY Risk Score (Calculated) 4 Filed at: 02/17/2023 1920              Medical Decision Making  71 Y O F hx of htn  hld, DM presents for a week of lower abdominal discomfort radiating to the back, nausea , states that she feels worse everyday and that something is not right  Vital stable  Physical exam remarkable for diffuse abdominal tenderness  Differential includes but not limited to ACS, gastritis, diverticulitis, other acute intra-abdominal pathology  Patient was evaluated with cardiac work-up and CT abdominal   Imaging positive for acute sigmoid diverticulitis  Patient's EKG showed new T wave changes in inferior lateral leads  Patient declined pain meds in the ED  Patient was admitted to medicine for further cardiac work-up given her risk factors and new T wave changes      Abdominal pain: acute illness or injury  Back pain: acute illness or injury  Nausea: acute illness or injury  Amount and/or Complexity of Data Reviewed  Labs: ordered  Radiology: ordered  Risk  Prescription drug management  Decision regarding hospitalization  Disposition  Final diagnoses:   Abdominal pain   Nausea   Back pain     Time reflects when diagnosis was documented in both MDM as applicable and the Disposition within this note     Time User Action Codes Description Comment    2/17/2023  4:18 PM Bryantheather Kesha Add [R10 9] Abdominal pain     2/17/2023  4:18 PM Kesha Kramer Add [R11 0] Nausea     2/17/2023  4:18 PM Kesha Kramer Add [M54 9] Back pain     2/17/2023  7:09 PM Iona Hendrix Add [R94 31] Abnormal EKG     2/18/2023 11:15 AM ZahraMadhavjocelyn Add [K57 92] Acute diverticulitis       ED Disposition     ED Disposition   Admit    Condition   Stable    Date/Time   Fri Feb 17, 2023  4:19 PM    Comment   Case was discussed with Iona Moya and the patient's admission status was agreed to be Admission Status: observation status to the service of Dr Selin Grissom             Follow-up Information     Follow up With Specialties Details Why Contact Lori Mesa MD Family Medicine Schedule an appointment as soon as possible for a visit in 1 week(s)  [address] [place] [address]  [phone]            Discharge Medication List as of 2/18/2023 11:50 AM      START taking these medications    Details   fluconazole (DIFLUCAN) 200 mg tablet Take 1 tablet (200 mg total) by mouth daily for 4 days Do not start before February 19, 2023 , Starting Sun 2/19/2023, Until Thu 2/23/2023, Normal      amoxicillin-clavulanate (AUGMENTIN) 875-125 mg per tablet Take 1 tablet by mouth every 12 (twelve) hours for 4 days, Starting Sat 2/18/2023, Until Wed 2/22/2023, Normal         CONTINUE these medications which have NOT CHANGED    Details   albuterol (2 5 mg/3 mL) 0 083 % nebulizer solution Take 3 mL (2 5 mg total) by nebulization every 6 (six) hours as needed for wheezing or shortness of breath, Starting Wed 12/7/2022, Normal      amLODIPine (NORVASC) 2 5 mg tablet Take 1 tablet (2 5 mg total) by mouth daily at bedtime, Starting Fri 11/18/2022, Normal      artificial tear (LUBRIFRESH P M ) 83-15 % ophthalmic ointment daily at bedtime, Historical Med      aspirin 81 mg chewable tablet Chew 81 mg daily, Historical Med      baclofen 10 mg tablet Take 10 mg by mouth daily at bedtime, Starting Tue 6/25/2019, Historical Med      Breo Ellipta 200-25 MCG/ACT inhaler INHALE ONE INHALATION BY MOUTH EVERY DAY - (RINSE MOUTH AND SPIT AFTER USE, DISCARD SIX WEEKS AFTER REMOVAL FROM FOIL POUCH), Normal      Cholecalciferol (VITAMIN D PO) Take 1 tablet by mouth daily, Historical Med      Diclofenac Sodium (VOLTAREN) 1 % Apply 1 application topically 4 (four) times a day, Historical Med      ezetimibe (ZETIA) 10 mg tablet Take 0 5 tablets (5 mg total) by mouth daily, Starting Fri 11/18/2022, Until Wed 5/17/2023, Normal      Ferrous Sulfate Dried (FERROUS SULFATE CR PO) Iron TABS    Refills: 0       Active, Historical Med      glucose blood (Contour Next Test) test strip Use 1 each 4 (four) times a day, Starting Wed 1/26/2022, Normal      hydrochlorothiazide (HYDRODIURIL) 12 5 mg tablet Take 1 tablet (12 5 mg total) by mouth daily, Starting Wed 8/3/2022, Normal      hydroxychloroquine (PLAQUENIL) 200 mg tablet Take 200 mg by mouth 2 (two) times a day with meals, Historical Med      insulin aspart (NovoLOG FlexPen) 100 UNIT/ML injection pen Inject 6 Units under the skin 3 (three) times a day with meals, Starting Fri 11/18/2022, Until Thu 2/16/2023, Normal      Insulin Glargine Solostar (Lantus SoloStar) 100 UNIT/ML SOPN Inject 0 25 mL (25 Units total) under the skin daily at bedtime, Starting Fri 11/18/2022, Normal      Insulin Pen Needle (BD Pen Needle Ester U/F) 32G X 4 MM MISC Inject under the skin 4 (four) times a day, Starting Fri 1/14/2022, Normal      Lifitegrast ([facility] OP) Apply to eye 2 vials a day  , Historical Med      losartan (COZAAR) 25 mg tablet Take 2 tablets (50 mg total) by mouth 2 (two) times a day, Starting Fri 11/18/2022, Normal      metFORMIN (GLUCOPHAGE) 500 mg tablet Take 2 tablets (1,000 mg total) by mouth 2 (two) times a day with meals, Starting Fri 11/18/2022, Until Wed 5/17/2023, Normal      Microlet Lancets MISC Use 4 (four) times a day, Starting Fri 1/14/2022, Normal      predniSONE 20 mg tablet 2 TABS PO Q DAY WITH FOOD, Normal      sertraline (ZOLOFT) 50 mg tablet TAKE ONE TABLET BY MOUTH EVERY DAY, Normal      simvastatin (ZOCOR) 20 mg tablet Take 1 tablet (20 mg total) by mouth daily, Starting Fri 11/18/2022, Normal      valACYclovir (VALTREX) 1,000 mg tablet Take 1,000 mg by mouth daily, Historical Med         STOP taking these medications       benzonatate (TESSALON) 200 MG capsule Comments:   Reason for Stopping:             No discharge procedures on file  PDMP Review     None           ED Provider  Attending physically available and evaluated Becki Roman I managed the patient along with the ED Attending      Electronically Signed by         Fortino Álvarez DO  02/20/23 [id number]

## 2023-02-17 NOTE — Clinical Note
Case was discussed with Iona Moya and the patient's admission status was agreed to be Admission Status: observation status to the service of Dr Holloway Sea

## 2023-02-17 NOTE — ED ATTENDING ATTESTATION
2/17/2023  I, Quinn Bob MD, saw and evaluated the patient  I have discussed the patient with the resident/non-physician practitioner and agree with the resident's/non-physician practitioner's findings, Plan of Care, and MDM as documented in the resident's/non-physician practitioner's note, except where noted  All available labs and Radiology studies were reviewed  I was present for key portions of any procedure(s) performed by the resident/non-physician practitioner and I was immediately available to provide assistance  At this point I agree with the current assessment done in the Emergency Department  I have conducted an independent evaluation of this patient a history and physical is as follows:    OA: 72 y/o f with h/o DM2 c/o generalized feeling of being unwell including lower abdominal pain with radiation to back,  + nausea without vomiting  Has issues with baseline urinary incontinence, unchanged  No dysuria  No new back pain  No radiation of pain but does feel overall weak  Sxms have been progressive over the past few days  No chest pain does occasionally have feelings of SOB with nausea/pain  No fevers/chills/cough/congestion  NO change in BM  No falls/trauma  PE, NAD, VSS, NC/AT, MMM, anicteric, neck supple/FROM, RR, lungs CTAB, abd soft, +BS, + ttp over abdomen, worse over lower abdomen, -r/g, - CVA ttp, intact pulses, capillary refill < 2 sec, AAO  A/p abd pain with nausea, generalized fatigue/weakness  eval with abd labs, imaging, given nausea and weak feeling, eval with EKG, labs  Pt declining pain control at this time  Consider for IVF hydration  ED Course   EKG with new T wave changes, trop x 1 negative  Discussed with pt consideration of admission v dc to home given delta trop, feelings of generalized weakness with abd pain and nausea  Pt with decision pending at end of shift         Critical Care Time  Procedures

## 2023-02-18 ENCOUNTER — APPOINTMENT (OUTPATIENT)
Dept: NON INVASIVE DIAGNOSTICS | Facility: HOSPITAL | Age: 73
End: 2023-02-18

## 2023-02-18 VITALS
DIASTOLIC BLOOD PRESSURE: 79 MMHG | OXYGEN SATURATION: 94 % | SYSTOLIC BLOOD PRESSURE: 137 MMHG | HEIGHT: 61 IN | TEMPERATURE: 98.2 F | BODY MASS INDEX: 29.45 KG/M2 | HEART RATE: 68 BPM | RESPIRATION RATE: 19 BRPM | WEIGHT: 156 LBS

## 2023-02-18 LAB
ANION GAP SERPL CALCULATED.3IONS-SCNC: 8 MMOL/L (ref 4–13)
AORTIC ROOT: 3 CM
APICAL FOUR CHAMBER EJECTION FRACTION: 57 %
ASCENDING AORTA: 3.1 CM
BASOPHILS # BLD AUTO: 0.03 THOUSANDS/ÂΜL (ref 0–0.1)
BASOPHILS NFR BLD AUTO: 0 % (ref 0–1)
BUN SERPL-MCNC: 13 MG/DL (ref 5–25)
CALCIUM SERPL-MCNC: 8.8 MG/DL (ref 8.3–10.1)
CHLORIDE SERPL-SCNC: 107 MMOL/L (ref 96–108)
CO2 SERPL-SCNC: 24 MMOL/L (ref 21–32)
CREAT SERPL-MCNC: 0.57 MG/DL (ref 0.6–1.3)
E WAVE DECELERATION TIME: 198 MS
EOSINOPHIL # BLD AUTO: 0.01 THOUSAND/ÂΜL (ref 0–0.61)
EOSINOPHIL NFR BLD AUTO: 0 % (ref 0–6)
ERYTHROCYTE [DISTWIDTH] IN BLOOD BY AUTOMATED COUNT: 12.9 % (ref 11.6–15.1)
FRACTIONAL SHORTENING: 44 % (ref 28–44)
GFR SERPL CREATININE-BSD FRML MDRD: 93 ML/MIN/1.73SQ M
GLUCOSE SERPL-MCNC: 121 MG/DL (ref 65–140)
GLUCOSE SERPL-MCNC: 164 MG/DL (ref 65–140)
GLUCOSE SERPL-MCNC: 183 MG/DL (ref 65–140)
HCT VFR BLD AUTO: 36.3 % (ref 34.8–46.1)
HGB BLD-MCNC: 11.7 G/DL (ref 11.5–15.4)
IMM GRANULOCYTES # BLD AUTO: 0.03 THOUSAND/UL (ref 0–0.2)
IMM GRANULOCYTES NFR BLD AUTO: 0 % (ref 0–2)
INTERVENTRICULAR SEPTUM IN DIASTOLE (PARASTERNAL SHORT AXIS VIEW): 1.2 CM
INTERVENTRICULAR SEPTUM: 1.2 CM (ref 0.6–1.1)
LAAS-AP2: 14.2 CM2
LAAS-AP4: 11.1 CM2
LEFT ATRIUM SIZE: 3.4 CM
LEFT INTERNAL DIMENSION IN SYSTOLE: 2.3 CM (ref 2.1–4)
LEFT VENTRICULAR INTERNAL DIMENSION IN DIASTOLE: 4.1 CM (ref 3.5–6)
LEFT VENTRICULAR POSTERIOR WALL IN END DIASTOLE: 1 CM
LEFT VENTRICULAR STROKE VOLUME: 54 ML
LVSV (TEICH): 54 ML
LYMPHOCYTES # BLD AUTO: 1.27 THOUSANDS/ÂΜL (ref 0.6–4.47)
LYMPHOCYTES NFR BLD AUTO: 15 % (ref 14–44)
MAGNESIUM SERPL-MCNC: 1.9 MG/DL (ref 1.6–2.6)
MCH RBC QN AUTO: 29.8 PG (ref 26.8–34.3)
MCHC RBC AUTO-ENTMCNC: 32.2 G/DL (ref 31.4–37.4)
MCV RBC AUTO: 93 FL (ref 82–98)
MONOCYTES # BLD AUTO: 0.4 THOUSAND/ÂΜL (ref 0.17–1.22)
MONOCYTES NFR BLD AUTO: 5 % (ref 4–12)
MV E'TISSUE VEL-SEP: 7 CM/S
MV PEAK A VEL: 0.88 M/S
MV PEAK E VEL: 67 CM/S
MV STENOSIS PRESSURE HALF TIME: 57 MS
MV VALVE AREA P 1/2 METHOD: 3.86 CM2
NEUTROPHILS # BLD AUTO: 6.93 THOUSANDS/ÂΜL (ref 1.85–7.62)
NEUTS SEG NFR BLD AUTO: 80 % (ref 43–75)
NRBC BLD AUTO-RTO: 0 /100 WBCS
PLATELET # BLD AUTO: 240 THOUSANDS/UL (ref 149–390)
PMV BLD AUTO: 9.2 FL (ref 8.9–12.7)
POTASSIUM SERPL-SCNC: 3.7 MMOL/L (ref 3.5–5.3)
RBC # BLD AUTO: 3.92 MILLION/UL (ref 3.81–5.12)
RIGHT ATRIUM AREA SYSTOLE A4C: 12.7 CM2
RIGHT VENTRICLE ID DIMENSION: 3.3 CM
SL CV LEFT ATRIUM LENGTH A2C: 5.1 CM
SL CV LV EF: 55
SL CV PED ECHO LEFT VENTRICLE DIASTOLIC VOLUME (MOD BIPLANE) 2D: 73 ML
SL CV PED ECHO LEFT VENTRICLE SYSTOLIC VOLUME (MOD BIPLANE) 2D: 19 ML
SODIUM SERPL-SCNC: 139 MMOL/L (ref 135–147)
TR MAX PG: 26 MMHG
TR PEAK VELOCITY: 2.6 M/S
TRICUSPID ANNULAR PLANE SYSTOLIC EXCURSION: 1.6 CM
TRICUSPID VALVE PEAK REGURGITATION VELOCITY: 2.56 M/S
WBC # BLD AUTO: 8.67 THOUSAND/UL (ref 4.31–10.16)

## 2023-02-18 RX ORDER — FLUCONAZOLE 200 MG/1
200 TABLET ORAL DAILY
Qty: 4 TABLET | Refills: 0 | Status: SHIPPED | OUTPATIENT
Start: 2023-02-19 | End: 2023-02-23

## 2023-02-18 RX ORDER — AMOXICILLIN AND CLAVULANATE POTASSIUM 875; 125 MG/1; MG/1
1 TABLET, FILM COATED ORAL EVERY 12 HOURS SCHEDULED
Qty: 8 TABLET | Refills: 0 | Status: SHIPPED | OUTPATIENT
Start: 2023-02-18 | End: 2023-02-20

## 2023-02-18 RX ORDER — ACETAMINOPHEN 325 MG/1
650 TABLET ORAL EVERY 6 HOURS PRN
Status: DISCONTINUED | OUTPATIENT
Start: 2023-02-18 | End: 2023-02-18 | Stop reason: HOSPADM

## 2023-02-18 RX ADMIN — INSULIN LISPRO 4 UNITS: 100 INJECTION, SOLUTION INTRAVENOUS; SUBCUTANEOUS at 06:08

## 2023-02-18 RX ADMIN — METRONIDAZOLE 500 MG: 500 INJECTION, SOLUTION INTRAVENOUS at 02:51

## 2023-02-18 RX ADMIN — LOSARTAN POTASSIUM 50 MG: 50 TABLET, FILM COATED ORAL at 08:43

## 2023-02-18 RX ADMIN — SERTRALINE HYDROCHLORIDE 50 MG: 50 TABLET ORAL at 08:43

## 2023-02-18 RX ADMIN — ASPIRIN 81 MG CHEWABLE TABLET 81 MG: 81 TABLET CHEWABLE at 08:43

## 2023-02-18 RX ADMIN — FLUCONAZOLE 200 MG: 200 TABLET ORAL at 08:50

## 2023-02-18 RX ADMIN — HYDROXYCHLOROQUINE SULFATE 200 MG: 200 TABLET ORAL at 06:33

## 2023-02-18 RX ADMIN — EZETIMIBE 5 MG: 10 TABLET ORAL at 08:43

## 2023-02-18 RX ADMIN — FLUTICASONE FUROATE AND VILANTEROL TRIFENATATE 1 PUFF: 200; 25 POWDER RESPIRATORY (INHALATION) at 08:45

## 2023-02-18 RX ADMIN — INSULIN LISPRO 1 UNITS: 100 INJECTION, SOLUTION INTRAVENOUS; SUBCUTANEOUS at 06:07

## 2023-02-18 RX ADMIN — METRONIDAZOLE 500 MG: 500 INJECTION, SOLUTION INTRAVENOUS at 08:43

## 2023-02-18 RX ADMIN — INSULIN LISPRO 4 UNITS: 100 INJECTION, SOLUTION INTRAVENOUS; SUBCUTANEOUS at 06:43

## 2023-02-18 RX ADMIN — HEPARIN SODIUM 5000 UNITS: 5000 INJECTION INTRAVENOUS; SUBCUTANEOUS at 06:08

## 2023-02-18 RX ADMIN — HYDROCHLOROTHIAZIDE 12.5 MG: 12.5 TABLET ORAL at 08:43

## 2023-02-18 RX ADMIN — ACETAMINOPHEN 650 MG: 325 TABLET ORAL at 06:33

## 2023-02-18 NOTE — ASSESSMENT & PLAN NOTE
· T wave inversion in inferior leads  Subtle T wave inversion was present in the same leads about 10 years ago, patient had a stress test a few years ago which per her was normal it was done prior to her hysterectomy  · Patient denies chest pain at rest or exertion    She has not been that active but she is walking 1 flight of stairs without getting short of breath or having chest pain  · She has risk factors as age, hypertension, hyperlipidemia, diabetes  · Troponins x3 negative  · Echo ordered  · Discussed with patient, plan was if echo is negative to follow-up with cardiology as outpatient   · Patient is insisting on seeing cardiology as inpatient Bladder non-tender and non-distended. Urine clear yellow.

## 2023-02-18 NOTE — ASSESSMENT & PLAN NOTE
Lab Results   Component Value Date    HGBA1C 9 0 (H) 02/13/2023       Recent Labs     02/17/23  1637 02/17/23  2222 02/18/23  0536   POCGLU 120 175* 164*       Blood Sugar Average: Last 72 hrs:  (P) 153   · Resume PTA regimen on discharge   · Outpatient follow-up

## 2023-02-18 NOTE — ASSESSMENT & PLAN NOTE
· Patient does not know her medications, she said everything that in the chart she is taking  · Continue amlodipine 2 5 mg daily, hydrochlorothiazide 12 5 mg daily and losartan 50 mg twice a day

## 2023-02-18 NOTE — ASSESSMENT & PLAN NOTE
· Patient presents with lower abdominal pain, denies diarrhea constipation or blood in stool  · CT abdomen showed uncomplicated acute sigmoid diverticulitis  · Continue ceftriaxone/Flagyl  · Patient reports getting yeast infection with antibiotics  Insisting on getting Diflucan    Currently asymptomatic  · Patient had a normal colonoscopy 2 years ago

## 2023-02-18 NOTE — ASSESSMENT & PLAN NOTE
Patient presents with lower abdominal pain, denies diarrhea constipation or blood in stool  CT abdomen showed uncomplicated acute sigmoid diverticulitis  Patient had a normal colonoscopy 2 years ago  · IV ceftriaxone/Flagyl started on admission, transition to Augmentin on discharge to complete total 5 day course   · Patient reports getting yeast infection with antibiotics  Insisting on getting Diflucan  Currently asymptomatic    · Abdominal pain improved/resolved

## 2023-02-18 NOTE — CONSULTS
Consultation - Cardiology   Becki Roman 71 y o  female MRN: [id number]  Unit/Bed#: [facility] 704-62 Encounter: [id number]          Inpatient consult to Cardiology     Date/Time 2/18/2023 8:27 AM     Performed by  Josh Thomson MD     Authorized by Anne Jack MD              History of Present Illness   Physician Requesting Consult: Anne Jack MD  Reason for Consult / Principal Problem: abnormal EKG      Assessment:  Principal Problem:    Diverticulitis  Active Problems:    Asthma    Rheumatoid arthritis of multiple sites with negative rheumatoid factor ([address] )    Hypertension    Hyperlipidemia    Type 2 diabetes mellitus with hypoglycemia without coma, with long-term current use of insulin ([facility])    Abnormal EKG      Assessment/ Plan:    Abnormal EKG  EKG on admission with T wave inversion in lead III and aVF  Compared prior EKGs, noted to have T inversions in lead III  CAD risk factors: family history, diabetes mellitus, former smoker (HTN, HLD well controlled)  No ongoing chest pain, exertional angina, shortness of breath  Dizziness  Noted in the last few days (about a week)  Mostly when she stands up to walk  Not associated syncope, palpitations  Possible orthostasis with ongoing diverticulitis and reduced po intake    Hypertension  -160/80's  Home medications: amlodipine 2 5 mg daily, losartan 25 mg daily,     Hyperlipidemia  LDL 59 on 2/13/2023    Diabetes mellitus  HbA1C 9 0 on 2/13/2023    Plan  1  Will review echocardiogram  2  EKG abnormalities are non specific and do not require further inpatient work up  3  Check orthostatic vs     HPI: Margaret Young is a 71y o  year old female who has a history of  HTN, HLD, DM, anxiety, rheumatoid arthritis, and GERD, who presented on 2/17 with abdominal pain for 1 week, with CT abdomen showing uncomplicated acute sigmoid diverticulitis  She is currently being managed with IV antibiotics      On admission, EKG was noted to have non specific T wave inversions in leads III and aVF  Troponin is negative  Cardiology has been consulted per patient request for abnormal EKG  No prior cardiac history  Denies chest pain, exertional angina, shortness of breath, or palpitations  She had a nuclear medicine stress test in December 2016 for chest pain and abnormal EKG, and it was negative for ischemia  She was empirically treated for GERD given chronic NSAID use  She is a former smoker quit 15 years ago, hypertension, hyperlipidemia is well controlled, however diabetes has been not well controlled, father had heart disease 1st MI in his 42's  Review of Systems   Constitutional: Positive for malaise/fatigue  Negative for decreased appetite  HENT: Negative  Eyes: Negative  Cardiovascular: Negative  Negative for chest pain, dyspnea on exertion, irregular heartbeat, leg swelling, near-syncope, orthopnea, palpitations, paroxysmal nocturnal dyspnea and syncope  Respiratory: Negative  Skin: Negative  Musculoskeletal: Negative  Gastrointestinal: Positive for abdominal pain  Neurological: Positive for dizziness  Psychiatric/Behavioral: Negative  Allergic/Immunologic: Negative  All other systems reviewed and are negative  Historical Information   Past Medical History:   Diagnosis Date   • Anxiety    • Anxiety    • Arthritis of right knee    • Cellulitis    • Contact dermatitis    • Cough    • Diabetes mellitus ([address] )    • GERD without esophagitis    • Headache    • Hiatal hernia    • Hyperlipidemia    • Knee sprain    • Lyme disease    • Tick bite      Past Surgical History:   Procedure Laterality Date   • ABDOMINAL SURGERY      tummy tuck   • CATARACT EXTRACTION, BILATERAL Bilateral 11/21 and 12/21   • HYSTERECTOMY      age 51   • AK COLONOSCOPY FLX DX W/COLLJ SPEC WHEN PFRMD N/A 5/8/2019    Procedure: COLONOSCOPY;  Surgeon: Faye Thorne MD;  Location: [facility];   Service: Gastroenterology   • AK ESOPHAGOGASTRODUODENOSCOPY TRANSORAL DIAGNOSTIC N/A 2019    Procedure: ESOPHAGOGASTRODUODENOSCOPY (EGD); Surgeon: Timi Montesinos MD;  Location: [facility];   Service: Gastroenterology   • TUBAL LIGATION       Social History     Substance and Sexual Activity   Alcohol Use Not Currently    Comment: seldom     Social History     Substance and Sexual Activity   Drug Use Yes   • Types: Marijuana    Comment: smokes weed 3x per week     Social History     Tobacco Use   Smoking Status Former   • Packs/day: 0 50   • Years: 25 00   • Pack years: 12 50   • Types: Cigarettes   • Quit date: 2008   • Years since quittin 5   Smokeless Tobacco Never     Family History:   Family History   Problem Relation Age of Onset   • Mental illness Mother    • Heart disease Father    • Colon cancer Father 67   • No Known Problems Sister    • No Known Problems Daughter    • No Known Problems Maternal Grandmother    • No Known Problems Maternal Grandfather    • No Known Problems Paternal Grandmother    • No Known Problems Paternal Grandfather    • No Known Problems Son    • No Known Problems Sister    • No Known Problems Sister    • No Known Problems Sister    • No Known Problems Sister    • No Known Problems Sister    • Lymphoma Brother 45   • No Known Problems Brother    • No Known Problems Paternal Aunt    • No Known Problems Paternal Aunt    • No Known Problems Paternal Aunt    • No Known Problems Paternal Aunt    • Breast cancer Neg Hx    • Breast cancer additional onset Neg Hx    • Endometrial cancer Neg Hx    • Ovarian cancer Neg Hx    • BRCA 1/2 Neg Hx    • BRCA1 Negative Neg Hx    • BRCA1 Positive Neg Hx    • BRCA2 Negative Neg Hx    • BRCA2 Positive Neg Hx        Meds/Allergies   all current active meds have been reviewed, current meds:   Current Facility-Administered Medications   Medication Dose Route Frequency   • acetaminophen (TYLENOL) tablet 650 mg  650 mg Oral Q6H PRN   • amLODIPine (NORVASC) tablet 2 5 mg  2 5 mg Oral HS   • aspirin chewable tablet 81 mg  81 mg Oral Daily   • cefTRIAXone (ROCEPHIN) 1,000 mg in dextrose 5 % 50 mL IVPB  1,000 mg Intravenous Q24H   • ezetimibe (ZETIA) tablet 5 mg  5 mg Oral Daily   • fluconazole (DIFLUCAN) tablet 200 mg  200 mg Oral Daily   • fluticasone-vilanterol 200-25 mcg/actuation 1 puff  1 puff Inhalation Daily   • heparin (porcine) subcutaneous injection 5,000 Units  5,000 Units Subcutaneous Q8H [facility]   • hydrochlorothiazide (HYDRODIURIL) tablet 12 5 mg  12 5 mg Oral Daily   • hydroxychloroquine (PLAQUENIL) tablet 200 mg  200 mg Oral BID With Meals   • insulin glargine (LANTUS) subcutaneous injection 20 Units 0 2 mL  20 Units Subcutaneous HS   • insulin lispro (HumaLOG) 100 units/mL subcutaneous injection 1-5 Units  1-5 Units Subcutaneous TID AC   • insulin lispro (HumaLOG) 100 units/mL subcutaneous injection 4 Units  4 Units Subcutaneous TID With Meals   • losartan (COZAAR) tablet 50 mg  50 mg Oral BID   • metroNIDAZOLE (FLAGYL) IVPB (premix) 500 mg 100 mL  500 mg Intravenous Q8H   • pravastatin (PRAVACHOL) tablet 40 mg  40 mg Oral Daily With Dinner   • sertraline (ZOLOFT) tablet 50 mg  50 mg Oral Daily    and PTA meds:   Prior to Admission Medications   Prescriptions Last Dose Informant Patient Reported? Taking?    Breo Ellipta 200-25 MCG/ACT inhaler   No No   Sig: INHALE ONE INHALATION BY MOUTH EVERY DAY - (RINSE MOUTH AND SPIT AFTER USE, DISCARD SIX WEEKS AFTER REMOVAL FROM FOIL POUCH)   Cholecalciferol (VITAMIN D PO)   Yes No   Sig: Take 1 tablet by mouth daily   Diclofenac Sodium (VOLTAREN) 1 %   Yes No   Sig: Apply 1 application topically 4 (four) times a day   Ferrous Sulfate Dried (FERROUS SULFATE CR PO)   Yes No   Sig: Iron TABS    Refills: 0       Active   Insulin Glargine Solostar (Lantus SoloStar) 100 UNIT/ML SOPN   No No   Sig: Inject 0 25 mL (25 Units total) under the skin daily at bedtime   Insulin Pen Needle (BD Pen Needle Ester U/F) 32G X 4 MM MISC   No No   Sig: Inject under the skin 4 (four) times a day Lifitegrast (Justyn Reasoner OP)   Yes No   Sig: Apply to eye 2 vials a day     Microlet Lancets MISC   No No   Sig: Use 4 (four) times a day   albuterol (2 5 mg/3 mL) 0 083 % nebulizer solution   No No   Sig: Take 3 mL (2 5 mg total) by nebulization every 6 (six) hours as needed for wheezing or shortness of breath   amLODIPine (NORVASC) 2 5 mg tablet   No No   Sig: Take 1 tablet (2 5 mg total) by mouth daily at bedtime   artificial tear (LUBRIFRESH P M ) 83-15 % ophthalmic ointment   Yes No   Sig: daily at bedtime   aspirin 81 mg chewable tablet   Yes No   Sig: Chew 81 mg daily   baclofen 10 mg tablet   Yes No   Sig: Take 10 mg by mouth daily at bedtime   benzonatate (TESSALON) 200 MG capsule   No No   Sig: Take 1 capsule (200 mg total) by mouth 3 (three) times a day as needed for cough   Patient not taking: Reported on 2021   ezetimibe (ZETIA) 10 mg tablet   No No   Sig: Take 0 5 tablets (5 mg total) by mouth daily   glucose blood (Contour Next Test) test strip   No No   Sig: Use 1 each 4 (four) times a day   hydrochlorothiazide (HYDRODIURIL) 12 5 mg tablet   No No   Sig: Take 1 tablet (12 5 mg total) by mouth daily   hydroxychloroquine (PLAQUENIL) 200 mg tablet   Yes No   Sig: Take 200 mg by mouth 2 (two) times a day with meals   insulin aspart (NovoLOG FlexPen) 100 UNIT/ML injection pen   No No   Sig: Inject 6 Units under the skin 3 (three) times a day with meals   losartan (COZAAR) 25 mg tablet   No No   Sig: Take 2 tablets (50 mg total) by mouth 2 (two) times a day   metFORMIN (GLUCOPHAGE) 500 mg tablet   No No   Sig: Take 2 tablets (1,000 mg total) by mouth 2 (two) times a day with meals   predniSONE 20 mg tablet   No No   Sig: One po q day   Patient not taking: Reported on 2022   predniSONE 20 mg tablet   No No   Si TABS PO Q DAY WITH FOOD   sertraline (ZOLOFT) 50 mg tablet   No No   Sig: TAKE ONE TABLET BY MOUTH EVERY DAY   simvastatin (ZOCOR) 20 mg tablet   No No   Sig: Take 1 tablet (20 mg total) by mouth daily   valACYclovir (VALTREX) 1,000 mg tablet   Yes No   Sig: Take 1,000 mg by mouth daily      Facility-Administered Medications: None          Allergies   Allergen Reactions   • Augmentin [Amoxicillin-Pot Clavulanate]    • Codeine Drowsiness   • Macrobid [Nitrofurantoin] Other (See Comments)     Pt does not recall       Objective   Vitals: Blood pressure 163/86, pulse 92, temperature 98 2 °F (36 8 °C), temperature source Oral, resp  rate 17, height 5' 1" (1 549 m), weight 70 9 kg (156 lb 6 4 oz), SpO2 95 %  , Body mass index is 29 55 kg/m² ,   Orthostatic Blood Pressures    Flowsheet Row Most Recent Value   Blood Pressure 163/86 filed at 02/17/2023 2236   Patient Position - Orthostatic VS Lying filed at 02/17/2023 0404        Systolic (14FRL), PLY:983 , Min:148 , AKK:203     Diastolic (46RGN), HST:84, Min:73, Max:92      Intake/Output Summary (Last 24 hours) at 2/18/2023 0512  Last data filed at 2/18/2023 0301  Gross per 24 hour   Intake 1050 ml   Output 400 ml   Net 650 ml       Weight (last 2 days)     Date/Time Weight    02/17/23 21:32:52 70 9 (156 4)          Invasive Devices     Peripheral Intravenous Line  Duration           Peripheral IV 02/17/23 Left Antecubital <1 day                    Physical Exam: /76 (BP Location: Left arm)   Pulse 73   Temp 98 2 °F (36 8 °C) (Oral)   Resp 19   Ht 5' 1" (1 549 m)   Wt 70 9 kg (156 lb 6 4 oz)   SpO2 94%   BMI 29 55 kg/m²     General Appearance:    Alert, cooperative, no distress, appears stated age   Head:    Normocephalic, without obvious abnormality, atraumatic   Eyes:    PERRL, conjunctiva/corneas clear, EOM's intact, fundi     benign, both eyes   Ears:    Normal TM's and external ear canals, both ears   Nose:   Nares normal, septum midline, mucosa normal, no drainage    or sinus tenderness   Throat:   Lips, mucosa, and tongue normal; teeth and gums normal   Neck:   Supple, symmetrical, trachea midline, no adenopathy;     thyroid:  no enlargement/tenderness/nodules; no carotid    bruit or JVD   Back:     Symmetric, no curvature, ROM normal, no CVA tenderness   Lungs:     Clear to auscultation bilaterally, respirations unlabored   Chest Wall:    No tenderness or deformity    Heart:    Regular rate and rhythm, S1 and S2 normal, no murmur, rub   or gallop   Breast Exam:    No tenderness, masses, or nipple abnormality   Abdomen:     Soft, non-tender, bowel sounds active all four quadrants,     no masses, no organomegaly   Extremities:   Extremities normal, atraumatic, no cyanosis or edema   Pulses:   2+ and symmetric all extremities   Skin:   Skin color, texture, turgor normal, no rashes or lesions   Lymph nodes:   Cervical, supraclavicular, and axillary nodes normal   Neurologic:   CNII-XII intact, normal strength, sensation and reflexes     throughout           Laboratory Results:        CBC with diff:   Results from last 7 days   Lab Units 02/17/23  1125   WBC Thousand/uL 9 67   HEMOGLOBIN g/dL 13 1   HEMATOCRIT % 41 3   MCV fL 93   PLATELETS Thousands/uL 241   MCH pg 29 6   MCHC g/dL 31 7   RDW % 13 0   MPV fL 9 1   NRBC AUTO /100 WBCs 0         CMP:  Results from last 7 days   Lab Units 02/17/23  1125 02/13/23  0734   POTASSIUM mmol/L 4 2 4 2   CHLORIDE mmol/L 105 105   CO2 mmol/L 26 27   BUN mg/dL 16 15   CREATININE mg/dL 0 62 0 62   CALCIUM mg/dL 8 9 9 0   AST U/L 22 16   ALT U/L 20 15   ALK PHOS U/L 83 70   EGFR ml/min/1 73sq m 91 95         BMP:  Results from last 7 days   Lab Units 02/17/23  1125 02/13/23  0734   POTASSIUM mmol/L 4 2 4 2   CHLORIDE mmol/L 105 105   CO2 mmol/L 26 27   BUN mg/dL 16 15   CREATININE mg/dL 0 62 0 62   CALCIUM mg/dL 8 9 9 0       BNP:  No results for input(s): BNP in the last 72 hours      Magnesium:       Coags:       TSH:       Hemoglobin A1C   Results from last 7 days   Lab Units 02/13/23  0734   HEMOGLOBIN A1C % of total Hgb 9 0*       Lipid Profile:   Results from last 7 days   Lab Units 02/13/23  0734 TRIGLYCERIDES mg/dL 77   HDL mg/dL 85       Cardiac testing:   No results found for this or any previous visit  No results found for this or any previous visit  No results found for this or any previous visit  Results for orders placed during the hospital encounter of 16    NM myocardial perfusion spect (stress and/or rest)    [address]  [address] [facility], [address] [place] [address]  [phone]    Rest/Stress Gated SPECT Myocardial Perfusion Imaging After Regadenoson    Patient: Kuldeep Johnston  MR number: [id number]  Account number: [de-identified]  : 1950  Age: 73 years  Gender: Female  Status: Outpatient  Location: [address] [place] [address] [facility] [place]  Height: 61 in  Weight: 164 lb  BP: 154/ 90 mmHg    Allergies: AMOXICILLIN-POT CLAVULANATE, CODEINE    Referring Physician:  Reba Tolentino MD  Primary Physician:  Reba Tolentino MD  Technician:  LILLY Chand  RN:  Bessy Cordova RN  Group:  [facility]  Cardiology Fellow:  Ashleigh Garnett MD  Report Prepared by[de-identified]  Bessy Cordova RN  Interpreting Physician:  Ansley Vanegas MD    INDICATIONS: Detection of coronary artery disease  HISTORY: The patient is a 73year old female  Chest pain status: chest pain  Coronary artery disease risk factors: diabetes mellitus  Cardiovascular  history: none significant  Previous test results: abnormal ECG  PHYSICAL EXAM: Baseline physical exam screening: no wheezes audible  REST ECG: Normal sinus rhythm  PROCEDURE: The study was performed at the [address] [facility] [place]  A  regadenoson infusion pharmacologic stress test was performed  Gated SPECT  myocardial perfusion imaging was performed after stress  Systolic blood  pressure was 154 mmHg, at the start of the study  Diastolic blood pressure was  90 mmHg, at the start of the study  The heart rate was 67 bpm, at the start of  the study  IV double checked    Regadenoson protocol:  HR bpm SBP mmHg DBP mmHg Symptoms  Baseline 67 154 90 --  Immediate 108 146 70 mild dyspnea  1 min 74 148 80 headache  No medications or fluids given  The patient also performed low level exercise  STRESS SUMMARY: Duration of pharmacologic stress was 3 min  Functional capacity  could not be adequately assessed  Maximal heart rate during stress was 108 bpm   The heart rate response to stress was normal  There was resting hypertension  with an appropriate blood pressure response to stress  The rate-pressure  product for the peak heart rate and blood pressure was 29062  There was no  chest pain during stress  The stress test was terminated due to protocol  completion  Pre oxygen saturation: 97 %  Peak oxygen saturation: 99 %  The  stress ECG was negative for ischemia  There were no stress arrhythmias or  conduction abnormalities  ISOTOPE ADMINISTRATION:  Resting isotope administration Stress isotope administration  Agent Tetrofosmin Tetrofosmin  Dose 10 85 mCi 31 78 mCi  Date 12/12/2016 12/12/2016  Injection time 12:45 14:15  Imaging time 13:35 15:20  Injection-image interval 50 min 65 min    The radiopharmaceutical was injected at the peak effect of pharmacologic  stress  MYOCARDIAL PERFUSION IMAGING:  The image quality was good  Left ventricular size was normal  The TID ratio was  1  PERFUSION DEFECTS:  -  There were no perfusion defects  GATED SPECT:  The calculated left ventricular ejection fraction was 74 %  Left ventricular  ejection fraction was within normal limits by visual estimate  Ejection  fraction was overestimated due to small heart size  There was no left  ventricular regional abnormality  SUMMARY:  -  Stress results: There was resting hypertension with an appropriate blood  pressure response to stress  There was no chest pain during stress  -  ECG conclusions: The stress ECG was negative for ischemia  -  Perfusion imaging:  There were no perfusion defects   -  Gated SPECT: The calculated left ventricular ejection fraction was 74 %  Left ventricular ejection fraction was within normal limits by visual estimate  There was no left ventricular regional abnormality  IMPRESSIONS: Normal study after pharmacologic vasodilation without reproduction  of symptoms  Myocardial perfusion imaging was normal at rest and with stress  Left ventricular systolic function was normal     Prepared and signed by    Denton Lesches, MD  Signed 12/12/2016 16:08:19        Imaging: I have personally reviewed pertinent reports  CT abdomen pelvis with contrast    Result Date: 2/17/2023  Narrative: CT ABDOMEN AND PELVIS WITH IV CONTRAST INDICATION:   Abdominal pain, acute, nonlocalized abdominal pain  History of hysterectomy and tubal ligation and abdominoplasty  COMPARISON:  10/11/2019 chest CT TECHNIQUE:  CT examination of the abdomen and pelvis was performed  Axial, sagittal, and coronal 2D reformatted images were created from the source data and submitted for interpretation  Radiation dose length product (DLP) for this visit:  518 69 mGy-cm   This examination, like all CT scans performed in the [facility], was performed utilizing techniques to minimize radiation dose exposure, including the use of iterative  reconstruction and automated exposure control  IV Contrast:  90 mL of iohexol (OMNIPAQUE) Enteric Contrast:  Enteric contrast was not administered  FINDINGS: ABDOMEN LOWER CHEST:  Chronic moderate hiatal hernia  Mild linear scar  LIVER/BILIARY TREE:  Tiny right lobe hypodensities, one calcified, appear chronic and are considered benign  GALLBLADDER:  Within normal limits  SPLEEN:  Within normal limits  PANCREAS:  Chronic diffuse atrophy  ADRENAL GLANDS:  Within normal limits  KIDNEYS/URETERS:  Within normal limits  STOMACH AND BOWEL:  Hiatal hernia mentioned above  Small bowel is within normal limits   Short segment sigmoid bowel wall thickening with surrounding fatty infiltrative change and diverticulosis  No dilation or fluid levels  APPENDIX:  Within normal limits  ABDOMINOPELVIC CAVITY:  No abnormal air, fluid or enlarged lymph nodes  VESSELS:  Atherosclerosis  Normal aortic caliber  Patent central mesenteric vessels  PELVIS: REPRODUCTIVE ORGANS:  Hysterectomy  No adnexal mass  URINARY BLADDER:  Within normal limits  ABDOMINAL WALL/INGUINAL REGIONS:  Small, fat-containing umbilical hernia  OSSEOUS STRUCTURES:  Degenerative changes and mild lumbar scoliosis  The study was marked in [facility] for immediate notification  Impression: Findings most compatible with uncomplicated, acute sigmoid diverticulitis  Other stable nonemergent findings above  Workstation performed: [id number]     Mammo diagnostic bilateral w 3d & cad, US breast left limited (diagnostic)    Addendum Date: 2/8/2023 Addendum:   ADDENDUM: There is suboptimal occlusion of posterior soft tissue on left MLO view due to patient mobility limitations in the left shoulder  Best images obtained were submitted for review  Overall interpretation and recommendations are unchanged  ASSESSMENT/BI-RADS CATEGORY: Left: 2 - Benign Right: 1 - Negative Overall: 2 - Benign RECOMMENDATION:      - Routine Screening Mammogram in 1 year for both breasts  END ADDENDUM    Result Date: 2/8/2023  Narrative: DIAGNOSIS: Solitary cyst of left breast TECHNIQUE: Digital diagnostic mammography was performed  Computer Aided Detection (CAD) analyzed all applicable images  Ultrasound of the left breast(s) was performed  COMPARISONS: Prior breast imaging dated: 10/06/2021, 04/05/2021, 10/22/2020, 10/16/2020, 03/06/2019, 10/11/2017, 11/05/2014, 06/20/2012, and 05/30/2006 RELEVANT HISTORY: Family Breast Cancer History: History of breast cancer in Neg Hx  Family Medical History: Family medical history includes colon cancer in father  Personal History: No known relevant hormone history  Surgical history includes hysterectomy  No known relevant medical history   RISK ASSESSMENT: 5 Year Tyrer-Cuzick: 0 47 % 10 Year Tyrer-Cuzick: 1 01 % Lifetime Tyrer-Cuzick: 1 49 % TISSUE DENSITY: The breasts are almost entirely fatty  INDICATION: Dotty Amaya is a 71 y o  female presenting for follow-up left breast finding  Annual mammography  FINDINGS: LEFT B) CYST: Left breast 12:00 3 cm from nipple complicated cyst versus round circumscribed mass measuring 2 x 2 x 2 mm is slightly decreased in size change since 10/22/2020 ultrasound when it measured 5 x 3 x 3 mm  Elsewhere in both breast on mammography, there are no suspicious masses, grouped microcalcifications or areas of unexplained architectural distortion  The skin and nipple areolar complex are unremarkable  Impression: Benign findings  ASSESSMENT/BI-RADS CATEGORY: Left: 2 - Benign Right: 1 - Negative Overall: 2 - Benign RECOMMENDATION:      - Routine screening mammogram in 1 year for both breasts   Workstation ID: [id number]       EKG reviewed personally: non specific T inversions in lead 3 and aVF  Telemetry reviewed personally: NSR          Code Status: Level 1 - Full Code

## 2023-02-18 NOTE — ASSESSMENT & PLAN NOTE
Lab Results   Component Value Date    HGBA1C 9 0 (H) 02/13/2023       Recent Labs     02/17/23  1637   POCGLU 120       Blood Sugar Average: Last 72 hrs:  (P) 120   · Taking Lantus 25 units at bedtime, Humalog 6 units 3 times daily with meals  · Start Lantus 20 units with meal, homolog 4 units 3 times daily with meal with sliding scale

## 2023-02-18 NOTE — DISCHARGE SUMMARY
[facility]  Discharge- Becki Roman 1950, 71 y o  female MRN: [id number]  Unit/Bed#: Ted Zavala 212-02 Encounter: [id number]  Primary Care Provider: Leoncio Bingham MD   Date and time admitted to hospital: 2/17/2023 10:12 AM    * Acute diverticulitis  Assessment & Plan  Patient presents with lower abdominal pain, denies diarrhea constipation or blood in stool  CT abdomen showed uncomplicated acute sigmoid diverticulitis  Patient had a normal colonoscopy 2 years ago  · IV ceftriaxone/Flagyl started on admission, transition to Augmentin on discharge to complete total 5 day course   · Patient reports getting yeast infection with antibiotics  Insisting on getting Diflucan  Currently asymptomatic  · Abdominal pain improved/resolved     Abnormal EKG  Assessment & Plan  T wave inversion in inferior leads  Subtle T wave inversion was present in the same leads about 10 years ago, patient had a stress test a few years ago which per her was normal it was done prior to her hysterectomy  · Patient denies chest pain and SOB at rest and with exertion  · She has risk factors as age, hypertension, hyperlipidemia, diabetes  · Troponins x3 negativ  · Echocardiogram: Left ventricular cavity size is normal  Wall thickness is mildly increased  The left ventricular ejection fraction is 55%  Systolic function is normal  Wall motion is normal  Diastolic function is mildly abnormal, consistent with grade I (abnormal) relaxation  Mild tricuspid regurgitation    · Cardiology consulted per patient request   · EKG abnormalities are non specific and do not require further inpatient work-up  · Outpatient follow-up     Type 2 diabetes mellitus with hypoglycemia without coma, with long-term current use of insulin [facility])  Assessment & Plan  Lab Results   Component Value Date    HGBA1C 9 0 (H) 02/13/2023       Recent Labs     02/17/23  1637 02/17/23  2222 02/18/23  0536   POCGLU 120 175* 164*       Blood Sugar Average: Last 72 hrs:  (P) 153   · Resume PTA regimen on discharge   · Outpatient follow-up     Hyperlipidemia  Assessment & Plan  · Continue statin, Zetia    Hypertension  Assessment & Plan  · Continue amlodipine 2 5 mg daily, hydrochlorothiazide 12 5 mg daily and losartan 50 mg twice a day    Rheumatoid arthritis of multiple sites with negative rheumatoid factor [facility])  Assessment & Plan  · Continue Plaquenil    Medical Problems     Resolved Problems  Date Reviewed: 2/18/2023   None       Discharging Physician / Practitioner: Zbigniew Mays PA-C  PCP: Johnnie Valdez MD  Admission Date:   Admission Orders (From admission, onward)     Ordered        02/17/23 1619  Place in Observation  Once                      Discharge Date: 02/18/23    Consultations During Hospital Stay:  · Cardiology     Procedures Performed:   · None     Significant Findings / Test Results:   · Outlined above     Incidental Findings:   · None      Test Results Pending at Discharge (will require follow up): · None      Outpatient Tests Requested:  · None     Complications:  None     Reason for Admission: acute diverticulitis     Hospital Course:   Leesa Gunderson is a 71 y o  female patient who originally presented to the hospital on 2/17/2023 due to abdominal pain  CT AP showed uncomplicated acute sigmoid diverticulitis and she was started on IV ceftriaxone/flagyl with improvement in symptoms  She was discharged on Augmentin to complete total 5 day course  She was seen by cardiology given concern for EKG changes  Echo essentially unremarkable  Cardiology cleared without need for further workup as inpatient  Please see above list of diagnoses and related plan for additional information  Condition at Discharge: good    Discharge Day Visit / Exam:   Subjective:  Patient reports feeling well this morning, abdominal pain has improved  She is eager for discharge     Vitals: Blood Pressure: 137/79 (02/18/23 0931)  Pulse: 68 (02/18/23 3077)  Temperature: 98 2 °F (36 8 °C) (02/18/23 0557)  Temp Source: Oral (02/18/23 0557)  Respirations: 19 (02/18/23 0846)  Height: 5' 1" (154 9 cm) (02/18/23 0931)  Weight - Scale: 70 8 kg (156 lb) (02/18/23 0931)  SpO2: 94 % (02/18/23 0900)  Exam:   Physical Exam  Vitals reviewed  Constitutional:       General: She is not in acute distress  Cardiovascular:      Rate and Rhythm: Normal rate and regular rhythm  Pulmonary:      Effort: Pulmonary effort is normal  No respiratory distress  Neurological:      General: No focal deficit present  Mental Status: She is alert and oriented to person, place, and time  Mental status is at baseline  Discussion with Family: Patient declined call to   Discharge instructions/Information to patient and family:   See after visit summary for information provided to patient and family  Provisions for Follow-Up Care:  See after visit summary for information related to follow-up care and any pertinent home health orders  Disposition:   Home    Planned Readmission: no     Discharge Statement:  I spent 25 minutes discharging the patient  This time was spent on the day of discharge  I had direct contact with the patient on the day of discharge  Greater than 50% of the total time was spent examining patient, answering all patient questions, arranging and discussing plan of care with patient as well as directly providing post-discharge instructions  Additional time then spent on discharge activities  Discharge Medications:  See after visit summary for reconciled discharge medications provided to patient and/or family        **Please Note: This note may have been constructed using a voice recognition system**

## 2023-02-18 NOTE — PLAN OF CARE
Problem: PAIN - ADULT  Goal: Verbalizes/displays adequate comfort level or baseline comfort level  Description: Interventions:  - Encourage patient to monitor pain and request assistance  - Assess pain using appropriate pain scale  - Administer analgesics based on type and severity of pain and evaluate response  - Implement non-pharmacological measures as appropriate and evaluate response  - Consider cultural and social influences on pain and pain management  - Notify physician/advanced practitioner if interventions unsuccessful or patient reports new pain  Outcome: Progressing     Problem: INFECTION - ADULT  Goal: Absence or prevention of progression during hospitalization  Description: INTERVENTIONS:  - Assess and monitor for signs and symptoms of infection  - Monitor lab/diagnostic results  - Monitor all insertion sites, i e  indwelling lines, tubes, and drains  - Monitor endotracheal if appropriate and nasal secretions for changes in amount and color  - Spencer appropriate cooling/warming therapies per order  - Administer medications as ordered  - Instruct and encourage patient and family to use good hand hygiene technique  - Identify and instruct in appropriate isolation precautions for identified infection/condition  Outcome: Progressing     Problem: INFECTION - ADULT  Goal: Absence of fever/infection during neutropenic period  Description: INTERVENTIONS:  - Monitor WBC    Outcome: Progressing     Problem: SAFETY ADULT  Goal: Patient will remain free of falls  Description: INTERVENTIONS:  - Educate patient/family on patient safety including physical limitations  - Instruct patient to call for assistance with activity   - Consult OT/PT to assist with strengthening/mobility   - Keep Call bell within reach  - Keep bed low and locked with side rails adjusted as appropriate  - Keep care items and personal belongings within reach  - Initiate and maintain comfort rounds  - Make Fall Risk Sign visible to staff  - Offer Toileting every  2 Hours, in advance of need  - Initiate/Maintain  bed alarm  - Obtain necessary fall risk management equipment:  -     Problem: DISCHARGE PLANNING  Goal: Discharge to home or other facility with appropriate resources  Description: INTERVENTIONS:  - Identify barriers to discharge w/patient and caregiver  - Arrange for needed discharge resources and transportation as appropriate  - Identify discharge learning needs (meds, wound care, etc )  - Arrange for interpretive services to assist at discharge as needed  - Refer to Case Management Department for coordinating discharge planning if the patient needs post-hospital services based on physician/advanced practitioner order or complex needs related to functional status, cognitive ability, or social support system  Outcome: Progressing     Problem: Knowledge Deficit  Goal: Patient/family/caregiver demonstrates understanding of disease process, treatment plan, medications, and discharge instructions  Description: Complete learning assessment and assess knowledge base    Interventions:  - Provide teaching at level of understanding  - Provide teaching via preferred learning methods  Outcome: Progressing     Problem: CARDIOVASCULAR - ADULT  Goal: Maintains optimal cardiac output and hemodynamic stability  Description: INTERVENTIONS:  - Monitor I/O, vital signs and rhythm  - Monitor for S/S and trends of decreased cardiac output  - Administer and titrate ordered vasoactive medications to optimize hemodynamic stability  - Assess quality of pulses, skin color and temperature  - Assess for signs of decreased coronary artery perfusion  - Instruct patient to report change in severity of symptoms  Outcome: Progressing     Problem: CARDIOVASCULAR - ADULT  Goal: Absence of cardiac dysrhythmias or at baseline rhythm  Description: INTERVENTIONS:  - Continuous cardiac monitoring, vital signs, obtain 12 lead EKG if ordered  - Administer antiarrhythmic and heart rate control medications as ordered  - Monitor electrolytes and administer replacement therapy as ordered  Outcome: Progressing     Problem: GASTROINTESTINAL - ADULT  Goal: Minimal or absence of nausea and/or vomiting  Description: INTERVENTIONS:  - Administer IV fluids if ordered to ensure adequate hydration  - Maintain NPO status until nausea and vomiting are resolved  - Nasogastric tube if ordered  - Administer ordered antiemetic medications as needed  - Provide nonpharmacologic comfort measures as appropriate  - Advance diet as tolerated, if ordered  - Consider nutrition services referral to assist patient with adequate nutrition and appropriate food choices  Outcome: Progressing     Problem: GASTROINTESTINAL - ADULT  Goal: Maintains or returns to baseline bowel function  Description: INTERVENTIONS:  - Assess bowel function  - Encourage oral fluids to ensure adequate hydration  - Administer IV fluids if ordered to ensure adequate hydration  - Administer ordered medications as needed  - Encourage mobilization and activity  - Consider nutritional services referral to assist patient with adequate nutrition and appropriate food choices  Outcome: Progressing     Problem: GASTROINTESTINAL - ADULT  Goal: Maintains adequate nutritional intake  Description: INTERVENTIONS:  - Monitor percentage of each meal consumed  - Identify factors contributing to decreased intake, treat as appropriate  - Assist with meals as needed  - Monitor I&O, weight, and lab values if indicated  - Obtain nutrition services referral as needed  Outcome: Progressing     Problem: GASTROINTESTINAL - ADULT  Goal: Oral mucous membranes remain intact  Description: INTERVENTIONS  - Assess oral mucosa and hygiene practices  - Implement preventative oral hygiene regimen  - Implement oral medicated treatments as ordered  - Initiate Nutrition services referral as needed  Outcome: Progressing     Problem: METABOLIC, FLUID AND ELECTROLYTES - ADULT  Goal: Electrolytes maintained within normal limits  Description: INTERVENTIONS:  - Monitor labs and assess patient for signs and symptoms of electrolyte imbalances  - Administer electrolyte replacement as ordered  - Monitor response to electrolyte replacements, including repeat lab results as appropriate  - Instruct patient on fluid and nutrition as appropriate  Outcome: Progressing     Problem: METABOLIC, FLUID AND ELECTROLYTES - ADULT  Goal: Fluid balance maintained  Description: INTERVENTIONS:  - Monitor labs   - Monitor I/O and WT  - Instruct patient on fluid and nutrition as appropriate  - Assess for signs & symptoms of volume excess or deficit  Outcome: Progressing     Problem: METABOLIC, FLUID AND ELECTROLYTES - ADULT  Goal: Glucose maintained within target range  Description: INTERVENTIONS:  - Monitor Blood Glucose as ordered  - Assess for signs and symptoms of hyperglycemia and hypoglycemia  - Administer ordered medications to maintain glucose within target range  - Assess nutritional intake and initiate nutrition service referral as needed  Outcome: Progressing   Apply yellow socks and bracelet for high fall risk patients  - Consider moving patient to room near nurses station  Outcome: Progressing

## 2023-02-18 NOTE — ASSESSMENT & PLAN NOTE
T wave inversion in inferior leads  Subtle T wave inversion was present in the same leads about 10 years ago, patient had a stress test a few years ago which per her was normal it was done prior to her hysterectomy  · Patient denies chest pain and SOB at rest and with exertion  · She has risk factors as age, hypertension, hyperlipidemia, diabetes  · Troponins x3 negativ  · Echocardiogram: Left ventricular cavity size is normal  Wall thickness is mildly increased  The left ventricular ejection fraction is 55%  Systolic function is normal  Wall motion is normal  Diastolic function is mildly abnormal, consistent with grade I (abnormal) relaxation  Mild tricuspid regurgitation    · Cardiology consulted per patient request   · EKG abnormalities are non specific and do not require further inpatient work-up  · Outpatient follow-up

## 2023-02-18 NOTE — ASSESSMENT & PLAN NOTE
· Continue amlodipine 2 5 mg daily, hydrochlorothiazide 12 5 mg daily and losartan 50 mg twice a day

## 2023-02-18 NOTE — H&P
[facility]  H&P- Becki Roman 1950, 71 y o  female MRN: [id number]  Unit/Bed#: ED 19 Encounter: [id number]  Primary Care Provider: Kiel Velez MD   Date and time admitted to hospital: 2/17/2023 10:12 AM    * Diverticulitis  Assessment & Plan  · Patient presents with lower abdominal pain, denies diarrhea constipation or blood in stool  · CT abdomen showed uncomplicated acute sigmoid diverticulitis  · Continue ceftriaxone/Flagyl  · Patient reports getting yeast infection with antibiotics  Insisting on getting Diflucan  Currently asymptomatic  · Patient had a normal colonoscopy 2 years ago    Abnormal EKG  Assessment & Plan  · T wave inversion in inferior leads  Subtle T wave inversion was present in the same leads about 10 years ago, patient had a stress test a few years ago which per her was normal it was done prior to her hysterectomy  · Patient denies chest pain at rest or exertion    She has not been that active but she is walking 1 flight of stairs without getting short of breath or having chest pain  · She has risk factors as age, hypertension, hyperlipidemia, diabetes  · Troponins x3 negative  · Echo ordered  · Discussed with patient, plan was if echo is negative to follow-up with cardiology as outpatient   · Patient is insisting on seeing cardiology as inpatient    Type 2 diabetes mellitus with hypoglycemia without coma, with long-term current use of insulin [facility])  Assessment & Plan  Lab Results   Component Value Date    HGBA1C 9 0 (H) 02/13/2023       Recent Labs     02/17/23  1637   POCGLU 120       Blood Sugar Average: Last 72 hrs:  (P) 120   · Taking Lantus 25 units at bedtime, Humalog 6 units 3 times daily with meals  · Start Lantus 20 units with meal, homolog 4 units 3 times daily with meal with sliding scale    Hyperlipidemia  Assessment & Plan  · Continue statin, Zetia    Hypertension  Assessment & Plan  · Patient does not know her medications, she said everything that in the chart she is taking  · Continue amlodipine 2 5 mg daily, hydrochlorothiazide 12 5 mg daily and losartan 50 mg twice a day    Rheumatoid arthritis of multiple sites with negative rheumatoid factor (HCC)  Assessment & Plan  · Continue Plaquenil    Asthma  Assessment & Plan  · Monitor acute exacerbation, continue inhalers    VTE Pharmacologic Prophylaxis: VTE Score: 4 Moderate Risk (Score 3-4) - Pharmacological DVT Prophylaxis Ordered: heparin  Code Status: Level 1 - Full Code   Discussion with family: Patient declined call to   Anticipated Length of Stay: Patient will be admitted on an observation basis with an anticipated length of stay of less than 2 midnights secondary to Abnormal EKG, diverticulitis  Total Time Spent on Date of Encounter in care of patient: 55 minutes This time was spent on one or more of the following: performing physical exam; counseling and coordination of care; obtaining or reviewing history; documenting in the medical record; reviewing/ordering tests, medications or procedures; communicating with other healthcare professionals and discussing with patient's family/caregivers  Chief Complaint: Abdominal pain, abnormal EKG    History of Present Illness:  Stephen Richter is a 71 y o  female with a PMH of hypertension, hyperlipidemia, diabetes, rheumatoid arthritis who presents with abdominal pain  About a week ago developed low abdominal pain, denies nausea vomiting  Denies diarrhea or constipation  No fever or chills  Patient denies any chest pain palpitation or shortness of breath at rest or exertion  She is not that active but she is walking 1 flight of stairs without getting shortness of breath or chest pain  Review of Systems:  Review of Systems   Constitutional: Negative for activity change, chills and fever  HENT: Negative  Eyes: Negative  Respiratory: Negative for cough and shortness of breath      Cardiovascular: Negative for chest pain, palpitations and leg swelling  Gastrointestinal: Positive for abdominal pain  Negative for constipation, diarrhea, nausea and vomiting  Endocrine: Negative  Genitourinary: Negative for dysuria  Musculoskeletal: Negative  Skin: Negative  Neurological: Negative for dizziness  Hematological: Negative  Psychiatric/Behavioral: Negative  Past Medical and Surgical History:   Past Medical History:   Diagnosis Date   • Anxiety    • Anxiety    • Arthritis of right knee    • Cellulitis    • Contact dermatitis    • Cough    • Diabetes mellitus ([address] )    • GERD without esophagitis    • Headache    • Hiatal hernia    • Hyperlipidemia    • Knee sprain    • Lyme disease    • Tick bite        Past Surgical History:   Procedure Laterality Date   • ABDOMINAL SURGERY      tummy tuck   • CATARACT EXTRACTION, BILATERAL Bilateral 11/21 and 12/21   • HYSTERECTOMY      age 51   • OH COLONOSCOPY FLX DX W/COLLJ SPEC WHEN PFRMD N/A 5/8/2019    Procedure: COLONOSCOPY;  Surgeon: Esperanza Acuna MD;  Location: [facility]; Service: Gastroenterology   • OH ESOPHAGOGASTRODUODENOSCOPY TRANSORAL DIAGNOSTIC N/A 5/8/2019    Procedure: ESOPHAGOGASTRODUODENOSCOPY (EGD); Surgeon: Esperanza Acuna MD;  Location: [facility]; Service: Gastroenterology   • TUBAL LIGATION         Meds/Allergies:  Prior to Admission medications    Medication Sig Start Date End Date Taking?  Authorizing Provider   albuterol (2 5 mg/3 mL) 0 083 % nebulizer solution Take 3 mL (2 5 mg total) by nebulization every 6 (six) hours as needed for wheezing or shortness of breath 52/8/05   César Collazo MD   amLODIPine (NORVASC) 2 5 mg tablet Take 1 tablet (2 5 mg total) by mouth daily at bedtime 11/18/22   Trenton Guevara MD   artificial tear (LUBRIFRESH P M ) 83-15 % ophthalmic ointment daily at bedtime    Historical Provider, MD   aspirin 81 mg chewable tablet Chew 81 mg daily    Historical Provider, MD   baclofen 10 mg tablet Take 10 mg by mouth daily at bedtime 6/25/19   Historical Provider, MD   benzonatate (TESSALON) 200 MG capsule Take 1 capsule (200 mg total) by mouth 3 (three) times a day as needed for cough  Patient not taking: Reported on 4/26/2021 12/21/19   PAWAN Saeed Ellipta 200-25 MCG/ACT inhaler INHALE ONE INHALATION BY MOUTH EVERY DAY - (RINSE MOUTH AND SPIT AFTER USE, DISCARD SIX WEEKS AFTER REMOVAL FROM FOIL POUCH) 8/88/05   Karyn Flores MD   Cholecalciferol (VITAMIN D PO) Take 1 tablet by mouth daily    Historical Provider, MD   Diclofenac Sodium (VOLTAREN) 1 % Apply 1 application topically 4 (four) times a day    Historical Provider, MD   ezetimibe (ZETIA) 10 mg tablet Take 0 5 tablets (5 mg total) by mouth daily 11/18/22 5/17/23  Pat Aguilar MD   Ferrous Sulfate Dried (FERROUS SULFATE CR PO) Iron TABS    Refills: 0       Active    Historical Provider, MD   glucose blood (Contour Next Test) test strip Use 1 each 4 (four) times a day 1/26/22   Blair Sharif PA-C   hydrochlorothiazide (HYDRODIURIL) 12 5 mg tablet Take 1 tablet (12 5 mg total) by mouth daily 8/3/22   Pat Aguilar MD   hydroxychloroquine (PLAQUENIL) 200 mg tablet Take 200 mg by mouth 2 (two) times a day with meals    Historical Provider, MD   insulin aspart (NovoLOG FlexPen) 100 UNIT/ML injection pen Inject 6 Units under the skin 3 (three) times a day with meals 11/18/22 2/16/23  Pat Aguilar MD   Insulin Glargine Solostar (Lantus SoloStar) 100 UNIT/ML SOPN Inject 0 25 mL (25 Units total) under the skin daily at bedtime 11/18/22   Pat Aguilar MD   Insulin Pen Needle (BD Pen Needle Ester U/F) 32G X 4 MM MISC Inject under the skin 4 (four) times a day 1/14/22   Blair Sharif PA-C   Lifitegrast (Vallarie [place] OP) Apply to eye 2 vials a day      Historical Provider, MD   losartan (COZAAR) 25 mg tablet Take 2 tablets (50 mg total) by mouth 2 (two) times a day 11/18/22   Pat Aguilar MD   metFORMIN (GLUCOPHAGE) 500 mg tablet Take 2 tablets (1,000 mg total) by mouth 2 (two) times a day with meals 22  Yajaira Zelaya MD   Microlet Lancets MISC Use 4 (four) times a day 22   Roscoe Rodriguez PA-C   predniSONE 20 mg tablet One po q day  Patient not taking: Reported on 2022   Tiera Camacho MD   predniSONE 20 mg tablet 2 TABS PO Q DAY WITH FOOD    Tiera Camacho MD   sertraline (ZOLOFT) 50 mg tablet TAKE ONE TABLET BY MOUTH EVERY DAY 3/78/25   Tiera Camacho MD   simvastatin (ZOCOR) 20 mg tablet Take 1 tablet (20 mg total) by mouth daily 22   Yajaira Zelaya MD   valACYclovir (VALTREX) 1,000 mg tablet Take 1,000 mg by mouth daily    Historical Provider, MD     I have reviewed home medications using recent Epic encounter  Allergies: Allergies   Allergen Reactions   • Augmentin [Amoxicillin-Pot Clavulanate]    • Codeine Drowsiness   • Macrobid [Nitrofurantoin] Other (See Comments)     Pt does not recall       Social History:  Marital Status:       Substance Use History:   Social History     Substance and Sexual Activity   Alcohol Use Not Currently    Comment: seldom     Social History     Tobacco Use   Smoking Status Former   • Packs/day: 0 50   • Years: 25 00   • Pack years: 12 50   • Types: Cigarettes   • Quit date: 2008   • Years since quittin 5   Smokeless Tobacco Never     Social History     Substance and Sexual Activity   Drug Use Yes   • Types: Marijuana    Comment: smokes weed 3x per week       Family History:  Family History   Problem Relation Age of Onset   • Mental illness Mother    • Heart disease Father    • Colon cancer Father 67   • No Known Problems Sister    • No Known Problems Daughter    • No Known Problems Maternal Grandmother    • No Known Problems Maternal Grandfather    • No Known Problems Paternal Grandmother    • No Known Problems Paternal Grandfather    • No Known Problems Son    • No Known Problems Sister    • No Known Problems Sister    • No Known Problems Sister    • No Known Problems Sister    • No Known Problems Sister    • Lymphoma Brother 45   • No Known Problems Brother    • No Known Problems Paternal Aunt    • No Known Problems Paternal Aunt    • No Known Problems Paternal Aunt    • No Known Problems Paternal Aunt    • Breast cancer Neg Hx    • Breast cancer additional onset Neg Hx    • Endometrial cancer Neg Hx    • Ovarian cancer Neg Hx    • BRCA 1/2 Neg Hx    • BRCA1 Negative Neg Hx    • BRCA1 Positive Neg Hx    • BRCA2 Negative Neg Hx    • BRCA2 Positive Neg Hx        Physical Exam:     Vitals:   Blood Pressure: 166/80 (02/17/23 1800)  Pulse: 88 (02/17/23 1800)  Temperature: (!) 97 °F (36 1 °C) (02/17/23 1018)  Temp Source: Tympanic (02/17/23 1018)  Respirations: 18 (02/17/23 1800)  SpO2: 93 % (02/17/23 1800)    Physical Exam  Constitutional:       General: She is not in acute distress  HENT:      Head: Atraumatic  Cardiovascular:      Rate and Rhythm: Normal rate and regular rhythm  Heart sounds: No murmur heard  No friction rub  No gallop  Pulmonary:      Effort: Pulmonary effort is normal  No respiratory distress  Breath sounds: Normal breath sounds  No wheezing  Abdominal:      General: Bowel sounds are normal       Palpations: Abdomen is soft  Tenderness: There is abdominal tenderness  Musculoskeletal:         General: No swelling  Cervical back: Neck supple  Skin:     General: Skin is warm and dry  Neurological:      General: No focal deficit present  Mental Status: She is alert     Psychiatric:         Mood and Affect: Mood normal           Additional Data:     Lab Results:  Results from last 7 days   Lab Units 02/17/23  1125   WBC Thousand/uL 9 67   HEMOGLOBIN g/dL 13 1   HEMATOCRIT % 41 3   PLATELETS Thousands/uL 241   NEUTROS PCT % 84*   LYMPHS PCT % 11*   MONOS PCT % 4   EOS PCT % 1     Results from last 7 days   Lab Units 02/17/23  1125   SODIUM mmol/L 139   POTASSIUM mmol/L 4 2   CHLORIDE mmol/L 105   CO2 mmol/L 26   BUN mg/dL 16   CREATININE mg/dL 0 62   ANION GAP mmol/L 8   CALCIUM mg/dL 8 9   ALBUMIN g/dL 3 1*   TOTAL BILIRUBIN mg/dL 0 55   ALK PHOS U/L 83   ALT U/L 20   AST U/L 22   GLUCOSE RANDOM mg/dL 164*         Results from last 7 days   Lab Units 02/17/23  1637   POC GLUCOSE mg/dl 120     Results from last 7 days   Lab Units 02/13/23  0734   HEMOGLOBIN A1C % of total Hgb 9 0*           Lines/Drains:  Invasive Devices     Peripheral Intravenous Line  Duration           Peripheral IV 02/17/23 Left Antecubital <1 day                    Imaging: Reviewed radiology reports from this admission including: abdominal/pelvic CT  CT abdomen pelvis with contrast   Final Result by Glenda Juarez MD (02/17 1433)      Findings most compatible with uncomplicated, acute sigmoid diverticulitis  Other stable nonemergent findings above  Workstation performed: [id number]             EKG and Other Studies Reviewed on Admission:   · EKG: Sinus rhythm with T wave inversion in inferior leads  ** Please Note: This note has been constructed using a voice recognition system   **

## 2023-02-20 ENCOUNTER — OFFICE VISIT (OUTPATIENT)
Dept: GASTROENTEROLOGY | Facility: CLINIC | Age: 73
End: 2023-02-20

## 2023-02-20 ENCOUNTER — TRANSITIONAL CARE MANAGEMENT (OUTPATIENT)
Dept: FAMILY MEDICINE CLINIC | Facility: CLINIC | Age: 73
End: 2023-02-20

## 2023-02-20 ENCOUNTER — OFFICE VISIT (OUTPATIENT)
Dept: URGENT CARE | Facility: CLINIC | Age: 73
End: 2023-02-20

## 2023-02-20 VITALS
SYSTOLIC BLOOD PRESSURE: 140 MMHG | OXYGEN SATURATION: 98 % | DIASTOLIC BLOOD PRESSURE: 82 MMHG | TEMPERATURE: 96.9 F | RESPIRATION RATE: 16 BRPM | HEART RATE: 72 BPM

## 2023-02-20 VITALS
WEIGHT: 152 LBS | TEMPERATURE: 96.8 F | BODY MASS INDEX: 28.7 KG/M2 | SYSTOLIC BLOOD PRESSURE: 132 MMHG | HEART RATE: 80 BPM | HEIGHT: 61 IN | DIASTOLIC BLOOD PRESSURE: 86 MMHG

## 2023-02-20 DIAGNOSIS — S31.139A PUNCTURE WOUND OF ABDOMEN, INITIAL ENCOUNTER: Primary | ICD-10-CM

## 2023-02-20 DIAGNOSIS — K57.92 ACUTE DIVERTICULITIS: Primary | ICD-10-CM

## 2023-02-20 RX ORDER — METRONIDAZOLE 500 MG/1
500 TABLET ORAL EVERY 8 HOURS SCHEDULED
Qty: 30 TABLET | Refills: 0 | Status: SHIPPED | OUTPATIENT
Start: 2023-02-20 | End: 2023-03-02

## 2023-02-20 RX ORDER — CIPROFLOXACIN 500 MG/1
500 TABLET, FILM COATED ORAL EVERY 12 HOURS SCHEDULED
Qty: 20 TABLET | Refills: 0 | Status: SHIPPED | OUTPATIENT
Start: 2023-02-20 | End: 2023-03-02

## 2023-02-20 NOTE — PATIENT INSTRUCTIONS
Diverticulitis  WHAT YOU NEED TO KNOW:   Diverticulitis is a condition that causes small pockets along your intestine called diverticula to become inflamed or infected  This is caused by hard bowel movements, food, or bacteria that get stuck in the pockets  DISCHARGE INSTRUCTIONS:   Return to the emergency department if:   You have bowel movement or foul-smelling discharge leaking from your vagina or in your urine  You have severe diarrhea  You urinate less than usual or not at all  You are not able to have a bowel movement  You cannot stop vomiting  You have severe abdominal pain, a fever, and your abdomen is larger than usual     You have new or increased blood in your bowel movements  Call your doctor if:   You have pain when you urinate  Your symptoms get worse or do not go away  You have questions or concerns about your condition or care  Medicines:   Antibiotics  may be given to help treat a bacterial infection  Prescription pain medicine  may be given  Ask your healthcare provider how to take this medicine safely  Some prescription pain medicines contain acetaminophen  Do not take other medicines that contain acetaminophen without talking to your healthcare provider  Too much acetaminophen may cause liver damage  Prescription pain medicine may cause constipation  Ask your healthcare provider how to prevent or treat constipation  Take your medicine as directed  Contact your healthcare provider if you think your medicine is not helping or if you have side effects  Tell your provider if you are allergic to any medicine  Keep a list of the medicines, vitamins, and herbs you take  Include the amounts, and when and why you take them  Bring the list or the pill bottles to follow-up visits  Carry your medicine list with you in case of an emergency  Clear liquid diet:  A clear liquid diet includes any liquids that you can see through   Examples include water, ginger-kiley, cranberry or apple juice, frozen fruit ice, or broth  Stay on a clear liquid diet until your symptoms are gone, or as directed  Follow up with your doctor as directed: You may need to return for a colonoscopy  When your symptoms are gone, you may need a low-fat, high-fiber diet to prevent diverticulitis from developing again  Your healthcare provider or dietitian can help you create meal plans  Write down your questions so you remember to ask them during your visits  © Copyright [employer] 2022 Information is for End User's use only and may not be sold, redistributed or otherwise used for commercial purposes  The above information is an  only  It is not intended as medical advice for individual conditions or treatments  Talk to your doctor, nurse or pharmacist before following any medical regimen to see if it is safe and effective for you  Scheduled date of colonoscopy (as of today):04/20/2023  Physician performing colonoscopy:Osiel  [facility] of colonoscopy: [facility]  Bowel prep reviewed with patient:Yaneli  Instructions reviewed with patient by:Hanh  Clearances:  N/A

## 2023-02-20 NOTE — PROGRESS NOTES
[facility] - Outpatient Consultation  Becki Roman 71 y o  female MRN: [id number]  Encounter: [id number]          ASSESSMENT AND PLAN:       Dea Lopez is a 70 y/o female with DM2, HLD, HTN, RA who presents for hospital follow-up  1  Acute diverticulitis  Pt admitted from 2/17-2/18 with acute, non-complicated diverticulitis  She was d/c on augmentin but has GI intolerance to this so she did not take this  She says she only had 1 day of cipro/flagyl prior to d/c  She says she is still constipated but the pain is slowly decreasing  Last colonoscopy 2019 noted diverticulosis, otherwise normal  Pt TTP on physical exam   -re-start cipro/flagyl x 10-day course: she would like to do full 10-day course due to disruption in tx regimen, which is reasonable due to ongoing symptoms   -start OTC miralax PRN  -colonoscopy to be done in 6-8 weeks to rule out underling malignancy  -low fiber/low residue diet for now; please switch to high-fiber diet after you compete Abx course and are pain-free     2  Poor wound healing  Pt complains of "open hole" in abdomen that is bleeding since d/c, which she believes was caused by insulin Injection  On physical exam, very small puncture wound with surrounding ecchymosis and dried blood is noted  Pt had elevated sugars during her admission   -I explained I suspect this is due to her poorly-controlled sugars causing poor wound healing but I would like her to be seen by urgent care or PCP ASAP first  She says she is going to call her PCP today    -keep wound clean and dry with gauze and antibiotic ointment     ______________________________________________________________________    HPI:  Dea Lopez is a 70 y/o female with DM2, HLD, HTN, RA who presents for hospital follow-up  Pt says about 4-5 days prior to going to the  ED, she started having BL lower abdominal pain and constipation   She says she is very displeased with how she was tx during her admission as she was given augmentin on d/c, which she gets GI intolerance from and she says she now has a "open hole" in her abdomen that is bleeding due to insulin injections  Pt says she is still constipated but the pain is slowly getting better  Pt denies bloody or black BMs, family hx of colon cancer, unintentional weight loss, fevers, chills, night sweats, bloody or black BMs  Pt denies heartburn and frequent NSAID use  REVIEW OF SYSTEMS:    CONSTITUTIONAL: Denies any fever, chills, rigors, and weight loss  HEENT: No earache or tinnitus  Denies hearing loss or visual disturbances  CARDIOVASCULAR: No chest pain or palpitations  RESPIRATORY: Denies any cough, hemoptysis, shortness of breath or dyspnea on exertion  GASTROINTESTINAL: As noted in the History of Present Illness  GENITOURINARY: No problems with urination  Denies any hematuria or dysuria  NEUROLOGIC: No dizziness or vertigo, denies headaches  MUSCULOSKELETAL: Denies any muscle or joint pain  SKIN: Denies skin rashes or itching  ENDOCRINE: Denies excessive thirst  Denies intolerance to heat or cold  PSYCHOSOCIAL: Denies depression or anxiety  Denies any recent memory loss  Historical Information   Past Medical History:   Diagnosis Date   • Anxiety    • Anxiety    • Arthritis of right knee    • Cellulitis    • Contact dermatitis    • Cough    • Diabetes mellitus ([facility] [address] )    • GERD without esophagitis    • Headache    • Hiatal hernia    • Hyperlipidemia    • Knee sprain    • Lyme disease    • Tick bite      Past Surgical History:   Procedure Laterality Date   • ABDOMINAL SURGERY      tummy tuck   • CATARACT EXTRACTION, BILATERAL Bilateral 11/21 and 12/21   • HYSTERECTOMY      age 51   • AZ COLONOSCOPY FLX DX W/COLLJ SPEC WHEN PFRMD N/A 5/8/2019    Procedure: COLONOSCOPY;  Surgeon: Rupal Chilel MD;  Location: [facility];   Service: Gastroenterology   • AZ ESOPHAGOGASTRODUODENOSCOPY TRANSORAL DIAGNOSTIC N/A 5/8/2019    Procedure: ESOPHAGOGASTRODUODENOSCOPY (EGD); Surgeon: Nuris Turner MD;  Location: [facility];   Service: Gastroenterology   • TUBAL LIGATION       Social History   Social History     Substance and Sexual Activity   Alcohol Use Not Currently    Comment: seldom     Social History     Substance and Sexual Activity   Drug Use Yes   • Types: Marijuana    Comment: smokes weed 3x per week     Social History     Tobacco Use   Smoking Status Former   • Packs/day: 0 50   • Years: 25 00   • Pack years: 12 50   • Types: Cigarettes   • Quit date: 2008   • Years since quittin 5   Smokeless Tobacco Never     Family History   Problem Relation Age of Onset   • Mental illness Mother    • Heart disease Father    • Colon cancer Father 67   • No Known Problems Sister    • No Known Problems Daughter    • No Known Problems Maternal Grandmother    • No Known Problems Maternal Grandfather    • No Known Problems Paternal Grandmother    • No Known Problems Paternal Grandfather    • No Known Problems Son    • No Known Problems Sister    • No Known Problems Sister    • No Known Problems Sister    • No Known Problems Sister    • No Known Problems Sister    • Lymphoma Brother 45   • No Known Problems Brother    • No Known Problems Paternal Aunt    • No Known Problems Paternal Aunt    • No Known Problems Paternal Aunt    • No Known Problems Paternal Aunt    • Breast cancer Neg Hx    • Breast cancer additional onset Neg Hx    • Endometrial cancer Neg Hx    • Ovarian cancer Neg Hx    • BRCA 1/2 Neg Hx    • BRCA1 Negative Neg Hx    • BRCA1 Positive Neg Hx    • BRCA2 Negative Neg Hx    • BRCA2 Positive Neg Hx        Meds/Allergies       Current Outpatient Medications:   •  albuterol (2 5 mg/3 mL) 0 083 % nebulizer solution  •  amLODIPine (NORVASC) 2 5 mg tablet  •  artificial tear (LUBRIFRESH P M ) 83-15 % ophthalmic ointment  •  aspirin 81 mg chewable tablet  •  baclofen 10 mg tablet  •  Breo Ellipta 200-25 MCG/ACT inhaler  •  Cholecalciferol (VITAMIN D PO)  • ciprofloxacin (CIPRO) 500 mg tablet  •  Diclofenac Sodium (VOLTAREN) 1 %  •  ezetimibe (ZETIA) 10 mg tablet  •  Ferrous Sulfate Dried (FERROUS SULFATE CR PO)  •  fluconazole (DIFLUCAN) 200 mg tablet  •  glucose blood (Contour Next Test) test strip  •  hydrochlorothiazide (HYDRODIURIL) 12 5 mg tablet  •  hydroxychloroquine (PLAQUENIL) 200 mg tablet  •  Insulin Glargine Solostar (Lantus SoloStar) 100 UNIT/ML SOPN  •  Insulin Pen Needle (BD Pen Needle Ester U/F) 32G X 4 MM MISC  •  Lifitegrast (XIIDRA OP)  •  losartan (COZAAR) 25 mg tablet  •  metFORMIN (GLUCOPHAGE) 500 mg tablet  •  metroNIDAZOLE (FLAGYL) 500 mg tablet  •  Microlet Lancets MISC  •  polyethylene glycol (GOLYTELY) 4000 mL solution  •  sertraline (ZOLOFT) 50 mg tablet  •  simvastatin (ZOCOR) 20 mg tablet  •  valACYclovir (VALTREX) 1,000 mg tablet  •  insulin aspart (NovoLOG FlexPen) 100 UNIT/ML injection pen  •  predniSONE 20 mg tablet    Allergies   Allergen Reactions   • Augmentin [Amoxicillin-Pot Clavulanate] GI Intolerance   • Codeine Drowsiness   • Macrobid [Nitrofurantoin] Other (See Comments)     Pt does not recall           Objective     Blood pressure 132/86, pulse 80, temperature (!) 96 8 °F (36 °C), temperature source Tympanic, height 5' 1" (1 549 m), weight 68 9 kg (152 lb)  Body mass index is 28 72 kg/m²  PHYSICAL EXAM:      General Appearance:   Alert, cooperative, no distress   HEENT:   Normocephalic, atraumatic, anicteric      Neck:  Supple, symmetrical, trachea midline   Lungs:   Clear to auscultation bilaterally; no rales, rhonchi or wheezing; respirations unlabored    Heart[de-identified]   Regular rate and rhythm; no murmur, rub, or gallop     Abdomen:   Soft, non-tender, non-distended; normal bowel sounds; no masses, no organomegaly    Genitalia:   Deferred    Rectal:   Deferred    Extremities:  No cyanosis, clubbing or edema    Pulses:  2+ and symmetric    Skin:  No jaundice, rashes, or lesions    Lymph nodes:  No palpable cervical lymphadenopathy        Lab Results:   No visits with results within 1 Day(s) from this visit     Latest known visit with results is:   Admission on 02/17/2023, Discharged on 02/18/2023   Component Date Value   • WBC 02/17/2023 9 67    • RBC 02/17/2023 4 43    • Hemoglobin 02/17/2023 13 1    • Hematocrit 02/17/2023 41 3    • MCV 02/17/2023 93    • MCH 02/17/2023 29 6    • MCHC 02/17/2023 31 7    • RDW 02/17/2023 13 0    • MPV 02/17/2023 9 1    • Platelets 88/69/7196 241    • nRBC 02/17/2023 0    • Neutrophils Relative 02/17/2023 84 (H)    • Immat GRANS % 02/17/2023 0    • Lymphocytes Relative 02/17/2023 11 (L)    • Monocytes Relative 02/17/2023 4    • Eosinophils Relative 02/17/2023 1    • Basophils Relative 02/17/2023 0    • Neutrophils Absolute 02/17/2023 7 99 (H)    • Immature Grans Absolute 02/17/2023 0 02    • Lymphocytes Absolute 02/17/2023 1 10    • Monocytes Absolute 02/17/2023 0 43    • Eosinophils Absolute 02/17/2023 0 09    • Basophils Absolute 02/17/2023 0 04    • Sodium 02/17/2023 139    • Potassium 02/17/2023 4 2    • Chloride 02/17/2023 105    • CO2 02/17/2023 26    • ANION GAP 02/17/2023 8    • BUN 02/17/2023 16    • Creatinine 02/17/2023 0 62    • Glucose 02/17/2023 164 (H)    • Calcium 02/17/2023 8 9    • Corrected Calcium 02/17/2023 9 6    • AST 02/17/2023 22    • ALT 02/17/2023 20    • Alkaline Phosphatase 02/17/2023 83    • Total Protein 02/17/2023 6 9    • Albumin 02/17/2023 3 1 (L)    • Total Bilirubin 02/17/2023 0 55    • eGFR 02/17/2023 91    • Lipase 02/17/2023 34 (L)    • Color, UA 02/17/2023 Yellow    • Clarity, UA 02/17/2023 Clear    • Specific Gravity, UA 02/17/2023 1 032 (H)    • pH, UA 02/17/2023 6 0    • Leukocytes, UA 02/17/2023 Negative    • Nitrite, UA 02/17/2023 Negative    • Protein, UA 02/17/2023 70 (1+) (A)    • Glucose, UA 02/17/2023 Trace (A)    • Ketones, UA 02/17/2023 40 (2+) (A)    • Urobilinogen, UA 02/17/2023 <2 0    • Bilirubin, UA 02/17/2023 Negative    • Occult Blood, UA 02/17/2023 Negative    • hs TnI 0hr 02/17/2023 2    • SARS-CoV-2 02/17/2023 Negative    • INFLUENZA A PCR 02/17/2023 Negative    • INFLUENZA B PCR 02/17/2023 Negative    • RSV PCR 02/17/2023 Negative    • Ventricular Rate 02/17/2023 74    • Atrial Rate 02/17/2023 74    • NJ Interval 02/17/2023 132    • QRSD Interval 02/17/2023 64    • QT Interval 02/17/2023 366    • QTC Interval 02/17/2023 406    • P Axis 02/17/2023 43    • QRS Axis 02/17/2023 -19    • T Wave Axis 02/17/2023 -11    • RBC, UA 02/17/2023 2-4 (A)    • WBC, UA 02/17/2023 1-2    • Epithelial Cells 02/17/2023 Occasional    • Bacteria, UA 02/17/2023 None Seen    • MUCUS THREADS 02/17/2023 Moderate (A)    • hs TnI 2hr 02/17/2023 3    • Delta 2hr hsTnI 02/17/2023 1    • hs TnI 4hr 02/17/2023 3    • Delta 4hr hsTnI 02/17/2023 1    • POC Glucose 02/17/2023 120    • LA size 02/18/2023 3 4    • Triscuspid Valve Regurgi* 02/18/2023 26 0    • Tricuspid valve peak reg* 02/18/2023 2 56    • LVPWd 02/18/2023 1 00    • Left Atrium Area-systoli* 02/18/2023 14 2    • Left Atrium Area-systoli* 02/18/2023 11 1    • MV E' Tissue Velocity Se* 02/18/2023 7    • Tricuspid annular plane * 02/18/2023 1 60    • TR Peak Emigdio 02/18/2023 2 6    • IVSd 02/18/2023 2 11    • LV DIASTOLIC VOLUME (MOD* 85/24/1195 73    • LEFT VENTRICLE SYSTOLIC * 13/96/2895 19    • Left ventricular stroke * 02/18/2023 54 00    • A4C EF 02/18/2023 57    • LA length (A2C) 02/18/2023 5 10    • LVIDd 02/18/2023 4 10    • IVS 02/18/2023 1 2    • LVIDS 02/18/2023 2 30    • FS 02/18/2023 44    • Asc Ao 02/18/2023 3 1    • Ao root 02/18/2023 3 00    • RVID d 02/18/2023 3 3    • MV valve area p 1/2 meth* 02/18/2023 3 86    • E wave deceleration time 02/18/2023 198    • MV Peak E Emigdio 02/18/2023 67    • MV Peak A Emigdio 02/18/2023 0 88    • RAA A4C 02/18/2023 12 7    • MV stenosis pressure 1/2* 02/18/2023 57    • LVSV, 2D 02/18/2023 54    • LV EF 02/18/2023 55    • POC Glucose 02/17/2023 175 (H)    • Sodium 02/18/2023 139    • Potassium 02/18/2023 3 7    • Chloride 02/18/2023 107    • CO2 02/18/2023 24    • ANION GAP 02/18/2023 8    • BUN 02/18/2023 13    • Creatinine 02/18/2023 0 57 (L)    • Glucose 02/18/2023 183 (H)    • Calcium 02/18/2023 8 8    • eGFR 02/18/2023 93    • Magnesium 02/18/2023 1 9    • WBC 02/18/2023 8 67    • RBC 02/18/2023 3 92    • Hemoglobin 02/18/2023 11 7    • Hematocrit 02/18/2023 36 3    • MCV 02/18/2023 93    • MCH 02/18/2023 29 8    • MCHC 02/18/2023 32 2    • RDW 02/18/2023 12 9    • MPV 02/18/2023 9 2    • Platelets 62/35/9800 240    • nRBC 02/18/2023 0    • Neutrophils Relative 02/18/2023 80 (H)    • Immat GRANS % 02/18/2023 0    • Lymphocytes Relative 02/18/2023 15    • Monocytes Relative 02/18/2023 5    • Eosinophils Relative 02/18/2023 0    • Basophils Relative 02/18/2023 0    • Neutrophils Absolute 02/18/2023 6 93    • Immature Grans Absolute 02/18/2023 0 03    • Lymphocytes Absolute 02/18/2023 1 27    • Monocytes Absolute 02/18/2023 0 40    • Eosinophils Absolute 02/18/2023 0 01    • Basophils Absolute 02/18/2023 0 03    • POC Glucose 02/18/2023 164 (H)    • POC Glucose 02/18/2023 121          Radiology Results:   CT abdomen pelvis with contrast    Result Date: 2/17/2023  Narrative: CT ABDOMEN AND PELVIS WITH IV CONTRAST INDICATION:   Abdominal pain, acute, nonlocalized abdominal pain  History of hysterectomy and tubal ligation and abdominoplasty  COMPARISON:  10/11/2019 chest CT TECHNIQUE:  CT examination of the abdomen and pelvis was performed  Axial, sagittal, and coronal 2D reformatted images were created from the source data and submitted for interpretation  Radiation dose length product (DLP) for this visit:  518 69 mGy-cm   This examination, like all CT scans performed in the [facility], was performed utilizing techniques to minimize radiation dose exposure, including the use of iterative  reconstruction and automated exposure control   IV Contrast:  90 mL of iohexol (OMNIPAQUE) Enteric Contrast:  Enteric contrast was not administered  FINDINGS: ABDOMEN LOWER CHEST:  Chronic moderate hiatal hernia  Mild linear scar  LIVER/BILIARY TREE:  Tiny right lobe hypodensities, one calcified, appear chronic and are considered benign  GALLBLADDER:  Within normal limits  SPLEEN:  Within normal limits  PANCREAS:  Chronic diffuse atrophy  ADRENAL GLANDS:  Within normal limits  KIDNEYS/URETERS:  Within normal limits  STOMACH AND BOWEL:  Hiatal hernia mentioned above  Small bowel is within normal limits  Short segment sigmoid bowel wall thickening with surrounding fatty infiltrative change and diverticulosis  No dilation or fluid levels  APPENDIX:  Within normal limits  ABDOMINOPELVIC CAVITY:  No abnormal air, fluid or enlarged lymph nodes  VESSELS:  Atherosclerosis  Normal aortic caliber  Patent central mesenteric vessels  PELVIS: REPRODUCTIVE ORGANS:  Hysterectomy  No adnexal mass  URINARY BLADDER:  Within normal limits  ABDOMINAL WALL/INGUINAL REGIONS:  Small, fat-containing umbilical hernia  OSSEOUS STRUCTURES:  Degenerative changes and mild lumbar scoliosis  The study was marked in [facility] for immediate notification  Impression: Findings most compatible with uncomplicated, acute sigmoid diverticulitis  Other stable nonemergent findings above  Workstation performed: [id number]     Mammo diagnostic bilateral w 3d & cad, US breast left limited (diagnostic)    Addendum Date: 2/8/2023 Addendum:   ADDENDUM: There is suboptimal occlusion of posterior soft tissue on left MLO view due to patient mobility limitations in the left shoulder  Best images obtained were submitted for review  Overall interpretation and recommendations are unchanged  ASSESSMENT/BI-RADS CATEGORY: Left: 2 - Benign Right: 1 - Negative Overall: 2 - Benign RECOMMENDATION:      - Routine Screening Mammogram in 1 year for both breasts   END ADDENDUM    Result Date: 2/8/2023  Narrative: DIAGNOSIS: Solitary cyst of left breast TECHNIQUE: Digital diagnostic mammography was performed  Computer Aided Detection (CAD) analyzed all applicable images  Ultrasound of the left breast(s) was performed  COMPARISONS: Prior breast imaging dated: 10/06/2021, 04/05/2021, 10/22/2020, 10/16/2020, 03/06/2019, 10/11/2017, 11/05/2014, 06/20/2012, and 05/30/2006 RELEVANT HISTORY: Family Breast Cancer History: History of breast cancer in Neg Hx  Family Medical History: Family medical history includes colon cancer in father  Personal History: No known relevant hormone history  Surgical history includes hysterectomy  No known relevant medical history  RISK ASSESSMENT: 5 Year Tyrer-Cuzick: 0 47 % 10 Year Tyrer-Cuzick: 1 01 % Lifetime Tyrer-Cuzick: 1 49 % TISSUE DENSITY: The breasts are almost entirely fatty  INDICATION: Susan Yuen is a 71 y o  female presenting for follow-up left breast finding  Annual mammography  FINDINGS: LEFT B) CYST: Left breast 12:00 3 cm from nipple complicated cyst versus round circumscribed mass measuring 2 x 2 x 2 mm is slightly decreased in size change since 10/22/2020 ultrasound when it measured 5 x 3 x 3 mm  Elsewhere in both breast on mammography, there are no suspicious masses, grouped microcalcifications or areas of unexplained architectural distortion  The skin and nipple areolar complex are unremarkable  Impression: Benign findings  ASSESSMENT/BI-RADS CATEGORY: Left: 2 - Benign Right: 1 - Negative Overall: 2 - Benign RECOMMENDATION:      - Routine screening mammogram in 1 year for both breasts  Workstation ID: [id number]     Echo complete w/ contrast if indicated    Result Date: 2/18/2023  Narrative: •  Left Ventricle: Left ventricular cavity size is normal  Wall thickness is mildly increased  The left ventricular ejection fraction is 55%  Systolic function is normal  Wall motion is normal  Diastolic function is mildly abnormal, consistent with grade I (abnormal) relaxation  •  Tricuspid Valve:  There is mild regurgitation

## 2023-02-20 NOTE — PROGRESS NOTES
[facility]        NAME: Phoenix Allen is a 71 y o  female  : 1950    MRN: [id number]  DATE: 2023  TIME: 12:09 PM    Assessment and Plan   Puncture wound of abdomen, initial encounter [S31 139A]  1  Puncture wound of abdomen, initial encounter        Wound was cleaned with alcohol prep pads and covered with band-aid  Patient Instructions       Patient was educated on puncture wound in lower abdomen  Patient was educated to keep wound clean and dry  Any excessive bleeding go to ED  Patient was educated on signs of infection  These include- Redness, purulent discharge or high grade fevers    Chief Complaint     Chief Complaint   Patient presents with   • Puncture Wound     Pt reports she was seen at the ER on Friday and developed a small hole at the site of an abdominal insulin injection which continues to bleed periodically  Presents with a small hole on right lower abdomen covered by a bandage  History of Present Illness       Patient is here today reporting open wound in lower abdomen that has been bleeding since she was admitted to the hospital on 23  Patient reports at the hospital she was stuck with insulin  Denies being on blood thinners  Patient is currently on antibiotics for Diverticulitis  Allergies were reviewed in charts  Patient is type one diabetic  Review of Systems   Review of Systems   Constitutional: Negative  Respiratory: Negative  Cardiovascular: Negative  Skin:        Small puncture wound on right lower abdomen    Psychiatric/Behavioral: Negative            Current Medications       Current Outpatient Medications:   •  albuterol (2 5 mg/3 mL) 0 083 % nebulizer solution, Take 3 mL (2 5 mg total) by nebulization every 6 (six) hours as needed for wheezing or shortness of breath, Disp: 75 mL, Rfl: 2  •  amLODIPine (NORVASC) 2 5 mg tablet, Take 1 tablet (2 5 mg total) by mouth daily at bedtime, Disp: 90 tablet, Rfl: 1  •  artificial tear (LUBRIFRESH P M ) 83-15 % ophthalmic ointment, daily at bedtime, Disp: , Rfl:   •  aspirin 81 mg chewable tablet, Chew 81 mg daily, Disp: , Rfl:   •  baclofen 10 mg tablet, Take 10 mg by mouth daily at bedtime, Disp: , Rfl: 4  •  Breo Ellipta 200-25 MCG/ACT inhaler, INHALE ONE INHALATION BY MOUTH EVERY DAY - (RINSE MOUTH AND SPIT AFTER USE, DISCARD SIX WEEKS AFTER REMOVAL FROM FOIL POUCH), Disp: 2 each, Rfl: 1  •  Cholecalciferol (VITAMIN D PO), Take 1 tablet by mouth daily, Disp: , Rfl:   •  ciprofloxacin (CIPRO) 500 mg tablet, Take 1 tablet (500 mg total) by mouth every 12 (twelve) hours for 10 days, Disp: 20 tablet, Rfl: 0  •  Diclofenac Sodium (VOLTAREN) 1 %, Apply 1 application topically 4 (four) times a day, Disp: , Rfl:   •  ezetimibe (ZETIA) 10 mg tablet, Take 0 5 tablets (5 mg total) by mouth daily, Disp: 45 tablet, Rfl: 1  •  Ferrous Sulfate Dried (FERROUS SULFATE CR PO), Iron TABS   Refills: 0     Active, Disp: , Rfl:   •  fluconazole (DIFLUCAN) 200 mg tablet, Take 1 tablet (200 mg total) by mouth daily for 4 days Do not start before February 19, 2023 , Disp: 4 tablet, Rfl: 0  •  glucose blood (Contour Next Test) test strip, Use 1 each 4 (four) times a day, Disp: 400 each, Rfl: 1  •  hydrochlorothiazide (HYDRODIURIL) 12 5 mg tablet, Take 1 tablet (12 5 mg total) by mouth daily, Disp: 90 tablet, Rfl: 0  •  hydroxychloroquine (PLAQUENIL) 200 mg tablet, Take 200 mg by mouth 2 (two) times a day with meals, Disp: , Rfl:   •  insulin aspart (NovoLOG FlexPen) 100 UNIT/ML injection pen, Inject 6 Units under the skin 3 (three) times a day with meals, Disp: 45 mL, Rfl: 1  •  Insulin Glargine Solostar (Lantus SoloStar) 100 UNIT/ML SOPN, Inject 0 25 mL (25 Units total) under the skin daily at bedtime, Disp: 45 mL, Rfl: 1  •  Insulin Pen Needle (BD Pen Needle Ester U/F) 32G X 4 MM MISC, Inject under the skin 4 (four) times a day, Disp: 400 each, Rfl: 1  •  Lifitegrast (XIIDRA OP), Apply to eye 2 vials a day  , Disp: , Rfl:   •  losartan (COZAAR) 25 mg tablet, Take 2 tablets (50 mg total) by mouth 2 (two) times a day, Disp: 180 tablet, Rfl: 1  •  metFORMIN (GLUCOPHAGE) 500 mg tablet, Take 2 tablets (1,000 mg total) by mouth 2 (two) times a day with meals, Disp: 360 tablet, Rfl: 1  •  metroNIDAZOLE (FLAGYL) 500 mg tablet, Take 1 tablet (500 mg total) by mouth every 8 (eight) hours for 10 days, Disp: 30 tablet, Rfl: 0  •  Microlet Lancets MISC, Use 4 (four) times a day, Disp: 400 each, Rfl: 1  •  polyethylene glycol (GOLYTELY) 4000 mL solution, Take 4,000 mL by mouth once for 1 dose, Disp: 4000 mL, Rfl: 0  •  predniSONE 20 mg tablet, 2 TABS PO Q DAY WITH FOOD, Disp: 10 tablet, Rfl: 0  •  sertraline (ZOLOFT) 50 mg tablet, TAKE ONE TABLET BY MOUTH EVERY DAY, Disp: 90 tablet, Rfl: 3  •  simvastatin (ZOCOR) 20 mg tablet, Take 1 tablet (20 mg total) by mouth daily, Disp: 90 tablet, Rfl: 1  •  valACYclovir (VALTREX) 1,000 mg tablet, Take 1,000 mg by mouth daily, Disp: , Rfl:     Current Allergies     Allergies as of 02/20/2023 - Reviewed 02/20/2023   Allergen Reaction Noted   • Augmentin [amoxicillin-pot clavulanate] GI Intolerance 12/12/2016   • Codeine Drowsiness 12/20/2012   • Macrobid [nitrofurantoin] Other (See Comments) 08/21/2018            The following portions of the patient's history were reviewed and updated as appropriate: allergies, current medications, past family history, past medical history, past social history, past surgical history and problem list      Past Medical History:   Diagnosis Date   • Anxiety    • Anxiety    • Arthritis of right knee    • Cellulitis    • Contact dermatitis    • Cough    • Diabetes mellitus ([address] )    • GERD without esophagitis    • Headache    • Hiatal hernia    • Hyperlipidemia    • Knee sprain    • Lyme disease    • Tick bite        Past Surgical History:   Procedure Laterality Date   • ABDOMINAL SURGERY      tummy tuck   • CATARACT EXTRACTION, BILATERAL Bilateral 11/21 and 12/21   • HYSTERECTOMY      age 51   • OK COLONOSCOPY FLX DX W/COLLJ SPEC WHEN PFRMD N/A 5/8/2019    Procedure: COLONOSCOPY;  Surgeon: Jasmine Veloz MD;  Location: [facility]; Service: Gastroenterology   • OK ESOPHAGOGASTRODUODENOSCOPY TRANSORAL DIAGNOSTIC N/A 5/8/2019    Procedure: ESOPHAGOGASTRODUODENOSCOPY (EGD); Surgeon: Jasmine Veloz MD;  Location: [facility]; Service: Gastroenterology   • TUBAL LIGATION         Family History   Problem Relation Age of Onset   • Mental illness Mother    • Heart disease Father    • Colon cancer Father 67   • No Known Problems Sister    • No Known Problems Daughter    • No Known Problems Maternal Grandmother    • No Known Problems Maternal Grandfather    • No Known Problems Paternal Grandmother    • No Known Problems Paternal Grandfather    • No Known Problems Son    • No Known Problems Sister    • No Known Problems Sister    • No Known Problems Sister    • No Known Problems Sister    • No Known Problems Sister    • Lymphoma Brother 45   • No Known Problems Brother    • No Known Problems Paternal Aunt    • No Known Problems Paternal Aunt    • No Known Problems Paternal Aunt    • No Known Problems Paternal Aunt    • Breast cancer Neg Hx    • Breast cancer additional onset Neg Hx    • Endometrial cancer Neg Hx    • Ovarian cancer Neg Hx    • BRCA 1/2 Neg Hx    • BRCA1 Negative Neg Hx    • BRCA1 Positive Neg Hx    • BRCA2 Negative Neg Hx    • BRCA2 Positive Neg Hx          Medications have been verified  Objective   /82   Pulse 72   Temp (!) 96 9 °F (36 1 °C)   Resp 16   SpO2 98%   No LMP recorded  Patient is postmenopausal        Physical Exam     Physical Exam  Vitals and nursing note reviewed  Constitutional:       Appearance: Normal appearance  HENT:      Head: Normocephalic  Cardiovascular:      Rate and Rhythm: Normal rate and regular rhythm  Heart sounds: Normal heart sounds  Pulmonary:      Breath sounds: Normal breath sounds     Abdominal: General: Bowel sounds are normal       Palpations: Abdomen is soft  Comments: Small puncture wound with ecchymosis noted on right lower abdomen  No signs of infection  Neurological:      General: No focal deficit present  Mental Status: She is alert and oriented to person, place, and time     Psychiatric:         Mood and Affect: Mood normal          Behavior: Behavior normal

## 2023-02-20 NOTE — PATIENT INSTRUCTIONS
Patient was educated on puncture wound in lower abdomen  Patient was educated to keep wound clean and dry  Any excessive bleeding go to ED  Patient was educated on signs of infection  These include- Redness, purulent discharge or high grade fevers    Puncture Wound   WHAT YOU NEED TO KNOW:   A puncture wound is a hole in the skin made by a sharp, pointed object  The area may be bruised or swollen  You may have bleeding, pain, or trouble moving the affected area  DISCHARGE INSTRUCTIONS:   Return to the emergency department if:   You have severe pain  You have numbness or tingling in the area of your wound  Your wound starts bleeding and does not stop, even after you apply pressure  Call your doctor if:   You have new drainage or a bad odor coming from the wound  You have a fever or chills  You have increased swelling, redness, or pain  You have red streaks on your skin coming from your wound  You have questions or concerns about your condition or care  Medicines: You may need any of the following:  NSAIDs , such as ibuprofen, help decrease swelling, pain, and fever  This medicine is available with or without a doctor's order  NSAIDs can cause stomach bleeding or kidney problems in certain people  If you take blood thinner medicine, always ask your healthcare provider if NSAIDs are safe for you  Always read the medicine label and follow directions  Antibiotics  help prevent a bacterial infection  Take your medicine as directed  Contact your healthcare provider if you think your medicine is not helping or if you have side effects  Tell your provider if you are allergic to any medicine  Keep a list of the medicines, vitamins, and herbs you take  Include the amounts, and when and why you take them  Bring the list or the pill bottles to follow-up visits  Carry your medicine list with you in case of an emergency  Care for your wound as directed:  Keep your wound clean and dry  When you are allowed to bathe, carefully wash the wound with soap and water  Dry the area and put on new, clean bandages as directed  Change your bandages when they get wet or dirty  Rest and elevate  the injured area above the level of your heart as often as you can  This will help decrease swelling and pain  Prop your injured area on pillows or blankets to keep it elevated comfortably  Follow up with your doctor in 2 to 3 days:  Write down your questions so you remember to ask them during your visits  © Copyright [employer] 2022 Information is for End User's use only and may not be sold, redistributed or otherwise used for commercial purposes  The above information is an  only  It is not intended as medical advice for individual conditions or treatments  Talk to your doctor, nurse or pharmacist before following any medical regimen to see if it is safe and effective for you

## 2023-02-21 ENCOUNTER — OFFICE VISIT (OUTPATIENT)
Dept: ENDOCRINOLOGY | Facility: CLINIC | Age: 73
End: 2023-02-21

## 2023-02-21 VITALS
DIASTOLIC BLOOD PRESSURE: 70 MMHG | BODY MASS INDEX: 29.52 KG/M2 | SYSTOLIC BLOOD PRESSURE: 120 MMHG | HEIGHT: 61 IN | HEART RATE: 74 BPM | WEIGHT: 156.38 LBS

## 2023-02-21 DIAGNOSIS — E78.5 HYPERLIPIDEMIA, UNSPECIFIED HYPERLIPIDEMIA TYPE: ICD-10-CM

## 2023-02-21 DIAGNOSIS — I10 HYPERTENSION, UNSPECIFIED TYPE: ICD-10-CM

## 2023-02-21 DIAGNOSIS — Z79.4 TYPE 2 DIABETES MELLITUS WITH HYPERGLYCEMIA, WITH LONG-TERM CURRENT USE OF INSULIN (HCC): Primary | ICD-10-CM

## 2023-02-21 DIAGNOSIS — E11.65 TYPE 2 DIABETES MELLITUS WITH HYPERGLYCEMIA, WITH LONG-TERM CURRENT USE OF INSULIN (HCC): Primary | ICD-10-CM

## 2023-02-21 NOTE — PROGRESS NOTES
Established Patient Progress Note      Chief Complaint   Patient presents with   • Diabetes Type 2        History of Present Illness:   [place] Allen is a 71 y o  female with a history of type 2 diabetes with long term use of insulin since about 15 years ago  Reports complications of none  Denies recent illness or hospitalizations  Denies recent severe hypoglycemic or severe hyperglycemic episodes  Denies any issues with her current regimen  home glucose monitoring: are performed regularly 3x per day and varie from 90s-200s  Last night had high blood sugar due to checking BG after eating pizza  Last week had a reading of 359  1 day had BG of 50, this is rare  Blood sugars have been running higher due to illness after thanksgiving and started on steroids at that time  Recently dx with diverticulitis, will be having colonoscopy  She was given augmentin but stopped due to allergy, started cipro/flagyl yesterday  She is on diflucan as she tends to get yeast infection while on abx  Current regimen:  Lantus 25 at bedtime   Novolog 6-7 units before meals  Metformin 1000mg twice per day     Last Eye Exam: UTD within the year, Dr Stefan Tripathi     Last Foot Exam: Today at visit  Will be seeing Dr Mor Maradiaga, callus bottom of right foot    Has hypertension: Taking amlodipine and HCTZ and losartan  Has hyperlipidemia: Taking zetia and simvastatin      Patient Active Problem List   Diagnosis   • Allergic rhinitis   • Asthma   • Generalized anxiety disorder   • Stress incontinence, female   • Lumbar radiculopathy   • Type 2 diabetes mellitus with hyperglycemia, with long-term current use of insulin ([address] )   • Colitis   • Chronic fatigue   • Gastroesophageal reflux disease without esophagitis   • Hiatal hernia   • Rheumatoid arthritis of multiple sites with negative rheumatoid factor (HCC)   • Ear itch   • Sensorineural hearing loss (SNHL) of left ear with restricted hearing of right ear   • Dry mouth   • Hypertension   • Hyperlipidemia   • Type 2 diabetes mellitus with hypoglycemia without coma, with long-term current use of insulin ([facility] [address] )   • Preoperative evaluation of a medical condition to rule out surgical contraindications (TAR required)   • Continuous opioid dependence ([facility] [address] )   • Upper respiratory tract infection   • Acute diverticulitis   • Abnormal EKG      Past Medical History:   Diagnosis Date   • Anxiety    • Anxiety    • Arthritis of right knee    • Cellulitis    • Contact dermatitis    • Cough    • Diabetes mellitus ([facility] [address] )    • GERD without esophagitis    • Headache    • Hiatal hernia    • Hyperlipidemia    • Knee sprain    • Lyme disease    • Tick bite       Past Surgical History:   Procedure Laterality Date   • ABDOMINAL SURGERY      tummy tuck   • CATARACT EXTRACTION, BILATERAL Bilateral 11/21 and 12/21   • HYSTERECTOMY      age 51   • MI COLONOSCOPY FLX DX W/COLLJ SPEC WHEN PFRMD N/A 5/8/2019    Procedure: COLONOSCOPY;  Surgeon: Leann Hall MD;  Location: [facility]; Service: Gastroenterology   • MI ESOPHAGOGASTRODUODENOSCOPY TRANSORAL DIAGNOSTIC N/A 5/8/2019    Procedure: ESOPHAGOGASTRODUODENOSCOPY (EGD); Surgeon: Leann Hall MD;  Location: [facility];   Service: Gastroenterology   • TUBAL LIGATION        Family History   Problem Relation Age of Onset   • Mental illness Mother    • Heart disease Father    • Colon cancer Father 67   • No Known Problems Sister    • No Known Problems Daughter    • No Known Problems Maternal Grandmother    • No Known Problems Maternal Grandfather    • No Known Problems Paternal Grandmother    • No Known Problems Paternal Grandfather    • No Known Problems Son    • No Known Problems Sister    • No Known Problems Sister    • No Known Problems Sister    • No Known Problems Sister    • No Known Problems Sister    • Lymphoma Brother 45   • No Known Problems Brother    • No Known Problems Paternal Aunt    • No Known Problems Paternal Aunt    • No Known Problems Paternal Aunt    • No Known Problems Paternal Aunt    • Breast cancer Neg Hx    • Breast cancer additional onset Neg Hx    • Endometrial cancer Neg Hx    • Ovarian cancer Neg Hx    • BRCA 1/2 Neg Hx    • BRCA1 Negative Neg Hx    • BRCA1 Positive Neg Hx    • BRCA2 Negative Neg Hx    • BRCA2 Positive Neg Hx      Social History     Tobacco Use   • Smoking status: Former     Packs/day: 0 50     Years: 25 00     Pack years: 12 50     Types: Cigarettes     Quit date: 2008     Years since quittin 5   • Smokeless tobacco: Never   Substance Use Topics   • Alcohol use: Not Currently     Comment: seldom     Allergies   Allergen Reactions   • Augmentin [Amoxicillin-Pot Clavulanate] GI Intolerance   • Codeine Drowsiness   • Macrobid [Nitrofurantoin] Other (See Comments)     Pt does not recall         Current Outpatient Medications:   •  albuterol (2 5 mg/3 mL) 0 083 % nebulizer solution, Take 3 mL (2 5 mg total) by nebulization every 6 (six) hours as needed for wheezing or shortness of breath, Disp: 75 mL, Rfl: 2  •  amLODIPine (NORVASC) 2 5 mg tablet, Take 1 tablet (2 5 mg total) by mouth daily at bedtime, Disp: 90 tablet, Rfl: 1  •  artificial tear (LUBRIFRESH P M ) 83-15 % ophthalmic ointment, daily at bedtime, Disp: , Rfl:   •  aspirin 81 mg chewable tablet, Chew 81 mg daily, Disp: , Rfl:   •  baclofen 10 mg tablet, Take 10 mg by mouth daily at bedtime, Disp: , Rfl: 4  •  Breo Ellipta 200-25 MCG/ACT inhaler, INHALE ONE INHALATION BY MOUTH EVERY DAY - (RINSE MOUTH AND SPIT AFTER USE, DISCARD SIX WEEKS AFTER REMOVAL FROM FOIL POUCH), Disp: 2 each, Rfl: 1  •  Cholecalciferol (VITAMIN D PO), Take 1 tablet by mouth daily, Disp: , Rfl:   •  Diclofenac Sodium (VOLTAREN) 1 %, Apply 1 application topically 4 (four) times a day, Disp: , Rfl:   •  ezetimibe (ZETIA) 10 mg tablet, Take 0 5 tablets (5 mg total) by mouth daily, Disp: 45 tablet, Rfl: 1  •  Ferrous Sulfate Dried (FERROUS SULFATE CR PO), Iron TABS   Refills: 0     Active, Disp: , Rfl: •  fluconazole (DIFLUCAN) 200 mg tablet, Take 1 tablet (200 mg total) by mouth daily for 4 days Do not start before February 19, 2023 , Disp: 4 tablet, Rfl: 0  •  glucose blood (Contour Next Test) test strip, Use 1 each 4 (four) times a day, Disp: 400 each, Rfl: 1  •  hydrochlorothiazide (HYDRODIURIL) 12 5 mg tablet, Take 1 tablet (12 5 mg total) by mouth daily, Disp: 90 tablet, Rfl: 0  •  hydroxychloroquine (PLAQUENIL) 200 mg tablet, Take 200 mg by mouth 2 (two) times a day with meals, Disp: , Rfl:   •  insulin aspart (NovoLOG FlexPen) 100 UNIT/ML injection pen, Inject 6 Units under the skin 3 (three) times a day with meals, Disp: 45 mL, Rfl: 1  •  Insulin Glargine Solostar (Lantus SoloStar) 100 UNIT/ML SOPN, Inject 0 25 mL (25 Units total) under the skin daily at bedtime, Disp: 45 mL, Rfl: 1  •  Insulin Pen Needle (BD Pen Needle Ester U/F) 32G X 4 MM MISC, Inject under the skin 4 (four) times a day, Disp: 400 each, Rfl: 1  •  Lifitegrast (Cam Linea OP), Apply to eye 2 vials a day  , Disp: , Rfl:   •  losartan (COZAAR) 25 mg tablet, Take 2 tablets (50 mg total) by mouth 2 (two) times a day, Disp: 180 tablet, Rfl: 1  •  metFORMIN (GLUCOPHAGE) 500 mg tablet, Take 2 tablets (1,000 mg total) by mouth 2 (two) times a day with meals, Disp: 360 tablet, Rfl: 1  •  metroNIDAZOLE (FLAGYL) 500 mg tablet, Take 1 tablet (500 mg total) by mouth every 8 (eight) hours for 10 days, Disp: 30 tablet, Rfl: 0  •  Microlet Lancets MISC, Use 4 (four) times a day, Disp: 400 each, Rfl: 1  •  polyethylene glycol (GOLYTELY) 4000 mL solution, Take 4,000 mL by mouth once for 1 dose, Disp: 4000 mL, Rfl: 0  •  sertraline (ZOLOFT) 50 mg tablet, TAKE ONE TABLET BY MOUTH EVERY DAY, Disp: 90 tablet, Rfl: 3  •  simvastatin (ZOCOR) 20 mg tablet, Take 1 tablet (20 mg total) by mouth daily, Disp: 90 tablet, Rfl: 1  •  valACYclovir (VALTREX) 1,000 mg tablet, Take 1,000 mg by mouth daily, Disp: , Rfl:   •  ciprofloxacin (CIPRO) 500 mg tablet, Take 1 tablet (500 mg total) by mouth every 12 (twelve) hours for 10 days (Patient not taking: Reported on 2/21/2023), Disp: 20 tablet, Rfl: 0    Review of Systems   Constitutional: Negative for activity change, appetite change, chills, diaphoresis, fatigue, fever and unexpected weight change  HENT: Negative for trouble swallowing and voice change  Eyes: Negative for visual disturbance  Respiratory: Negative for shortness of breath  Cardiovascular: Negative for chest pain and palpitations  Gastrointestinal: Positive for abdominal pain, diarrhea and nausea  Negative for constipation  Endocrine: Negative for cold intolerance, heat intolerance, polydipsia, polyphagia and polyuria  Genitourinary: Negative for frequency and menstrual problem  Musculoskeletal: Negative for arthralgias and myalgias  Skin: Negative for rash  Allergic/Immunologic: Negative for food allergies  Neurological: Negative for dizziness and tremors  Hematological: Negative for adenopathy  Psychiatric/Behavioral: Negative for sleep disturbance  All other systems reviewed and are negative  Physical Exam:  Body mass index is 29 55 kg/m²  /70 (BP Location: Left arm, Patient Position: Sitting, Cuff Size: Standard)   Pulse 74   Ht 5' 1" (1 549 m)   Wt 70 9 kg (156 lb 6 oz)   BMI 29 55 kg/m²    Wt Readings from Last 3 Encounters:   02/21/23 70 9 kg (156 lb 6 oz)   02/20/23 68 9 kg (152 lb)   02/18/23 70 8 kg (156 lb)       Physical Exam  Vitals reviewed  Constitutional:       General: She is not in acute distress  Appearance: She is well-developed  HENT:      Head: Normocephalic and atraumatic  Eyes:      Conjunctiva/sclera: Conjunctivae normal       Pupils: Pupils are equal, round, and reactive to light  Neck:      Thyroid: No thyromegaly  Cardiovascular:      Rate and Rhythm: Normal rate and regular rhythm        Pulses: no weak pulses          Dorsalis pedis pulses are 2+ on the right side and 2+ on the left side         Posterior tibial pulses are 2+ on the right side and 2+ on the left side  Heart sounds: Normal heart sounds  Pulmonary:      Effort: Pulmonary effort is normal  No respiratory distress  Breath sounds: Normal breath sounds  No wheezing or rales  Abdominal:      General: Bowel sounds are normal  There is no distension  Palpations: Abdomen is soft  Tenderness: There is no abdominal tenderness  Musculoskeletal:         General: Normal range of motion  Cervical back: Normal range of motion and neck supple  Feet:      Right foot:      Skin integrity: Dry skin present  No ulcer, skin breakdown, erythema, warmth or callus  Left foot:      Skin integrity: Dry skin present  No ulcer, skin breakdown, erythema, warmth or callus  Skin:     General: Skin is warm and dry  Neurological:      Mental Status: She is alert and oriented to person, place, and time  Patient's shoes and socks removed  Right Foot/Ankle   Right Foot Inspection  Skin Exam: skin normal, skin intact and dry skin  No warmth, no callus, no erythema, no maceration, no abnormal color, no pre-ulcer, no ulcer and no callus  Toe Exam: ROM and strength within normal limits  No swelling, no tenderness, erythema and  no right toe deformity    Sensory   Monofilament testing: intact    Vascular  Capillary refills: < 3 seconds  The right DP pulse is 2+  The right PT pulse is 2+  Left Foot/Ankle  Left Foot Inspection  Skin Exam: skin normal, skin intact and dry skin  No warmth, no erythema, no maceration, normal color, no pre-ulcer, no ulcer and no callus  Toe Exam: ROM and strength within normal limits  No swelling, no tenderness, no erythema and no left toe deformity  Sensory   Monofilament testing: intact    Vascular  Capillary refills: < 3 seconds  The left DP pulse is 2+  The left PT pulse is 2+       Assign Risk Category  No deformity present  No loss of protective sensation  No weak pulses  Risk: 0      Labs:   Lab Results   Component Value Date    HGBA1C 9 0 (H) 02/13/2023    HGBA1C 8 5 (A) 11/18/2022    HGBA1C 8 4 (A) 08/18/2022     Lab Results   Component Value Date    CREATININE 0 57 (L) 02/18/2023    CREATININE 0 62 02/17/2023    CREATININE 0 62 02/13/2023    BUN 13 02/18/2023     11/10/2015    K 3 7 02/18/2023     02/18/2023    CO2 24 02/18/2023     eGFR   Date Value Ref Range Status   02/18/2023 93 ml/min/1 73sq m Final     Lab Results   Component Value Date    HDL 85 02/13/2023    TRIG 77 02/13/2023     Lab Results   Component Value Date    ALT 20 02/17/2023    AST 22 02/17/2023    ALKPHOS 83 02/17/2023     No results found for: [facility]  Lab Results   Component Value Date    FREET4 1 2 02/26/2018       Impression & Plan:    Problem List Items Addressed This Visit        Endocrine    Type 2 diabetes mellitus with hyperglycemia, with long-term current use of insulin ([facility] [address] ) - Primary     Diabetes control has worsened  May be related to illness/prednisone in December and now with diverticulitis and taking antibiotics  Blood sugars are highly variable with occasional hypoglycemia so will not make adjustments to regimen today  Will refer to dietician for medical nutrition therapy as consistent carb intake should help to stabilize blood sugar readings and will also need to follow fiber intake guidelines per GI  Will also refer for CGM training-- she will double check what sensor/supplies she is using  If she does not have a reader she will need to reset her apple ID to download CGM erasto on cell phone as insurance would not cover replacement reader at previous visit  Lab Results   Component Value Date    HGBA1C 9 0 (H) 02/13/2023            Relevant Orders    Ambulatory referral to Diabetic Education    Hemoglobin L5K    Basic metabolic panel       Cardiovascular and Mediastinum    Hypertension     Well controlled on current regimen               Other    Hyperlipidemia Continue atorvastatin and zetia  Orders Placed This Encounter   Procedures   • Hemoglobin A1C     Standing Status:   Future     Standing Expiration Date:   2/21/2024   • Basic metabolic panel     This is a patient instruction: Patient fasting for 8 hours or longer recommended  Standing Status:   Future     Standing Expiration Date:   2/21/2024   • Ambulatory referral to Diabetic Education     Standing Status:   Future     Standing Expiration Date:   2/21/2024     Referral Priority:   Routine     Referral Type:   Consult - AMB     Referral Reason:   Specialty Services Required     Requested Specialty:   Diabetes Services     Number of Visits Requested:   1     Expiration Date:   2/21/2024       There are no Patient Instructions on file for this visit  Discussed with the patient and all questioned fully answered  She will call me if any problems arise  Follow-up appointment in 3 months       Counseled patient on diagnostic results, prognosis, risk and benefit of treatment options, instruction for management, importance of treatment compliance, Risk  factor reduction and impressions    Sheldon Carty PA-C

## 2023-02-21 NOTE — ASSESSMENT & PLAN NOTE
Diabetes control has worsened  May be related to illness/prednisone in December and now with diverticulitis and taking antibiotics  Blood sugars are highly variable with occasional hypoglycemia so will not make adjustments to regimen today  Will refer to dietician for medical nutrition therapy as consistent carb intake should help to stabilize blood sugar readings and will also need to follow fiber intake guidelines per GI  Will also refer for CGM training-- she will double check what sensor/supplies she is using  If she does not have a reader she will need to reset her apple ID to download CGM erasto on cell phone as insurance would not cover replacement reader at previous visit     Lab Results   Component Value Date    HGBA1C 9 0 (H) 02/13/2023

## 2023-02-27 ENCOUNTER — OFFICE VISIT (OUTPATIENT)
Dept: FAMILY MEDICINE CLINIC | Facility: CLINIC | Age: 73
End: 2023-02-27

## 2023-02-27 VITALS
HEART RATE: 60 BPM | SYSTOLIC BLOOD PRESSURE: 130 MMHG | BODY MASS INDEX: 29.69 KG/M2 | TEMPERATURE: 97.1 F | HEIGHT: 61 IN | WEIGHT: 157.25 LBS | RESPIRATION RATE: 18 BRPM | OXYGEN SATURATION: 97 % | DIASTOLIC BLOOD PRESSURE: 80 MMHG

## 2023-02-27 DIAGNOSIS — K57.92 ACUTE DIVERTICULITIS: Primary | ICD-10-CM

## 2023-02-27 PROBLEM — M79.7 FIBROMYALGIA: Status: ACTIVE | Noted: 2020-12-29

## 2023-02-27 PROBLEM — L29.9 EAR ITCH: Status: RESOLVED | Noted: 2021-08-03 | Resolved: 2023-02-27

## 2023-02-27 PROBLEM — R94.31 ABNORMAL EKG: Status: RESOLVED | Noted: 2023-02-17 | Resolved: 2023-02-27

## 2023-02-27 PROBLEM — F11.20 CONTINUOUS OPIOID DEPENDENCE (HCC): Status: RESOLVED | Noted: 2021-12-30 | Resolved: 2023-02-27

## 2023-02-27 PROBLEM — J06.9 UPPER RESPIRATORY TRACT INFECTION: Status: RESOLVED | Noted: 2021-12-30 | Resolved: 2023-02-27

## 2023-02-27 PROBLEM — M06.09 RHEUMATOID ARTHRITIS OF MULTIPLE SITES WITH NEGATIVE RHEUMATOID FACTOR (HCC): Status: ACTIVE | Noted: 2020-12-29

## 2023-02-27 PROBLEM — Z01.818 PREOPERATIVE EVALUATION OF A MEDICAL CONDITION TO RULE OUT SURGICAL CONTRAINDICATIONS (TAR REQUIRED): Status: RESOLVED | Noted: 2021-11-02 | Resolved: 2023-02-27

## 2023-02-27 NOTE — ASSESSMENT & PLAN NOTE
Diverticulitis  Patient appears to be recovering well after being diagnosed and treated for diverticulitis  Her left lower quadrant is nontender at this time however she does feel queasy and nauseous stomach with her medications  She will discontinue metronidazole and try to finish her course of Cipro    Patient will call if symptoms persist after medication completed

## 2023-02-27 NOTE — PROGRESS NOTES
FAMILY PRACTICE OFFICE VISIT       NAME: Becki Roman  AGE: 71 y o  SEX: female       : 1950        MRN: [id number]    DATE: 2023  TIME: 10:30 AM    Assessment and Plan     Problem List Items Addressed This Visit        Digestive    Acute diverticulitis - Primary     Diverticulitis  Patient appears to be recovering well after being diagnosed and treated for diverticulitis  Her left lower quadrant is nontender at this time however she does feel queasy and nauseous stomach with her medications  She will discontinue metronidazole and try to finish her course of Cipro  Patient will call if symptoms persist after medication completed            TCM Call     Date and time call was made  2023  8:50 AM    Hospital care reviewed  Records reviewed    Patient was hospitialized at  [facility]    Date of Admission  23    Date of discharge  23    Diagnosis  Acute diverticulitis    Disposition  Home    Were the patients medications reviewed and updated  No    Current Symptoms  None; Lower abdominal pain    Lower abdominal pain severity  Mild    Lower abdominal pain onset  Ongoing      TCM Call     Post hospital issues  None    Should patient be enrolled in anticoag monitoring? No    Scheduled for follow up? Yes    Did you obtain your prescribed medications  Yes    Do you need help managing your prescriptions or medications  No    Is transportation to your appointment needed  No    I have advised the patient to call PCP with any new or worsening symptoms  Dari Vickers MA    Comments  TCM scheduled for 10/27/23 @ 95:69 with Dr Michael Hickman is a 71 y o  female patient who originally presented to the hospital on 2023 due to abdominal pain  CT AP showed uncomplicated acute sigmoid diverticulitis and she was started on IV ceftriaxone/flagyl with improvement in symptoms  She was discharged on Augmentin to complete total 5 day course   She was seen by cardiology given concern for EKG changes  Echo essentially unremarkable  Cardiology cleared without need for further workup as inpatient           Chief Complaint     Chief Complaint   Patient presents with   • Transition of Care Management       History of Present Illness     I reviewed the patient's discharge summary from her latest hospitalization  She states her bowel movements have started to return to normal   She denies any documented fevers  Unfortunately she feels like she is having side effects to her antibiotics of metronidazole and Cipro  She has nauseous feeling and discomfort along her mid epigastric area different than her left lower quadrant when she was initially seen for diverticulitis  She denies any vomiting  Review of Systems   Review of Systems   Constitutional: Positive for fatigue  Negative for fever  Respiratory: Negative  Cardiovascular: Negative  Gastrointestinal: Positive for abdominal pain and nausea  Negative for diarrhea  Genitourinary: Negative          Active Problem List     Patient Active Problem List   Diagnosis   • Allergic rhinitis   • Asthma   • Generalized anxiety disorder   • Stress incontinence, female   • Lumbar radiculopathy   • Type 2 diabetes mellitus with hyperglycemia, with long-term current use of insulin ([facility])   • Colitis   • Chronic fatigue   • Gastroesophageal reflux disease without esophagitis   • Hiatal hernia   • Rheumatoid arthritis of multiple sites with negative rheumatoid factor ([facility])   • Sensorineural hearing loss (SNHL) of left ear with restricted hearing of right ear   • Dry mouth   • Hypertension   • Hyperlipidemia   • Type 2 diabetes mellitus with hypoglycemia without coma, with long-term current use of insulin ([facility])   • Acute diverticulitis   • Fibromyalgia       Past Medical History:  Past Medical History:   Diagnosis Date   • Anxiety    • Anxiety    • Arthritis of right knee    • Cellulitis    • Contact dermatitis    • Cough    • Diabetes mellitus [facility])    • GERD without esophagitis    • Headache    • Hiatal hernia    • Hyperlipidemia    • Knee sprain    • Lyme disease    • Tick bite        Past Surgical History:  Past Surgical History:   Procedure Laterality Date   • ABDOMINAL SURGERY      tummy tuck   • CATARACT EXTRACTION, BILATERAL Bilateral 11/21 and 12/21   • HYSTERECTOMY      age 51   • IN COLONOSCOPY FLX DX W/COLLJ SPEC WHEN PFRMD N/A 5/8/2019    Procedure: COLONOSCOPY;  Surgeon: Ben Frausto MD;  Location: [facility]; Service: Gastroenterology   • IN ESOPHAGOGASTRODUODENOSCOPY TRANSORAL DIAGNOSTIC N/A 5/8/2019    Procedure: ESOPHAGOGASTRODUODENOSCOPY (EGD); Surgeon: Ben Frausto MD;  Location: [facility]; Service: Gastroenterology   • TUBAL LIGATION         Family History:  Family History   Problem Relation Age of Onset   • Mental illness Mother    • Heart disease Father    • Colon cancer Father 67   • No Known Problems Sister    • No Known Problems Daughter    • No Known Problems Maternal Grandmother    • No Known Problems Maternal Grandfather    • No Known Problems Paternal Grandmother    • No Known Problems Paternal Grandfather    • No Known Problems Son    • No Known Problems Sister    • No Known Problems Sister    • No Known Problems Sister    • No Known Problems Sister    • No Known Problems Sister    • Lymphoma Brother 45   • No Known Problems Brother    • No Known Problems Paternal Aunt    • No Known Problems Paternal Aunt    • No Known Problems Paternal Aunt    • No Known Problems Paternal Aunt    • Breast cancer Neg Hx    • Breast cancer additional onset Neg Hx    • Endometrial cancer Neg Hx    • Ovarian cancer Neg Hx    • BRCA 1/2 Neg Hx    • BRCA1 Negative Neg Hx    • BRCA1 Positive Neg Hx    • BRCA2 Negative Neg Hx    • BRCA2 Positive Neg Hx        Social History:  Social History     Socioeconomic History   • Marital status:       Spouse name: Not on file   • Number of children: Not on file   • Years of education: Not on file   • Highest education level: Not on file   Occupational History   • Not on file   Tobacco Use   • Smoking status: Former     Packs/day: 0 50     Years: 25 00     Pack years: 12 50     Types: Cigarettes     Quit date: 2008     Years since quittin 6   • Smokeless tobacco: Never   Vaping Use   • Vaping Use: Never used   Substance and Sexual Activity   • Alcohol use: Not Currently     Comment: seldom   • Drug use: Yes     Types: Marijuana     Comment: smokes weed 3x per week   • Sexual activity: Not Currently   Other Topics Concern   • Not on file   Social History Narrative   • Not on file     Social Determinants of Health     Financial Resource Strain: Low Risk    • Difficulty of Paying Living Expenses: Not very hard   Food Insecurity: Not on file   Transportation Needs: No Transportation Needs   • Lack of Transportation (Medical): No   • Lack of Transportation (Non-Medical): No   Physical Activity: Not on file   Stress: Not on file   Social Connections: Not on file   Intimate Partner Violence: Not on file   Housing Stability: Not on file       Objective     Vitals:    23 1003   BP: 130/80   Pulse: 60   Resp: 18   Temp: (!) 97 1 °F (36 2 °C)   SpO2: 97%     Wt Readings from Last 3 Encounters:   23 71 3 kg (157 lb 4 oz)   23 70 9 kg (156 lb 6 oz)   23 68 9 kg (152 lb)       Physical Exam  Vitals and nursing note reviewed  Constitutional:       General: She is not in acute distress  Appearance: Normal appearance  She is well-developed  She is not ill-appearing  HENT:      Head: Normocephalic and atraumatic  Right Ear: External ear normal       Left Ear: External ear normal       Nose: Nose normal    Eyes:      General:         Right eye: No discharge  Left eye: No discharge  Extraocular Movements: Extraocular movements intact  Conjunctiva/sclera: Conjunctivae normal       Pupils: Pupils are equal, round, and reactive to light     Neck:      Thyroid: No thyromegaly  Vascular: No carotid bruit  Cardiovascular:      Rate and Rhythm: Normal rate and regular rhythm  Heart sounds: Normal heart sounds  No murmur heard  Pulmonary:      Effort: Pulmonary effort is normal       Breath sounds: Normal breath sounds  No wheezing, rhonchi or rales  Abdominal:      General: Abdomen is flat  Bowel sounds are normal  There is no distension  Palpations: Abdomen is soft  Tenderness: There is no abdominal tenderness  There is no guarding or rebound  Comments: NO hepatospenomegaly   Musculoskeletal:         General: Normal range of motion  Cervical back: Normal range of motion and neck supple  Right lower leg: No edema  Left lower leg: No edema  Lymphadenopathy:      Cervical: No cervical adenopathy  Skin:     Findings: No rash  Comments: NO RASHES   Neurological:      General: No focal deficit present  Mental Status: She is alert and oriented to person, place, and time  Cranial Nerves: No cranial nerve deficit  Psychiatric:         Mood and Affect: Mood normal          Behavior: Behavior normal          Thought Content:  Thought content normal          Judgment: Judgment normal          Pertinent Laboratory/Diagnostic Studies:  Lab Results   Component Value Date    GLUCOSE 105 11/10/2015    BUN 13 02/18/2023    CREATININE 0 57 (L) 02/18/2023    CALCIUM 8 8 02/18/2023     11/10/2015    K 3 7 02/18/2023    CO2 24 02/18/2023     02/18/2023     Lab Results   Component Value Date    ALT 20 02/17/2023    AST 22 02/17/2023    ALKPHOS 83 02/17/2023       Lab Results   Component Value Date    WBC 8 67 02/18/2023    HGB 11 7 02/18/2023    HCT 36 3 02/18/2023    MCV 93 02/18/2023     02/18/2023       Lab Results   Component Value Date    TSH 3 19 02/26/2018       No results found for: CHOL  Lab Results   Component Value Date    TRIG 77 02/13/2023     Lab Results   Component Value Date    HDL 85 02/13/2023 Lab Results   Component Value Date    LDLCALC 59 02/13/2023     Lab Results   Component Value Date    HGBA1C 9 0 (H) 02/13/2023       Results for orders placed or performed during the hospital encounter of 02/17/23   COVID/FLU/RSV    Specimen: Nose; Nares   Result Value Ref Range    SARS-CoV-2 Negative Negative    INFLUENZA A PCR Negative Negative    INFLUENZA B PCR Negative Negative    RSV PCR Negative Negative   CBC and differential   Result Value Ref Range    WBC 9 67 4 31 - 10 16 Thousand/uL    RBC 4 43 3 81 - 5 12 Million/uL    Hemoglobin 13 1 11 5 - 15 4 g/dL    Hematocrit 41 3 34 8 - 46 1 %    MCV 93 82 - 98 fL    MCH 29 6 26 8 - 34 3 pg    MCHC 31 7 31 4 - 37 4 g/dL    RDW 13 0 11 6 - 15 1 %    MPV 9 1 8 9 - 12 7 fL    Platelets 134 399 - 553 Thousands/uL    nRBC 0 /100 WBCs    Neutrophils Relative 84 (H) 43 - 75 %    Immat GRANS % 0 0 - 2 %    Lymphocytes Relative 11 (L) 14 - 44 %    Monocytes Relative 4 4 - 12 %    Eosinophils Relative 1 0 - 6 %    Basophils Relative 0 0 - 1 %    Neutrophils Absolute 7 99 (H) 1 85 - 7 62 Thousands/µL    Immature Grans Absolute 0 02 0 00 - 0 20 Thousand/uL    Lymphocytes Absolute 1 10 0 60 - 4 47 Thousands/µL    Monocytes Absolute 0 43 0 17 - 1 22 Thousand/µL    Eosinophils Absolute 0 09 0 00 - 0 61 Thousand/µL    Basophils Absolute 0 04 0 00 - 0 10 Thousands/µL   Comprehensive metabolic panel   Result Value Ref Range    Sodium 139 135 - 147 mmol/L    Potassium 4 2 3 5 - 5 3 mmol/L    Chloride 105 96 - 108 mmol/L    CO2 26 21 - 32 mmol/L    ANION GAP 8 4 - 13 mmol/L    BUN 16 5 - 25 mg/dL    Creatinine 0 62 0 60 - 1 30 mg/dL    Glucose 164 (H) 65 - 140 mg/dL    Calcium 8 9 8 3 - 10 1 mg/dL    Corrected Calcium 9 6 8 3 - 10 1 mg/dL    AST 22 5 - 45 U/L    ALT 20 12 - 78 U/L    Alkaline Phosphatase 83 46 - 116 U/L    Total Protein 6 9 6 4 - 8 4 g/dL    Albumin 3 1 (L) 3 5 - 5 0 g/dL    Total Bilirubin 0 55 0 20 - 1 00 mg/dL    eGFR 91 ml/min/1 73sq m   Lipase   Result Value Ref Range    Lipase 34 (L) 73 - 393 u/L   UA w Reflex to Microscopic w Reflex to Culture    Specimen: Urine, Clean Catch   Result Value Ref Range    Color, UA Yellow     Clarity, UA Clear     Specific Gravity, UA 1 032 (H) 1 003 - 1 030    pH, UA 6 0 4 5, 5 0, 5 5, 6 0, 6 5, 7 0, 7 5, 8 0    Leukocytes, UA Negative Negative    Nitrite, UA Negative Negative    Protein, UA 70 (1+) (A) Negative mg/dl    Glucose, UA Trace (A) Negative mg/dl    Ketones, UA 40 (2+) (A) Negative mg/dl    Urobilinogen, UA <2 0 <2 0 mg/dl mg/dl    Bilirubin, UA Negative Negative    Occult Blood, UA Negative Negative   HS Troponin 0hr (reflex protocol)   Result Value Ref Range    hs TnI 0hr 2 "Refer to ACS Flowchart"- see link ng/L   Urine Microscopic   Result Value Ref Range    RBC, UA 2-4 (A) None Seen, 1-2 /hpf    WBC, UA 1-2 None Seen, 1-2 /hpf    Epithelial Cells Occasional None Seen, Occasional /hpf    Bacteria, UA None Seen None Seen, Occasional /hpf    MUCUS THREADS Moderate (A) None Seen   HS Troponin I 2hr   Result Value Ref Range    hs TnI 2hr 3 "Refer to ACS Flowchart"- see link ng/L    Delta 2hr hsTnI 1 <20 ng/L   HS Troponin I 4hr   Result Value Ref Range    hs TnI 4hr 3 "Refer to ACS Flowchart"- see link ng/L    Delta 4hr hsTnI 1 <20 ng/L   Basic metabolic panel   Result Value Ref Range    Sodium 139 135 - 147 mmol/L    Potassium 3 7 3 5 - 5 3 mmol/L    Chloride 107 96 - 108 mmol/L    CO2 24 21 - 32 mmol/L    ANION GAP 8 4 - 13 mmol/L    BUN 13 5 - 25 mg/dL    Creatinine 0 57 (L) 0 60 - 1 30 mg/dL    Glucose 183 (H) 65 - 140 mg/dL    Calcium 8 8 8 3 - 10 1 mg/dL    eGFR 93 ml/min/1 73sq m   Magnesium   Result Value Ref Range    Magnesium 1 9 1 6 - 2 6 mg/dL   CBC and differential   Result Value Ref Range    WBC 8 67 4 31 - 10 16 Thousand/uL    RBC 3 92 3 81 - 5 12 Million/uL    Hemoglobin 11 7 11 5 - 15 4 g/dL    Hematocrit 36 3 34 8 - 46 1 %    MCV 93 82 - 98 fL    MCH 29 8 26 8 - 34 3 pg    MCHC 32 2 31 4 - 37 4 g/dL RDW 12 9 11 6 - 15 1 %    MPV 9 2 8 9 - 12 7 fL    Platelets 810 779 - 124 Thousands/uL    nRBC 0 /100 WBCs    Neutrophils Relative 80 (H) 43 - 75 %    Immat GRANS % 0 0 - 2 %    Lymphocytes Relative 15 14 - 44 %    Monocytes Relative 5 4 - 12 %    Eosinophils Relative 0 0 - 6 %    Basophils Relative 0 0 - 1 %    Neutrophils Absolute 6 93 1 85 - 7 62 Thousands/µL    Immature Grans Absolute 0 03 0 00 - 0 20 Thousand/uL    Lymphocytes Absolute 1 27 0 60 - 4 47 Thousands/µL    Monocytes Absolute 0 40 0 17 - 1 22 Thousand/µL    Eosinophils Absolute 0 01 0 00 - 0 61 Thousand/µL    Basophils Absolute 0 03 0 00 - 0 10 Thousands/µL   ECG 12 lead   Result Value Ref Range    Ventricular Rate 74 BPM    Atrial Rate 74 BPM    OR Interval 132 ms    QRSD Interval 64 ms    QT Interval 366 ms    QTC Interval 406 ms    P Axis 43 degrees    QRS Axis -19 degrees    T Wave Axis -11 degrees   Echo complete w/ contrast if indicated   Result Value Ref Range    LA size 3 4 cm    Triscuspid Valve Regurgitation Peak Gradient 26 0 mmHg    Tricuspid valve peak regurgitation velocity 2 56 m/s    LVPWd 1 00 cm    Left Atrium Area-systolic Apical Two Chamber 14 2 cm2    Left Atrium Area-systolic Four Chamber 08 4 cm2    MV E' Tissue Velocity Septal 7 cm/s    Tricuspid annular plane systolic excursion 9 99 cm    TR Peak Emigdio 2 6 m/s    IVSd 4 12 cm    LV DIASTOLIC VOLUME (MOD BIPLANE) 2D 73 mL    LEFT VENTRICLE SYSTOLIC VOLUME (MOD BIPLANE) 2D 19 mL    Left ventricular stroke volume (2D) 54 00 mL    A4C EF 57 %    LA length (A2C) 5 10 cm    LVIDd 4 10 cm    IVS 1 2 cm    LVIDS 2 30 cm    FS 44 28 - 44 %    Asc Ao 3 1 cm    Ao root 3 00 cm    RVID d 3 3 cm    MV valve area p 1/2 method 3 86 cm2    E wave deceleration time 198 ms    MV Peak E Emigdio 67 cm/s    MV Peak A Emigdio 0 88 m/s    RAA A4C 12 7 cm2    MV stenosis pressure 1/2 time 57 ms    LVSV, 2D 54 mL    LV EF 55    Fingerstick Glucose (POCT)   Result Value Ref Range    POC Glucose 120 65 - 140 mg/dl   Fingerstick Glucose (POCT)   Result Value Ref Range    POC Glucose 175 (H) 65 - 140 mg/dl   Fingerstick Glucose (POCT)   Result Value Ref Range    POC Glucose 164 (H) 65 - 140 mg/dl   Fingerstick Glucose (POCT)   Result Value Ref Range    POC Glucose 121 65 - 140 mg/dl       No orders of the defined types were placed in this encounter        ALLERGIES:  Allergies   Allergen Reactions   • Augmentin [Amoxicillin-Pot Clavulanate] GI Intolerance   • Codeine Drowsiness   • Macrobid [Nitrofurantoin] Other (See Comments)     Pt does not recall       Current Medications     Current Outpatient Medications   Medication Sig Dispense Refill   • albuterol (2 5 mg/3 mL) 0 083 % nebulizer solution Take 3 mL (2 5 mg total) by nebulization every 6 (six) hours as needed for wheezing or shortness of breath 75 mL 2   • amLODIPine (NORVASC) 2 5 mg tablet Take 1 tablet (2 5 mg total) by mouth daily at bedtime 90 tablet 1   • artificial tear (LUBRIFRESH P M ) 83-15 % ophthalmic ointment daily at bedtime     • aspirin 81 mg chewable tablet Chew 81 mg daily     • baclofen 10 mg tablet Take 10 mg by mouth daily at bedtime  4   • Breo Ellipta 200-25 MCG/ACT inhaler INHALE ONE INHALATION BY MOUTH EVERY DAY - (RINSE MOUTH AND SPIT AFTER USE, DISCARD SIX WEEKS AFTER REMOVAL FROM FOIL POUCH) 2 each 1   • Cholecalciferol (VITAMIN D PO) Take 1 tablet by mouth daily     • Diclofenac Sodium (VOLTAREN) 1 % Apply 1 application topically 4 (four) times a day     • ezetimibe (ZETIA) 10 mg tablet Take 0 5 tablets (5 mg total) by mouth daily 45 tablet 1   • Ferrous Sulfate Dried (FERROUS SULFATE CR PO) Iron TABS    Refills: 0       Active     • glucose blood (Contour Next Test) test strip Use 1 each 4 (four) times a day 400 each 1   • hydrochlorothiazide (HYDRODIURIL) 12 5 mg tablet Take 1 tablet (12 5 mg total) by mouth daily 90 tablet 0   • hydroxychloroquine (PLAQUENIL) 200 mg tablet Take 200 mg by mouth 2 (two) times a day with meals     • Insulin Glargine Solostar (Lantus SoloStar) 100 UNIT/ML SOPN Inject 0 25 mL (25 Units total) under the skin daily at bedtime 45 mL 1   • Insulin Pen Needle (BD Pen Needle Ester U/F) 32G X 4 MM MISC Inject under the skin 4 (four) times a day 400 each 1   • Lifitegrast (Aneita Fabry OP) Apply to eye 2 vials a day       • losartan (COZAAR) 25 mg tablet Take 2 tablets (50 mg total) by mouth 2 (two) times a day 180 tablet 1   • metFORMIN (GLUCOPHAGE) 500 mg tablet Take 2 tablets (1,000 mg total) by mouth 2 (two) times a day with meals 360 tablet 1   • metroNIDAZOLE (FLAGYL) 500 mg tablet Take 1 tablet (500 mg total) by mouth every 8 (eight) hours for 10 days 30 tablet 0   • Microlet Lancets MISC Use 4 (four) times a day 400 each 1   • sertraline (ZOLOFT) 50 mg tablet TAKE ONE TABLET BY MOUTH EVERY DAY 90 tablet 3   • simvastatin (ZOCOR) 20 mg tablet Take 1 tablet (20 mg total) by mouth daily 90 tablet 1   • valACYclovir (VALTREX) 1,000 mg tablet Take 1,000 mg by mouth daily     • ciprofloxacin (CIPRO) 500 mg tablet Take 1 tablet (500 mg total) by mouth every 12 (twelve) hours for 10 days (Patient not taking: Reported on 2/21/2023) 20 tablet 0   • insulin aspart (NovoLOG FlexPen) 100 UNIT/ML injection pen Inject 6 Units under the skin 3 (three) times a day with meals 45 mL 1   • polyethylene glycol (GOLYTELY) 4000 mL solution Take 4,000 mL by mouth once for 1 dose 4000 mL 0     No current facility-administered medications for this visit           Health Maintenance     Health Maintenance   Topic Date Due   • Hepatitis C Screening  Never done   • Pneumococcal Vaccine: 65+ Years (1 - PCV) Never done   • Osteoporosis Screening  Never done   • COVID-19 Vaccine (3 - Booster for Moderna series) 04/19/2021   • BMI: Followup Plan  07/15/2022   • HEMOGLOBIN A1C  08/13/2023   • DM Eye Exam  08/23/2023   • Fall Risk  09/22/2023   • Urinary Incontinence Screening  09/22/2023   • Medicare Annual Wellness Visit (AWV)  09/22/2023   • Breast Cancer Screening: Mammogram  02/08/2024   • Kidney Health Evaluation: Microalbumin/Creatinine Ratio  02/13/2024   • Kidney Health Evaluation: GFR  02/18/2024   • Depression Screening  02/20/2024   • Diabetic Foot Exam  02/21/2024   • BMI: Adult  02/27/2024   • Colorectal Cancer Screening  05/08/2029   • Influenza Vaccine  Completed   • HIB Vaccine  Aged Out   • IPV Vaccine  Aged Out   • Hepatitis A Vaccine  Aged Out   • Meningococcal ACWY Vaccine  Aged Out   • HPV Vaccine  Aged Out     Immunization History   Administered Date(s) Administered   • COVID-19 MODERNA VACC 0 5 ML IM 01/25/2021, 02/22/2021   • INFLUENZA 10/22/2022   • Influenza Quadrivalent Preservative Free 3 years and older IM 11/08/2014   • Influenza Split High Dose Preservative Free IM 02/09/2018   • Influenza, high dose seasonal 0 7 mL 10/04/2018, 34/28/0951       Devorah Oliveira MD

## 2023-03-08 DIAGNOSIS — Z79.4 TYPE 2 DIABETES MELLITUS WITH HYPERGLYCEMIA, WITH LONG-TERM CURRENT USE OF INSULIN (HCC): ICD-10-CM

## 2023-03-08 DIAGNOSIS — E11.65 TYPE 2 DIABETES MELLITUS WITH HYPERGLYCEMIA, WITH LONG-TERM CURRENT USE OF INSULIN (HCC): ICD-10-CM

## 2023-03-08 RX ORDER — PERPHENAZINE 16 MG/1
1 TABLET, FILM COATED ORAL 4 TIMES DAILY
Qty: 400 EACH | Refills: 1 | Status: SHIPPED | OUTPATIENT
Start: 2023-03-08

## 2023-03-08 RX ORDER — PEN NEEDLE, DIABETIC 32GX 5/32"
NEEDLE, DISPOSABLE MISCELLANEOUS 4 TIMES DAILY
Qty: 400 EACH | Refills: 1 | Status: SHIPPED | OUTPATIENT
Start: 2023-03-08

## 2023-03-17 ENCOUNTER — TELEPHONE (OUTPATIENT)
Dept: ENDOCRINOLOGY | Facility: CLINIC | Age: 73
End: 2023-03-17

## 2023-03-17 NOTE — TELEPHONE ENCOUNTER
Pt is having a colonoscopy done on 03/20/2023, and would like to know what to do with her  medication, Also the day prior to her colonoscopy she will be only on the liquid diet and she is afraid that her BS will be low , she would like to now what to do if her BS are low  Patient is taking  Lantus 25 units QHS, Novolog 6 units before meal , Metformin 500 mg 2 tab BID

## 2023-03-20 ENCOUNTER — NURSE TRIAGE (OUTPATIENT)
Dept: OTHER | Facility: OTHER | Age: 73
End: 2023-03-20

## 2023-03-20 DIAGNOSIS — K57.92 ACUTE DIVERTICULITIS: Primary | ICD-10-CM

## 2023-03-20 DIAGNOSIS — R10.32 ABDOMINAL PAIN, LEFT LOWER QUADRANT: ICD-10-CM

## 2023-03-20 NOTE — TELEPHONE ENCOUNTER
Given her last CT scan showed diverticulitis I would highly encourage her to go to the ER for repeat scans and blood work to ensure no complications such as abscess, perforation, etc  If she truly is refusing we could order these STAT but not as ideal or comprehensive as an ER visit given we can't physically examine her, check her vitals, etc

## 2023-03-20 NOTE — TELEPHONE ENCOUNTER
Patient called in stating she would like to make a sooner appointment for colonoscopy and to see provider again prior to procedure  States symptoms have returned  Doesn't ED would like to see provider in office  States intermittent abdominal cramping 8/10 with nausea  Denies fever or blood in stool  Reason for Disposition  • Patient wants to be seen    Answer Assessment - Initial Assessment Questions  1  LOCATION: "Where does it hurt?"       Lower abdomen   2  RADIATION: "Does the pain shoot anywhere else?" (e g , chest, back)     Denies   3  ONSET: "When did the pain begin?" (e g , minutes, hours or days ago)      Ongoing   4  SUDDEN: "Gradual or sudden onset?"   sudden   5  PATTERN "Does the pain come and go, or is it constant?"     - If constant: "Is it getting better, staying the same, or worsening?"       (Note: Constant means the pain never goes away completely; most serious pain is constant and it progresses)      - If intermittent: "How long does it last?" "Do you have pain now?"      (Note: Intermittent means the pain goes away completely between bouts)     Intermittent and cramping   6  SEVERITY: "How bad is the pain?"  (e g , Scale 1-10; mild, moderate, or severe)    - MILD (1-3): doesn't interfere with normal activities, abdomen soft and not tender to touch     - MODERATE (4-7): interferes with normal activities or awakens from sleep, tender to touch     - SEVERE (8-10): excruciating pain, doubled over, unable to do any normal activities   8-9/10  7  RECURRENT SYMPTOM: "Have you ever had this type of stomach pain before?" If Yes, ask: "When was the last time?" and "What happened that time?"      Yes   8  CAUSE: "What do you think is causing the stomach pain?"      Might be due to diverticulitis   9  RELIEVING/AGGRAVATING FACTORS: "What makes it better or worse?" (e g , movement, antacids, bowel movement)      Denies   10   OTHER SYMPTOMS: "Has there been any vomiting, diarrhea, constipation, or urine problems?"        Diarrhea and nausa    Protocols used: ABDOMINAL PAIN - [facility] - WILLIE FABIAN

## 2023-03-21 ENCOUNTER — TELEPHONE (OUTPATIENT)
Dept: GASTROENTEROLOGY | Facility: CLINIC | Age: 73
End: 2023-03-21

## 2023-03-21 ENCOUNTER — HOSPITAL ENCOUNTER (OUTPATIENT)
Dept: CT IMAGING | Facility: HOSPITAL | Age: 73
Discharge: HOME | End: 2023-03-21

## 2023-03-21 ENCOUNTER — APPOINTMENT (OUTPATIENT)
Dept: LAB | Facility: CLINIC | Age: 73
End: 2023-03-21

## 2023-03-21 DIAGNOSIS — K57.92 ACUTE DIVERTICULITIS: ICD-10-CM

## 2023-03-21 LAB
ALBUMIN SERPL BCP-MCNC: 4.1 G/DL (ref 3.5–5)
ALP SERPL-CCNC: 67 U/L (ref 34–104)
ALT SERPL W P-5'-P-CCNC: 19 U/L (ref 7–52)
ANION GAP SERPL CALCULATED.3IONS-SCNC: 8 MMOL/L (ref 4–13)
AST SERPL W P-5'-P-CCNC: 16 U/L (ref 13–39)
BASOPHILS # BLD AUTO: 0.05 THOUSANDS/ÂΜL (ref 0–0.1)
BASOPHILS NFR BLD AUTO: 1 % (ref 0–1)
BILIRUB SERPL-MCNC: 0.28 MG/DL (ref 0.2–1)
BUN SERPL-MCNC: 12 MG/DL (ref 5–25)
CALCIUM SERPL-MCNC: 9.1 MG/DL (ref 8.4–10.2)
CHLORIDE SERPL-SCNC: 106 MMOL/L (ref 96–108)
CO2 SERPL-SCNC: 26 MMOL/L (ref 21–32)
CREAT SERPL-MCNC: 0.56 MG/DL (ref 0.6–1.3)
EOSINOPHIL # BLD AUTO: 0.27 THOUSAND/ÂΜL (ref 0–0.61)
EOSINOPHIL NFR BLD AUTO: 5 % (ref 0–6)
ERYTHROCYTE [DISTWIDTH] IN BLOOD BY AUTOMATED COUNT: 12.9 % (ref 11.6–15.1)
GFR SERPL CREATININE-BSD FRML MDRD: 94 ML/MIN/1.73SQ M
GLUCOSE P FAST SERPL-MCNC: 113 MG/DL (ref 65–99)
HCT VFR BLD AUTO: 40.8 % (ref 34.8–46.1)
HGB BLD-MCNC: 12.9 G/DL (ref 11.5–15.4)
IMM GRANULOCYTES # BLD AUTO: 0.02 THOUSAND/UL (ref 0–0.2)
IMM GRANULOCYTES NFR BLD AUTO: 0 % (ref 0–2)
LYMPHOCYTES # BLD AUTO: 2.13 THOUSANDS/ÂΜL (ref 0.6–4.47)
LYMPHOCYTES NFR BLD AUTO: 37 % (ref 14–44)
MCH RBC QN AUTO: 29.1 PG (ref 26.8–34.3)
MCHC RBC AUTO-ENTMCNC: 31.6 G/DL (ref 31.4–37.4)
MCV RBC AUTO: 92 FL (ref 82–98)
MONOCYTES # BLD AUTO: 0.38 THOUSAND/ÂΜL (ref 0.17–1.22)
MONOCYTES NFR BLD AUTO: 7 % (ref 4–12)
NEUTROPHILS # BLD AUTO: 2.94 THOUSANDS/ÂΜL (ref 1.85–7.62)
NEUTS SEG NFR BLD AUTO: 50 % (ref 43–75)
NRBC BLD AUTO-RTO: 0 /100 WBCS
PLATELET # BLD AUTO: 217 THOUSANDS/UL (ref 149–390)
PMV BLD AUTO: 9.1 FL (ref 8.9–12.7)
POTASSIUM SERPL-SCNC: 3.9 MMOL/L (ref 3.5–5.3)
PROT SERPL-MCNC: 6.8 G/DL (ref 6.4–8.4)
RBC # BLD AUTO: 4.43 MILLION/UL (ref 3.81–5.12)
SODIUM SERPL-SCNC: 140 MMOL/L (ref 135–147)
WBC # BLD AUTO: 5.79 THOUSAND/UL (ref 4.31–10.16)

## 2023-03-21 RX ORDER — DICYCLOMINE HYDROCHLORIDE 10 MG/1
10 CAPSULE ORAL 4 TIMES DAILY PRN
Qty: 60 CAPSULE | Refills: 1 | Status: SHIPPED | OUTPATIENT
Start: 2023-03-21 | End: 2023-03-27 | Stop reason: SDUPTHER

## 2023-03-21 RX ADMIN — IOHEXOL 100 ML: 350 INJECTION, SOLUTION INTRAVENOUS at 12:01

## 2023-03-21 NOTE — TELEPHONE ENCOUNTER
Spoke with patient and relayed Lucia Pollard PA-C recommendations  Patient does not want to go to ER at this time  Patient will get blood work done at [place] [id number]  Provided patient with number to central scheduling  Patient states she will schedule CT now

## 2023-03-21 NOTE — TELEPHONE ENCOUNTER
Patients GI provider:  Sasha LAMAR    Number to return call: [phone]    Reason for call: Devyn Montaño from radiology called with Stat results   Tiger Text sent    Scheduled procedure/appointment date if applicable: Procedure 0/89/98

## 2023-03-21 NOTE — TELEPHONE ENCOUNTER
CT scan is back and revealing resolution of prior diverticulitis with no complications, CMP is back and without concern, CBC is pending  If patient would like we can trial bentyl for her pain  As for colonoscopy would not recommend scheduling this until at least after March 31st (6 weeks from diverticulitis bout) to prevent perforation

## 2023-03-21 NOTE — TELEPHONE ENCOUNTER
Spoke with pt  Relayed recommendations as per WILLARD Arroyo  Pt has her colonoscopy scheduled for 4/20/23  Would it be possible to get it done sooner, but after the recommended 3/31 date? She also questioned the label on Golytely that states it should not be used by someone with ulcerative colitis and she has concerns that it would affect her as well  Pt advised on the use of Golytely for her and her diagnosis is diverticulitis  Pt agreeable to recommendations and verbalized understanding of the same

## 2023-03-24 NOTE — TELEPHONE ENCOUNTER
Spoke to pt  She is Dr Michael Sherman pt  Could not find sooner appt  Sent message to [facility] to see if they can reschedule pt

## 2023-03-27 ENCOUNTER — NURSE TRIAGE (OUTPATIENT)
Dept: GASTROENTEROLOGY | Facility: CLINIC | Age: 73
End: 2023-03-27

## 2023-03-27 DIAGNOSIS — R10.32 ABDOMINAL PAIN, LEFT LOWER QUADRANT: ICD-10-CM

## 2023-03-27 RX ORDER — DICYCLOMINE HYDROCHLORIDE 10 MG/1
10 CAPSULE ORAL 4 TIMES DAILY PRN
Qty: 60 CAPSULE | Refills: 1 | Status: SHIPPED | OUTPATIENT
Start: 2023-03-27

## 2023-03-27 NOTE — TELEPHONE ENCOUNTER
"  Reason for Disposition  • Prescription prescribed recently is not at pharmacy and triager has access to patient's EMR and prescription is recorded in the EMR    Answer Assessment - Initial Assessment Questions  1  NAME of MEDICATION: \"What medicine are you calling about? \"      Pain medication   2  QUESTION: Myron Britain is your question? \" (e g , medication refill, side effect)      Medication was never sent to patient's pharmacy   3  PRESCRIBING HCP: \"Who prescribed it? \" Reason: if prescribed by specialist, call should be referred to that group        GI    Protocols used: MEDICATION QUESTION CALL-ADULT-[place]    "

## 2023-03-27 NOTE — TELEPHONE ENCOUNTER
Patient states medication was never sent to her pharmacy  Original order was set to phone in  Medication resent  Receipt confirmed by pharmacy

## 2023-04-18 ENCOUNTER — TELEPHONE (OUTPATIENT)
Dept: ENDOCRINOLOGY | Facility: CLINIC | Age: 73
End: 2023-04-18

## 2023-04-18 NOTE — TELEPHONE ENCOUNTER
Pt has a question, she has a colonoscopy on 4/20 and wants to know what medication can she take and what if she gets up and her sugars are very low or very high? What should she do? Please advise

## 2023-04-18 NOTE — TELEPHONE ENCOUNTER
Spoke with pt , Message was review  Per pt her BS are running  High, advised pt to sent her BS logs, per pt she will sent her BS logs another day, right now her major concern is the colonoscopy procedure

## 2023-04-18 NOTE — TELEPHONE ENCOUNTER
According to last visit this was her insulin dosing  Lantus 25 at bedtime   Novolog 6-7 units before meals  Metformin 1000mg twice per day     On the night prior to the Colonoscopy prep, Reduce Lantus to 20 units daily  When not eating usual meal during prep, she can skip her usual novolog doses but can take novolog every 3-4 hours if needed if blood sugars high     201-250: 2 units  251-300: 4 units  301-350: 6 units  Over 350: 8 units     Night before Colonoscopy can reduce lantus to 14 units daily to avoid low blood sugars  Use clear fluids such as lemonade, apple juice, or sprite to treat low blood sugars or prevent low blood sugars if trending down  Can also stay off metformin for the day of prep/procedure    Also if she has been having lows recently please send blood sugar readings so meds can be adjusted

## 2023-04-20 ENCOUNTER — HOSPITAL ENCOUNTER (OUTPATIENT)
Dept: GASTROENTEROLOGY | Facility: HOSPITAL | Age: 73
Setting detail: OUTPATIENT SURGERY
Discharge: HOME | End: 2023-04-20
Admitting: INTERNAL MEDICINE

## 2023-04-20 VITALS
BODY MASS INDEX: 29.84 KG/M2 | DIASTOLIC BLOOD PRESSURE: 63 MMHG | HEART RATE: 60 BPM | HEIGHT: 60 IN | TEMPERATURE: 97 F | RESPIRATION RATE: 16 BRPM | SYSTOLIC BLOOD PRESSURE: 128 MMHG | OXYGEN SATURATION: 97 % | WEIGHT: 152 LBS

## 2023-04-20 DIAGNOSIS — K57.92 ACUTE DIVERTICULITIS: ICD-10-CM

## 2023-04-20 LAB — GLUCOSE SERPL-MCNC: 240 MG/DL (ref 65–140)

## 2023-04-20 NOTE — H&P
History and Physical - [facility]  Katie Deluca 71 y o  female MRN: [id number]        HPI: 67-year-old female with history of diabetes mellitus was hospitalized couple of months ago because of acute diverticulitis  Regular bowel movements  Historical Information   Past Medical History:   Diagnosis Date   • Anxiety    • Anxiety    • Arthritis of right knee    • Cellulitis    • Contact dermatitis    • Cough    • Diabetes mellitus ([address] )    • GERD without esophagitis    • Headache    • Hiatal hernia    • Hyperlipidemia    • Knee sprain    • Lyme disease    • Tick bite      Past Surgical History:   Procedure Laterality Date   • ABDOMINAL SURGERY      tummy tuck   • CATARACT EXTRACTION, BILATERAL Bilateral  and    • HYSTERECTOMY      age 51   • NV COLONOSCOPY FLX DX W/COLLJ SPEC WHEN PFRMD N/A 2019    Procedure: COLONOSCOPY;  Surgeon: Shruthi Llamas MD;  Location: [facility]; Service: Gastroenterology   • NV ESOPHAGOGASTRODUODENOSCOPY TRANSORAL DIAGNOSTIC N/A 2019    Procedure: ESOPHAGOGASTRODUODENOSCOPY (EGD); Surgeon: Shruthi Llamas MD;  Location: [facility];   Service: Gastroenterology   • TUBAL LIGATION       Social History   Social History     Substance and Sexual Activity   Alcohol Use Not Currently    Comment: seldom     Social History     Substance and Sexual Activity   Drug Use Yes   • Types: Marijuana    Comment: smokes weed 3x per week     Social History     Tobacco Use   Smoking Status Former   • Packs/day: 0 50   • Years: 25 00   • Pack years: 12 50   • Types: Cigarettes   • Quit date: 2008   • Years since quittin 7   Smokeless Tobacco Never     Family History   Problem Relation Age of Onset   • Mental illness Mother    • Heart disease Father    • Colon cancer Father 67   • No Known Problems Sister    • No Known Problems Daughter    • No Known Problems Maternal Grandmother    • No Known Problems Maternal Grandfather    • No Known Problems Paternal Grandmother    • No Known Problems Paternal Grandfather    • No Known Problems Son    • No Known Problems Sister    • No Known Problems Sister    • No Known Problems Sister    • No Known Problems Sister    • No Known Problems Sister    • Lymphoma Brother 45   • No Known Problems Brother    • No Known Problems Paternal Aunt    • No Known Problems Paternal Aunt    • No Known Problems Paternal Aunt    • No Known Problems Paternal Aunt    • Breast cancer Neg Hx    • Breast cancer additional onset Neg Hx    • Endometrial cancer Neg Hx    • Ovarian cancer Neg Hx    • BRCA 1/2 Neg Hx    • BRCA1 Negative Neg Hx    • BRCA1 Positive Neg Hx    • BRCA2 Negative Neg Hx    • BRCA2 Positive Neg Hx        Meds/Allergies     (Not in a hospital admission)      Allergies   Allergen Reactions   • Augmentin [Amoxicillin-Pot Clavulanate] GI Intolerance   • Codeine Drowsiness   • Macrobid [Nitrofurantoin] Other (See Comments)     Pt does not recall       Objective     There were no vitals taken for this visit      PHYSICAL EXAM:    Gen: NAD  CV: S1 & S2 normal, RRR  CHEST: Clear to auscultate  ABD: soft, NT/ND, good bowel sounds  EXT: no edema    ASSESSMENT:     History of diverticulitis    PLAN:    Colonoscopy

## 2023-05-08 ENCOUNTER — TELEPHONE (OUTPATIENT)
Dept: ENDOCRINOLOGY | Facility: CLINIC | Age: 73
End: 2023-05-08

## 2023-05-08 DIAGNOSIS — I10 HYPERTENSION, UNSPECIFIED TYPE: ICD-10-CM

## 2023-05-08 DIAGNOSIS — Z79.4 TYPE 2 DIABETES MELLITUS WITH HYPERGLYCEMIA, WITH LONG-TERM CURRENT USE OF INSULIN (HCC): ICD-10-CM

## 2023-05-08 DIAGNOSIS — E11.65 TYPE 2 DIABETES MELLITUS WITH HYPERGLYCEMIA, WITH LONG-TERM CURRENT USE OF INSULIN (HCC): ICD-10-CM

## 2023-05-08 RX ORDER — LOSARTAN POTASSIUM 25 MG/1
50 TABLET ORAL 2 TIMES DAILY
Qty: 360 TABLET | Refills: 1 | Status: SHIPPED | OUTPATIENT
Start: 2023-05-08

## 2023-05-08 NOTE — TELEPHONE ENCOUNTER
Morning blood sugars are consistently high  Increase Lantus from 26 to 30 units    Blood sugars variable throughout the day so I don't want to increase the novolog as that can increase risk of low blood sugars  Per last office note  Will refer to dietician for medical nutrition therapy as consistent carb intake should help to stabilize blood sugar readings and will also need to follow fiber intake guidelines per GI  **Please schedule appointment with dietician**

## 2023-05-08 NOTE — TELEPHONE ENCOUNTER
Received a call from Pt  Per pt she did call last week  And nobody call her back  Pt  C/o high sugar  Please review BS logs  Pt is taking Metformin 500 mg 2 tabs BID , Lantus 26 units QHS, Novolog 6-9 before meals

## 2023-05-11 ENCOUNTER — ESTABLISHED COMPREHENSIVE EXAM (OUTPATIENT)
Dept: URBAN - METROPOLITAN AREA CLINIC 6 | Facility: CLINIC | Age: 73
End: 2023-05-11

## 2023-05-11 DIAGNOSIS — H04.123: ICD-10-CM

## 2023-05-11 DIAGNOSIS — H02.885: ICD-10-CM

## 2023-05-11 DIAGNOSIS — E11.9: ICD-10-CM

## 2023-05-11 DIAGNOSIS — H02.882: ICD-10-CM

## 2023-05-11 DIAGNOSIS — Z96.1: ICD-10-CM

## 2023-05-11 DIAGNOSIS — Z79.4: ICD-10-CM

## 2023-05-11 PROCEDURE — 92014 COMPRE OPH EXAM EST PT 1/>: CPT

## 2023-05-11 PROCEDURE — 92015 DETERMINE REFRACTIVE STATE: CPT

## 2023-05-11 ASSESSMENT — KERATOMETRY
OS_AXISANGLE2_DEGREES: 18
OD_K1POWER_DIOPTERS: 43.00
OS_AXISANGLE_DEGREES: 108
OS_K2POWER_DIOPTERS: 43.00
OS_K1POWER_DIOPTERS: 42.25
OS_AXISANGLE2_DEGREES: 14
OD_AXISANGLE_DEGREES: 084
OD_AXISANGLE2_DEGREES: 174
OD_K2POWER_DIOPTERS: 43.50
OS_AXISANGLE_DEGREES: 104

## 2023-05-11 ASSESSMENT — VISUAL ACUITY
OD_CC: J7
OS_GLARE: 20/200
OS_CC: 20/40
OS_CC: J5
OD_CC: 20/40+2
OD_GLARE: 20/200

## 2023-05-11 ASSESSMENT — TONOMETRY
OD_IOP_MMHG: 14
OS_IOP_MMHG: 14

## 2023-05-15 ENCOUNTER — OFFICE VISIT (OUTPATIENT)
Dept: FAMILY MEDICINE CLINIC | Facility: CLINIC | Age: 73
End: 2023-05-15

## 2023-05-15 VITALS
WEIGHT: 155.25 LBS | SYSTOLIC BLOOD PRESSURE: 182 MMHG | HEART RATE: 89 BPM | HEIGHT: 60 IN | BODY MASS INDEX: 30.48 KG/M2 | RESPIRATION RATE: 18 BRPM | DIASTOLIC BLOOD PRESSURE: 83 MMHG | OXYGEN SATURATION: 98 % | TEMPERATURE: 97.6 F

## 2023-05-15 DIAGNOSIS — J02.9 PHARYNGITIS, UNSPECIFIED ETIOLOGY: Primary | ICD-10-CM

## 2023-05-15 RX ORDER — AZITHROMYCIN 250 MG/1
TABLET, FILM COATED ORAL
Qty: 6 TABLET | Refills: 0 | Status: SHIPPED | OUTPATIENT
Start: 2023-05-15 | End: 2023-05-20

## 2023-05-15 NOTE — PROGRESS NOTES
FAMILY PRACTICE OFFICE VISIT       NAME: Becki Roman  AGE: 71 y o  SEX: female       : 1950        MRN: [id number]    DATE: 5/15/2023  TIME: 4:00 PM    Assessment and Plan     Problem List Items Addressed This Visit        Digestive    Pharyngitis - Primary     Pharyngitis  Patient given prescription for Zithromax Z-Yadiel to take as directed  She may continue with Tylenol as needed  Patient will call if symptoms persist after medication completed         Relevant Medications    azithromycin (Zithromax) 250 mg tablet           Chief Complaint     Chief Complaint   Patient presents with   • SWOLLEN TONSIL      SORE THROAT X 2 DAYS       History of Present Illness     Patient in the office is 2-3-day history of cute onset of soreness in her throat area around her random sample  She denies any fevers or coughing  Swallowing her medications is uncomfortable  She denies being around any other sick individuals  Patient has tried warm salt water gargling without relief in symptoms  Review of Systems   Review of Systems   Constitutional: Negative  HENT: Positive for sore throat  Respiratory: Negative  Cardiovascular: Negative  Gastrointestinal: Negative  Neurological: Negative          Active Problem List     Patient Active Problem List   Diagnosis   • Allergic rhinitis   • Asthma   • Generalized anxiety disorder   • Stress incontinence, female   • Lumbar radiculopathy   • Type 2 diabetes mellitus with hyperglycemia, with long-term current use of insulin ([facility])   • Colitis   • Chronic fatigue   • Gastroesophageal reflux disease without esophagitis   • Hiatal hernia   • Rheumatoid arthritis of multiple sites with negative rheumatoid factor ([facility])   • Sensorineural hearing loss (SNHL) of left ear with restricted hearing of right ear   • Dry mouth   • Hypertension   • Hyperlipidemia   • Type 2 diabetes mellitus with hypoglycemia without coma, with long-term current use of insulin ([facility])   • Acute diverticulitis   • Fibromyalgia   • Pharyngitis       Past Medical History:  Past Medical History:   Diagnosis Date   • Anxiety    • Anxiety    • Arthritis of right knee    • Cellulitis    • Contact dermatitis    • Cough    • Diabetes mellitus ([address] )    • GERD without esophagitis    • Headache    • Hiatal hernia    • Hyperlipidemia    • Knee sprain    • Lyme disease    • Tick bite        Past Surgical History:  Past Surgical History:   Procedure Laterality Date   • ABDOMINAL SURGERY      tummy tuck   • CATARACT EXTRACTION, BILATERAL Bilateral 11/21 and 12/21   • HYSTERECTOMY      age 51   • MT COLONOSCOPY FLX DX W/COLLJ SPEC WHEN PFRMD N/A 5/8/2019    Procedure: COLONOSCOPY;  Surgeon: Celestino Augustin MD;  Location: [facility]; Service: Gastroenterology   • MT ESOPHAGOGASTRODUODENOSCOPY TRANSORAL DIAGNOSTIC N/A 5/8/2019    Procedure: ESOPHAGOGASTRODUODENOSCOPY (EGD); Surgeon: Celestino Augustin MD;  Location: [facility];   Service: Gastroenterology   • TUBAL LIGATION         Family History:  Family History   Problem Relation Age of Onset   • Mental illness Mother    • Heart disease Father    • Colon cancer Father 67   • No Known Problems Sister    • No Known Problems Daughter    • No Known Problems Maternal Grandmother    • No Known Problems Maternal Grandfather    • No Known Problems Paternal Grandmother    • No Known Problems Paternal Grandfather    • No Known Problems Son    • No Known Problems Sister    • No Known Problems Sister    • No Known Problems Sister    • No Known Problems Sister    • No Known Problems Sister    • Lymphoma Brother 45   • No Known Problems Brother    • No Known Problems Paternal Aunt    • No Known Problems Paternal Aunt    • No Known Problems Paternal Aunt    • No Known Problems Paternal Aunt    • Breast cancer Neg Hx    • Breast cancer additional onset Neg Hx    • Endometrial cancer Neg Hx    • Ovarian cancer Neg Hx    • BRCA 1/2 Neg Hx    • BRCA1 Negative Neg Hx    • BRCA1 Positive Neg Hx    • BRCA2 Negative Neg Hx    • BRCA2 Positive Neg Hx        Social History:  Social History     Socioeconomic History   • Marital status:      Spouse name: Not on file   • Number of children: Not on file   • Years of education: Not on file   • Highest education level: Not on file   Occupational History   • Not on file   Tobacco Use   • Smoking status: Former     Packs/day: 0 50     Years: 25 00     Pack years: 12 50     Types: Cigarettes     Quit date: 2008     Years since quittin 8   • Smokeless tobacco: Never   Vaping Use   • Vaping Use: Never used   Substance and Sexual Activity   • Alcohol use: Not Currently     Comment: seldom   • Drug use: Yes     Types: Marijuana     Comment: smokes weed 3x per week   • Sexual activity: Not Currently   Other Topics Concern   • Not on file   Social History Narrative   • Not on file     Social Determinants of Health     Financial Resource Strain: Low Risk    • Difficulty of Paying Living Expenses: Not very hard   Food Insecurity: Not on file   Transportation Needs: No Transportation Needs   • Lack of Transportation (Medical): No   • Lack of Transportation (Non-Medical): No   Physical Activity: Not on file   Stress: Not on file   Social Connections: Not on file   Intimate Partner Violence: Not on file   Housing Stability: Not on file       Objective     Vitals:    05/15/23 1537   BP: (!) 182/83   Pulse: 89   Resp: 18   Temp: 97 6 °F (36 4 °C)   SpO2: 98%     Wt Readings from Last 3 Encounters:   05/15/23 70 4 kg (155 lb 4 oz)   23 68 9 kg (152 lb)   23 71 3 kg (157 lb 4 oz)       Physical Exam  Constitutional:       General: She is not in acute distress  Appearance: Normal appearance  She is not ill-appearing  HENT:      Head: Normocephalic and atraumatic  Eyes:      General:         Right eye: No discharge  Left eye: No discharge        Conjunctiva/sclera: Conjunctivae normal       Pupils: Pupils are equal, round, and reactive to light    Neck:      Vascular: No carotid bruit  Cardiovascular:      Rate and Rhythm: Normal rate and regular rhythm  Heart sounds: Normal heart sounds  No murmur heard  Pulmonary:      Effort: Pulmonary effort is normal       Breath sounds: Normal breath sounds  No wheezing, rhonchi or rales  Musculoskeletal:      Right lower leg: No edema  Left lower leg: No edema  Lymphadenopathy:      Cervical: No cervical adenopathy  Skin:     Findings: No rash  Neurological:      General: No focal deficit present  Mental Status: She is alert and oriented to person, place, and time  Cranial Nerves: No cranial nerve deficit  Psychiatric:         Mood and Affect: Mood normal          Behavior: Behavior normal          Thought Content:  Thought content normal          Judgment: Judgment normal          Pertinent Laboratory/Diagnostic Studies:  Lab Results   Component Value Date    GLUCOSE 105 11/10/2015    BUN 12 03/21/2023    CREATININE 0 56 (L) 03/21/2023    CALCIUM 9 1 03/21/2023     11/10/2015    K 3 9 03/21/2023    CO2 26 03/21/2023     03/21/2023     Lab Results   Component Value Date    ALT 19 03/21/2023    AST 16 03/21/2023    ALKPHOS 67 03/21/2023       Lab Results   Component Value Date    WBC 5 79 03/21/2023    HGB 12 9 03/21/2023    HCT 40 8 03/21/2023    MCV 92 03/21/2023     03/21/2023       Lab Results   Component Value Date    TSH 3 19 02/26/2018       No results found for: CHOL  Lab Results   Component Value Date    TRIG 77 02/13/2023     Lab Results   Component Value Date    HDL 85 02/13/2023     Lab Results   Component Value Date    LDLCALC 59 02/13/2023     Lab Results   Component Value Date    HGBA1C 9 0 (H) 02/13/2023       Results for orders placed or performed during the hospital encounter of 04/20/23   Tissue Exam   Result Value Ref Range    Case Report       Surgical Pathology Report                         Case: [id number] "  Authorizing Provider:  Phil Shah MD          Collected:           04/20/2023 1008              Ordering Location:     [facility]        Received:            04/20/2023 [address] [facility]                                                           Pathologist:           Trevin Zabala DO                                                            Specimen:    Large Intestine, Transverse Colon, Cold Snare Transverse Colon polypectomy                 Final Diagnosis       A  Transverse colon, polyp, polypectomy:  -Inflammatory polyp  Note       Deeper levels examined  Additional Information       All reported additional testing was performed with appropriately reactive controls  These tests were developed and their performance characteristics determined by [facility] or appropriate performing facility, though some tests may be performed on tissues which have not been validated for performance characteristics (such as staining performed on alcohol exposed cell blocks and decalcified tissues)  Results should be interpreted with caution and in the context of the patients’ clinical condition  These tests may not be cleared or approved by the [place]  Food and Drug Administration, though the FDA has determined that such clearance or approval is not necessary  These tests are used for clinical purposes and they should not be regarded as investigational or for research  This laboratory has been approved by CLIA 88, designated as a high-complexity laboratory and is qualified to perform these tests  Interpretation performed at [address]          COLON/RECTUM POLYP FORM - GI - All Specimens          :    Other      Gross Description       A   The specimen is received in formalin, labeled with the patient's name and hospital number, and is designated \" transverse colon " "polypectomy\"  The specimen consists of a single tan-pink tissue fragment measuring 0 5 cm in greatest dimension  The specimen is entirely submitted in a screened cassette  Note: The estimated total formalin fixation time based upon information provided by the submitting clinician and the standard processing schedule is under 72 hours  Diamond Doss     Fingerstick Glucose (POCT)   Result Value Ref Range    POC Glucose 240 (H) 65 - 140 mg/dl       No orders of the defined types were placed in this encounter  ALLERGIES:  Allergies   Allergen Reactions   • Augmentin [Amoxicillin-Pot Clavulanate] GI Intolerance   • Codeine Drowsiness   • Macrobid [Nitrofurantoin] Other (See Comments)     Pt does not recall       Current Medications     Current Outpatient Medications   Medication Sig Dispense Refill   • albuterol (2 5 mg/3 mL) 0 083 % nebulizer solution Take 3 mL (2 5 mg total) by nebulization every 6 (six) hours as needed for wheezing or shortness of breath 75 mL 2   • amLODIPine (NORVASC) 2 5 mg tablet Take 1 tablet (2 5 mg total) by mouth daily at bedtime 90 tablet 1   • artificial tear (LUBRIFRESH P M ) 83-15 % ophthalmic ointment daily at bedtime     • aspirin 81 mg chewable tablet Chew 81 mg daily     • azithromycin (Zithromax) 250 mg tablet Take 2 tablets (500 mg total) by mouth daily for 1 day, THEN 1 tablet (250 mg total) daily for 4 days   6 tablet 0   • baclofen 10 mg tablet Take 10 mg by mouth daily at bedtime  4   • Breo Ellipta 200-25 MCG/ACT inhaler INHALE ONE INHALATION BY MOUTH EVERY DAY - (RINSE MOUTH AND SPIT AFTER USE, DISCARD SIX WEEKS AFTER REMOVAL FROM FOIL POUCH) 2 each 1   • Cholecalciferol (VITAMIN D PO) Take 1 tablet by mouth daily     • Diclofenac Sodium (VOLTAREN) 1 % Apply 1 application topically 4 (four) times a day     • dicyclomine (BENTYL) 10 mg capsule Take 1 capsule (10 mg total) by mouth 4 (four) times a day as needed (abdominal pain) 60 capsule 1   • ezetimibe (ZETIA) 10 " mg tablet Take 0 5 tablets (5 mg total) by mouth daily 45 tablet 1   • Ferrous Sulfate Dried (FERROUS SULFATE CR PO) Iron TABS    Refills: 0       Active     • glucose blood (Contour Next Test) test strip Use 1 each 4 (four) times a day 400 each 1   • hydrochlorothiazide (HYDRODIURIL) 12 5 mg tablet Take 1 tablet (12 5 mg total) by mouth daily 90 tablet 0   • hydroxychloroquine (PLAQUENIL) 200 mg tablet Take 200 mg by mouth 2 (two) times a day with meals     • Insulin Glargine Solostar (Lantus SoloStar) 100 UNIT/ML SOPN Inject 0 25 mL (25 Units total) under the skin daily at bedtime 45 mL 1   • Insulin Pen Needle (BD Pen Needle Ester U/F) 32G X 4 MM MISC Inject under the skin 4 (four) times a day (Patient taking differently: Inject under the skin 3 (three) times a day) 400 each 1   • Lifitegrast (XIIDRA OP) Apply to eye 2 vials a day       • losartan (COZAAR) 25 mg tablet Take 2 tablets (50 mg total) by mouth 2 (two) times a day 360 tablet 1   • metFORMIN (GLUCOPHAGE) 500 mg tablet Take 2 tablets (1,000 mg total) by mouth 2 (two) times a day with meals 360 tablet 1   • Microlet Lancets MISC Use 4 (four) times a day 400 each 1   • sertraline (ZOLOFT) 50 mg tablet TAKE ONE TABLET BY MOUTH EVERY DAY 90 tablet 3   • simvastatin (ZOCOR) 20 mg tablet Take 1 tablet (20 mg total) by mouth daily 90 tablet 1   • valACYclovir (VALTREX) 1,000 mg tablet Take 1,000 mg by mouth daily     • insulin aspart (NovoLOG FlexPen) 100 UNIT/ML injection pen Inject 6 Units under the skin 3 (three) times a day with meals 45 mL 1     No current facility-administered medications for this visit           Health Maintenance     Health Maintenance   Topic Date Due   • Hepatitis C Screening  Never done   • Pneumococcal Vaccine: 65+ Years (1 - PCV) Never done   • Osteoporosis Screening  Never done   • COVID-19 Vaccine (3 - Booster for Moderna series) 04/19/2021   • BMI: Followup Plan  07/15/2022   • HEMOGLOBIN A1C  08/13/2023   • DM Eye Exam 08/23/2023   • Fall Risk  09/22/2023   • Urinary Incontinence Screening  09/22/2023   • Medicare Annual Wellness Visit (AWV)  09/22/2023   • Breast Cancer Screening: Mammogram  02/08/2024   • Kidney Health Evaluation: Microalbumin/Creatinine Ratio  02/13/2024   • Depression Screening  02/20/2024   • Diabetic Foot Exam  02/21/2024   • Kidney Health Evaluation: GFR  03/21/2024   • BMI: Adult  05/15/2024   • Colorectal Cancer Screening  04/18/2028   • Influenza Vaccine  Completed   • HIB Vaccine  Aged Out   • IPV Vaccine  Aged Out   • Hepatitis A Vaccine  Aged Out   • Meningococcal ACWY Vaccine  Aged Out   • HPV Vaccine  Aged Out     Immunization History   Administered Date(s) Administered   • COVID-19 MODERNA VACC 0 5 ML IM 01/25/2021, 02/22/2021   • INFLUENZA 10/22/2022   • Influenza Quadrivalent Preservative Free 3 years and older IM 11/08/2014   • Influenza Split High Dose Preservative Free IM 02/09/2018   • Influenza, high dose seasonal 0 7 mL 10/04/2018, 91/36/0423       Beth Bruno MD

## 2023-05-15 NOTE — ASSESSMENT & PLAN NOTE
Pharyngitis  Patient given prescription for Zithromax Z-Yadiel to take as directed  She may continue with Tylenol as needed    Patient will call if symptoms persist after medication completed

## 2023-05-24 ENCOUNTER — OFFICE VISIT (OUTPATIENT)
Dept: ENDOCRINOLOGY | Facility: CLINIC | Age: 73
End: 2023-05-24

## 2023-05-24 VITALS
HEART RATE: 72 BPM | BODY MASS INDEX: 30.04 KG/M2 | SYSTOLIC BLOOD PRESSURE: 118 MMHG | WEIGHT: 153 LBS | HEIGHT: 60 IN | DIASTOLIC BLOOD PRESSURE: 72 MMHG

## 2023-05-24 DIAGNOSIS — Z79.4 TYPE 2 DIABETES MELLITUS WITH HYPERGLYCEMIA, WITH LONG-TERM CURRENT USE OF INSULIN (HCC): Primary | ICD-10-CM

## 2023-05-24 DIAGNOSIS — I10 HYPERTENSION, UNSPECIFIED TYPE: ICD-10-CM

## 2023-05-24 DIAGNOSIS — E11.65 TYPE 2 DIABETES MELLITUS WITH HYPERGLYCEMIA, WITH LONG-TERM CURRENT USE OF INSULIN (HCC): Primary | ICD-10-CM

## 2023-05-24 DIAGNOSIS — E78.5 HYPERLIPIDEMIA, UNSPECIFIED HYPERLIPIDEMIA TYPE: ICD-10-CM

## 2023-05-24 LAB — SL AMB POCT HEMOGLOBIN AIC: 9.2 (ref ?–6.5)

## 2023-05-24 RX ORDER — METFORMIN HYDROCHLORIDE 500 MG/1
500 TABLET, EXTENDED RELEASE ORAL
Qty: 90 TABLET | Refills: 1 | Status: SHIPPED | OUTPATIENT
Start: 2023-05-24

## 2023-05-24 RX ORDER — LOSARTAN POTASSIUM 50 MG/1
TABLET ORAL
Qty: 180 TABLET | Refills: 1 | Status: SHIPPED | OUTPATIENT
Start: 2023-05-24

## 2023-05-24 RX ORDER — EZETIMIBE 10 MG/1
5 TABLET ORAL DAILY
Qty: 45 TABLET | Refills: 1 | Status: SHIPPED | OUTPATIENT
Start: 2023-05-24 | End: 2023-11-20

## 2023-05-24 RX ORDER — HYDROCHLOROTHIAZIDE 12.5 MG/1
12.5 TABLET ORAL DAILY
Qty: 90 TABLET | Refills: 1 | Status: SHIPPED | OUTPATIENT
Start: 2023-05-24

## 2023-05-24 RX ORDER — AMLODIPINE BESYLATE 2.5 MG/1
2.5 TABLET ORAL
Qty: 90 TABLET | Refills: 1 | Status: SHIPPED | OUTPATIENT
Start: 2023-05-24

## 2023-05-24 NOTE — PATIENT INSTRUCTIONS
Change metformin to Metformin ER 500mg twice per day  Send blood sugar in 2 weeks  Let us know if any change in your GI symptoms  We may need to adjust insulin if taking lower metformin dose

## 2023-05-24 NOTE — ASSESSMENT & PLAN NOTE
Diabetes is very poorly controlled  Blood sugars did improve when Lantus dose was increased on May 8  Discussed possibility that metformin is complaining to some of her GI problems including abdominal pain and diarrhea  She does not want to discontinue metformin but will try reducing to metformin and switching to extended release 500 mg twice per day  Send blood sugar log for review in 2 weeks and will adjust medications if needed    Recommend schedule follow-up with dietitian to help with some of the glucose variability but she declines and has been working on trying to be consistent with carbohydrates at each meal   Lab Results   Component Value Date    HGBA1C 9 2 (A) 05/24/2023

## 2023-07-11 ENCOUNTER — TELEMEDICINE (OUTPATIENT)
Dept: FAMILY MEDICINE CLINIC | Facility: CLINIC | Age: 73
End: 2023-07-11
Payer: MEDICARE

## 2023-07-11 VITALS — TEMPERATURE: 96.2 F | WEIGHT: 152 LBS | BODY MASS INDEX: 29.69 KG/M2

## 2023-07-11 DIAGNOSIS — U07.1 COVID-19: Primary | ICD-10-CM

## 2023-07-11 PROCEDURE — 99442 PR PHYS/QHP TELEPHONE EVALUATION 11-20 MIN: CPT | Performed by: FAMILY MEDICINE

## 2023-07-11 RX ORDER — NIRMATRELVIR AND RITONAVIR 300-100 MG
3 KIT ORAL 2 TIMES DAILY
Qty: 30 TABLET | Refills: 0 | Status: SHIPPED | OUTPATIENT
Start: 2023-07-11 | End: 2023-07-14

## 2023-07-11 NOTE — PROGRESS NOTES
COVID-19 Outpatient Progress Note    Assessment/Plan:    Problem List Items Addressed This Visit        Other    COVID-19 - Primary     Symptom onset 7/9/23  Home test positive 7/11/23  Meets criteria for Paxlovid, will start it and hold statin for next 10 days  Discussed symptom based treatment and supportive care otherwise  Isolation precautions discussed  Follow up as needed or if any new concerns         Relevant Medications    nirmatrelvir & ritonavir (Paxlovid, 300/100,) tablet therapy pack      Disposition:     Patient has asymptomatic or mild COVID-19 infection. Based off CDC guidelines, they were recommended to isolate for 5 days. If they are asymptomatic or symptoms are improving with no fevers in the past 24 hours, isolation may be ended followed by 5 days of wearing a mask when around othes to minimize risk of infecting others. If still have a fever or other symptoms have not improved, continue to isolate until they improve. Regardless of when they end isolation, avoid being around people who are more likely to get very sick from COVID-19 until at least day 11. Discussed symptom directed medication options with patient. Discussed vitamin D, vitamin C, and/or zinc supplementation with patient. Patient meets criteria for PAXLOVID and they have been counseled appropriately according to EUA documentation released by the FDA. After discussion, patient agrees to treatment. Luc Kipper is an investigational medicine used to treat mild-to-moderate COVID-19 in adults and children (15years of age and older weighing at least 80 pounds (40 kg)) with positive results of direct SARS-CoV-2 viral testing, and who are at high risk for progression to severe COVID-19, including hospitalization or death. PAXLOVID is investigational because it is still being studied. There is limited information about the safety and effectiveness of using PAXLOVID to treat people with mild-to-moderate COVID-19.     The FDA has authorized the emergency use of PAXLOVID for the treatment of mild-tomoderate COVID-19 in adults and children (15years of age and older weighing at least 80 pounds (40 kg)) with a positive test for the virus that causes COVID-19, and who are at high risk for progression to severe COVID-19, including hospitalization or death, under an EUA. What should I tell my healthcare provider before I take PAXLOVID? Tell your healthcare provider if you:  - Have any allergies  - Have liver or kidney disease  - Are pregnant or plan to become pregnant  - Are breastfeeding a child  - Have any serious illnesses    Tell your healthcare provider about all the medicines you take, including prescription and over-the-counter medicines, vitamins, and herbal supplements. Some medicines may interact with PAXLOVID and may cause serious side effects. Keep a list of your medicines to show your healthcare provider and pharmacist when you get a new medicine. You can ask your healthcare provider or pharmacist for a list of medicines that interact with PAXLOVID. Do not start taking a new medicine without telling your healthcare provider. Your healthcare provider can tell you if it is safe to take PAXLOVID with other medicines. Tell your healthcare provider if you are taking combined hormonal contraceptive. PAXLOVID may affect how your birth control pills work. Females who are able to become pregnant should use another effective alternative form of contraception or an additional barrier method of contraception. Talk to your healthcare provider if you have any questions about contraceptive methods that might be right for you. How do I take PAXLOVID? PAXLOVID consists of 2 medicines: nirmatrelvir and ritonavir. - Take 2 pink tablets of nirmatrelvir with 1 white tablet of ritonavir by mouth 2 times each day (in the morning and in the evening) for 5 days. For each dose, take all 3 tablets at the same time.   - If you have kidney disease, talk to your healthcare provider. You may need a different dose. - Swallow the tablets whole. Do not chew, break, or crush the tablets  - Take PAXLOVID with or without food. - Do not stop taking PAXLOVID without talking to your healthcare provider, even if you feel better. - If you miss a dose of PAXLOVID within 8 hours of the time it is usually taken, take it as soon as you remember. If you miss a dose by more than 8 hours, skip the missed dose and take the next dose at your regular time. Do not take 2 doses of PAXLOVID at the same time. - If you take too much PAXLOVID, call your healthcare provider or go to the nearest hospital emergency room right away. - If you are taking a ritonavir- or cobicistat-containing medicine to treat hepatitis C or Human Immunodeficiency Virus (HIV), you should continue to take your medicine as prescribed by your healthcare provider.  - Talk to your healthcare provider if you do not feel better or if you feel worse after 5 days. Who should generally not take PAXLOVID? Do not take PAXLOVID if:  You are allergic to nirmatrelvir, ritonavir, or any of the ingredients in PAXLOVID. You are taking any of the following medicines:  - Alfuzosin  - Pethidine, piroxicam, propoxyphene  - Ranolazine  - Amiodarone, dronedarone, flecainide, propafenone, quinidine  - Colchicine  - Lurasidone, pimozide, clozapine  - Dihydroergotamine, ergotamine, methylergonovine  - Lovastatin, simvastatin  - Sildenafil (Revatio®) for pulmonary arterial hypertension (PAH)  - Triazolam, oral midazolam  - Apalutamide  - Carbamazepine, phenobarbital, phenytoin  - Rifampin  - Anabella’s Wort (hypericum perforatum)    What are the important possible side effects of PAXLOVID? Possible side effects of PAXLOVID are:  - Liver Problems.  Tell your healthcare provider right away if you have any of these signs and symptoms of liver problems: loss of appetite, yellowing of your skin and the whites of eyes (jaundice), dark-colored urine, pale colored stools and itchy skin, stomach area (abdominal) pain. - Resistance to HIV Medicines. If you have untreated HIV infection, PAXLOVID may lead to some HIV medicines not working as well in the future. - Other possible side effects include: altered sense of taste, diarrhea, high blood pressure, or muscle aches    These are not all the possible side effects of PAXLOVID. Not many people have taken PAXLOVID. Serious and unexpected side effects may happen. Ashok Ha is still being studied, so it is possible that all of the risks are not known at this time. What other treatment choices are there? Like Nancy Mejia may allow for the emergency use of other medicines to treat people with COVID-19. Go to https://Fixetude/ for information on the emergency use of other medicines that are authorized by FDA to treat people with COVID-19. Your healthcare provider may talk with you about clinical trials for which you may be eligible. It is your choice to be treated or not to be treated with PAXLOVID. Should you decide not to receive it or for your child not to receive it, it will not change your standard medical care. What if I am pregnant or breastfeeding? There is no experience treating pregnant women or breastfeeding mothers with PAXLOVID. For a mother and unborn baby, the benefit of taking PAXLOVID may be greater than the risk from the treatment. If you are pregnant, discuss your options and specific situation with your healthcare provider. It is recommended that you use effective barrier contraception or do not have sexual activity while taking PAXLOVID. If you are breastfeeding, discuss your options and specific situation with your healthcare provider. How do I report side effects with PAXLOVID?     Contact your healthcare provider if you have any side effects that bother you or do not go away. Report side effects to FDA MedWatch at www.fda.gov/medwatch or call 5-028-PKJ2008 or you can report side effects to [facility]. at the contact information provided below. Website Fax number Telephone number   [employer] [phone]     How should I store [facility]? Store PAXLOVID tablets at room temperature between 68°F to 77°F (20°C to 25°C). Full fact sheet for patients, parents, and caregivers can be found at: [employer]    I have spent a total time of 20 minutes on the day of the encounter for this patient including       Encounter provider: Jeffery Su DO     Provider located at: [address] [id number]     Recent Visits  No visits were found meeting these conditions. Showing recent visits within past 7 days and meeting all other requirements  Today's Visits  Date Type Provider Dept   07/11/23 Telemedicine Gisela [address]uga Dr today's visits and meeting all other requirements  Future Appointments  No visits were found meeting these conditions. Showing future appointments within next 150 days and meeting all other requirements     This virtual check-in was done via telephone and she agrees to proceed. Patient agrees to participate in a virtual check in via telephone or video visit instead of presenting to the office to address urgent/immediate medical needs. Patient is aware this is a billable service. She acknowledged consent and understanding of privacy and security of the video platform. The patient has agreed to participate and understands they can discontinue the visit at any time. After connecting through Telephone, the patient was identified by name and date of birth.  Susi Zhang was informed that this was a telemedicine visit and that the exam was being conducted confidentially over secure lines. My office door was closed. No one else was in the room. Nayeli Johnson acknowledged consent and understanding of privacy and security of the telemedicine visit. I informed the patient that I have reviewed her record in Epic and presented the opportunity for her to ask any questions regarding the visit today. The patient agreed to participate. It was my intent to perform this visit via video technology but the patient was not able to do a video connection so the visit was completed via audio telephone only. Verification of patient location:  Patient is located in the following state in which I hold an active license: [place]    Subjective:   Nayeli Johnson is a 71 y.o. female who has been screened for COVID-19. Patient's symptoms include fatigue, malaise, nasal congestion, sore throat, cough, shortness of breath, chest tightness, nausea, vomiting, diarrhea, myalgias and headache. Patient denies fever, chills, rhinorrhea and abdominal pain. - Date of symptom onset: 7/9/2023  - Date of positive COVID-19 test: 7/11/2023. Type of test: Home antigen. COVID-19 vaccination status: Fully vaccinated (primary series)    Lab Results   Component Value Date    SARSCOV2 Negative 02/17/2023    [facility] [facility] Not Detected 07/28/2020       Review of Systems   Constitutional: Positive for fatigue. Negative for chills and fever. HENT: Positive for congestion and sore throat. Negative for rhinorrhea. Respiratory: Positive for cough, chest tightness and shortness of breath. Gastrointestinal: Positive for diarrhea, nausea and vomiting. Negative for abdominal pain. Musculoskeletal: Positive for myalgias. Neurological: Positive for headaches.      Current Outpatient Medications on File Prior to Visit   Medication Sig   • albuterol (2.5 mg/3 mL) 0.083 % nebulizer solution Take 3 mL (2.5 mg total) by nebulization every 6 (six) hours as needed for wheezing or shortness of breath   • amLODIPine (NORVASC) 2.5 mg tablet Take 1 tablet (2.5 mg total) by mouth daily at bedtime   • artificial tear (LUBRIFRESH P.M.) 83-15 % ophthalmic ointment daily at bedtime   • aspirin 81 mg chewable tablet Chew 81 mg daily   • baclofen 10 mg tablet Take 10 mg by mouth daily at bedtime   • Breo Ellipta 200-25 MCG/ACT inhaler INHALE ONE INHALATION BY MOUTH EVERY DAY - (RINSE MOUTH AND SPIT AFTER USE, DISCARD SIX WEEKS AFTER REMOVAL FROM FOIL POUCH)   • Cholecalciferol (VITAMIN D PO) Take 1 tablet by mouth daily   • Diclofenac Sodium (VOLTAREN) 1 % Apply 1 application topically 4 (four) times a day   • glucose blood (Contour Next Test) test strip Use 1 each 4 (four) times a day   • hydrochlorothiazide (HYDRODIURIL) 12.5 mg tablet Take 1 tablet (12.5 mg total) by mouth daily   • hydroxychloroquine (PLAQUENIL) 200 mg tablet Take 200 mg by mouth 2 (two) times a day with meals   • insulin aspart (NovoLOG FlexPen) 100 UNIT/ML injection pen Inject 6 Units under the skin 3 (three) times a day with meals   • Insulin Glargine Solostar (Lantus SoloStar) 100 UNIT/ML SOPN Inject 0.25 mL (25 Units total) under the skin daily at bedtime (Patient taking differently: Inject 30 Units under the skin daily at bedtime)   • Insulin Pen Needle (BD Pen Needle Ester U/F) 32G X 4 MM MISC Inject under the skin 4 (four) times a day (Patient taking differently: Inject under the skin 3 (three) times a day)   • Lifitegrast (XIIDRA OP) Apply to eye 2 vials a day     • losartan (COZAAR) 50 mg tablet 1 tab twice per day   • metFORMIN (GLUCOPHAGE-XR) 500 mg 24 hr tablet Take 1 tablet (500 mg total) by mouth daily with dinner   • Microlet Lancets MISC Use 4 (four) times a day   • sertraline (ZOLOFT) 50 mg tablet TAKE ONE TABLET BY MOUTH EVERY DAY   • simvastatin (ZOCOR) 20 mg tablet Take 1 tablet (20 mg total) by mouth daily   • valACYclovir (VALTREX) 1,000 mg tablet Take 1,000 mg by mouth daily   • dicyclomine (BENTYL) 10 mg capsule Take 1 capsule (10 mg total) by mouth 4 (four) times a day as needed (abdominal pain)   • ezetimibe (ZETIA) 10 mg tablet Take 0.5 tablets (5 mg total) by mouth daily   • Ferrous Sulfate Dried (FERROUS SULFATE CR PO) Iron TABS    Refills: 0       Active       Objective:    Temp (!) 96.2 °F (35.7 °C) (Oral)   Wt 68.9 kg (152 lb)   BMI 29.69 kg/m²        Physical Exam   It was my intent to perform this visit via video technology but the patient was not able to do a video connection so the visit was completed via audio telephone only.     Vern Bond, DO

## 2023-07-11 NOTE — ASSESSMENT & PLAN NOTE
Symptom onset 7/9/23  Home test positive 7/11/23  Meets criteria for Paxlovid, will start it and hold statin for next 10 days  Discussed symptom based treatment and supportive care otherwise  Isolation precautions discussed  Follow up as needed or if any new concerns

## 2023-07-14 ENCOUNTER — TELEMEDICINE (OUTPATIENT)
Dept: FAMILY MEDICINE CLINIC | Facility: CLINIC | Age: 73
End: 2023-07-14
Payer: MEDICARE

## 2023-07-14 VITALS — WEIGHT: 152 LBS | TEMPERATURE: 95.6 F | BODY MASS INDEX: 29.69 KG/M2

## 2023-07-14 DIAGNOSIS — U07.1 COVID-19: Primary | ICD-10-CM

## 2023-07-14 DIAGNOSIS — Z79.4 TYPE 2 DIABETES MELLITUS WITH HYPERGLYCEMIA, WITH LONG-TERM CURRENT USE OF INSULIN (HCC): ICD-10-CM

## 2023-07-14 DIAGNOSIS — E11.65 TYPE 2 DIABETES MELLITUS WITH HYPERGLYCEMIA, WITH LONG-TERM CURRENT USE OF INSULIN (HCC): ICD-10-CM

## 2023-07-14 PROCEDURE — 99213 OFFICE O/P EST LOW 20 MIN: CPT | Performed by: FAMILY MEDICINE

## 2023-07-14 RX ORDER — PERPHENAZINE 16 MG/1
1 TABLET, FILM COATED ORAL 4 TIMES DAILY
Qty: 400 EACH | Refills: 1 | Status: SHIPPED | OUTPATIENT
Start: 2023-07-14

## 2023-07-14 NOTE — ASSESSMENT & PLAN NOTE
Symptom onset 7/9/23  Home test positive 7/11/23  Started Paxlovid on 7/11, was holding statin. Having adverse effects from Paxlovid including N/V/D so will discontinue at this time. Declines Zofran and Imodium.   T 95.6 today, unable to measure BP or SpO2  Will call over the weekend if any worsening  Encouraged to stay adequately hydrated, eat healthy foods, and get plenty of rest.

## 2023-07-14 NOTE — PROGRESS NOTES
Virtual Regular Visit    Verification of patient location:    Patient is located at Home in the following state in which I hold an active license [place]      Assessment/Plan:    Problem List Items Addressed This Visit        Other    COVID-19 - Primary     Symptom onset 7/9/23  Home test positive 7/11/23  Started Paxlovid on 7/11, was holding statin. Having adverse effects from Paxlovid including N/V/D so will discontinue at this time. Declines Zofran and Imodium. T 95.6 today, unable to measure BP or SpO2  Will call over the weekend if any worsening  Encouraged to stay adequately hydrated, eat healthy foods, and get plenty of rest.                     Reason for visit is   Chief Complaint   Patient presents with   • COVID-19     Follow uo  Patient states feel extremely weak, headache, very dry mouth and is very difficult for her to walk    • Vomiting     Patient states also diarrhea   • Virtual Regular Visit        Encounter provider Vern Bond DO    Provider located at [address] [place] [id number]      Recent Visits  Date Type Provider Dept   07/11/23 [address] [place] [address]  recent visits within past 7 days and meeting all other requirements  Today's Visits  Date Type Provider Dept   07/14/23 [address] [place] [address]  today's visits and meeting all other requirements  Future Appointments  No visits were found meeting these conditions. Showing future appointments within next 150 days and meeting all other requirements       The patient was identified by name and date of birth. Aimee Bueno was informed that this is a telemedicine visit and that the visit is being conducted through the [facility]. She agrees to proceed. .  My office door was closed. No one else was in the room. She acknowledged consent and understanding of privacy and security of the video platform.  The patient has agreed to participate and understands they can discontinue the visit at any time. Patient is aware this is a billable service. Subjective  Maria L Dockery is a 71 y.o. female. HPI   Patient has COVID, feeling weak and with headache. Symptom onset 7/9, tested positive 7/11, started on Paxlovid but not tolerating due to nausea and vomiting. Having severe body aches and generalized weakness. Walking from bed to kitchen she is worn out. Drinking sugar free gatorade. Poor appetite but eating a small amount three times a day. Mouth is very dry, urine is yellow. She reports dry skin as well. Past Medical History:   Diagnosis Date   • Anxiety    • Anxiety    • Arthritis of right knee    • Cellulitis    • Contact dermatitis    • Cough    • Diabetes mellitus ([address])    • GERD without esophagitis    • Headache    • Hiatal hernia    • Hyperlipidemia    • Knee sprain    • Lyme disease    • Tick bite        Past Surgical History:   Procedure Laterality Date   • ABDOMINAL SURGERY      tummy tuck   • CATARACT EXTRACTION, BILATERAL Bilateral 11/21 and 12/21   • HYSTERECTOMY      age 51   • DC COLONOSCOPY FLX DX W/COLLJ SPEC WHEN PFRMD N/A 5/8/2019    Procedure: COLONOSCOPY;  Surgeon: Moreno Medellin MD;  Location: [facility]; Service: Gastroenterology   • DC ESOPHAGOGASTRODUODENOSCOPY TRANSORAL DIAGNOSTIC N/A 5/8/2019    Procedure: ESOPHAGOGASTRODUODENOSCOPY (EGD); Surgeon: Moreno Medellin MD;  Location: [facility];   Service: Gastroenterology   • TUBAL LIGATION         Current Outpatient Medications   Medication Sig Dispense Refill   • albuterol (2.5 mg/3 mL) 0.083 % nebulizer solution Take 3 mL (2.5 mg total) by nebulization every 6 (six) hours as needed for wheezing or shortness of breath 75 mL 2   • amLODIPine (NORVASC) 2.5 mg tablet Take 1 tablet (2.5 mg total) by mouth daily at bedtime 90 tablet 1   • artificial tear (LUBRIFRESH P.M.) 83-15 % ophthalmic ointment daily at bedtime     • aspirin 81 mg chewable tablet Chew 81 mg daily     • baclofen 10 mg tablet Take 10 mg by mouth daily at bedtime  4   • Breo Ellipta 200-25 MCG/ACT inhaler INHALE ONE INHALATION BY MOUTH EVERY DAY - (RINSE MOUTH AND SPIT AFTER USE, DISCARD SIX WEEKS AFTER REMOVAL FROM FOIL POUCH) 2 each 1   • Cholecalciferol (VITAMIN D PO) Take 1 tablet by mouth daily     • Diclofenac Sodium (VOLTAREN) 1 % Apply 1 application topically 4 (four) times a day     • dicyclomine (BENTYL) 10 mg capsule Take 1 capsule (10 mg total) by mouth 4 (four) times a day as needed (abdominal pain) 60 capsule 1   • ezetimibe (ZETIA) 10 mg tablet Take 0.5 tablets (5 mg total) by mouth daily 45 tablet 1   • Ferrous Sulfate Dried (FERROUS SULFATE CR PO) Iron TABS    Refills: 0       Active     • glucose blood (Contour Next Test) test strip Use 1 each 4 (four) times a day 400 each 1   • hydrochlorothiazide (HYDRODIURIL) 12.5 mg tablet Take 1 tablet (12.5 mg total) by mouth daily 90 tablet 1   • hydroxychloroquine (PLAQUENIL) 200 mg tablet Take 200 mg by mouth 2 (two) times a day with meals     • insulin aspart (NovoLOG FlexPen) 100 UNIT/ML injection pen Inject 6 Units under the skin 3 (three) times a day with meals 45 mL 1   • Insulin Glargine Solostar (Lantus SoloStar) 100 UNIT/ML SOPN Inject 0.25 mL (25 Units total) under the skin daily at bedtime (Patient taking differently: Inject 30 Units under the skin daily at bedtime) 45 mL 1   • Insulin Pen Needle (BD Pen Needle Ester U/F) 32G X 4 MM MISC Inject under the skin 4 (four) times a day (Patient taking differently: Inject under the skin 3 (three) times a day) 400 each 1   • Lifitegrast (XIIDRA OP) Apply to eye 2 vials a day       • losartan (COZAAR) 50 mg tablet 1 tab twice per day 180 tablet 1   • metFORMIN (GLUCOPHAGE-XR) 500 mg 24 hr tablet Take 1 tablet (500 mg total) by mouth daily with dinner 90 tablet 1   • Microlet Lancets MISC Use 4 (four) times a day 400 each 1   • sertraline (ZOLOFT) 50 mg tablet TAKE ONE TABLET BY MOUTH EVERY DAY 90 tablet 3   • simvastatin (ZOCOR) 20 mg tablet Take 1 tablet (20 mg total) by mouth daily 90 tablet 1   • valACYclovir (VALTREX) 1,000 mg tablet Take 1,000 mg by mouth daily       No current facility-administered medications for this visit. Allergies   Allergen Reactions   • Augmentin [Amoxicillin-Pot Clavulanate] GI Intolerance   • Codeine Drowsiness   • Macrobid [Nitrofurantoin] Other (See Comments)     Pt does not recall       Review of Systems as in HPI    Video Exam    Vitals:    07/14/23 0929   Temp: (!) 95.6 °F (35.3 °C)   TempSrc: Oral   Weight: 68.9 kg (152 lb)       Physical Exam   It was my intent to perform this visit via video technology but the patient was not able to do a video connection so the visit was completed via audio telephone only.       Visit Time  Total Visit Duration: 11

## 2023-09-08 LAB — HBA1C MFR BLD: 9.7 % OF TOTAL HGB

## 2023-09-13 ENCOUNTER — OFFICE VISIT (OUTPATIENT)
Dept: ENDOCRINOLOGY | Facility: CLINIC | Age: 73
End: 2023-09-13
Payer: MEDICARE

## 2023-09-13 VITALS
WEIGHT: 152.6 LBS | HEART RATE: 68 BPM | DIASTOLIC BLOOD PRESSURE: 76 MMHG | SYSTOLIC BLOOD PRESSURE: 114 MMHG | BODY MASS INDEX: 29.96 KG/M2 | HEIGHT: 60 IN

## 2023-09-13 DIAGNOSIS — E11.649 TYPE 2 DIABETES MELLITUS WITH HYPOGLYCEMIA WITHOUT COMA, WITH LONG-TERM CURRENT USE OF INSULIN (HCC): ICD-10-CM

## 2023-09-13 DIAGNOSIS — Z79.4 TYPE 2 DIABETES MELLITUS WITH HYPOGLYCEMIA WITHOUT COMA, WITH LONG-TERM CURRENT USE OF INSULIN (HCC): ICD-10-CM

## 2023-09-13 DIAGNOSIS — E11.65 TYPE 2 DIABETES MELLITUS WITH HYPERGLYCEMIA, WITH LONG-TERM CURRENT USE OF INSULIN (HCC): Primary | ICD-10-CM

## 2023-09-13 DIAGNOSIS — I10 HYPERTENSION, UNSPECIFIED TYPE: ICD-10-CM

## 2023-09-13 DIAGNOSIS — Z79.4 TYPE 2 DIABETES MELLITUS WITH HYPERGLYCEMIA, WITH LONG-TERM CURRENT USE OF INSULIN (HCC): Primary | ICD-10-CM

## 2023-09-13 DIAGNOSIS — E78.5 HYPERLIPIDEMIA, UNSPECIFIED HYPERLIPIDEMIA TYPE: ICD-10-CM

## 2023-09-13 PROCEDURE — 99214 OFFICE O/P EST MOD 30 MIN: CPT | Performed by: INTERNAL MEDICINE

## 2023-09-13 PROCEDURE — 95250 CONT GLUC MNTR PHYS/QHP EQP: CPT

## 2023-09-13 RX ORDER — INSULIN ASPART 100 [IU]/ML
INJECTION, SOLUTION INTRAVENOUS; SUBCUTANEOUS
Qty: 45 ML | Refills: 1 | Status: SHIPPED | OUTPATIENT
Start: 2023-09-13

## 2023-09-13 RX ORDER — PEN NEEDLE, DIABETIC 32GX 5/32"
NEEDLE, DISPOSABLE MISCELLANEOUS 4 TIMES DAILY
Qty: 400 EACH | Refills: 1 | Status: SHIPPED | OUTPATIENT
Start: 2023-09-13

## 2023-09-13 RX ORDER — INSULIN GLARGINE 100 [IU]/ML
30 INJECTION, SOLUTION SUBCUTANEOUS
Qty: 45 ML | Refills: 3 | Status: SHIPPED | OUTPATIENT
Start: 2023-09-13

## 2023-09-13 NOTE — PROGRESS NOTES
Continous glucose monitoring yehuda placement     Date/Time 9/13/2023 9:20 AM     Performed by  Alda Diaz MA   Authorized by Sara Brasher MD     Universal Protocol   Consent: Verbal consent obtained. Written consent obtained. Risks and benefits: risks, benefits and alternatives were discussed  Consent given by: patient  Patient understanding: patient states understanding of the procedure being performed  Patient consent: the patient's understanding of the procedure matches consent given  Procedure consent: procedure consent matches procedure scheduled  Relevant documents: relevant documents present and verified  Test results: test results not available  Site marked: the operative site was not marked  Radiology Images displayed and confirmed. If images not available, report reviewed: imaging studies not available  Patient identity confirmed: verbally with patient and provided demographic data        Local anesthesia used: no     Anesthesia   Local anesthesia used: no     Sedation   Patient sedated: no        Specimen: no    Culture: no   Procedure Details   Procedure Notes: Yehuda Pro placed on pt's left arm, explained to pt to return in 2 weeks for removal and interpretation. Pt understood and tolerated procedure well with no complications.   Patient tolerance: patient tolerated the procedure well with no immediate complications

## 2023-09-13 NOTE — PROGRESS NOTES
Becki Roman 71 y.o. female MRN: [id number]    Encounter: [id number]      Assessment/Plan     Problem List Items Addressed This Visit        Endocrine    Type 2 diabetes mellitus with hyperglycemia, with long-term current use of insulin ([address]) - Primary       Lab Results   Component Value Date    HGBA1C 9.7 (H) 09/08/2023   worsening control ,suggested MNT that she has declined   For now novolg 8 units before meals if fingerstick less than 140 10 units if finger stick above 140  Will do diagnostic trial of CGM to get a better understanding of blood sugar patterns          Relevant Medications    insulin aspart (NovoLOG FlexPen) 100 UNIT/ML injection pen    Insulin Glargine Solostar (Lantus SoloStar) 100 UNIT/ML SOPN    Insulin Pen Needle (BD Pen Needle Ester U/F) 32G X 4 MM MISC    Other Relevant Orders    Comprehensive metabolic panel Lab Collect    HEMOGLOBIN A1C W/ EAG ESTIMATION Lab Collect    Continous glucose monitoring lanre placement    Continous glucose monitoring lanre intrepretation    Type 2 diabetes mellitus with hypoglycemia without coma, with long-term current use of insulin (HCC)    Relevant Medications    insulin aspart (NovoLOG FlexPen) 100 UNIT/ML injection pen    Insulin Glargine Solostar (Lantus SoloStar) 100 UNIT/ML SOPN    Other Relevant Orders    Comprehensive metabolic panel Lab Collect       Cardiovascular and Mediastinum    Hypertension     bp at goal, continue current regimen         Relevant Orders    Albumin / creatinine urine ratio       Other    Hyperlipidemia     On statins         Relevant Orders    Lipid panel Lab Collect Lab Collect       CC: Diabetes    History of Present Illness     HPI:  67-year-old female with type 2 diabetes on insulin therapy, hypertension and hyperlipidemia seen in follow-up    Having issues with L ankle pain and feet pain      Current regimen:   Metformin 500 mg with dinner   Lantus 30 units daily  Novolog 6 units before meals but will take 7-9  if high.      Still having constipation and diarrhea after decreasing metformin      checks f.s 3/day -did not bring in log or meter to the visit so by her memory  Fasting -180-200  Pre lunch - about the same  Pre dinner - could be higher     Hypoglycemia once a week between breakfast and lunch     No polyuria , polydipsia , no blurry vision   No numbness and tingling in feet         Review of Systems    Historical Information   Past Medical History:   Diagnosis Date   • Anxiety    • Anxiety    • Arthritis of right knee    • Cellulitis    • Contact dermatitis    • Cough    • Diabetes mellitus ([address])    • GERD without esophagitis    • Headache    • Hiatal hernia    • Hyperlipidemia    • Knee sprain    • Lyme disease    • Tick bite      Past Surgical History:   Procedure Laterality Date   • ABDOMINAL SURGERY      tummy nancyck   • CATARACT EXTRACTION, BILATERAL Bilateral 11/21 and 12/21   • HYSTERECTOMY      age 51   • NH COLONOSCOPY FLX DX W/COLLJ SPEC WHEN PFRMD N/A 5/8/2019    Procedure: COLONOSCOPY;  Surgeon: Oma Barros MD;  Location: [facility]; Service: Gastroenterology   • NH ESOPHAGOGASTRODUODENOSCOPY TRANSORAL DIAGNOSTIC N/A 5/8/2019    Procedure: ESOPHAGOGASTRODUODENOSCOPY (EGD); Surgeon: Oma Barros MD;  Location: [facility];   Service: Gastroenterology   • TUBAL LIGATION       Social History   Social History     Substance and Sexual Activity   Alcohol Use Not Currently    Comment: seldom     Social History     Substance and Sexual Activity   Drug Use Yes   • Types: Marijuana    Comment: smokes weed 3x per week     Social History     Tobacco Use   Smoking Status Former   • Packs/day: 0.50   • Years: 25.00   • Total pack years: 12.50   • Types: Cigarettes   • Quit date: 7/23/2008   • Years since quitting: 15.1   Smokeless Tobacco Never     Family History:   Family History   Problem Relation Age of Onset   • Mental illness Mother    • Heart disease Father    • Colon cancer Father 67   • No Known Problems Sister    • No Known Problems Daughter    • No Known Problems Maternal Grandmother    • No Known Problems Maternal Grandfather    • No Known Problems Paternal Grandmother    • No Known Problems Paternal Grandfather    • No Known Problems Son    • No Known Problems Sister    • No Known Problems Sister    • No Known Problems Sister    • No Known Problems Sister    • No Known Problems Sister    • Lymphoma Brother 45   • No Known Problems Brother    • No Known Problems Paternal Aunt    • No Known Problems Paternal Aunt    • No Known Problems Paternal Aunt    • No Known Problems Paternal Aunt    • Breast cancer Neg Hx    • Breast cancer additional onset Neg Hx    • Endometrial cancer Neg Hx    • Ovarian cancer Neg Hx    • BRCA 1/2 Neg Hx    • BRCA1 Negative Neg Hx    • BRCA1 Positive Neg Hx    • BRCA2 Negative Neg Hx    • BRCA2 Positive Neg Hx        Meds/Allergies   Current Outpatient Medications   Medication Sig Dispense Refill   • albuterol (2.5 mg/3 mL) 0.083 % nebulizer solution Take 3 mL (2.5 mg total) by nebulization every 6 (six) hours as needed for wheezing or shortness of breath 75 mL 2   • amLODIPine (NORVASC) 2.5 mg tablet Take 1 tablet (2.5 mg total) by mouth daily at bedtime 90 tablet 1   • artificial tear (LUBRIFRESH P.M.) 83-15 % ophthalmic ointment daily at bedtime     • aspirin 81 mg chewable tablet Chew 81 mg daily     • baclofen 10 mg tablet Take 10 mg by mouth daily at bedtime  4   • Breo Ellipta 200-25 MCG/ACT inhaler INHALE ONE INHALATION BY MOUTH EVERY DAY - (RINSE MOUTH AND SPIT AFTER USE, DISCARD SIX WEEKS AFTER REMOVAL FROM FOIL POUCH) 2 each 1   • Cholecalciferol (VITAMIN D PO) Take 1 tablet by mouth daily     • Diclofenac Sodium (VOLTAREN) 1 % Apply 1 application topically 4 (four) times a day     • dicyclomine (BENTYL) 10 mg capsule Take 1 capsule (10 mg total) by mouth 4 (four) times a day as needed (abdominal pain) 60 capsule 1   • ezetimibe (ZETIA) 10 mg tablet Take 0.5 tablets (5 mg total) by mouth daily 45 tablet 1   • Ferrous Sulfate Dried (FERROUS SULFATE CR PO) Iron TABS    Refills: 0       Active     • glucose blood (Contour Next Test) test strip Use 1 each 4 (four) times a day 400 each 1   • hydrochlorothiazide (HYDRODIURIL) 12.5 mg tablet Take 1 tablet (12.5 mg total) by mouth daily 90 tablet 1   • hydroxychloroquine (PLAQUENIL) 200 mg tablet Take 200 mg by mouth 2 (two) times a day with meals     • insulin aspart (NovoLOG FlexPen) 100 UNIT/ML injection pen 8 units before meals if fingerstick less than 140 10 units if finger stick above 140 45 mL 1   • Insulin Glargine Solostar (Lantus SoloStar) 100 UNIT/ML SOPN Inject 0.3 mL (30 Units total) under the skin daily at bedtime 45 mL 3   • Insulin Pen Needle (BD Pen Needle Ester U/F) 32G X 4 MM MISC Inject under the skin 4 (four) times a day 400 each 1   • Lifitegrast (XIIDRA OP) Apply to eye 2 vials a day       • losartan (COZAAR) 50 mg tablet 1 tab twice per day 180 tablet 1   • metFORMIN (GLUCOPHAGE-XR) 500 mg 24 hr tablet Take 1 tablet (500 mg total) by mouth daily with dinner 90 tablet 1   • Microlet Lancets MISC Use 4 (four) times a day 400 each 1   • sertraline (ZOLOFT) 50 mg tablet TAKE ONE TABLET BY MOUTH EVERY DAY 90 tablet 3   • simvastatin (ZOCOR) 20 mg tablet Take 1 tablet (20 mg total) by mouth daily 90 tablet 1   • valACYclovir (VALTREX) 1,000 mg tablet Take 1,000 mg by mouth daily       No current facility-administered medications for this visit. Allergies   Allergen Reactions   • Augmentin [Amoxicillin-Pot Clavulanate] GI Intolerance   • Codeine Drowsiness   • Macrobid [Nitrofurantoin] Other (See Comments)     Pt does not recall       Objective   Vitals: Blood pressure 114/76, pulse 68, height 5' (1.524 m), weight 69.2 kg (152 lb 9.6 oz). Physical Exam  Vitals reviewed. Constitutional:       General: She is not in acute distress. Appearance: Normal appearance.  She is not ill-appearing, toxic-appearing or diaphoretic. HENT:      Head: Normocephalic and atraumatic. Eyes:      General: No scleral icterus. Extraocular Movements: Extraocular movements intact. Cardiovascular:      Rate and Rhythm: Normal rate and regular rhythm. Heart sounds: Normal heart sounds. No murmur heard. Pulmonary:      Effort: Pulmonary effort is normal. No respiratory distress. Breath sounds: Normal breath sounds. No wheezing or rales. Abdominal:      General: There is no distension. Palpations: Abdomen is soft. Tenderness: There is no abdominal tenderness. Musculoskeletal:      Cervical back: Neck supple. Right lower leg: No edema. Left lower leg: No edema. Lymphadenopathy:      Cervical: No cervical adenopathy. Skin:     General: Skin is warm and dry. Neurological:      General: No focal deficit present. Mental Status: She is alert and oriented to person, place, and time. Gait: Gait normal.   Psychiatric:         Mood and Affect: Mood normal.         Behavior: Behavior normal.         Thought Content: Thought content normal.         Judgment: Judgment normal.         The history was obtained from the review of the chart, patient.     Lab Results:   Lab Results   Component Value Date/Time    Hemoglobin A1C 9.7 (H) 09/08/2023 08:18 AM    Hemoglobin A1C 9.2 (A) 05/24/2023 11:17 AM    Hemoglobin A1C 9.0 (H) 02/13/2023 07:34 AM    Hemoglobin A1C 8.5 (A) 11/18/2022 10:10 AM    WBC 5.79 03/21/2023 11:11 AM    WBC 8.67 02/18/2023 04:53 AM    WBC 9.67 02/17/2023 11:25 AM    Hemoglobin 12.9 03/21/2023 11:11 AM    Hemoglobin 11.7 02/18/2023 04:53 AM    Hemoglobin 13.1 02/17/2023 11:25 AM    Hematocrit 40.8 03/21/2023 11:11 AM    Hematocrit 36.3 02/18/2023 04:53 AM    Hematocrit 41.3 02/17/2023 11:25 AM    MCV 92 03/21/2023 11:11 AM    MCV 93 02/18/2023 04:53 AM    MCV 93 02/17/2023 11:25 AM    Platelets 401 19/97/6023 11:11 AM    Platelets 754 91/64/3723 04:53 AM    Platelets 530 67/62/6607 11:25 AM    BUN 12 03/21/2023 11:11 AM    BUN 13 02/18/2023 04:53 AM    BUN 16 02/17/2023 11:25 AM    BUN 15 02/13/2023 07:34 AM    Potassium 3.9 03/21/2023 11:11 AM    Potassium 3.7 02/18/2023 04:53 AM    Potassium 4.2 02/17/2023 11:25 AM    Potassium 4.2 02/13/2023 07:34 AM    Chloride 106 03/21/2023 11:11 AM    Chloride 107 02/18/2023 04:53 AM    Chloride 105 02/17/2023 11:25 AM    Chloride 105 02/13/2023 07:34 AM    CO2 26 03/21/2023 11:11 AM    CO2 24 02/18/2023 04:53 AM    CO2 26 02/17/2023 11:25 AM    CO2 27 02/13/2023 07:34 AM    Creatinine 0.56 (L) 03/21/2023 11:11 AM    Creatinine 0.57 (L) 02/18/2023 04:53 AM    Creatinine 0.62 02/17/2023 11:25 AM    AST 16 03/21/2023 11:11 AM    AST 22 02/17/2023 11:25 AM    AST 16 02/13/2023 07:34 AM    ALT 19 03/21/2023 11:11 AM    ALT 20 02/17/2023 11:25 AM    ALT 15 02/13/2023 07:34 AM    Total Protein 6.8 03/21/2023 11:11 AM    Total Protein 6.9 02/17/2023 11:25 AM    Protein, Total 6.1 02/13/2023 07:34 AM    Albumin 4.1 03/21/2023 11:11 AM    Albumin 3.1 (L) 02/17/2023 11:25 AM    Albumin 3.9 02/13/2023 07:34 AM    Globulin 2.2 02/13/2023 07:34 AM    HDL 85 02/13/2023 07:34 AM    Triglycerides 77 02/13/2023 07:34 AM             Portions of the record may have been created with voice recognition software. Occasional wrong word or "sound a like" substitutions may have occurred due to the inherent limitations of voice recognition software. Read the chart carefully and recognize, using context, where substitutions have occurred.

## 2023-09-13 NOTE — ASSESSMENT & PLAN NOTE
Lab Results   Component Value Date    HGBA1C 9.7 (H) 09/08/2023   worsening control ,suggested MNT that she has declined   For now novolg 8 units before meals if fingerstick less than 140 10 units if finger stick above 140  Will do diagnostic trial of CGM to get a better understanding of blood sugar patterns

## 2023-09-20 DIAGNOSIS — E11.65 TYPE 2 DIABETES MELLITUS WITH HYPERGLYCEMIA, WITH LONG-TERM CURRENT USE OF INSULIN (HCC): ICD-10-CM

## 2023-09-20 DIAGNOSIS — Z79.4 TYPE 2 DIABETES MELLITUS WITH HYPERGLYCEMIA, WITH LONG-TERM CURRENT USE OF INSULIN (HCC): ICD-10-CM

## 2023-09-20 DIAGNOSIS — F41.9 ANXIETY: ICD-10-CM

## 2023-09-20 RX ORDER — PERPHENAZINE 16 MG/1
1 TABLET, FILM COATED ORAL 4 TIMES DAILY
Qty: 400 EACH | Refills: 1 | Status: SHIPPED | OUTPATIENT
Start: 2023-09-20

## 2023-09-27 ENCOUNTER — CLINICAL SUPPORT (OUTPATIENT)
Dept: ENDOCRINOLOGY | Facility: CLINIC | Age: 73
End: 2023-09-27
Payer: MEDICARE

## 2023-09-27 DIAGNOSIS — Z79.4 TYPE 2 DIABETES MELLITUS WITH HYPERGLYCEMIA, WITH LONG-TERM CURRENT USE OF INSULIN (HCC): ICD-10-CM

## 2023-09-27 DIAGNOSIS — E11.65 TYPE 2 DIABETES MELLITUS WITH HYPERGLYCEMIA, WITH LONG-TERM CURRENT USE OF INSULIN (HCC): ICD-10-CM

## 2023-09-27 PROCEDURE — 95251 CONT GLUC MNTR ANALYSIS I&R: CPT

## 2023-09-27 NOTE — PROGRESS NOTES
Continous glucose monitoring yehuda intrepretation     Date/Time 9/27/2023 9:38 AM     Performed by  Abimael Mariee MA   Authorized by Siomara Rodriguez MD     [place] Protocol   Consent: Verbal consent obtained. Written consent obtained. Consent given by: patient  Patient understanding: patient states understanding of the procedure being performed  Patient consent: the patient's understanding of the procedure matches consent given  Procedure consent: procedure consent matches procedure scheduled  Relevant documents present and verified: n/a. Test results available and properly labeled: n/a. Site marked: n/a. Imaging studies available: n/a. Patient identity confirmed: verbally with patient and provided demographic data        Local anesthesia used: no     Anesthesia   Local anesthesia used: no     Sedation   Patient sedated: no        Specimen: no    Culture: no   Procedure Details   Procedure Notes: Yehuda Pro sensor removed from pt's arm. Downloaded data and forwarded to provider. All questions answered to pt's satisfaction.   Patient tolerance: patient tolerated the procedure well with no immediate complications

## 2023-11-07 ENCOUNTER — PROBLEM (OUTPATIENT)
Dept: URBAN - METROPOLITAN AREA CLINIC 6 | Facility: CLINIC | Age: 73
End: 2023-11-07

## 2023-11-07 DIAGNOSIS — H02.885: ICD-10-CM

## 2023-11-07 DIAGNOSIS — Z96.1: ICD-10-CM

## 2023-11-07 DIAGNOSIS — H02.882: ICD-10-CM

## 2023-11-07 DIAGNOSIS — H04.123: ICD-10-CM

## 2023-11-07 PROCEDURE — 92012 INTRM OPH EXAM EST PATIENT: CPT

## 2023-11-07 ASSESSMENT — KERATOMETRY
OD_K1POWER_DIOPTERS: 43.00
OS_AXISANGLE2_DEGREES: 14
OD_K2POWER_DIOPTERS: 43.50
OD_AXISANGLE2_DEGREES: 174
OS_AXISANGLE2_DEGREES: 18
OS_AXISANGLE_DEGREES: 104
OS_AXISANGLE_DEGREES: 108
OD_AXISANGLE_DEGREES: 084
OS_K2POWER_DIOPTERS: 43.00
OS_K1POWER_DIOPTERS: 42.25

## 2023-11-07 ASSESSMENT — TONOMETRY
OD_IOP_MMHG: 11
OS_IOP_MMHG: 10

## 2023-11-07 ASSESSMENT — VISUAL ACUITY
OD_SC: 20/20
OS_SC: 20/25

## 2023-12-07 DIAGNOSIS — R06.9 RESPIRATORY ABNORMALITIES: ICD-10-CM

## 2023-12-07 LAB
ALBUMIN SERPL-MCNC: 3.8 G/DL (ref 3.6–5.1)
ALBUMIN/CREAT UR: 8 MCG/MG CREAT
ALBUMIN/GLOB SERPL: 1.5 (CALC) (ref 1–2.5)
ALP SERPL-CCNC: 79 U/L (ref 37–153)
ALT SERPL-CCNC: 21 U/L (ref 6–29)
AST SERPL-CCNC: 19 U/L (ref 10–35)
BILIRUB SERPL-MCNC: 0.4 MG/DL (ref 0.2–1.2)
BUN SERPL-MCNC: 15 MG/DL (ref 7–25)
BUN/CREAT SERPL: ABNORMAL (CALC) (ref 6–22)
CALCIUM SERPL-MCNC: 9.4 MG/DL (ref 8.6–10.4)
CHLORIDE SERPL-SCNC: 104 MMOL/L (ref 98–110)
CHOLEST SERPL-MCNC: 155 MG/DL
CHOLEST/HDLC SERPL: 1.8 (CALC)
CO2 SERPL-SCNC: 30 MMOL/L (ref 20–32)
CREAT SERPL-MCNC: 0.67 MG/DL (ref 0.6–1)
CREAT UR-MCNC: 88 MG/DL (ref 20–275)
EST. AVERAGE GLUCOSE BLD GHB EST-MCNC: 260 MG/DL
EST. AVERAGE GLUCOSE BLD GHB EST-SCNC: 14.4 MMOL/L
GFR/BSA.PRED SERPLBLD CYS-BASED-ARV: 93 ML/MIN/1.73M2
GLOBULIN SER CALC-MCNC: 2.5 G/DL (CALC) (ref 1.9–3.7)
GLUCOSE SERPL-MCNC: 171 MG/DL (ref 65–99)
HBA1C MFR BLD: 10.7 % OF TOTAL HGB
HDLC SERPL-MCNC: 86 MG/DL
LDLC SERPL CALC-MCNC: 56 MG/DL (CALC)
MICROALBUMIN UR-MCNC: 0.7 MG/DL
NONHDLC SERPL-MCNC: 69 MG/DL (CALC)
POTASSIUM SERPL-SCNC: 4.3 MMOL/L (ref 3.5–5.3)
PROT SERPL-MCNC: 6.3 G/DL (ref 6.1–8.1)
SODIUM SERPL-SCNC: 141 MMOL/L (ref 135–146)
TRIGL SERPL-MCNC: 53 MG/DL

## 2023-12-07 RX ORDER — FLUTICASONE FUROATE AND VILANTEROL TRIFENATATE 200; 25 UG/1; UG/1
POWDER RESPIRATORY (INHALATION)
Qty: 2 EACH | Refills: 1 | Status: SHIPPED | OUTPATIENT
Start: 2023-12-07

## 2023-12-14 ENCOUNTER — OFFICE VISIT (OUTPATIENT)
Dept: FAMILY MEDICINE CLINIC | Facility: CLINIC | Age: 73
End: 2023-12-14
Payer: MEDICARE

## 2023-12-14 VITALS
DIASTOLIC BLOOD PRESSURE: 76 MMHG | OXYGEN SATURATION: 93 % | TEMPERATURE: 97.5 F | SYSTOLIC BLOOD PRESSURE: 124 MMHG | WEIGHT: 160.2 LBS | RESPIRATION RATE: 18 BRPM | BODY MASS INDEX: 31.29 KG/M2 | HEART RATE: 73 BPM

## 2023-12-14 DIAGNOSIS — E11.65 TYPE 2 DIABETES MELLITUS WITH HYPERGLYCEMIA, WITH LONG-TERM CURRENT USE OF INSULIN (HCC): ICD-10-CM

## 2023-12-14 DIAGNOSIS — Z79.4 TYPE 2 DIABETES MELLITUS WITH HYPERGLYCEMIA, WITH LONG-TERM CURRENT USE OF INSULIN (HCC): ICD-10-CM

## 2023-12-14 DIAGNOSIS — I10 HYPERTENSION, UNSPECIFIED TYPE: ICD-10-CM

## 2023-12-14 DIAGNOSIS — J06.9 UPPER RESPIRATORY TRACT INFECTION, UNSPECIFIED TYPE: Primary | ICD-10-CM

## 2023-12-14 DIAGNOSIS — M76.62 ACHILLES TENDINITIS OF LEFT LOWER EXTREMITY: ICD-10-CM

## 2023-12-14 DIAGNOSIS — Z00.00 MEDICARE ANNUAL WELLNESS VISIT, SUBSEQUENT: ICD-10-CM

## 2023-12-14 PROBLEM — J02.9 PHARYNGITIS: Status: RESOLVED | Noted: 2023-05-15 | Resolved: 2023-12-14

## 2023-12-14 PROBLEM — U07.1 COVID-19: Status: RESOLVED | Noted: 2023-07-11 | Resolved: 2023-12-14

## 2023-12-14 LAB
LEFT EYE DIABETIC RETINOPATHY: NORMAL
LEFT EYE IMAGE QUALITY: NORMAL
LEFT EYE MACULAR EDEMA: NORMAL
LEFT EYE OTHER RETINOPATHY: NORMAL
RIGHT EYE DIABETIC RETINOPATHY: NORMAL
RIGHT EYE IMAGE QUALITY: NORMAL
RIGHT EYE MACULAR EDEMA: NORMAL
RIGHT EYE OTHER RETINOPATHY: NORMAL
SEVERITY (EYE EXAM): NORMAL

## 2023-12-14 PROCEDURE — G0439 PPPS, SUBSEQ VISIT: HCPCS | Performed by: FAMILY MEDICINE

## 2023-12-14 PROCEDURE — 94640 AIRWAY INHALATION TREATMENT: CPT

## 2023-12-14 PROCEDURE — 99214 OFFICE O/P EST MOD 30 MIN: CPT | Performed by: FAMILY MEDICINE

## 2023-12-14 RX ORDER — ALBUTEROL SULFATE 2.5 MG/3ML
2.5 SOLUTION RESPIRATORY (INHALATION) ONCE
Status: COMPLETED | OUTPATIENT
Start: 2023-12-14 | End: 2023-12-14

## 2023-12-14 RX ORDER — SULFAMETHOXAZOLE AND TRIMETHOPRIM 800; 160 MG/1; MG/1
1 TABLET ORAL 2 TIMES DAILY
Qty: 20 TABLET | Refills: 0 | Status: SHIPPED | OUTPATIENT
Start: 2023-12-14 | End: 2023-12-24

## 2023-12-14 RX ADMIN — ALBUTEROL SULFATE 2.5 MG: 2.5 SOLUTION RESPIRATORY (INHALATION) at 11:03

## 2023-12-14 NOTE — ASSESSMENT & PLAN NOTE
URI.  Patient with URI versus bronchitis. She was given an albuterol nebulizer treatment x 1 in the office today. She will continue with her Breo inhaler. She was also given prescription for Bactrim DS to take twice daily for 10 days.   Patient will call if symptoms persist after medication completed

## 2023-12-14 NOTE — ASSESSMENT & PLAN NOTE
Diabetes. A1c elevated at 10.7. She continues to see her endocrinologist.  She has had a few prescriptions of steroids for arthralgias related to her degenerative joint disease.   Her eye and foot exams are up-to-date  Lab Results   Component Value Date    HGBA1C 10.7 (H) 12/07/2023

## 2023-12-14 NOTE — ASSESSMENT & PLAN NOTE
Achilles tendinitis. Patient given referral to physical therapy to receive for this condition. She may continue to diclofenac gel 3 times daily to the affected area.

## 2023-12-14 NOTE — PATIENT INSTRUCTIONS
Medicare Preventive Visit Patient Instructions  Thank you for completing your Welcome to Medicare Visit or Medicare Annual Wellness Visit today. Your next wellness visit will be due in one year (12/14/2024). The screening/preventive services that you may require over the next 5-10 years are detailed below. Some tests may not apply to you based off risk factors and/or age. Screening tests ordered at today's visit but not completed yet may show as past due. Also, please note that scanned in results may not display below. Preventive Screenings:  Service Recommendations Previous Testing/Comments   Colorectal Cancer Screening  * Colonoscopy    * Fecal Occult Blood Test (FOBT)/Fecal Immunochemical Test (FIT)  * Fecal DNA/Cologuard Test  * Flexible Sigmoidoscopy Age: 43-73 years old   Colonoscopy: every 10 years (may be performed more frequently if at higher risk)  OR  FOBT/FIT: every 1 year  OR  Cologuard: every 3 years  OR  Sigmoidoscopy: every 5 years  Screening may be recommended earlier than age 39 if at higher risk for colorectal cancer. Also, an individualized decision between you and your healthcare provider will decide whether screening between the ages of 77-80 would be appropriate. Colonoscopy: 04/20/2023  FOBT/FIT: Not on file  Cologuard: Not on file  Sigmoidoscopy: Not on file    Screening Current     Breast Cancer Screening Age: 36 years old  Frequency: every 1-2 years  Not required if history of left and right mastectomy Mammogram: 02/08/2023    Screening Current   Cervical Cancer Screening Between the ages of 21-29, pap smear recommended once every 3 years. Between the ages of 32-69, can perform pap smear with HPV co-testing every 5 years.    Recommendations may differ for women with a history of total hysterectomy, cervical cancer, or abnormal pap smears in past. Pap Smear: Not on file    Screening Not Indicated   Hepatitis C Screening Once for adults born between 1945 and 1965  More frequently in patients at high risk for Hepatitis C Hep C Antibody: Not on file        Diabetes Screening 1-2 times per year if you're at risk for diabetes or have pre-diabetes Fasting glucose: 113 mg/dL (3/21/2023)  A1C: 10.7 % of total Hgb (12/7/2023)  Screening Not Indicated  History Diabetes   Cholesterol Screening Once every 5 years if you don't have a lipid disorder. May order more often based on risk factors. Lipid panel: 12/07/2023    Screening Not Indicated  History Lipid Disorder     Other Preventive Screenings Covered by Medicare:  Abdominal Aortic Aneurysm (AAA) Screening: covered once if your at risk. You're considered to be at risk if you have a family history of AAA. Lung Cancer Screening: covers low dose CT scan once per year if you meet all of the following conditions: (1) Age 48-67; (2) No signs or symptoms of lung cancer; (3) Current smoker or have quit smoking within the last 15 years; (4) You have a tobacco smoking history of at least 20 pack years (packs per day multiplied by number of years you smoked); (5) You get a written order from a healthcare provider. Glaucoma Screening: covered annually if you're considered high risk: (1) You have diabetes OR (2) Family history of glaucoma OR (3)  aged 48 and older OR (3)  American aged 72 and older  Osteoporosis Screening: covered every 2 years if you meet one of the following conditions: (1) You're estrogen deficient and at risk for osteoporosis based off medical history and other findings; (2) Have a vertebral abnormality; (3) On glucocorticoid therapy for more than 3 months; (4) Have primary hyperparathyroidism; (5) On osteoporosis medications and need to assess response to drug therapy. Last bone density test (DXA Scan): Not on file. HIV Screening: covered annually if you're between the age of 14-79. Also covered annually if you are younger than 13 and older than 72 with risk factors for HIV infection.  For pregnant patients, it is covered up to 3 times per pregnancy. Immunizations:  Immunization Recommendations   Influenza Vaccine Annual influenza vaccination during flu season is recommended for all persons aged >= 6 months who do not have contraindications   Pneumococcal Vaccine   * Pneumococcal conjugate vaccine = PCV13 (Prevnar 13), PCV15 (Vaxneuvance), PCV20 (Prevnar 20)  * Pneumococcal polysaccharide vaccine = PPSV23 (Pneumovax) Adults 41-49 yo with certain risk factors or if 69+ yo  If never received any pneumonia vaccine: recommend Prevnar 20 (PCV20)  Give PCV20 if previously received 1 dose of PCV13 or PPSV23   Hepatitis B Vaccine 3 dose series if at intermediate or high risk (ex: diabetes, end stage renal disease, liver disease)   Respiratory syncytial virus (RSV) Vaccine - COVERED BY MEDICARE PART D  * RSVPreF3 (Arexvy) CDC recommends that adults 61years of age and older may receive a single dose of RSV vaccine using shared clinical decision-making (SCDM)   Tetanus (Td) Vaccine - COST NOT COVERED BY MEDICARE PART B Following completion of primary series, a booster dose should be given every 10 years to maintain immunity against tetanus. Td may also be given as tetanus wound prophylaxis. Tdap Vaccine - COST NOT COVERED BY MEDICARE PART B Recommended at least once for all adults. For pregnant patients, recommended with each pregnancy.    Shingles Vaccine (Shingrix) - COST NOT COVERED BY MEDICARE PART B  2 shot series recommended in those 19 years and older who have or will have weakened immune systems or those 50 years and older     Health Maintenance Due:      Topic Date Due   • Hepatitis C Screening  Never done   • Breast Cancer Screening: Mammogram  02/08/2024   • Colorectal Cancer Screening  04/18/2028     Immunizations Due:      Topic Date Due   • Pneumococcal Vaccine: 65+ Years (1 - PCV) Never done   • Influenza Vaccine (1) 09/01/2023   • COVID-19 Vaccine (3 - 2023-24 season) 09/01/2023     Advance Directives   What are advance directives? Advance directives are legal documents that state your wishes and plans for medical care. These plans are made ahead of time in case you lose your ability to make decisions for yourself. Advance directives can apply to any medical decision, such as the treatments you want, and if you want to donate organs. What are the types of advance directives? There are many types of advance directives, and each state has rules about how to use them. You may choose a combination of any of the following:  Living will: This is a written record of the treatment you want. You can also choose which treatments you do not want, which to limit, and which to stop at a certain time. This includes surgery, medicine, IV fluid, and tube feedings. Durable power of  for [facility]): This is a written record that states who you want to make healthcare choices for you when you are unable to make them for yourself. This person, called a proxy, is usually a family member or a friend. You may choose more than 1 proxy. Do not resuscitate (DNR) order:  A DNR order is used in case your heart stops beating or you stop breathing. It is a request not to have certain forms of treatment, such as CPR. A DNR order may be included in other types of advance directives. Medical directive: This covers the care that you want if you are in a coma, near death, or unable to make decisions for yourself. You can list the treatments you want for each condition. Treatment may include pain medicine, surgery, blood transfusions, dialysis, IV or tube feedings, and a ventilator (breathing machine). Values history: This document has questions about your views, beliefs, and how you feel and think about life. This information can help others choose the care that you would choose. Why are advance directives important? An advance directive helps you control your care.  Although spoken wishes may be used, it is better to have your wishes written down. Spoken wishes can be misunderstood, or not followed. Treatments may be given even if you do not want them. An advance directive may make it easier for your family to make difficult choices about your care. Urinary Incontinence   Urinary incontinence (UI)  is when you lose control of your bladder. UI develops because your bladder cannot store or empty urine properly. The 3 most common types of UI are stress incontinence, urge incontinence, or both. Medicines:   May be given to help strengthen your bladder control. Report any side effects of medication to your healthcare provider. Do pelvic muscle exercises often:  Your pelvic muscles help you stop urinating. Squeeze these muscles tight for 5 seconds, then relax for 5 seconds. Gradually work up to squeezing for 10 seconds. Do 3 sets of 15 repetitions a day, or as directed. This will help strengthen your pelvic muscles and improve bladder control. Train your bladder:  Go to the bathroom at set times, such as every 2 hours, even if you do not feel the urge to go. You can also try to hold your urine when you feel the urge to go. For example, hold your urine for 5 minutes when you feel the urge to go. As that becomes easier, hold your urine for 10 minutes. Self-care:   Keep a UI record. Write down how often you leak urine and how much you leak. Make a note of what you were doing when you leaked urine. Drink liquids as directed. You may need to limit the amount of liquid you drink to help control your urine leakage. Do not drink any liquid right before you go to bed. Limit or do not have drinks that contain caffeine or alcohol. Prevent constipation. Eat a variety of high-fiber foods. Good examples are high-fiber cereals, beans, vegetables, and whole-grain breads. Walking is the best way to trigger your intestines to have a bowel movement. Exercise regularly and maintain a healthy weight.   Weight loss and exercise will decrease pressure on your bladder and help you control your leakage. Use a catheter as directed  to help empty your bladder. A catheter is a tiny, plastic tube that is put into your bladder to drain your urine. Go to behavior therapy as directed. Behavior therapy may be used to help you learn to control your urge to urinate. Weight Management   Why it is important to manage your weight:  Being overweight increases your risk of health conditions such as heart disease, high blood pressure, type 2 diabetes, and certain types of cancer. It can also increase your risk for osteoarthritis, sleep apnea, and other respiratory problems. Aim for a slow, steady weight loss. Even a small amount of weight loss can lower your risk of health problems. How to lose weight safely:  A safe and healthy way to lose weight is to eat fewer calories and get regular exercise. You can lose up about 1 pound a week by decreasing the number of calories you eat by 500 calories each day. Healthy meal plan for weight management:  A healthy meal plan includes a variety of foods, contains fewer calories, and helps you stay healthy. A healthy meal plan includes the following:  Eat whole-grain foods more often. A healthy meal plan should contain fiber. Fiber is the part of grains, fruits, and vegetables that is not broken down by your body. Whole-grain foods are healthy and provide extra fiber in your diet. Some examples of whole-grain foods are whole-wheat breads and pastas, oatmeal, brown rice, and bulgur. Eat a variety of vegetables every day. Include dark, leafy greens such as spinach, kale, doreen greens, and mustard greens. Eat yellow and orange vegetables such as carrots, sweet potatoes, and winter squash. Eat a variety of fruits every day. Choose fresh or canned fruit (canned in its own juice or light syrup) instead of juice. Fruit juice has very little or no fiber. Eat low-fat dairy foods. Drink fat-free (skim) milk or 1% milk.  Eat fat-free yogurt and low-fat cottage cheese. Try low-fat cheeses such as mozzarella and other reduced-fat cheeses. Choose meat and other protein foods that are low in fat. Choose beans or other legumes such as split peas or lentils. Choose fish, skinless poultry (chicken or turkey), or lean cuts of red meat (beef or pork). Before you cook meat or poultry, cut off any visible fat. Use less fat and oil. Try baking foods instead of frying them. Add less fat, such as margarine, sour cream, regular salad dressing and mayonnaise to foods. Eat fewer high-fat foods. Some examples of high-fat foods include french fries, doughnuts, ice cream, and cakes. Eat fewer sweets. Limit foods and drinks that are high in sugar. This includes candy, cookies, regular soda, and sweetened drinks. Exercise:  Exercise at least 30 minutes per day on most days of the week. Some examples of exercise include walking, biking, dancing, and swimming. You can also fit in more physical activity by taking the stairs instead of the elevator or parking farther away from stores. Ask your healthcare provider about the best exercise plan for you. © Copyright [employer] 2018 Information is for End User's use only and may not be sold, redistributed or otherwise used for commercial purposes.  All illustrations and images included in CareNotes® are the copyrighted property of [employer] or [address] [place] [address]

## 2023-12-14 NOTE — PROGRESS NOTES
Assessment and Plan:     Problem List Items Addressed This Visit       Type 2 diabetes mellitus with hyperglycemia, with long-term current use of insulin ([address])     Diabetes. A1c elevated at 10.7. She continues to see her endocrinologist.  She has had a few prescriptions of steroids for arthralgias related to her degenerative joint disease. Her eye and foot exams are up-to-date  Lab Results   Component Value Date    HGBA1C 10.7 (H) 12/07/2023            Relevant Orders    IRIS Diabetic eye exam (Completed)    Hypertension     Hypertension. The patient's blood pressure is stable at this time and he will continue current regimen of medications         Medicare annual wellness visit, subsequent    Upper respiratory tract infection - Primary     URI. Patient with URI versus bronchitis. She was given an albuterol nebulizer treatment x 1 in the office today. She will continue with her Breo inhaler. She was also given prescription for Bactrim DS to take twice daily for 10 days. Patient will call if symptoms persist after medication completed         Relevant Medications    sulfamethoxazole-trimethoprim (BACTRIM DS) 800-160 mg per tablet    albuterol inhalation solution 2.5 mg (Completed)    Achilles tendinitis of left lower extremity     Achilles tendinitis. Patient given referral to physical therapy to receive for this condition. She may continue to diclofenac gel 3 times daily to the affected area. Relevant Orders    Ambulatory Referral to Physical Therapy       Depression Screening and Follow-up Plan: Patient was screened for depression during today's encounter. They screened negative with a PHQ-2 score of 0. Preventive health issues were discussed with patient, and age appropriate screening tests were ordered as noted in patient's After Visit Summary.   Personalized health advice and appropriate referrals for health education or preventive services given if needed, as noted in patient's After Visit Summary. History of Present Illness:     Patient presents for a Medicare Wellness Visit    Patient in the office with a few days history of coughing and congestion with with chest tightness. She also describes occasional headaches. She denies any documented fevers. She does have a history of asthma. She has been unable to use her home nebulizers since it is not functioning at this time. Patient also describes several weeks to months history of left Achilles tendon discomfort which makes it difficult to ambulate at times especially after extended periods of resting. She denies any acute trauma. Patient Care Team:  Anna Winter MD as PCP - General  Kirill Taylor MD as PCP - Endocrinology (Endocrinology)  MD Christiano Otoole MD as Yocasta Wisdom MD as Surgeon (Thoracic Surgery)  Julieth Serrano MD (Ophthalmology)  [facility] (Ophthalmology)  Hugo Barnes DPM (Podiatry)  Jesús Stearns PA-C as Physician Assistant (Endocrinology)     Review of Systems:     Review of Systems   Constitutional:  Positive for fatigue. Negative for chills, diaphoresis and fever. HENT:  Positive for congestion and rhinorrhea. Respiratory:  Positive for cough. Cardiovascular: Negative. Gastrointestinal: Negative. Musculoskeletal:  Positive for arthralgias and gait problem. Neurological:  Positive for headaches.         Problem List:     Patient Active Problem List   Diagnosis    Allergic rhinitis    Asthma    Generalized anxiety disorder    Stress incontinence, female    Lumbar radiculopathy    Type 2 diabetes mellitus with hyperglycemia, with long-term current use of insulin (HCC)    Colitis    Chronic fatigue    Gastroesophageal reflux disease without esophagitis    Hiatal hernia    Rheumatoid arthritis of multiple sites with negative rheumatoid factor (HCC)    Sensorineural hearing loss (SNHL) of left ear with restricted hearing of right ear    Dry mouth    Hypertension    Hyperlipidemia    Type 2 diabetes mellitus with hypoglycemia without coma, with long-term current use of insulin (HCC)    Acute diverticulitis    Fibromyalgia    Medicare annual wellness visit, subsequent    Upper respiratory tract infection    Achilles tendinitis of left lower extremity      Past Medical and Surgical History:     Past Medical History:   Diagnosis Date    Anxiety     Anxiety     Arthritis of right knee     Cellulitis     Contact dermatitis     Cough     Diabetes mellitus ([address])     GERD without esophagitis     Headache     Hiatal hernia     Hyperlipidemia     Knee sprain     Lyme disease     Tick bite      Past Surgical History:   Procedure Laterality Date    ABDOMINAL SURGERY      tummy tuck    CATARACT EXTRACTION, BILATERAL Bilateral 11/21 and 12/21    HYSTERECTOMY      age 51    OR COLONOSCOPY FLX DX W/COLLJ SPEC WHEN PFRMD N/A 5/8/2019    Procedure: COLONOSCOPY;  Surgeon: Yesenia Clements MD;  Location: [facility]; Service: Gastroenterology    OR ESOPHAGOGASTRODUODENOSCOPY TRANSORAL DIAGNOSTIC N/A 5/8/2019    Procedure: ESOPHAGOGASTRODUODENOSCOPY (EGD); Surgeon: Yesenia Clements MD;  Location: [facility];   Service: Gastroenterology    TUBAL LIGATION        Family History:     Family History   Problem Relation Age of Onset    Mental illness Mother     Heart disease Father     Colon cancer Father 67    No Known Problems Sister     No Known Problems Daughter     No Known Problems Maternal Grandmother     No Known Problems Maternal Grandfather     No Known Problems Paternal Grandmother     No Known Problems Paternal Grandfather     No Known Problems Son     No Known Problems Sister     No Known Problems Sister     No Known Problems Sister     No Known Problems Sister     No Known Problems Sister     Lymphoma Brother 45    No Known Problems Brother     No Known Problems Paternal Aunt     No Known Problems Paternal Aunt     No Known Problems Paternal Aunt     No Known Problems Paternal Aunt     Breast cancer Neg Hx     Breast cancer additional onset Neg Hx     Endometrial cancer Neg Hx     Ovarian cancer Neg Hx     BRCA 1/2 Neg Hx     BRCA1 Negative Neg Hx     BRCA1 Positive Neg Hx     BRCA2 Negative Neg Hx     BRCA2 Positive Neg Hx       Social History:     Social History     Socioeconomic History    Marital status:      Spouse name: None    Number of children: None    Years of education: None    Highest education level: None   Occupational History    None   Tobacco Use    Smoking status: Former     Current packs/day: 0.00     Average packs/day: 0.5 packs/day for 25.0 years (12.5 ttl pk-yrs)     Types: Cigarettes     Start date: 7/23/1983     Quit date: 7/23/2008     Years since quitting: 15.4    Smokeless tobacco: Never   Vaping Use    Vaping status: Never Used   Substance and Sexual Activity    Alcohol use: Not Currently     Comment: seldom    Drug use: Yes     Types: Marijuana     Comment: smokes weed 3x per week    Sexual activity: Not Currently   Other Topics Concern    None   Social History Narrative    None     Social Determinants of Health     Financial Resource Strain: Low Risk  (12/14/2023)    Overall Financial Resource Strain (CARDIA)     Difficulty of Paying Living Expenses: Not hard at all   Food Insecurity: Not on file   Transportation Needs: No Transportation Needs (12/14/2023)    PRAPARE - Transportation     Lack of Transportation (Medical): No     Lack of Transportation (Non-Medical):  No   Physical Activity: Not on file   Stress: Not on file   Social Connections: Not on file   Intimate Partner Violence: Not on file   Housing Stability: Not on file      Medications and Allergies:     Current Outpatient Medications   Medication Sig Dispense Refill    albuterol (2.5 mg/3 mL) 0.083 % nebulizer solution Take 3 mL (2.5 mg total) by nebulization every 6 (six) hours as needed for wheezing or shortness of breath 75 mL 2    amLODIPine (NORVASC) 2.5 mg tablet Take 1 tablet (2.5 mg total) by mouth daily at bedtime 90 tablet 1    artificial tear (LUBRIFRESH P.M.) 83-15 % ophthalmic ointment daily at bedtime      aspirin 81 mg chewable tablet Chew 81 mg daily      baclofen 10 mg tablet Take 10 mg by mouth daily at bedtime  4    Breo Ellipta 200-25 MCG/ACT inhaler INHALE ONE INHALATION BY MOUTH EVERY DAY - (RINSE MOUTH AND SPIT AFTER USE, DISCARD SIX WEEKS AFTER REMOVAL FROM FOIL POUCH) 2 each 1    Cholecalciferol (VITAMIN D PO) Take 1 tablet by mouth daily      Diclofenac Sodium (VOLTAREN) 1 % Apply 1 application topically 4 (four) times a day      dicyclomine (BENTYL) 10 mg capsule Take 1 capsule (10 mg total) by mouth 4 (four) times a day as needed (abdominal pain) 60 capsule 1    ezetimibe (ZETIA) 10 mg tablet Take 0.5 tablets (5 mg total) by mouth daily 45 tablet 1    Ferrous Sulfate Dried (FERROUS SULFATE CR PO) Iron TABS    Refills: 0       Active      glucose blood (Contour Next Test) test strip Use 1 each 4 (four) times a day 400 each 1    hydrochlorothiazide (HYDRODIURIL) 12.5 mg tablet Take 1 tablet (12.5 mg total) by mouth daily 90 tablet 1    hydroxychloroquine (PLAQUENIL) 200 mg tablet Take 200 mg by mouth 2 (two) times a day with meals      insulin aspart (NovoLOG FlexPen) 100 UNIT/ML injection pen 8 units before meals if fingerstick less than 140 10 units if finger stick above 140 45 mL 1    Insulin Glargine Solostar (Lantus SoloStar) 100 UNIT/ML SOPN Inject 0.3 mL (30 Units total) under the skin daily at bedtime 45 mL 3    Insulin Pen Needle (BD Pen Needle Ester U/F) 32G X 4 MM MISC Inject under the skin 4 (four) times a day 400 each 1    Lifitegrast (XIIDRA OP) Apply to eye 2 vials a day        losartan (COZAAR) 50 mg tablet 1 tab twice per day 180 tablet 1    metFORMIN (GLUCOPHAGE-XR) 500 mg 24 hr tablet Take 1 tablet (500 mg total) by mouth daily with dinner 90 tablet 1    Microlet Lancets MISC Use 4 (four) times a day 400 each 1    sertraline (ZOLOFT) 50 mg tablet TAKE ONE TABLET BY MOUTH EVERY DAY 90 tablet 3    simvastatin (ZOCOR) 20 mg tablet Take 1 tablet (20 mg total) by mouth daily 90 tablet 1    sulfamethoxazole-trimethoprim (BACTRIM DS) 800-160 mg per tablet Take 1 tablet by mouth 2 (two) times a day for 10 days 20 tablet 0    valACYclovir (VALTREX) 1,000 mg tablet Take 1,000 mg by mouth daily       No current facility-administered medications for this visit. Allergies   Allergen Reactions    Augmentin [Amoxicillin-Pot Clavulanate] GI Intolerance    Codeine Drowsiness    Macrobid [Nitrofurantoin] Other (See Comments)     Pt does not recall      Immunizations:     Immunization History   Administered Date(s) Administered    COVID-19 MODERNA VACC 0.5 ML IM 01/25/2021, 02/22/2021    INFLUENZA 10/22/2022    Influenza Quadrivalent Preservative Free 3 years and older IM 11/08/2014    Influenza Split High Dose Preservative Free IM 02/09/2018    Influenza, high dose seasonal 0.7 mL 10/04/2018, 10/22/2022      Health Maintenance:         Topic Date Due    Hepatitis C Screening  Never done    Breast Cancer Screening: Mammogram  02/08/2024    Colorectal Cancer Screening  04/18/2028         Topic Date Due    Pneumococcal Vaccine: 65+ Years (1 - PCV) Never done    Influenza Vaccine (1) 09/01/2023    COVID-19 Vaccine (3 - 2023-24 season) 09/01/2023      Medicare Screening Tests and Risk Assessments:     Becki is here for her Subsequent Wellness visit. Health Risk Assessment:   Patient rates overall health as fair. Patient feels that their physical health rating is same. Patient is satisfied with their life. Eyesight was rated as same. Hearing was rated as same. Patient feels that their emotional and mental health rating is same. Patients states they are sometimes angry. Patient states they are often unusually tired/fatigued. Pain experienced in the last 7 days has been a lot. Patient's pain rating has been 7/10.  Patient states that she has experienced no weight loss or gain in last 6 months. Depression Screening:   PHQ-2 Score: 0      Fall Risk Screening: In the past year, patient has experienced: no history of falling in past year      Urinary Incontinence Screening:   Patient has leaked urine accidently in the last six months. Home Safety:  Patient does not have trouble with stairs inside or outside of their home. Patient has working smoke alarms and has no working carbon monoxide detector. Home safety hazards include: none. Medications:   Patient is currently taking over-the-counter supplements. OTC medications include: see medication list. Patient is able to manage medications. Activities of Daily Living (ADLs)/Instrumental Activities of Daily Living (IADLs):   Walk and transfer into and out of bed and chair?: Yes  Dress and groom yourself?: Yes    Bathe or shower yourself?: Yes    Feed yourself? Yes  Do your laundry/housekeeping?: Yes  Manage your money, pay your bills and track your expenses?: Yes  Make your own meals?: Yes    Do your own shopping?: Yes    Previous Hospitalizations:   Any hospitalizations or ED visits within the last 12 months?: No      Advance Care Planning:   Living will: Yes    Advanced directive: Yes      PREVENTIVE SCREENINGS      Cardiovascular Screening:    General: History Lipid Disorder and Screening Current      Diabetes Screening:     General: History Diabetes and Screening Current      Colorectal Cancer Screening:     General: Screening Current      Breast Cancer Screening:     General: Screening Current      Cervical Cancer Screening:    General: Screening Not Indicated      Lung Cancer Screening:     General: Screening Not Indicated    Screening, Brief Intervention, and Referral to Treatment (SBIRT)    Screening  Typical number of drinks in a day: 0  Typical number of drinks in a week: 0  Interpretation: Low risk drinking behavior.     Single Item Drug Screening:  How often have you used an illegal drug (including marijuana) or a prescription medication for non-medical reasons in the past year? never    Single Item Drug Screen Score: 0  Interpretation: Negative screen for possible drug use disorder    No results found. Physical Exam:     /76   Pulse 73   Temp 97.5 °F (36.4 °C) (Temporal)   Resp 18   Wt 72.7 kg (160 lb 3.2 oz)   SpO2 93%   BMI 31.29 kg/m²     Physical Exam  Constitutional:       General: She is not in acute distress. Appearance: Normal appearance. She is ill-appearing. HENT:      Right Ear: Tympanic membrane, ear canal and external ear normal. There is no impacted cerumen. Left Ear: Tympanic membrane, ear canal and external ear normal. There is no impacted cerumen. Eyes:      General:         Right eye: No discharge. Left eye: No discharge. Extraocular Movements: Extraocular movements intact. Conjunctiva/sclera: Conjunctivae normal.      Pupils: Pupils are equal, round, and reactive to light. Cardiovascular:      Rate and Rhythm: Normal rate and regular rhythm. Heart sounds: Normal heart sounds. No murmur heard. Pulmonary:      Effort: Pulmonary effort is normal. No respiratory distress. Breath sounds: Normal breath sounds. No wheezing, rhonchi or rales. Musculoskeletal:         General: Tenderness present. Right lower leg: No edema. Left lower leg: No edema. Comments: Patient with some tenderness of hard nodule along Achilles tendon area of her left leg. Normal range of motion of ankle. Increased discomfort of tendon with dorsiflexion of ankle. Muscle strength +5/5.  +2 dorsalis pedis and posterior tibialis pulses. Neurological:      Mental Status: She is alert.      I spent 30 minutes with this patient of which greater than 50% was spent counseling or reviewing chart    Juliette Greenwood MD

## 2023-12-18 ENCOUNTER — TELEPHONE (OUTPATIENT)
Dept: FAMILY MEDICINE CLINIC | Facility: CLINIC | Age: 73
End: 2023-12-18

## 2023-12-18 DIAGNOSIS — J06.9 UPPER RESPIRATORY TRACT INFECTION, UNSPECIFIED TYPE: Primary | ICD-10-CM

## 2023-12-18 RX ORDER — FLUCONAZOLE 150 MG/1
150 TABLET ORAL ONCE
Qty: 1 TABLET | Refills: 0 | Status: SHIPPED | OUTPATIENT
Start: 2023-12-18 | End: 2023-12-18

## 2023-12-18 RX ORDER — AZITHROMYCIN 250 MG/1
TABLET, FILM COATED ORAL DAILY
Qty: 6 TABLET | Refills: 0 | Status: SHIPPED | OUTPATIENT
Start: 2023-12-18 | End: 2023-12-23

## 2023-12-18 NOTE — TELEPHONE ENCOUNTER
Spoke with Becki, patient also requesting Diflucan as she gets yeast infection when she takes antibiotics. Please advise.

## 2023-12-18 NOTE — TELEPHONE ENCOUNTER
Pt seen 12/14, given Bactrim but stopped taking as it was giving her nausea and vomitting. Wondering if something else can be sent to pharmacy?

## 2024-01-09 ENCOUNTER — OFFICE VISIT (OUTPATIENT)
Dept: ENDOCRINOLOGY | Facility: CLINIC | Age: 74
End: 2024-01-09
Payer: MEDICARE

## 2024-01-09 VITALS
HEIGHT: 60 IN | SYSTOLIC BLOOD PRESSURE: 136 MMHG | DIASTOLIC BLOOD PRESSURE: 82 MMHG | BODY MASS INDEX: 32.35 KG/M2 | WEIGHT: 164.8 LBS | HEART RATE: 55 BPM

## 2024-01-09 DIAGNOSIS — E78.5 HYPERLIPIDEMIA, UNSPECIFIED HYPERLIPIDEMIA TYPE: ICD-10-CM

## 2024-01-09 DIAGNOSIS — E11.65 TYPE 2 DIABETES MELLITUS WITH HYPERGLYCEMIA, WITH LONG-TERM CURRENT USE OF INSULIN (HCC): Primary | ICD-10-CM

## 2024-01-09 DIAGNOSIS — I10 HYPERTENSION, UNSPECIFIED TYPE: ICD-10-CM

## 2024-01-09 DIAGNOSIS — Z79.4 TYPE 2 DIABETES MELLITUS WITH HYPERGLYCEMIA, WITH LONG-TERM CURRENT USE OF INSULIN (HCC): Primary | ICD-10-CM

## 2024-01-09 PROCEDURE — 99214 OFFICE O/P EST MOD 30 MIN: CPT | Performed by: PHYSICIAN ASSISTANT

## 2024-01-09 RX ORDER — TRAMADOL HYDROCHLORIDE 50 MG/1
TABLET ORAL
COMMUNITY
Start: 2023-10-19

## 2024-01-09 NOTE — PROGRESS NOTES
Established Patient Progress Note    Chief Complaint:  Diabetes follow up visit    Impression & Plan:    Problem List Items Addressed This Visit        Endocrine    Type 2 diabetes mellitus with hyperglycemia, with long-term current use of insulin ([facility]) - Primary     Diabetes is poorly controlled.   Blood sugars are high overall except occasional low blood sugars prior to lunch related to taking Novolog at breakfast time without eating.   Advised her to skip Novolog if skipping a meal to avoid hypoglycemia.  She is interested in treatment with ozempic.  Reviewed risks/side effects of GLP-1 agonist and sample pen provided.  Start Ozempic 0.25mg weekly x 4 weeks then increase to 0.5mg weekly if tolerating  Advised her to contact office if she develops hypoglycemia and will make reduction to insulin at that time.   Recommend referral to dietician but she declines  Advised her to send bG log in 2 weeks for review.   Lab Results   Component Value Date    HGBA1C 10.7 (H) 12/07/2023          Relevant Medications    semaglutide, 0.25 or 0.5 mg/dose, (Ozempic, 0.25 or 0.5 MG/DOSE,) 2 mg/3 mL injection pen    Other Relevant Orders    Hemoglobin A1C       Cardiovascular and Mediastinum    Hypertension     Well controlled on current medication.             Other    Hyperlipidemia     Continue simvastatin and zetia.               Orders Placed This Encounter   Procedures   • Hemoglobin A1C     Standing Status:   Future     Standing Expiration Date:   1/9/2025         History of Present Illness:   Becki Roman is a 73 y.o. female with a history of type 2 diabetes with long term use of insulin since about 15 years ago. Reports complications of none. Denies recent illness or hospitalizations. Denies recent severe hypoglycemic or severe hyperglycemic episodes. Denies any issues with her current regimen. home glucose monitoring: are performed regularly using fingersticks and blood sugars have been high.     Wore diagnostic CGM  after last visit, but not interested in CGM for personal use.     DX with COVID after Xmas, also  another URI prior to Xmas  Was on steroids x 2. Glucose log reviewed. Blood sugars high overall typically 200s, up to 300s with some normal/low readings before lunch.  She has a friend doing well with ozempic and would like to try this.   She denies hx pancreatitis or personal/FH of medullary thyroidCA/MEN2 syndrome.     CGM Interpretation  Becki Roman   Device used Freestyle lanre for Personal Use  Indication: Type of Diabetes: Type 2 Diabetes  More than 72 hours of data was reviewed. Report to be scanned to chart.   Date Range: 9/13/2023-9/27/2023  *scanned into chart, not seen by provider, reviewed today at visit.   Analysis of data:   Average Glucose: 329 mg/dl  Coefficient of Variation: 33.9%  Time in Target Range: 11%  Time Above Range: 90%  Time Below Range: 0%   Interpretation of data:   Blood sugars are very high overall.       Current regimen:   Metforin ER 500mg with dinner   Lantus 30 units at bedtime  Novolog 8-10 units before meals     Last Eye Exam: UTD  Last Foot Exam: UTD    Has hypertension: Taking lsoartan, HCTZ, and amlodipine  Has hyperlipidemia: Taking simvastatin and zetia       Patient Active Problem List   Diagnosis   • Allergic rhinitis   • Asthma   • Generalized anxiety disorder   • Stress incontinence, female   • Lumbar radiculopathy   • Type 2 diabetes mellitus with hyperglycemia, with long-term current use of insulin (HCC)   • Colitis   • Chronic fatigue   • Gastroesophageal reflux disease without esophagitis   • Hiatal hernia   • Rheumatoid arthritis of multiple sites with negative rheumatoid factor (HCC)   • Sensorineural hearing loss (SNHL) of left ear with restricted hearing of right ear   • Dry mouth   • Hypertension   • Hyperlipidemia   • Type 2 diabetes mellitus with hypoglycemia without coma, with long-term current use of insulin (HCC)   • Acute diverticulitis   • Fibromyalgia   •  Medicare annual wellness visit, subsequent   • Upper respiratory tract infection   • Achilles tendinitis of left lower extremity      Past Medical History:   Diagnosis Date   • Anxiety    • Anxiety    • Arthritis of right knee    • Cellulitis    • Contact dermatitis    • Cough    • Diabetes mellitus (HCC)    • GERD without esophagitis    • Headache    • Hiatal hernia    • Hyperlipidemia    • Knee sprain    • Lyme disease    • Tick bite       Past Surgical History:   Procedure Laterality Date   • ABDOMINAL SURGERY      tummy tuck   • CATARACT EXTRACTION, BILATERAL Bilateral 11/21 and 12/21   • HYSTERECTOMY      age 49   • [place] COLONOSCOPY FLX DX W/COLLJ SPEC WHEN PFRMD N/A 5/8/2019    Procedure: COLONOSCOPY;  Surgeon: Gillian Cartagena MD;  Location: [facility];  Service: Gastroenterology   • WY ESOPHAGOGASTRODUODENOSCOPY TRANSORAL DIAGNOSTIC N/A 5/8/2019    Procedure: ESOPHAGOGASTRODUODENOSCOPY (EGD);  Surgeon: Gillian Cartagena MD;  Location: [facility];  Service: Gastroenterology   • TUBAL LIGATION        Family History   Problem Relation Age of Onset   • Mental illness Mother    • Heart disease Father    • Colon cancer Father 72   • No Known Problems Sister    • No Known Problems Daughter    • No Known Problems Maternal Grandmother    • No Known Problems Maternal Grandfather    • No Known Problems Paternal Grandmother    • No Known Problems Paternal Grandfather    • No Known Problems Son    • No Known Problems Sister    • No Known Problems Sister    • No Known Problems Sister    • No Known Problems Sister    • No Known Problems Sister    • Lymphoma Brother 38   • No Known Problems Brother    • No Known Problems Paternal Aunt    • No Known Problems Paternal Aunt    • No Known Problems Paternal Aunt    • No Known Problems Paternal Aunt    • Breast cancer Neg Hx    • Breast cancer additional onset Neg Hx    • Endometrial cancer Neg Hx    • Ovarian cancer Neg Hx    • BRCA 1/2 Neg Hx    • BRCA1 Negative Neg Hx    • BRCA1  Positive Neg Hx    • BRCA2 Negative Neg Hx    • BRCA2 Positive Neg Hx      Social History     Tobacco Use   • Smoking status: Former     Current packs/day: 0.00     Average packs/day: 0.5 packs/day for 25.0 years (12.5 ttl pk-yrs)     Types: Cigarettes     Start date: 7/23/1983     Quit date: 7/23/2008     Years since quitting: 15.4   • Smokeless tobacco: Never   Substance Use Topics   • Alcohol use: Not Currently     Comment: seldom     Allergies   Allergen Reactions   • Augmentin [Amoxicillin-Pot Clavulanate] GI Intolerance   • Paxlovid [Nirmatrelvir-Ritonavir] Diarrhea and Vomiting   • Codeine Drowsiness   • Macrobid [Nitrofurantoin] Other (See Comments)     Pt does not recall         Current Outpatient Medications:   •  albuterol (2.5 mg/3 mL) 0.083 % nebulizer solution, Take 3 mL (2.5 mg total) by nebulization every 6 (six) hours as needed for wheezing or shortness of breath, Disp: 75 mL, Rfl: 2  •  amLODIPine (NORVASC) 2.5 mg tablet, Take 1 tablet (2.5 mg total) by mouth daily at bedtime, Disp: 90 tablet, Rfl: 1  •  artificial tear (LUBRIFRESH P.M.) 83-15 % ophthalmic ointment, daily at bedtime, Disp: , Rfl:   •  aspirin 81 mg chewable tablet, Chew 81 mg daily, Disp: , Rfl:   •  baclofen 10 mg tablet, Take 10 mg by mouth daily at bedtime, Disp: , Rfl: 4  •  Breo Ellipta 200-25 MCG/ACT inhaler, INHALE ONE INHALATION BY MOUTH EVERY DAY - (RINSE MOUTH AND SPIT AFTER USE, DISCARD SIX WEEKS AFTER REMOVAL FROM FOIL POUCH), Disp: 2 each, Rfl: 1  •  Cholecalciferol (VITAMIN D PO), Take 1 tablet by mouth daily, Disp: , Rfl:   •  Diclofenac Sodium (VOLTAREN) 1 %, Apply 1 application topically 4 (four) times a day, Disp: , Rfl:   •  ezetimibe (ZETIA) 10 mg tablet, Take 0.5 tablets (5 mg total) by mouth daily, Disp: 45 tablet, Rfl: 1  •  Ferrous Sulfate Dried (FERROUS SULFATE CR PO), Iron TABS   Refills: 0     Active, Disp: , Rfl:   •  glucose blood (Contour Next Test) test strip, Use 1 each 4 (four) times a day, Disp:  400 each, Rfl: 1  •  hydrochlorothiazide (HYDRODIURIL) 12.5 mg tablet, Take 1 tablet (12.5 mg total) by mouth daily, Disp: 90 tablet, Rfl: 1  •  hydroxychloroquine (PLAQUENIL) 200 mg tablet, Take 200 mg by mouth 2 (two) times a day with meals, Disp: , Rfl:   •  insulin aspart (NovoLOG FlexPen) 100 UNIT/ML injection pen, 8 units before meals if fingerstick less than 140 10 units if finger stick above 140, Disp: 45 mL, Rfl: 1  •  Insulin Glargine Solostar (Lantus SoloStar) 100 UNIT/ML SOPN, Inject 0.3 mL (30 Units total) under the skin daily at bedtime, Disp: 45 mL, Rfl: 3  •  Insulin Pen Needle (BD Pen Needle Ester U/F) 32G X 4 MM MISC, Inject under the skin 4 (four) times a day, Disp: 400 each, Rfl: 1  •  Lifitegrast (XIIDRA OP), Apply to eye 2 vials a day  , Disp: , Rfl:   •  losartan (COZAAR) 50 mg tablet, 1 tab twice per day, Disp: 180 tablet, Rfl: 1  •  metFORMIN (GLUCOPHAGE-XR) 500 mg 24 hr tablet, Take 1 tablet (500 mg total) by mouth daily with dinner, Disp: 90 tablet, Rfl: 1  •  Microlet Lancets MISC, Use 4 (four) times a day, Disp: 400 each, Rfl: 1  •  semaglutide, 0.25 or 0.5 mg/dose, (Ozempic, 0.25 or 0.5 MG/DOSE,) 2 mg/3 mL injection pen, 0.25mg weekly x4 weeks, then take 0.5mg weekly, Disp: 9 mL, Rfl: 1  •  sertraline (ZOLOFT) 50 mg tablet, TAKE ONE TABLET BY MOUTH EVERY DAY, Disp: 90 tablet, Rfl: 3  •  simvastatin (ZOCOR) 20 mg tablet, Take 1 tablet (20 mg total) by mouth daily, Disp: 90 tablet, Rfl: 1  •  traMADol (ULTRAM) 50 mg tablet, TAKE 1 TABLET BY MOUTH EVERY 12 (TWELVE) HOURS AS NEEDED FOR MODERATE PAIN, Disp: , Rfl:   •  dicyclomine (BENTYL) 10 mg capsule, Take 1 capsule (10 mg total) by mouth 4 (four) times a day as needed (abdominal pain) (Patient not taking: Reported on 1/9/2024), Disp: 60 capsule, Rfl: 1  •  valACYclovir (VALTREX) 1,000 mg tablet, Take 1,000 mg by mouth daily (Patient not taking: Reported on 1/9/2024), Disp: , Rfl:     Review of Systems   Constitutional:  Negative for  activity change, appetite change, chills, diaphoresis, fatigue, fever and unexpected weight change.   HENT:  Negative for trouble swallowing and voice change.    Eyes:  Negative for visual disturbance.   Respiratory:  Negative for shortness of breath.    Cardiovascular:  Negative for chest pain and palpitations.   Gastrointestinal:  Negative for abdominal pain, constipation and diarrhea.   Endocrine: Negative for cold intolerance, heat intolerance, polydipsia, polyphagia and polyuria.   Genitourinary:  Negative for frequency and menstrual problem.   Musculoskeletal:  Negative for arthralgias and myalgias.   Skin:  Negative for rash.   Allergic/Immunologic: Negative for food allergies.   Neurological:  Negative for dizziness and tremors.   Hematological:  Negative for adenopathy.   Psychiatric/Behavioral:  Negative for sleep disturbance.    All other systems reviewed and are negative.      Physical Exam:  Body mass index is 32.19 kg/m².  /82   Pulse 55   Ht 5' (1.524 m)   Wt 74.8 kg (164 lb 12.8 oz)   BMI 32.19 kg/m²    Wt Readings from Last 3 Encounters:   01/09/24 74.8 kg (164 lb 12.8 oz)   12/14/23 72.7 kg (160 lb 3.2 oz)   09/13/23 69.2 kg (152 lb 9.6 oz)       Physical Exam  Vitals reviewed.   Constitutional:       General: She is not in acute distress.     Appearance: Normal appearance. She is well-developed. She is not toxic-appearing.   HENT:      Head: Normocephalic and atraumatic.   Eyes:      Conjunctiva/sclera: Conjunctivae normal.      Pupils: Pupils are equal, round, and reactive to light.   Neck:      Thyroid: No thyromegaly.   Cardiovascular:      Rate and Rhythm: Normal rate and regular rhythm.      Heart sounds: Normal heart sounds.   Pulmonary:      Effort: Pulmonary effort is normal. No respiratory distress.      Breath sounds: Normal breath sounds. No wheezing or rales.   Abdominal:      General: Bowel sounds are normal. There is no distension.      Palpations: Abdomen is soft.       "Tenderness: There is no abdominal tenderness.   Musculoskeletal:         General: Normal range of motion.      Cervical back: Normal range of motion and neck supple.   Skin:     General: Skin is warm and dry.   Neurological:      Mental Status: She is alert and oriented to person, place, and time.   Psychiatric:         Mood and Affect: Mood normal.         Behavior: Behavior normal.           Labs:   Lab Results   Component Value Date    HGBA1C 10.7 (H) 12/07/2023    HGBA1C 9.7 (H) 09/08/2023    HGBA1C 9.2 (A) 05/24/2023     Lab Results   Component Value Date    CREATININE 0.67 12/07/2023    CREATININE 0.56 (L) 03/21/2023    CREATININE 0.57 (L) 02/18/2023    BUN 15 12/07/2023     11/10/2015    K 4.3 12/07/2023     12/07/2023    CO2 30 12/07/2023     eGFR   Date Value Ref Range Status   12/07/2023 93 > OR = 60 mL/min/1.73m2 Final   03/21/2023 94 ml/min/1.73sq m Final     Lab Results   Component Value Date    HDL 86 12/07/2023    TRIG 53 12/07/2023     Lab Results   Component Value Date    ALT 21 12/07/2023    AST 19 12/07/2023    ALKPHOS 79 12/07/2023     No results found for: \"[id number]\"  Lab Results   Component Value Date    FREET4 1.2 02/26/2018             Discussed with the patient and all questioned fully answered. She will call me if any problems arise.    Follow-up appointment in 3 months.     Counseled patient on diagnostic results, prognosis, risk and benefit of treatment options, instruction for management, importance of treatment compliance, Risk  factor reduction and impressions    There are no Patient Instructions on file for this visit.    Dominique Pierce PA-C  "

## 2024-01-09 NOTE — Clinical Note
FYI you ordered diagnostic CGM on her in September and it looks like results never routed to you but [employer] billed by MA, I noticed this happened a few times, informed katy RE: workflow issue for leadership meeting.

## 2024-01-09 NOTE — ASSESSMENT & PLAN NOTE
Diabetes is poorly controlled.   Blood sugars are high overall except occasional low blood sugars prior to lunch related to taking Novolog at breakfast time without eating.   Advised her to skip Novolog if skipping a meal to avoid hypoglycemia.  She is interested in treatment with ozempic.  Reviewed risks/side effects of GLP-1 agonist and sample pen provided.  Start Ozempic 0.25mg weekly x 4 weeks then increase to 0.5mg weekly if tolerating  Advised her to contact office if she develops hypoglycemia and will make reduction to insulin at that time.   Recommend referral to dietician but she declines  Advised her to send bG log in 2 weeks for review.   Lab Results   Component Value Date    HGBA1C 10.7 (H) 12/07/2023

## 2024-01-19 DIAGNOSIS — E11.65 TYPE 2 DIABETES MELLITUS WITH HYPERGLYCEMIA, WITH LONG-TERM CURRENT USE OF INSULIN (HCC): ICD-10-CM

## 2024-01-19 DIAGNOSIS — E78.5 HYPERLIPIDEMIA, UNSPECIFIED HYPERLIPIDEMIA TYPE: ICD-10-CM

## 2024-01-19 DIAGNOSIS — Z79.4 TYPE 2 DIABETES MELLITUS WITH HYPERGLYCEMIA, WITH LONG-TERM CURRENT USE OF INSULIN (HCC): ICD-10-CM

## 2024-01-22 RX ORDER — METFORMIN HYDROCHLORIDE 500 MG/1
500 TABLET, EXTENDED RELEASE ORAL
Qty: 90 TABLET | Refills: 1 | Status: SHIPPED | OUTPATIENT
Start: 2024-01-22

## 2024-01-22 RX ORDER — EZETIMIBE 10 MG/1
5 TABLET ORAL DAILY
Qty: 45 TABLET | Refills: 1 | Status: SHIPPED | OUTPATIENT
Start: 2024-01-22 | End: 2024-07-20

## 2024-01-30 ENCOUNTER — TELEPHONE (OUTPATIENT)
Dept: ENDOCRINOLOGY | Facility: CLINIC | Age: 74
End: 2024-01-30

## 2024-01-30 NOTE — TELEPHONE ENCOUNTER
Spoke with patient by phone  She is feeling very sick since starting ozempic  Will D/C ozempic  Advised hewr to send BG readings in two weeks and will review and consider trial of SGLT2 inhibitor

## 2024-02-01 DIAGNOSIS — I10 HYPERTENSION, UNSPECIFIED TYPE: ICD-10-CM

## 2024-02-01 RX ORDER — SIMVASTATIN 20 MG
20 TABLET ORAL DAILY
Qty: 90 TABLET | Refills: 3 | Status: SHIPPED | OUTPATIENT
Start: 2024-02-01

## 2024-02-06 ENCOUNTER — HOSPITAL ENCOUNTER (OUTPATIENT)
Dept: MAMMOGRAPHY | Facility: HOSPITAL | Age: 74
Discharge: HOME | End: 2024-02-06
Payer: MEDICARE

## 2024-02-06 VITALS — WEIGHT: 164.9 LBS | HEIGHT: 60 IN | BODY MASS INDEX: 32.38 KG/M2

## 2024-02-06 DIAGNOSIS — Z12.31 ENCOUNTER FOR SCREENING MAMMOGRAM FOR MALIGNANT NEOPLASM OF BREAST: ICD-10-CM

## 2024-02-06 PROCEDURE — 77063 BREAST TOMOSYNTHESIS BI: CPT

## 2024-02-06 PROCEDURE — 77067 SCR MAMMO BI INCL CAD: CPT

## 2024-02-12 PROBLEM — Z00.00 MEDICARE ANNUAL WELLNESS VISIT, SUBSEQUENT: Status: RESOLVED | Noted: 2023-12-14 | Resolved: 2024-02-12

## 2024-02-21 DIAGNOSIS — I10 HYPERTENSION, UNSPECIFIED TYPE: ICD-10-CM

## 2024-02-21 RX ORDER — AMLODIPINE BESYLATE 2.5 MG/1
2.5 TABLET ORAL
Qty: 90 TABLET | Refills: 0 | Status: SHIPPED | OUTPATIENT
Start: 2024-02-21

## 2024-02-22 ENCOUNTER — TELEPHONE (OUTPATIENT)
Dept: ENDOCRINOLOGY | Facility: CLINIC | Age: 74
End: 2024-02-22

## 2024-02-23 DIAGNOSIS — E11.65 TYPE 2 DIABETES MELLITUS WITH HYPERGLYCEMIA, WITH LONG-TERM CURRENT USE OF INSULIN (HCC): ICD-10-CM

## 2024-02-23 DIAGNOSIS — Z79.4 TYPE 2 DIABETES MELLITUS WITH HYPERGLYCEMIA, WITH LONG-TERM CURRENT USE OF INSULIN (HCC): ICD-10-CM

## 2024-02-23 RX ORDER — INSULIN GLARGINE 100 [IU]/ML
26 INJECTION, SOLUTION SUBCUTANEOUS
Qty: 45 ML | Refills: 3 | Status: SHIPPED | OUTPATIENT
Start: 2024-02-23

## 2024-02-23 NOTE — TELEPHONE ENCOUNTER
Blood sugars in the morning are on the low side  Reduce Lantus from 30 to 26 units daily   Will update Med list  Send log in 2 weeks

## 2024-02-29 NOTE — PROGRESS NOTES
Established Patient Progress Note      Chief Complaint   Patient presents with   • Diabetes Type 2        Impression & Plan:    Problem List Items Addressed This Visit        Endocrine    Type 2 diabetes mellitus with hyperglycemia, with long-term current use of insulin ([address] ) - Primary     Diabetes is very poorly controlled  Blood sugars did improve when Lantus dose was increased on May 8  Discussed possibility that metformin is complaining to some of her GI problems including abdominal pain and diarrhea  She does not want to discontinue metformin but will try reducing to metformin and switching to extended release 500 mg twice per day  Send blood sugar log for review in 2 weeks and will adjust medications if needed  Recommend schedule follow-up with dietitian to help with some of the glucose variability but she declines and has been working on trying to be consistent with carbohydrates at each meal   Lab Results   Component Value Date    HGBA1C 9 2 (A) 05/24/2023            Relevant Medications    metFORMIN (GLUCOPHAGE-XR) 500 mg 24 hr tablet    Other Relevant Orders    POCT hemoglobin A1c (Completed)    Hemoglobin A1C    Comprehensive metabolic panel       Cardiovascular and Mediastinum    Hypertension     Well-controlled on current medication  Blood pressure today is 118/72  Relevant Medications    amLODIPine (NORVASC) 2 5 mg tablet    losartan (COZAAR) 50 mg tablet    hydrochlorothiazide (HYDRODIURIL) 12 5 mg tablet       Other    Hyperlipidemia     Continue statin and Zetia  Relevant Medications    ezetimibe (ZETIA) 10 mg tablet    Other Relevant Orders    Comprehensive metabolic panel       Orders Placed This Encounter   Procedures   • Hemoglobin A1C     Standing Status:   Future     Standing Expiration Date:   5/24/2024   • Comprehensive metabolic panel     This is a patient instruction: Patient fasting for 8 hours or longer recommended       Standing Status:   Future     Standing Expiration Date:   5/24/2024   • POCT hemoglobin A1c       History of Present Illness:   Anny Grover is a 71 y o  female with a history of type 2 diabetes with long term use of insulin since about 15 years ago  Reports complications of none  Denies recent illness or hospitalizations  Denies recent severe hypoglycemic or severe hyperglycemic episodes  Denies any issues with her current regimen  home glucose monitoring: are performed regularly 3x per day     Referred to dietician at last visit, did not schedule  Has CGM at home, daugther will set up when she has time  Blood sugar improved since May 8th lantus increase  Home blood glucose readings:   Before breakfast: 90s to low 100s  Before lunch: Varies with most readings in the 100s, rarely low with 1 low of 58  Before dinner: Mostly in the 100s with a few readings in the 200s  Bedtime: Not checking     Current regimen:   Metformin 1000mg twice per day  Lantus 30 units daily  Novolog 6 units before meals but will take 7-8 if high       Last Eye Exam: 5/2023 exam showed no retinopathy  Last Foot Exam: UTD    Has hypertension: Taking amlodipine  Has hyperlipidemia: Taking simvastatin  Thyroi    Patient Active Problem List   Diagnosis   • Allergic rhinitis   • Asthma   • Generalized anxiety disorder   • Stress incontinence, female   • Lumbar radiculopathy   • Type 2 diabetes mellitus with hyperglycemia, with long-term current use of insulin ([facility] [address] )   • Colitis   • Chronic fatigue   • Gastroesophageal reflux disease without esophagitis   • Hiatal hernia   • Rheumatoid arthritis of multiple sites with negative rheumatoid factor ([facility])   • Sensorineural hearing loss (SNHL) of left ear with restricted hearing of right ear   • Dry mouth   • Hypertension   • Hyperlipidemia   • Type 2 diabetes mellitus with hypoglycemia without coma, with long-term current use of insulin ([facility])   • Acute diverticulitis   • Fibromyalgia   • Pharyngitis      Past Medical History:   Diagnosis Date   • Anxiety    • Anxiety    • Arthritis of right knee    • Cellulitis    • Contact dermatitis    • Cough    • Diabetes mellitus ([address] )    • GERD without esophagitis    • Headache    • Hiatal hernia    • Hyperlipidemia    • Knee sprain    • Lyme disease    • Tick bite       Past Surgical History:   Procedure Laterality Date   • ABDOMINAL SURGERY      tummy tuck   • CATARACT EXTRACTION, BILATERAL Bilateral 11/21 and 12/21   • HYSTERECTOMY      age 51   • KS COLONOSCOPY FLX DX W/COLLJ SPEC WHEN PFRMD N/A 5/8/2019    Procedure: COLONOSCOPY;  Surgeon: Rubia Carr MD;  Location: [facility]; Service: Gastroenterology   • KS ESOPHAGOGASTRODUODENOSCOPY TRANSORAL DIAGNOSTIC N/A 5/8/2019    Procedure: ESOPHAGOGASTRODUODENOSCOPY (EGD); Surgeon: Rubia Carr MD;  Location: [facility];   Service: Gastroenterology   • TUBAL LIGATION        Family History   Problem Relation Age of Onset   • Mental illness Mother    • Heart disease Father    • Colon cancer Father 67   • No Known Problems Sister    • No Known Problems Daughter    • No Known Problems Maternal Grandmother    • No Known Problems Maternal Grandfather    • No Known Problems Paternal Grandmother    • No Known Problems Paternal Grandfather    • No Known Problems Son    • No Known Problems Sister    • No Known Problems Sister    • No Known Problems Sister    • No Known Problems Sister    • No Known Problems Sister    • Lymphoma Brother 45   • No Known Problems Brother    • No Known Problems Paternal Aunt    • No Known Problems Paternal Aunt    • No Known Problems Paternal Aunt    • No Known Problems Paternal Aunt    • Breast cancer Neg Hx    • Breast cancer additional onset Neg Hx    • Endometrial cancer Neg Hx    • Ovarian cancer Neg Hx    • BRCA 1/2 Neg Hx    • BRCA1 Negative Neg Hx    • BRCA1 Positive Neg Hx    • BRCA2 Negative Neg Hx    • BRCA2 Positive Neg Hx      Social History     Tobacco Use   • Smoking status: Former     Packs/day: 0 50     Years: 25 00     Total pack years: 12 50     Types: Cigarettes     Quit date: 2008     Years since quittin 8   • Smokeless tobacco: Never   Substance Use Topics   • Alcohol use: Not Currently     Comment: seldom     Allergies   Allergen Reactions   • Augmentin [Amoxicillin-Pot Clavulanate] GI Intolerance   • Codeine Drowsiness   • Macrobid [Nitrofurantoin] Other (See Comments)     Pt does not recall         Current Outpatient Medications:   •  albuterol (2 5 mg/3 mL) 0 083 % nebulizer solution, Take 3 mL (2 5 mg total) by nebulization every 6 (six) hours as needed for wheezing or shortness of breath, Disp: 75 mL, Rfl: 2  •  amLODIPine (NORVASC) 2 5 mg tablet, Take 1 tablet (2 5 mg total) by mouth daily at bedtime, Disp: 90 tablet, Rfl: 1  •  artificial tear (LUBRIFRESH P M ) 83-15 % ophthalmic ointment, daily at bedtime, Disp: , Rfl:   •  aspirin 81 mg chewable tablet, Chew 81 mg daily, Disp: , Rfl:   •  baclofen 10 mg tablet, Take 10 mg by mouth daily at bedtime, Disp: , Rfl: 4  •  Breo Ellipta 200-25 MCG/ACT inhaler, INHALE ONE INHALATION BY MOUTH EVERY DAY - (RINSE MOUTH AND SPIT AFTER USE, DISCARD SIX WEEKS AFTER REMOVAL FROM FOIL POUCH), Disp: 2 each, Rfl: 1  •  Cholecalciferol (VITAMIN D PO), Take 1 tablet by mouth daily, Disp: , Rfl:   •  Diclofenac Sodium (VOLTAREN) 1 %, Apply 1 application topically 4 (four) times a day, Disp: , Rfl:   •  dicyclomine (BENTYL) 10 mg capsule, Take 1 capsule (10 mg total) by mouth 4 (four) times a day as needed (abdominal pain), Disp: 60 capsule, Rfl: 1  •  ezetimibe (ZETIA) 10 mg tablet, Take 0 5 tablets (5 mg total) by mouth daily, Disp: 45 tablet, Rfl: 1  •  Ferrous Sulfate Dried (FERROUS SULFATE CR PO), Iron TABS   Refills: 0     Active, Disp: , Rfl:   •  glucose blood (Contour Next Test) test strip, Use 1 each 4 (four) times a day, Disp: 400 each, Rfl: 1  •  hydrochlorothiazide (HYDRODIURIL) 12 5 mg tablet, Take 1 tablet (12 5 mg total) by mouth daily, Disp: 90 tablet, Rfl: 1  •  hydroxychloroquine (PLAQUENIL) 200 mg tablet, Take 200 mg by mouth 2 (two) times a day with meals, Disp: , Rfl:   •  insulin aspart (NovoLOG FlexPen) 100 UNIT/ML injection pen, Inject 6 Units under the skin 3 (three) times a day with meals, Disp: 45 mL, Rfl: 1  •  Insulin Glargine Solostar (Lantus SoloStar) 100 UNIT/ML SOPN, Inject 0 25 mL (25 Units total) under the skin daily at bedtime (Patient taking differently: Inject 30 Units under the skin daily at bedtime), Disp: 45 mL, Rfl: 1  •  Insulin Pen Needle (BD Pen Needle Ester U/F) 32G X 4 MM MISC, Inject under the skin 4 (four) times a day (Patient taking differently: Inject under the skin 3 (three) times a day), Disp: 400 each, Rfl: 1  •  Lifitegrast (XIIDRA OP), Apply to eye 2 vials a day  , Disp: , Rfl:   •  losartan (COZAAR) 50 mg tablet, 1 tab twice per day, Disp: 180 tablet, Rfl: 1  •  metFORMIN (GLUCOPHAGE-XR) 500 mg 24 hr tablet, Take 1 tablet (500 mg total) by mouth daily with dinner, Disp: 90 tablet, Rfl: 1  •  Microlet Lancets MISC, Use 4 (four) times a day, Disp: 400 each, Rfl: 1  •  sertraline (ZOLOFT) 50 mg tablet, TAKE ONE TABLET BY MOUTH EVERY DAY, Disp: 90 tablet, Rfl: 3  •  simvastatin (ZOCOR) 20 mg tablet, Take 1 tablet (20 mg total) by mouth daily, Disp: 90 tablet, Rfl: 1  •  valACYclovir (VALTREX) 1,000 mg tablet, Take 1,000 mg by mouth daily, Disp: , Rfl:     Review of Systems   Constitutional: Negative for activity change, appetite change, chills, diaphoresis, fatigue, fever and unexpected weight change  HENT: Negative for trouble swallowing and voice change  Eyes: Negative for visual disturbance  Respiratory: Negative for shortness of breath  Cardiovascular: Negative for chest pain and palpitations  Gastrointestinal: Positive for abdominal pain and diarrhea  Negative for constipation  Endocrine: Negative for cold intolerance, heat intolerance, polydipsia, polyphagia and polyuria     Genitourinary: Negative for frequency and menstrual problem  Musculoskeletal: Positive for arthralgias  Negative for myalgias  Skin: Negative for rash  Allergic/Immunologic: Negative for food allergies  Neurological: Negative for dizziness and tremors  Hematological: Negative for adenopathy  Psychiatric/Behavioral: Negative for sleep disturbance  All other systems reviewed and are negative  Physical Exam:  Body mass index is 29 88 kg/m²  /72 (BP Location: Right arm, Patient Position: Sitting, Cuff Size: Standard)   Pulse 72   Ht 5' (1 524 m)   Wt 69 4 kg (153 lb)   BMI 29 88 kg/m²    Wt Readings from Last 3 Encounters:   05/24/23 69 4 kg (153 lb)   05/15/23 70 4 kg (155 lb 4 oz)   04/20/23 68 9 kg (152 lb)       Physical Exam  Vitals reviewed  Constitutional:       General: She is not in acute distress  Appearance: She is well-developed  HENT:      Head: Normocephalic and atraumatic  Eyes:      Conjunctiva/sclera: Conjunctivae normal       Pupils: Pupils are equal, round, and reactive to light  Neck:      Thyroid: No thyromegaly  Cardiovascular:      Rate and Rhythm: Normal rate and regular rhythm  Heart sounds: Normal heart sounds  Pulmonary:      Effort: Pulmonary effort is normal  No respiratory distress  Breath sounds: Normal breath sounds  No wheezing or rales  Abdominal:      General: Bowel sounds are normal  There is no distension  Palpations: Abdomen is soft  Tenderness: There is no abdominal tenderness  Musculoskeletal:         General: Normal range of motion  Cervical back: Normal range of motion and neck supple  Skin:     General: Skin is warm and dry  Neurological:      Mental Status: She is alert and oriented to person, place, and time             Labs:   Lab Results   Component Value Date    HGBA1C 9 2 (A) 05/24/2023    HGBA1C 9 0 (H) 02/13/2023    HGBA1C 8 5 (A) 11/18/2022     Lab Results   Component Value Date    BUN 12 03/21/2023     03/21/2023 "   CO2 26 03/21/2023    CREATININE 0 56 (L) 03/21/2023    CREATININE 0 57 (L) 02/18/2023    CREATININE 0 62 02/17/2023    K 3 9 03/21/2023     11/10/2015     eGFR   Date Value Ref Range Status   03/21/2023 94 ml/min/1 73sq m Final     Lab Results   Component Value Date    HDL 85 02/13/2023    TRIG 77 02/13/2023     Lab Results   Component Value Date    ALKPHOS 67 03/21/2023    ALT 19 03/21/2023    AST 16 03/21/2023     No results found for: \"[id number]\"  Lab Results   Component Value Date    FREET4 1 2 02/26/2018             Discussed with the patient and all questioned fully answered  She will call me if any problems arise  Follow-up appointment in 3 months  Counseled patient on diagnostic results, prognosis, risk and benefit of treatment options, instruction for management, importance of treatment compliance, Risk  factor reduction and impressions    Patient Instructions   Change metformin to Metformin ER 500mg twice per day  Send blood sugar in 2 weeks  Let us know if any change in your GI symptoms  We may need to adjust insulin if taking lower metformin dose           Vesta Merino PA-C  " [Gestational Age: ___] : Gestational Age: [unfilled] [Chronological Age: ___] : Chronological Age: [unfilled] [Corrected Age: ___] : Corrected Age: [unfilled] [Date of D/C: ___] : Date of D/C: [unfilled] [Developmental Pediatrics: ___] : Developmental Pediatrics: [unfilled] [Cardiology: ___] : Cardiology: [unfilled] [Weight Gain Since Last Visit (oz/days) ___] : weight gain since last visit: [unfilled] (oz/days)  [Other ___] : [unfilled] [___ Times/day] : [unfilled] times/day [Every ___ hours] : every [unfilled] hours [_____ Times Per] : Stool frequency occurs [unfilled] times per  [Variable amount] : variable  [Day] : day [Soft] : soft [___ ounces/feeding] : ~BON valdivia/feeding [Ophthalmology: ___] : Ophthalmology: [unfilled] [Bloody] : not bloody [Mucousy] : no mucous [de-identified] : D/C from [facility] ED visit for [place]mia, [place] [facility] [place] 2[place]  [de-identified] :  High Risk & Developmental follow up  EI recommended at NICU d/c but they have not contacted mother yet. baby gets some tummy time, lifts head  [de-identified] : No cardiology f/u needed ( re-evaluated 2/9 [de-identified] : [facility] 2/15 due to hypothermia and poor feeding sepsis w/u negative, good weight gain, d/c 2/20 [FreeTextEntry3] : [facility] 24 destiny 25 ml x8  [de-identified] : NeoSure 22 destiny 25 ml x8 [de-identified] :  FEHM 24 destiny (fortified w/ HMF) +  neosure 22 destiny every feed [de-identified] : supine, alone in crip, naps in between feeds 2-3hrs [de-identified] : n/a

## 2024-03-08 NOTE — TELEPHONE ENCOUNTER
Patient has been informed but does not want to make change now since her readings are much higher in the morning now. She will submit more recent reading for review.

## 2024-03-11 DIAGNOSIS — E11.65 TYPE 2 DIABETES MELLITUS WITH HYPERGLYCEMIA, WITH LONG-TERM CURRENT USE OF INSULIN (HCC): ICD-10-CM

## 2024-03-11 DIAGNOSIS — Z79.4 TYPE 2 DIABETES MELLITUS WITH HYPERGLYCEMIA, WITH LONG-TERM CURRENT USE OF INSULIN (HCC): ICD-10-CM

## 2024-03-11 RX ORDER — INSULIN GLARGINE 100 [IU]/ML
30 INJECTION, SOLUTION SUBCUTANEOUS
Qty: 45 ML | Refills: 3 | Status: SHIPPED | OUTPATIENT
Start: 2024-03-11

## 2024-03-18 ENCOUNTER — OFFICE VISIT (OUTPATIENT)
Dept: PODIATRY | Facility: CLINIC | Age: 74
End: 2024-03-18
Payer: MEDICARE

## 2024-03-18 VITALS
HEIGHT: 60 IN | SYSTOLIC BLOOD PRESSURE: 110 MMHG | HEART RATE: 88 BPM | BODY MASS INDEX: 31.61 KG/M2 | DIASTOLIC BLOOD PRESSURE: 73 MMHG | WEIGHT: 161 LBS

## 2024-03-18 DIAGNOSIS — E11.42 DIABETIC POLYNEUROPATHY ASSOCIATED WITH TYPE 2 DIABETES MELLITUS (HCC): Primary | ICD-10-CM

## 2024-03-18 PROCEDURE — 99213 OFFICE O/P EST LOW 20 MIN: CPT | Performed by: PODIATRIST

## 2024-03-18 RX ORDER — GABAPENTIN 300 MG/1
300 CAPSULE ORAL
Qty: 30 CAPSULE | Refills: 0 | Status: SHIPPED | OUTPATIENT
Start: 2024-03-18 | End: 2024-04-17

## 2024-03-18 NOTE — PROGRESS NOTES
Assessment/Plan:    Explained to patient that her blood sugar is far too high.  Her foot pain is most consistent with symptoms of diabetic neuropathy.  Patient placed on gabapentin 300 mg at bedtime.  Patient urged to get blood sugar down.  Patient will be reassessed in 4 weeks.    No problem-specific Assessment & Plan notes found for this encounter.       Diagnoses and all orders for this visit:    Diabetic polyneuropathy associated with type 2 diabetes mellitus (HCC)  -     gabapentin (NEURONTIN) 300 mg capsule; Take 1 capsule (300 mg total) by mouth daily at bedtime          Subjective:      Patient ID: Becki Roman is a 73 y.o. female.    HPI    Patient, 73-year-old type II diabetic female complains of bilateral foot pain.  She states that she has been in pain for the past 7 months with no trauma related.  She states that she feels as if she was walking on a foreign body in her right heel.  She also states that she has pain along the left Achilles tendon.  She has difficulty sleeping due to the pain present in each foot when she rests.    I personally reviewed an A1c dated 12/7/2023.  It was 10.7.    I personally reviewed an A1c dated 9/8/2023.  It was 9.7.    The following portions of the patient's history were reviewed and updated as appropriate: allergies, current medications, past family history, past medical history, past social history, past surgical history, and problem list.    Review of Systems   Musculoskeletal:  Positive for arthralgias.   Neurological:         Lumbar radiculopathy   Psychiatric/Behavioral: Negative.               Objective:      /73   Pulse 88   Ht 5' (1.524 m)   Wt 73 kg (161 lb)   BMI 31.44 kg/m²          Physical Exam  Cardiovascular:      Pulses: no weak pulses.           Dorsalis pedis pulses are 2+ on the right side and 2+ on the left side.        Posterior tibial pulses are 2+ on the right side and 2+ on the left side.   Feet:      Right foot:      Skin integrity: No  ulcer, skin breakdown, erythema, warmth, callus or dry skin.      Left foot:      Skin integrity: No ulcer, skin breakdown, erythema, warmth, callus or dry skin.           Diabetic Foot Exam    Patient's shoes and socks removed.    Right Foot/Ankle   Right Foot Inspection  Skin Exam: skin normal and skin intact. No dry skin, no warmth, no callus, no erythema, no maceration, no abnormal color, no pre-ulcer, no ulcer and no callus.     Toe Exam: ROM and strength within normal limits.     Sensory   Vibration: intact  Proprioception: intact  Monofilament testing: intact    Vascular  Capillary refills: < 3 seconds  The right DP pulse is 2+. The right PT pulse is 2+.     Right Toe  - Comprehensive Exam  Ecchymosis: none  Arch: normal  Hammertoes: absent  Claw Toes: absent  Swelling: none   Tenderness: none       Left Foot/Ankle  Left Foot Inspection  Skin Exam: skin normal and skin intact. No dry skin, no warmth, no erythema, no maceration, normal color, no pre-ulcer, no ulcer and no callus.     Toe Exam: ROM and strength within normal limits.     Sensory   Vibration: intact  Proprioception: intact  Monofilament testing: intact    Vascular  Capillary refills: < 3 seconds  The left DP pulse is 2+. The left PT pulse is 2+.     Left Toe  - Comprehensive Exam  Ecchymosis: none  Arch: normal  Hammertoes: absent  Claw toes: absent  Swelling: none   Tenderness: none       Assign Risk Category  No deformity present  No loss of protective sensation  No weak pulses  Risk: 0

## 2024-04-02 ENCOUNTER — TELEPHONE (OUTPATIENT)
Dept: ENDOCRINOLOGY | Facility: CLINIC | Age: 74
End: 2024-04-02

## 2024-04-13 ENCOUNTER — LAB (OUTPATIENT)
Dept: LAB | Facility: CLINIC | Age: 74
End: 2024-04-13
Payer: MEDICARE

## 2024-04-13 DIAGNOSIS — Z79.4 TYPE 2 DIABETES MELLITUS WITH HYPERGLYCEMIA, WITH LONG-TERM CURRENT USE OF INSULIN (HCC): ICD-10-CM

## 2024-04-13 DIAGNOSIS — E11.649 TYPE 2 DIABETES MELLITUS WITH HYPOGLYCEMIA WITHOUT COMA, WITH LONG-TERM CURRENT USE OF INSULIN (HCC): ICD-10-CM

## 2024-04-13 DIAGNOSIS — E78.5 HYPERLIPIDEMIA, UNSPECIFIED HYPERLIPIDEMIA TYPE: ICD-10-CM

## 2024-04-13 DIAGNOSIS — E11.65 TYPE 2 DIABETES MELLITUS WITH HYPERGLYCEMIA, WITH LONG-TERM CURRENT USE OF INSULIN (HCC): ICD-10-CM

## 2024-04-13 DIAGNOSIS — I10 HYPERTENSION, UNSPECIFIED TYPE: ICD-10-CM

## 2024-04-13 DIAGNOSIS — Z79.4 TYPE 2 DIABETES MELLITUS WITH HYPOGLYCEMIA WITHOUT COMA, WITH LONG-TERM CURRENT USE OF INSULIN (HCC): ICD-10-CM

## 2024-04-13 LAB
ALBUMIN SERPL BCP-MCNC: 3.9 G/DL (ref 3.5–5)
ALP SERPL-CCNC: 78 U/L (ref 34–104)
ALT SERPL W P-5'-P-CCNC: 26 U/L (ref 7–52)
ANION GAP SERPL CALCULATED.3IONS-SCNC: 6 MMOL/L (ref 4–13)
AST SERPL W P-5'-P-CCNC: 24 U/L (ref 13–39)
BILIRUB SERPL-MCNC: 0.44 MG/DL (ref 0.2–1)
BUN SERPL-MCNC: 18 MG/DL (ref 5–25)
CALCIUM SERPL-MCNC: 8.9 MG/DL (ref 8.4–10.2)
CHLORIDE SERPL-SCNC: 101 MMOL/L (ref 96–108)
CHOLEST SERPL-MCNC: 140 MG/DL
CO2 SERPL-SCNC: 28 MMOL/L (ref 21–32)
CREAT SERPL-MCNC: 0.82 MG/DL (ref 0.6–1.3)
CREAT UR-MCNC: 130.7 MG/DL
EST. AVERAGE GLUCOSE BLD GHB EST-MCNC: 286 MG/DL
GFR SERPL CREATININE-BSD FRML MDRD: 71 ML/MIN/1.73SQ M
GLUCOSE P FAST SERPL-MCNC: 267 MG/DL (ref 65–99)
HBA1C MFR BLD: 11.6 %
HDLC SERPL-MCNC: 80 MG/DL
LDLC SERPL CALC-MCNC: 46 MG/DL (ref 0–100)
MICROALBUMIN UR-MCNC: 7.5 MG/L
MICROALBUMIN/CREAT 24H UR: 6 MG/G CREATININE (ref 0–30)
NONHDLC SERPL-MCNC: 60 MG/DL
POTASSIUM SERPL-SCNC: 4 MMOL/L (ref 3.5–5.3)
PROT SERPL-MCNC: 6.8 G/DL (ref 6.4–8.4)
SODIUM SERPL-SCNC: 135 MMOL/L (ref 135–147)
TRIGL SERPL-MCNC: 68 MG/DL

## 2024-04-13 PROCEDURE — 80061 LIPID PANEL: CPT

## 2024-04-13 PROCEDURE — 82043 UR ALBUMIN QUANTITATIVE: CPT

## 2024-04-13 PROCEDURE — 80053 COMPREHEN METABOLIC PANEL: CPT

## 2024-04-13 PROCEDURE — 36415 COLL VENOUS BLD VENIPUNCTURE: CPT

## 2024-04-13 PROCEDURE — 83036 HEMOGLOBIN GLYCOSYLATED A1C: CPT

## 2024-04-13 PROCEDURE — 82570 ASSAY OF URINE CREATININE: CPT

## 2024-04-15 ENCOUNTER — OFFICE VISIT (OUTPATIENT)
Dept: ENDOCRINOLOGY | Facility: CLINIC | Age: 74
End: 2024-04-15
Payer: MEDICARE

## 2024-04-15 VITALS
WEIGHT: 160.8 LBS | SYSTOLIC BLOOD PRESSURE: 120 MMHG | BODY MASS INDEX: 31.57 KG/M2 | DIASTOLIC BLOOD PRESSURE: 78 MMHG | HEIGHT: 60 IN

## 2024-04-15 DIAGNOSIS — E78.5 HYPERLIPIDEMIA, UNSPECIFIED HYPERLIPIDEMIA TYPE: ICD-10-CM

## 2024-04-15 DIAGNOSIS — E11.65 TYPE 2 DIABETES MELLITUS WITH HYPERGLYCEMIA, WITH LONG-TERM CURRENT USE OF INSULIN (HCC): Primary | ICD-10-CM

## 2024-04-15 DIAGNOSIS — I10 HYPERTENSION, UNSPECIFIED TYPE: ICD-10-CM

## 2024-04-15 DIAGNOSIS — Z79.4 TYPE 2 DIABETES MELLITUS WITH HYPERGLYCEMIA, WITH LONG-TERM CURRENT USE OF INSULIN (HCC): Primary | ICD-10-CM

## 2024-04-15 PROCEDURE — 99214 OFFICE O/P EST MOD 30 MIN: CPT | Performed by: PHYSICIAN ASSISTANT

## 2024-04-15 RX ORDER — DAPAGLIFLOZIN 10 MG/1
TABLET, FILM COATED ORAL
Qty: 90 TABLET | Refills: 1 | Status: SHIPPED | OUTPATIENT
Start: 2024-04-15

## 2024-04-15 NOTE — PROGRESS NOTES
Established Patient Progress Note    Chief Complaint:  Diabetes follow up visit    Impression & Plan:    Problem List Items Addressed This Visit        Cardiovascular and Mediastinum    Hypertension     Well controlled on current medication.             Endocrine    Type 2 diabetes mellitus with hyperglycemia, with long-term current use of insulin (HCC) - Primary     Diabetes is very poorly controlled and is worsening  Blood sugars are highly variable in the 80s to 300s making it difficulty to adjust insulin.  She does not think she will be able to change diet.  Refer to Diabetes education for Carb Counting/Flexible insulin regimen.  She declines CGM  She did not tolerate GLP-1 agonist.  Discussed option of Adding SGLT2 inhibitor including risks, side effects, and  DKA precautions.  Start Farxiga 10mg daily.   Lab Results   Component Value Date    HGBA1C 11.6 (H) 04/13/2024          Relevant Medications    dapagliflozin (Farxiga) 10 MG tablet    Other Relevant Orders    Ambulatory referral to Diabetic Education    Hemoglobin A1C    Basic metabolic panel       Other    Hyperlipidemia     Continue simvastatin.          Relevant Orders    TSH, 3rd generation         History of Present Illness:   Becki Roman is a 73 y.o. female with a history of type 2 diabetes  since age. Reports complications of neuropathy.     Did not tolerate ozempic due to severe GI side effects.   Does have a history of yeast infection 2 years ago.  Rotating injection site.     Seeing Kiara + Neuropathy     Home blood glucose readings:   See scanned log, ranges 80s to 300s. Checking 4x per day     Current regimen:   Metformin ER 500mg daily  Lantus 30 units daily at bedtime    Novolog 8-10 with meals    Has hypertension: Taking amlodipine and HCTZ and losartan  Has hyperlipidemia: Taking zetia and simvastatin    Patient Active Problem List   Diagnosis   • Allergic rhinitis   • Asthma   • Generalized anxiety disorder   • Stress incontinence,  female   • Lumbar radiculopathy   • Type 2 diabetes mellitus with hyperglycemia, with long-term current use of insulin ([facility])   • Colitis   • Chronic fatigue   • Gastroesophageal reflux disease without esophagitis   • Hiatal hernia   • Rheumatoid arthritis of multiple sites with negative rheumatoid factor ([facility])   • Sensorineural hearing loss (SNHL) of left ear with restricted hearing of right ear   • Dry mouth   • Hypertension   • Hyperlipidemia   • Type 2 diabetes mellitus with hypoglycemia without coma, with long-term current use of insulin ([facility])   • Acute diverticulitis   • Fibromyalgia   • Upper respiratory tract infection   • Achilles tendinitis of left lower extremity      Past Medical History:   Diagnosis Date   • Anxiety    • Anxiety    • Arthritis of right knee    • Cellulitis    • Contact dermatitis    • Cough    • Diabetes mellitus ([facility])    • GERD without esophagitis    • Headache    • Hiatal hernia    • Hyperlipidemia    • Knee sprain    • Lyme disease    • Tick bite       Past Surgical History:   Procedure Laterality Date   • ABDOMINAL SURGERY      tummy tuck   • CATARACT EXTRACTION, BILATERAL Bilateral 11/21 and 12/21   • HYSTERECTOMY      age 49   • FL COLONOSCOPY FLX DX W/COLLJ SPEC WHEN PFRMD N/A 5/8/2019    Procedure: COLONOSCOPY;  Surgeon: Gillian Cartagena MD;  Location: [facility];  Service: Gastroenterology   • FL ESOPHAGOGASTRODUODENOSCOPY TRANSORAL DIAGNOSTIC N/A 5/8/2019    Procedure: ESOPHAGOGASTRODUODENOSCOPY (EGD);  Surgeon: Gillian Cartagena MD;  Location: [facility];  Service: Gastroenterology   • TUBAL LIGATION        Family History   Problem Relation Age of Onset   • Mental illness Mother    • Heart disease Father    • Colon cancer Father 72   • No Known Problems Sister    • No Known Problems Daughter    • No Known Problems Maternal Grandmother    • No Known Problems Maternal Grandfather    • No Known Problems Paternal Grandmother    • No Known Problems Paternal Grandfather    • No Known  Problems Son    • No Known Problems Sister    • No Known Problems Sister    • No Known Problems Sister    • No Known Problems Sister    • No Known Problems Sister    • Lymphoma Brother 38   • No Known Problems Brother    • No Known Problems Paternal Aunt    • No Known Problems Paternal Aunt    • No Known Problems Paternal Aunt    • No Known Problems Paternal Aunt    • Breast cancer Neg Hx    • Breast cancer additional onset Neg Hx    • Endometrial cancer Neg Hx    • Ovarian cancer Neg Hx    • BRCA 1/2 Neg Hx    • BRCA1 Negative Neg Hx    • BRCA1 Positive Neg Hx    • BRCA2 Negative Neg Hx    • BRCA2 Positive Neg Hx      Social History     Tobacco Use   • Smoking status: Former     Current packs/day: 0.00     Average packs/day: 0.5 packs/day for 25.0 years (12.5 ttl pk-yrs)     Types: Cigarettes     Start date: 7/23/1983     Quit date: 7/23/2008     Years since quitting: 15.7   • Smokeless tobacco: Never   Substance Use Topics   • Alcohol use: Not Currently     Comment: seldom     Allergies   Allergen Reactions   • Augmentin [Amoxicillin-Pot Clavulanate] GI Intolerance   • Paxlovid [Nirmatrelvir-Ritonavir] Diarrhea and Vomiting   • Codeine Drowsiness   • Macrobid [Nitrofurantoin] Other (See Comments)     Pt does not recall         Current Outpatient Medications:   •  albuterol (2.5 mg/3 mL) 0.083 % nebulizer solution, Take 3 mL (2.5 mg total) by nebulization every 6 (six) hours as needed for wheezing or shortness of breath, Disp: 75 mL, Rfl: 2  •  amLODIPine (NORVASC) 2.5 mg tablet, Take 1 tablet (2.5 mg total) by mouth daily at bedtime, Disp: 90 tablet, Rfl: 0  •  artificial tear (LUBRIFRESH P.M.) 83-15 % ophthalmic ointment, daily at bedtime, Disp: , Rfl:   •  aspirin 81 mg chewable tablet, Chew 81 mg daily, Disp: , Rfl:   •  baclofen 10 mg tablet, Take 10 mg by mouth daily at bedtime, Disp: , Rfl: 4  •  Breo Ellipta 200-25 MCG/ACT inhaler, INHALE ONE INHALATION BY MOUTH EVERY DAY - (RINSE MOUTH AND SPIT AFTER USE,  DISCARD SIX WEEKS AFTER REMOVAL FROM FOIL POUCH), Disp: 2 each, Rfl: 1  •  Cholecalciferol (VITAMIN D PO), Take 1 tablet by mouth daily, Disp: , Rfl:   •  dapagliflozin (Farxiga) 10 MG tablet, 1 tab daily, Disp: 90 tablet, Rfl: 1  •  Diclofenac Sodium (VOLTAREN) 1 %, Apply 1 application topically 4 (four) times a day, Disp: , Rfl:   •  ezetimibe (ZETIA) 10 mg tablet, Take 0.5 tablets (5 mg total) by mouth daily, Disp: 45 tablet, Rfl: 1  •  Ferrous Sulfate Dried (FERROUS SULFATE CR PO), Iron TABS   Refills: 0     Active, Disp: , Rfl:   •  hydrochlorothiazide (HYDRODIURIL) 12.5 mg tablet, Take 1 tablet (12.5 mg total) by mouth daily, Disp: 90 tablet, Rfl: 1  •  hydroxychloroquine (PLAQUENIL) 200 mg tablet, Take 200 mg by mouth 2 (two) times a day with meals, Disp: , Rfl:   •  insulin aspart (NovoLOG FlexPen) 100 UNIT/ML injection pen, 8 units before meals if fingerstick less than 140 10 units if finger stick above 140, Disp: 45 mL, Rfl: 1  •  Insulin Glargine Solostar (Lantus SoloStar) 100 UNIT/ML SOPN, Inject 0.3 mL (30 Units total) under the skin daily at bedtime, Disp: 45 mL, Rfl: 3  •  Insulin Pen Needle (BD Pen Needle Ester U/F) 32G X 4 MM MISC, Inject under the skin 4 (four) times a day, Disp: 400 each, Rfl: 1  •  Lifitegrast (XIIDRA OP), Apply to eye 2 vials a day  , Disp: , Rfl:   •  losartan (COZAAR) 50 mg tablet, 1 tab twice per day, Disp: 180 tablet, Rfl: 1  •  metFORMIN (GLUCOPHAGE-XR) 500 mg 24 hr tablet, Take 1 tablet (500 mg total) by mouth daily with dinner, Disp: 90 tablet, Rfl: 1  •  Microlet Lancets MISC, Use 4 (four) times a day, Disp: 400 each, Rfl: 1  •  sertraline (ZOLOFT) 50 mg tablet, TAKE ONE TABLET BY MOUTH EVERY DAY, Disp: 90 tablet, Rfl: 3  •  simvastatin (ZOCOR) 20 mg tablet, TAKE ONE TABLET BY MOUTH EVERY DAY, Disp: 90 tablet, Rfl: 3  •  traMADol (ULTRAM) 50 mg tablet, TAKE 1 TABLET BY MOUTH EVERY 12 (TWELVE) HOURS AS NEEDED FOR MODERATE PAIN, Disp: , Rfl:   •  dicyclomine (BENTYL) 10 mg  capsule, Take 1 capsule (10 mg total) by mouth 4 (four) times a day as needed (abdominal pain) (Patient not taking: Reported on 1/9/2024), Disp: 60 capsule, Rfl: 1  •  gabapentin (NEURONTIN) 300 mg capsule, Take 1 capsule (300 mg total) by mouth daily at bedtime (Patient not taking: Reported on 4/15/2024), Disp: 30 capsule, Rfl: 0  •  glucose blood (Contour Next Test) test strip, Use 1 each 4 (four) times a day (Patient not taking: Reported on 4/15/2024), Disp: 400 each, Rfl: 1  •  valACYclovir (VALTREX) 1,000 mg tablet, Take 1,000 mg by mouth daily (Patient not taking: Reported on 1/9/2024), Disp: , Rfl:     Review of Systems   Constitutional:  Negative for activity change, appetite change, chills, diaphoresis, fatigue, fever and unexpected weight change.   HENT:  Negative for trouble swallowing and voice change.    Eyes:  Negative for visual disturbance.   Respiratory:  Negative for shortness of breath.    Cardiovascular:  Negative for chest pain and palpitations.   Gastrointestinal:  Negative for abdominal pain, constipation and diarrhea.   Endocrine: Negative for cold intolerance, heat intolerance, polydipsia, polyphagia and polyuria.   Genitourinary:  Negative for frequency and menstrual problem.   Musculoskeletal:  Negative for arthralgias and myalgias.   Skin:  Negative for rash.   Allergic/Immunologic: Negative for food allergies.   Neurological:  Negative for dizziness and tremors.   Hematological:  Negative for adenopathy.   Psychiatric/Behavioral:  Negative for sleep disturbance.    All other systems reviewed and are negative.      Physical Exam:  Body mass index is 31.4 kg/m².  /78   Ht 5' (1.524 m)   Wt 72.9 kg (160 lb 12.8 oz)   BMI 31.40 kg/m²    Wt Readings from Last 3 Encounters:   04/15/24 72.9 kg (160 lb 12.8 oz)   03/18/24 73 kg (161 lb)   02/06/24 74.8 kg (164 lb 14.5 oz)       Physical Exam  Vitals reviewed.   Constitutional:       General: She is not in acute distress.     Appearance:  Normal appearance. She is well-developed. She is not toxic-appearing.   HENT:      Head: Normocephalic and atraumatic.   Eyes:      Conjunctiva/sclera: Conjunctivae normal.      Pupils: Pupils are equal, round, and reactive to light.   Neck:      Thyroid: No thyromegaly.   Cardiovascular:      Rate and Rhythm: Normal rate and regular rhythm.      Heart sounds: Normal heart sounds.   Pulmonary:      Effort: Pulmonary effort is normal. No respiratory distress.      Breath sounds: Normal breath sounds. No wheezing or rales.   Abdominal:      General: Bowel sounds are normal. There is no distension.      Palpations: Abdomen is soft.      Tenderness: There is no abdominal tenderness.   Musculoskeletal:         General: Normal range of motion.      Cervical back: Normal range of motion and neck supple.   Skin:     General: Skin is warm and dry.   Neurological:      Mental Status: She is alert and oriented to person, place, and time.   Psychiatric:         Mood and Affect: Mood normal.         Behavior: Behavior normal.           Labs:   Lab Results   Component Value Date    HGBA1C 11.6 (H) 04/13/2024    HGBA1C 10.7 (H) 12/07/2023    HGBA1C 9.7 (H) 09/08/2023     Lab Results   Component Value Date    CREATININE 0.82 04/13/2024    CREATININE 0.67 12/07/2023    CREATININE 0.56 (L) 03/21/2023    BUN 18 04/13/2024     11/10/2015    K 4.0 04/13/2024     04/13/2024    CO2 28 04/13/2024     eGFRcr   Date Value Ref Range Status   08/19/2022 95 > OR = 60 mL/min/1.73m2 Final     Comment:     The eGFR is based on the CKD-EPI 2021 equation. To calculate   the new eGFR from a previous Creatinine or Cystatin C  result, go to https://www.kidney.org/professionals/  kdoqi/gfr%5Fcalculator     eGFR   Date Value Ref Range Status   04/13/2024 71 ml/min/1.73sq m Final     Lab Results   Component Value Date    HDL 80 04/13/2024    TRIG 68 04/13/2024     Lab Results   Component Value Date    ALT 26 04/13/2024    AST 24 04/13/2024     "ALKPHOS 78 04/13/2024     No results found for: \"[id number]\"  Lab Results   Component Value Date    FREET4 1.2 02/26/2018       Discussed with the patient and all questioned fully answered. She will call me if any problems arise.    Follow-up appointment in 3 months.     Counseled patient on diagnostic results, prognosis, risk and benefit of treatment options, instruction for management, importance of treatment compliance, Risk  factor reduction and impressions    Dominique Pierce PA-C  "

## 2024-04-15 NOTE — ASSESSMENT & PLAN NOTE
Diabetes is very poorly controlled and is worsening  Blood sugars are highly variable in the 80s to 300s making it difficulty to adjust insulin.  She does not think she will be able to change diet.  Refer to Diabetes education for Carb Counting/Flexible insulin regimen.  She declines CGM  She did not tolerate GLP-1 agonist.  Discussed option of Adding SGLT2 inhibitor including risks, side effects, and  DKA precautions.  Start Farxiga 10mg daily.   Lab Results   Component Value Date    HGBA1C 11.6 (H) 04/13/2024

## 2024-04-22 ENCOUNTER — OFFICE VISIT (OUTPATIENT)
Dept: PODIATRY | Facility: CLINIC | Age: 74
End: 2024-04-22
Payer: MEDICARE

## 2024-04-22 VITALS
RESPIRATION RATE: 18 BRPM | BODY MASS INDEX: 31.41 KG/M2 | HEIGHT: 60 IN | HEART RATE: 66 BPM | SYSTOLIC BLOOD PRESSURE: 118 MMHG | WEIGHT: 160 LBS | DIASTOLIC BLOOD PRESSURE: 80 MMHG

## 2024-04-22 DIAGNOSIS — M76.62 ACHILLES TENDINITIS OF LEFT LOWER EXTREMITY: Primary | ICD-10-CM

## 2024-04-22 PROCEDURE — 99212 OFFICE O/P EST SF 10 MIN: CPT | Performed by: PODIATRIST

## 2024-04-22 NOTE — PROGRESS NOTES
Patient presents for pedal assessment.  Patient is an uncontrolled type II diabetic female who at last visit was diagnosed with diabetic neuropathy.  She was placed on gabapentin 300 mg at bedtime but could only take it for a few days due to adverse effects.  On questioning, patient notes that she already takes Zoloft on a regular basis so she is not a good candidate for Cymbalta.    Chief complaint today is pain along the left Achilles tendon.  This pain increases the more that she is on her feet.    Recommend treating her for Achilles tendinitis.  Patient knows that her blood sugar is too high and is taking new medications to try to reduce the hypoglycemia.    Advised patient to utilize Voltaren gel on the left Achilles and to stretch is much as possible.  She was urged to wear shoes with a low heel.  She was also referred for physical therapy.  Reappoint 6 weeks.

## 2024-04-25 DIAGNOSIS — E11.65 TYPE 2 DIABETES MELLITUS WITH HYPERGLYCEMIA, WITH LONG-TERM CURRENT USE OF INSULIN (HCC): ICD-10-CM

## 2024-04-25 DIAGNOSIS — I10 HYPERTENSION, UNSPECIFIED TYPE: ICD-10-CM

## 2024-04-25 DIAGNOSIS — Z79.4 TYPE 2 DIABETES MELLITUS WITH HYPERGLYCEMIA, WITH LONG-TERM CURRENT USE OF INSULIN (HCC): ICD-10-CM

## 2024-04-25 RX ORDER — PEN NEEDLE, DIABETIC 32GX 5/32"
NEEDLE, DISPOSABLE MISCELLANEOUS 4 TIMES DAILY
Qty: 400 EACH | Refills: 1 | Status: SHIPPED | OUTPATIENT
Start: 2024-04-25

## 2024-04-25 RX ORDER — HYDROCHLOROTHIAZIDE 12.5 MG/1
12.5 TABLET ORAL DAILY
Qty: 90 TABLET | Refills: 1 | Status: SHIPPED | OUTPATIENT
Start: 2024-04-25

## 2024-04-25 NOTE — TELEPHONE ENCOUNTER
Reason for call:   [x] Refill   [] Prior Auth  [] Other:     Office:   [] PCP/Provider -   [x] Specialty/Provider - Endocrinology     Medication:     Hydrochlorothiazide 12.5 mg tablet taken by mouth once daily #90 tabs     Insulin Pen Needles 32G X 4 MM MISC. Inject under the skin 4x daily.     Pharmacy: [facility] [id number] [facility] [phone]     Does the patient have enough for 3 days?   [x] Yes   [] No - Send as HP to POD

## 2024-05-08 ENCOUNTER — EVALUATION (OUTPATIENT)
Dept: PHYSICAL THERAPY | Facility: REHABILITATION | Age: 74
End: 2024-05-08
Payer: MEDICARE

## 2024-05-08 DIAGNOSIS — M76.62 ACHILLES TENDINITIS OF LEFT LOWER EXTREMITY: ICD-10-CM

## 2024-05-08 PROCEDURE — 97140 MANUAL THERAPY 1/> REGIONS: CPT | Performed by: PHYSICAL THERAPIST

## 2024-05-08 PROCEDURE — 97110 THERAPEUTIC EXERCISES: CPT | Performed by: PHYSICAL THERAPIST

## 2024-05-08 PROCEDURE — 97161 PT EVAL LOW COMPLEX 20 MIN: CPT | Performed by: PHYSICAL THERAPIST

## 2024-05-08 NOTE — PROGRESS NOTES
PT Evaluation     Today's date: 2024  Patient name: Becki Roman  : 1950  MRN: [id number]  Referring provider: Terence Craig DPM  Dx:   Encounter Diagnosis     ICD-10-CM    1. Achilles tendinitis of left lower extremity  M76.62 Ambulatory referral to Physical Therapy          Start Time: 1402  Stop Time: 1444  Total time in clinic (min): 42 minutes    Assessment  Assessment details: Becki Roman is a 73 y.o. female who presents to therapy for L achilles pain. Signs and sxs are consistent with Achilles tendinitis. Pt presents with the above stated impairments and limitations including limited and painful ankle ROM, decreased ankle strength,and pain that is limiting pts  ADLs, iADLs, and work duties. Pain increased with WBing and improves with rest, elevation and ice. Pt will benefit from skilled outpatient PT to address the below stated impairments, to address therapy goals, to reduce pain, and improve function.       Therapist explained to pt: findings of IE, rehab diagnosis, and POC. Pt-centered goals reviewed and confirmed by pt. Instruction also given for HEP. Pt expressed verbal agreement and understanding and verified understanding via teach back method. Pt also expressed satisfaction that their current concerns were addressed at the end of the session.  Impairments: abnormal gait, abnormal or restricted ROM, activity intolerance, impaired physical strength, lacks appropriate home exercise program, pain with function and weight-bearing intolerance    Symptom irritability: moderateBarriers to therapy: None   Understanding of Dx/Px/POC: good   Prognosis: good    Goals  Short term goals: to be met in 4-6 weeks  Pt independent with initial HEP, rationale, technique and frequency, for ROM and pain control.   Pt will report a 25% reduction in subjective pain complaints/symptoms to better manage ADLs and functional mobility  Pt will report an improvement in max pain score by at least 2 points on the  numeric pain rating scale (NPRS) indicating a clinically important change and overall decrease in pain.  Improve ankle DF AROM by 5deg or better to reduce Achilles insertion strain/pain to better manage WBing.  Pt will achieve an improvement in FOTO score indicating an improvement in pain and function.         Long term goals: to be met in 8-12 weeks  Pt will report a 85% or > reduction in subjective pain complaints/symptoms to better manage ADLs and functional mobility.  Improve ankle DF AROM to >10 deg to reduce Achilles insertion strain/pain to better manage WBing.  Increase strength of ankle 4+/5 or greater for improved ease of functional mobility.   Pt will improve FOTO score to better then expected outcome indicating an overall improvement in pain and function   Pt able to reciprocally ascend/descend 1 flight of stairs without pain or substitution to be able to get to the second floor in pt's home.  Pt will be able to perform ADLs, iADLs, and household chores without pain or limitation free indicating return to PLOF.  Pt independent with rationale, technique and plan for performance of advanced HEP to ensure independent self-management of sx's.  Achieve pts therapy goal: to be pain free       Plan  Patient would benefit from: skilled physical therapy  Planned modality interventions: TENS, thermotherapy: hydrocollator packs, traction, ultrasound, cryotherapy and low level laser therapy  Planned therapy interventions: body mechanics training, therapeutic training, therapeutic exercise, therapeutic activities, stretching, strengthening, neuromuscular re-education, patient education, home exercise program, functional ROM exercises, flexibility, manual therapy, Mobley taping, joint mobilization and balance  Frequency: 1-2x/week.  Duration in weeks: 12  Treatment plan discussed with: patient      Subjective Evaluation    History of Present Illness  Mechanism of injury: Pt presents with L ankle/achilles pain x6  months and dx of achilles tendinopathy, diagnosed with podiatrist. Denies trauma or reason for tendinopathy. Has not had any treatment at this point. No injections. Pain increases upon standing, getting out of bed, Wbing. Pain is relieved with rest. Unable to take medication for pain. Denies n/t.  Pain is located right along the L achilles tendon. Has not had this prior. Does not recall change in shoe wear or activity. Did have a bout of steroids which did help, this was about 6 months ago.  Patient Goals  Patient goals for therapy: decreased pain, increased motion, increased strength, independence with ADLs/IADLs and improved balance  Patient goal: to be pain free  Pain  Current pain ratin  At best pain ratin  At worst pain ratin  Location: L achilles  Aggravating factors: standing and walking (WBing)  Progression: worsening    Social Support  Stairs in house: yes   Lives in: multiple-level home  Lives with: partner, grandson - takes care of others.    Treatments  No previous or current treatments        Objective      MMT   AROM   PROM     Knee Left Right Left Right Left Right   Flexion           Extension                    Hip           Flexion            Extension             Abduction                      Ankle             Dorsiflexion 5 5 lacking 15 ke  Lacking 3 kf         Plantarflexion   50      Inversion  3+        Eversion 5               ST jt mobility - dec eversion  TC jt mobility - mildly limited AP and PA glide  Great toe ROM WNL  Flowsheet Rows      Flowsheet Row Most Recent Value   PT/OT G-Codes    Current Score 44   Projected Score 57          Palpation: TTP L achilles tendon     Precautions: DM, fall risk    FOTO   = 44/57    POC exp: 24        Manuals             Laser dir contact - achilles protocol DS                                                   Neuro Re-Ed                                                                                                        Ther  Ex             PF with band, 5 sec ecc Maroon x30  hep            B/l heel raises - slow lowering 2x10                                                                                          Ther Activity                                       Gait Training                                       Modalities

## 2024-05-16 ENCOUNTER — OFFICE VISIT (OUTPATIENT)
Dept: PHYSICAL THERAPY | Facility: REHABILITATION | Age: 74
End: 2024-05-16
Payer: MEDICARE

## 2024-05-16 DIAGNOSIS — M76.62 ACHILLES TENDINITIS OF LEFT LOWER EXTREMITY: Primary | ICD-10-CM

## 2024-05-16 PROCEDURE — 97110 THERAPEUTIC EXERCISES: CPT | Performed by: PHYSICAL THERAPIST

## 2024-05-16 PROCEDURE — 97140 MANUAL THERAPY 1/> REGIONS: CPT | Performed by: PHYSICAL THERAPIST

## 2024-05-16 NOTE — PROGRESS NOTES
Daily Note     Today's date: 2024  Patient name: Becki Roman  : 1950  MRN: [id number]  Referring provider: Terence Craig DPM  Dx:   Encounter Diagnosis     ICD-10-CM    1. Achilles tendinitis of left lower extremity  M76.62                      Subjective: States that this week was rough for the ankle. She feels it when she stands.     Objective: See treatment diary below    Assessment: Pulling and slight discomfort present during single leg and DLHR but tolerates, more so w/ single. Added to HEP. Mobility improved after manual techniques. She would benefit from continued skilled PT services to reduce pain and increase level of function.     Plan: Continue per plan of care.      Precautions: DM, fall risk    FOTO   = 44/57    POC exp: 24        Manuals            Laser dir contact - achilles protocol DS            Foot and ankle mobs  MM                                     Neuro Re-Ed                                                                                                        Ther Ex             PF with band, 5 sec ecc Maroon x30  hep            B/l heel raises - slow lowering 2x10 10x           SLHR  10x - HEP           Decline HR  10x                                                               Ther Activity                                       Gait Training                                       Modalities

## 2024-05-21 ENCOUNTER — OFFICE VISIT (OUTPATIENT)
Dept: PHYSICAL THERAPY | Facility: REHABILITATION | Age: 74
End: 2024-05-21
Payer: MEDICARE

## 2024-05-21 DIAGNOSIS — M76.62 ACHILLES TENDINITIS OF LEFT LOWER EXTREMITY: Primary | ICD-10-CM

## 2024-05-21 PROCEDURE — 97112 NEUROMUSCULAR REEDUCATION: CPT | Performed by: PHYSICAL THERAPIST

## 2024-05-21 PROCEDURE — 97110 THERAPEUTIC EXERCISES: CPT | Performed by: PHYSICAL THERAPIST

## 2024-05-21 PROCEDURE — 97140 MANUAL THERAPY 1/> REGIONS: CPT | Performed by: PHYSICAL THERAPIST

## 2024-05-21 NOTE — PROGRESS NOTES
"Daily Note     Today's date: 2024  Patient name: Becki Roman  : 1950  MRN: [id number]  Referring provider: Terence Craig DPM  Dx:   Encounter Diagnosis     ICD-10-CM    1. Achilles tendinitis of left lower extremity  M76.62                      Subjective: Ankle has not been too bad.     Objective: See treatment diary below    Assessment: Pulling during SLHR, otherwise without symptoms through session. Eversion and DF improved after listed manual techniques. She clark benefit from continued skilled PT services to reduce pain and increase level of function.     Plan: Continue per plan of care.      Precautions: DM, fall risk    FOTO   = 44/57    POC exp: 24        Manuals           Laser dir contact - achilles protocol DS            Foot and ankle mobs  MM MM                                    Neuro Re-Ed             SLS   Blue oval 2x8x5\"                                                                                         Ther Ex             PF with band, 5 sec ecc Maroon x30  hep            B/l heel raises - slow lowering 2x10 10x           SLHR  10x - HEP           Decline HR  10x                                     Seated HR   15# KB 3x10          VG   SLHR L7 4x6          Ther Activity                                       Gait Training                                       Modalities                                            "

## 2024-05-28 ENCOUNTER — APPOINTMENT (OUTPATIENT)
Dept: PHYSICAL THERAPY | Facility: REHABILITATION | Age: 74
End: 2024-05-28
Payer: MEDICARE

## 2024-05-31 DIAGNOSIS — Z79.4 TYPE 2 DIABETES MELLITUS WITH HYPERGLYCEMIA, WITH LONG-TERM CURRENT USE OF INSULIN (HCC): ICD-10-CM

## 2024-05-31 DIAGNOSIS — E11.65 TYPE 2 DIABETES MELLITUS WITH HYPERGLYCEMIA, WITH LONG-TERM CURRENT USE OF INSULIN (HCC): ICD-10-CM

## 2024-05-31 NOTE — TELEPHONE ENCOUNTER
Reason for call:   [x] Refill   [] Prior Auth  [] Other:     Office:   [] PCP/Provider -   [x] Specialty/Provider -Petty Ball      Medication: Contour Next test strip    Dose/Frequency: Use 1 four times a day.     Quantity: #400    Pharmacy: [facility]    Does the patient have enough for 3 days?   [x] Yes   [] No - Send as HP to POD

## 2024-06-04 ENCOUNTER — OFFICE VISIT (OUTPATIENT)
Dept: PHYSICAL THERAPY | Facility: REHABILITATION | Age: 74
End: 2024-06-04
Payer: MEDICARE

## 2024-06-04 DIAGNOSIS — M76.62 ACHILLES TENDINITIS OF LEFT LOWER EXTREMITY: Primary | ICD-10-CM

## 2024-06-04 PROCEDURE — 97110 THERAPEUTIC EXERCISES: CPT | Performed by: PHYSICAL THERAPIST

## 2024-06-04 PROCEDURE — 97140 MANUAL THERAPY 1/> REGIONS: CPT | Performed by: PHYSICAL THERAPIST

## 2024-06-04 NOTE — PROGRESS NOTES
"Daily Note     Today's date: 2024  Patient name: Becki Roman  : 1950  MRN: [id number]  Referring provider: Terence Craig DPM  Dx:   Encounter Diagnosis     ICD-10-CM    1. Achilles tendinitis of left lower extremity  M76.62                      Subjective: She feels ankle is improving. She is able to do HEP w/o pain.     Objective: See treatment diary below    Assessment: Instructed on increased repetitions and heel height for HEP. W/o subjective pain through session. Fatigued w/ listed interventions. She would benefit from continued PT.    Plan: Continue per plan of care.      Precautions: DM, fall risk    FOTO   = 44/57    POC exp: 24        Manuals          Laser dir contact - achilles protocol DS            Foot and ankle mobs  MM MM MM                                   Neuro Re-Ed  4         SLS   Blue oval 2x8x5\"                                                                                         Ther Ex             PF with band, 5 sec ecc Maroon x30  hep            B/l heel raises - slow lowering 2x10 10x           SLHR  10x - HEP  10x         Decline HR  10x                                     Seated HR   15# KB 3x10          VG   SLHR L7 4x6 SLHR 30x         Ther Activity                                       Gait Training                                       Modalities                                            "

## 2024-06-06 ENCOUNTER — OFFICE VISIT (OUTPATIENT)
Dept: PODIATRY | Facility: CLINIC | Age: 74
End: 2024-06-06
Payer: MEDICARE

## 2024-06-06 VITALS
WEIGHT: 160 LBS | HEART RATE: 82 BPM | BODY MASS INDEX: 31.41 KG/M2 | DIASTOLIC BLOOD PRESSURE: 72 MMHG | HEIGHT: 60 IN | SYSTOLIC BLOOD PRESSURE: 106 MMHG

## 2024-06-06 DIAGNOSIS — M76.62 ACHILLES TENDINITIS OF LEFT LOWER EXTREMITY: Primary | ICD-10-CM

## 2024-06-06 PROCEDURE — 99212 OFFICE O/P EST SF 10 MIN: CPT | Performed by: PODIATRIST

## 2024-06-06 NOTE — PROGRESS NOTES
Patient presents for assessment of left Achilles.  She was diagnosed with Achilles tendinitis at last visit.  She has been going to physical therapy.  She notes that after therapy her Achilles hurts worse than before.    She is using Voltaren gel on a daily basis.  She has not started wearing shoes that have much of the heel and she was urged to do so.  Patient told to utilize the Voltaren gel at twice daily cyst and to continue with physical therapy.    This patient is diabetic so oral prednisone not advised.  She cannot tolerate NSAIDs.  She will be rescheduled in 6 weeks.

## 2024-06-10 DIAGNOSIS — I10 HYPERTENSION, UNSPECIFIED TYPE: ICD-10-CM

## 2024-06-10 DIAGNOSIS — E78.5 HYPERLIPIDEMIA, UNSPECIFIED HYPERLIPIDEMIA TYPE: ICD-10-CM

## 2024-06-10 NOTE — TELEPHONE ENCOUNTER
Reason for call:   [x] Refill   [] Prior Auth  [] Other:     Office:   [x] PCP/Provider -   [] Specialty/Provider -               Does the patient have enough for 3 days?   [] Yes   [x] No - Send as HP to POD

## 2024-06-11 ENCOUNTER — OFFICE VISIT (OUTPATIENT)
Dept: PHYSICAL THERAPY | Facility: REHABILITATION | Age: 74
End: 2024-06-11
Payer: MEDICARE

## 2024-06-11 DIAGNOSIS — M76.62 ACHILLES TENDINITIS OF LEFT LOWER EXTREMITY: Primary | ICD-10-CM

## 2024-06-11 PROCEDURE — 97110 THERAPEUTIC EXERCISES: CPT | Performed by: PHYSICAL THERAPIST

## 2024-06-11 PROCEDURE — 97140 MANUAL THERAPY 1/> REGIONS: CPT | Performed by: PHYSICAL THERAPIST

## 2024-06-11 RX ORDER — EZETIMIBE 10 MG/1
5 TABLET ORAL DAILY
Qty: 45 TABLET | Refills: 1 | Status: SHIPPED | OUTPATIENT
Start: 2024-06-11 | End: 2024-12-08

## 2024-06-11 RX ORDER — AMLODIPINE BESYLATE 2.5 MG/1
2.5 TABLET ORAL
Qty: 90 TABLET | Refills: 1 | Status: SHIPPED | OUTPATIENT
Start: 2024-06-11

## 2024-06-11 NOTE — PROGRESS NOTES
"Daily Note     Today's date: 2024  Patient name: Becki Roman  : 1950  MRN: [id number]  Referring provider: Terence Craig DPM  Dx:   Encounter Diagnosis     ICD-10-CM    1. Achilles tendinitis of left lower extremity  M76.62             Start Time: 901  Stop Time: 933  Total time in clinic (min): 32 minutes    Subjective: Pt reports she has been doing her HEP. Feels sometimes the achilles feels better, sometimes it does.     Objective: See treatment diary below  Access Code: [email or web address]  URL: [email or web address]  Date: 2024  Prepared by: Leslie Espana    Exercises  - Seated Heel Raise  - 1 x daily - 7 x weekly - 3 sets - 10 reps - 1 hold  - Heel Raises with Counter Support  - 1 x daily - 7 x weekly - 3 sets - 10 reps    Assessment: Updated HEP as above and advised pt to perform daily. Informed pt that it is normal to experience mild discomfort with the exercises. Pt has made some functional progress as supported by improvement in FOTO score.  Pt will benefit from continued skilled outpatient PT to improve strength, to address therapy goals, to reduce pain, and improve function.      Plan: Continue per plan of care.      Precautions: DM, fall risk    FOTO   = 44/57   = 49/57    POC exp: 24        Manuals         Laser dir contact - achilles protocol DS            Foot and ankle mobs  MM MM MM DS                                  Neuro Re-Ed           SLS   Blue oval 2x8x5\"                                                                                         Ther Ex             PF with band, 5 sec ecc Maroon x30  hep            B/l heel raises - slow lowering 2x10 10x   Up bilat, down single leg ecc 3x10        SLHR  10x - HEP  10x         Decline HR  10x                                     Seated HR   15# KB 3x10  Maroon 5\" lowering x30 HEP        VG   SLHR L7 4x6 SLHR 30x         Ther Activity                                     "   Gait Training                                       Modalities

## 2024-06-18 ENCOUNTER — OFFICE VISIT (OUTPATIENT)
Dept: PHYSICAL THERAPY | Facility: REHABILITATION | Age: 74
End: 2024-06-18
Payer: MEDICARE

## 2024-06-18 DIAGNOSIS — M76.62 ACHILLES TENDINITIS OF LEFT LOWER EXTREMITY: Primary | ICD-10-CM

## 2024-06-18 PROCEDURE — 97140 MANUAL THERAPY 1/> REGIONS: CPT | Performed by: PHYSICAL THERAPIST

## 2024-06-18 PROCEDURE — 97110 THERAPEUTIC EXERCISES: CPT | Performed by: PHYSICAL THERAPIST

## 2024-06-18 NOTE — PROGRESS NOTES
Daily Note     Today's date: 2024  Patient name: Becki Roman  : 1950  MRN: [id number]  Referring provider: Terence Craig DPM  Dx:   Encounter Diagnosis     ICD-10-CM    1. Achilles tendinitis of left lower extremity  M76.62                        Subjective: States she has pain all over her legs and ankle. Also her right hand. Today will be her last day of PT.     Objective: See treatment diary below    Assessment: D/c from PT today due to new onset LE and UE symptoms which patient wants to address. Goals updated as listed below.     Short term goals: to be met in 4-6 weeks - MET  Pt independent with initial HEP, rationale, technique and frequency, for ROM and pain control.   Pt will report a 25% reduction in subjective pain complaints/symptoms to better manage ADLs and functional mobility  Pt will report an improvement in max pain score by at least 2 points on the numeric pain rating scale (NPRS) indicating a clinically important change and overall decrease in pain.  Improve ankle DF AROM by 5deg or better to reduce Achilles insertion strain/pain to better manage WBing.  Pt will achieve an improvement in FOTO score indicating an improvement in pain and function.           Long term goals: to be met in 8-12 weeks - Not met  Pt will report a 85% or > reduction in subjective pain complaints/symptoms to better manage ADLs and functional mobility.  Improve ankle DF AROM to >10 deg to reduce Achilles insertion strain/pain to better manage WBing.  Increase strength of ankle 4+/5 or greater for improved ease of functional mobility.   Pt will improve FOTO score to better then expected outcome indicating an overall improvement in pain and function   Pt able to reciprocally ascend/descend 1 flight of stairs without pain or substitution to be able to get to the second floor in pt's home.  Pt will be able to perform ADLs, iADLs, and household chores without pain or limitation free indicating return to PLOF.  Pt  "independent with rationale, technique and plan for performance of advanced HEP to ensure independent self-management of sx's.  Achieve pts therapy goal: to be pain free    Plan: D/c.      Precautions: DM, fall risk    FOTO  5/8 = 44/57  6/11 = 49/57    POC exp: 7/31/24        Manuals 5/8 5/16 5/21 6/4 6/11 6/18       Laser dir contact - achilles protocol DS            Foot and ankle mobs  MM MM MM DS MM                                 Neuro Re-Ed  5/16 5/21 6/4         SLS   Blue oval 2x8x5\"                                                                                         Ther Ex             PF with band, 5 sec ecc Maroon x30  hep            B/l heel raises - slow lowering 2x10 10x   Up bilat, down single leg ecc 3x10 Reviewed       SLHR  10x - HEP  10x         Decline HR  10x                                     Seated HR   15# KB 3x10  Maroon 5\" lowering x30 HEP Reviewed       VG   SLHR L7 4x6 SLHR 30x         Ther Activity                                       Gait Training                                       Modalities                                            "

## 2024-06-20 ENCOUNTER — ESTABLISHED COMPREHENSIVE EXAM (OUTPATIENT)
Dept: URBAN - METROPOLITAN AREA CLINIC 6 | Facility: CLINIC | Age: 74
End: 2024-06-20

## 2024-06-20 DIAGNOSIS — H26.493: ICD-10-CM

## 2024-06-20 DIAGNOSIS — H02.885: ICD-10-CM

## 2024-06-20 DIAGNOSIS — H04.123: ICD-10-CM

## 2024-06-20 DIAGNOSIS — Z96.1: ICD-10-CM

## 2024-06-20 DIAGNOSIS — H02.882: ICD-10-CM

## 2024-06-20 DIAGNOSIS — E11.9: ICD-10-CM

## 2024-06-20 DIAGNOSIS — Z79.899: ICD-10-CM

## 2024-06-20 DIAGNOSIS — Z79.4: ICD-10-CM

## 2024-06-20 LAB
LEFT EYE DIABETIC RETINOPATHY: NORMAL
RIGHT EYE DIABETIC RETINOPATHY: NORMAL

## 2024-06-20 PROCEDURE — 92014 COMPRE OPH EXAM EST PT 1/>: CPT

## 2024-06-20 ASSESSMENT — KERATOMETRY
OD_K1POWER_DIOPTERS: 43.00
OD_K2POWER_DIOPTERS: 43.50
OD_AXISANGLE2_DEGREES: 174
OS_AXISANGLE2_DEGREES: 14
OS_K1POWER_DIOPTERS: 42.25
OD_AXISANGLE_DEGREES: 084
OS_AXISANGLE2_DEGREES: 18
OS_K2POWER_DIOPTERS: 43.00
OS_AXISANGLE_DEGREES: 104
OS_AXISANGLE_DEGREES: 108

## 2024-06-20 ASSESSMENT — VISUAL ACUITY
OS_SC: 20/25-2
OD_SC: 20/20

## 2024-06-20 ASSESSMENT — TONOMETRY
OS_IOP_MMHG: 12
OD_IOP_MMHG: 12

## 2024-06-25 ENCOUNTER — APPOINTMENT (OUTPATIENT)
Dept: PHYSICAL THERAPY | Facility: REHABILITATION | Age: 74
End: 2024-06-25
Payer: MEDICARE

## 2024-06-28 DIAGNOSIS — F41.9 ANXIETY: ICD-10-CM

## 2024-06-28 DIAGNOSIS — Z79.4 TYPE 2 DIABETES MELLITUS WITH HYPERGLYCEMIA, WITH LONG-TERM CURRENT USE OF INSULIN (HCC): ICD-10-CM

## 2024-06-28 DIAGNOSIS — E11.65 TYPE 2 DIABETES MELLITUS WITH HYPERGLYCEMIA, WITH LONG-TERM CURRENT USE OF INSULIN (HCC): ICD-10-CM

## 2024-06-28 DIAGNOSIS — R06.9 RESPIRATORY ABNORMALITIES: ICD-10-CM

## 2024-06-28 RX ORDER — INSULIN ASPART 100 [IU]/ML
10 INJECTION, SOLUTION INTRAVENOUS; SUBCUTANEOUS
Qty: 27 ML | Refills: 1 | Status: SHIPPED | OUTPATIENT
Start: 2024-06-28 | End: 2024-12-25

## 2024-06-28 RX ORDER — FLUTICASONE FUROATE AND VILANTEROL 200; 25 UG/1; UG/1
1 POWDER RESPIRATORY (INHALATION) DAILY
Qty: 28 EACH | Refills: 5 | Status: SHIPPED | OUTPATIENT
Start: 2024-06-28

## 2024-06-28 NOTE — TELEPHONE ENCOUNTER
Reason for call:   [x] Refill   [] Prior Auth  [] Other:     Office:   [] PCP/Provider -   [x] Specialty/Provider - endo/ dr liset cee    Medication:       insulin aspart (NovoLOG FlexPen) 100 UNIT/ML injection pen- 10 units tid          Pharmacy: [facility]  [id number]      Does the patient have enough for 3 days?   [x] Yes   [] No - Send as HP to POD

## 2024-06-28 NOTE — TELEPHONE ENCOUNTER
Reason for call:   [x] Refill   [] Prior Auth  [] Other:     Office:   [x] PCP/Provider - Tyler Mott MD   [] Specialty/Provider -         Does the patient have enough for 3 days?   [x] Yes   [] No - Send as HP to POD

## 2024-07-16 DIAGNOSIS — I10 HYPERTENSION, UNSPECIFIED TYPE: ICD-10-CM

## 2024-07-16 RX ORDER — LOSARTAN POTASSIUM 50 MG/1
TABLET ORAL
Qty: 200 TABLET | Refills: 1 | Status: SHIPPED | OUTPATIENT
Start: 2024-07-16

## 2024-07-16 NOTE — TELEPHONE ENCOUNTER
Reason for call:   [x] Refill   [] Prior Auth  [] Other:     Office:   [] PCP/Provider -   [x] Specialty/Provider - Endocrinology - Adyr Pierce PA-C     Medication: losartan (COZAAR)     Dose/Frequency: 50 mg tablet, 1 tab twice daily     Quantity: 200     Pharmacy: [facility] [place] [id number]    Does the patient have enough for 3 days?   [] Yes   [x] No - Send as HP to POD

## 2024-07-18 ENCOUNTER — OFFICE VISIT (OUTPATIENT)
Dept: PODIATRY | Facility: CLINIC | Age: 74
End: 2024-07-18
Payer: MEDICARE

## 2024-07-18 VITALS
WEIGHT: 160 LBS | RESPIRATION RATE: 18 BRPM | HEIGHT: 60 IN | SYSTOLIC BLOOD PRESSURE: 110 MMHG | DIASTOLIC BLOOD PRESSURE: 75 MMHG | HEART RATE: 65 BPM | BODY MASS INDEX: 31.41 KG/M2

## 2024-07-18 DIAGNOSIS — M76.62 ACHILLES TENDINITIS OF LEFT LOWER EXTREMITY: Primary | ICD-10-CM

## 2024-07-18 DIAGNOSIS — E11.42 DIABETIC POLYNEUROPATHY ASSOCIATED WITH TYPE 2 DIABETES MELLITUS (HCC): ICD-10-CM

## 2024-07-18 PROCEDURE — 99213 OFFICE O/P EST LOW 20 MIN: CPT | Performed by: PODIATRIST

## 2024-07-18 NOTE — PROGRESS NOTES
Patient presents for pedal assessment.  Patient has known diabetic neuropathy for which she takes no medication and at last visit was diagnosed with Achilles tendinitis of the left foot.  Patient was referred to physical therapy but she did not find it helpful.  In addition she now has knee and hip pain and back pain.    Last A1c was in April 2024 that was significantly elevated at 11.6.  The patient states that she was recently started on Jardiance and she thinks that her blood sugar will be lower.  She was told to get a new A1c as recommended by her medical doctor.    She states that she has appointment scheduled with hematology and pain management and she was urged to go to these appointments.  She was also urged to make sure the blood sugars under better control.    Advised her to continue with gentle stretching of the left Achilles and Voltaren gel.  Reappoint as needed

## 2024-08-02 ENCOUNTER — TELEPHONE (OUTPATIENT)
Age: 74
End: 2024-08-02

## 2024-08-02 NOTE — TELEPHONE ENCOUNTER
Patient calling because she has her appt on 8/5 and likes to go for lab work 3 days prior. She said she has been waking up and her numbers are low around 80. She said it makes her nervous to get in her car and drive. I told her if she eats and drinks something right before midnight to keep her sugars elevated by morning she will be good to go for her lab work at 8am or after. This will help keep her sugars a little higher in the morning to have her lab work done.

## 2024-08-03 ENCOUNTER — HOSPITAL ENCOUNTER (OUTPATIENT)
Dept: MRI IMAGING | Facility: HOSPITAL | Age: 74
Discharge: HOME | End: 2024-08-03
Payer: MEDICARE

## 2024-08-03 ENCOUNTER — APPOINTMENT (OUTPATIENT)
Dept: LAB | Facility: CLINIC | Age: 74
End: 2024-08-03
Payer: MEDICARE

## 2024-08-03 DIAGNOSIS — M06.09 RHEUMATOID ARTHRITIS OF MULTIPLE SITES WITHOUT RHEUMATOID FACTOR (HCC): ICD-10-CM

## 2024-08-03 DIAGNOSIS — M54.50 LOW BACK PAIN, UNSPECIFIED BACK PAIN LATERALITY, UNSPECIFIED CHRONICITY, UNSPECIFIED WHETHER SCIATICA PRESENT: ICD-10-CM

## 2024-08-03 DIAGNOSIS — Z79.4 TYPE 2 DIABETES MELLITUS WITH HYPERGLYCEMIA, WITH LONG-TERM CURRENT USE OF INSULIN (HCC): ICD-10-CM

## 2024-08-03 DIAGNOSIS — E11.65 TYPE 2 DIABETES MELLITUS WITH HYPERGLYCEMIA, WITH LONG-TERM CURRENT USE OF INSULIN (HCC): ICD-10-CM

## 2024-08-03 DIAGNOSIS — E78.5 HYPERLIPIDEMIA, UNSPECIFIED HYPERLIPIDEMIA TYPE: ICD-10-CM

## 2024-08-03 LAB
ALBUMIN SERPL BCG-MCNC: 4 G/DL (ref 3.5–5)
ALP SERPL-CCNC: 71 U/L (ref 34–104)
ALT SERPL W P-5'-P-CCNC: 26 U/L (ref 7–52)
ANION GAP SERPL CALCULATED.3IONS-SCNC: 8 MMOL/L (ref 4–13)
AST SERPL W P-5'-P-CCNC: 27 U/L (ref 13–39)
BASOPHILS # BLD AUTO: 0.03 THOUSANDS/ÂΜL (ref 0–0.1)
BASOPHILS NFR BLD AUTO: 1 % (ref 0–1)
BILIRUB SERPL-MCNC: 0.4 MG/DL (ref 0.2–1)
BUN SERPL-MCNC: 15 MG/DL (ref 5–25)
C3 SERPL-MCNC: 127 MG/DL (ref 87–200)
C4 SERPL-MCNC: 45 MG/DL (ref 19–52)
CALCIUM SERPL-MCNC: 9.2 MG/DL (ref 8.4–10.2)
CHLORIDE SERPL-SCNC: 104 MMOL/L (ref 96–108)
CO2 SERPL-SCNC: 28 MMOL/L (ref 21–32)
CREAT SERPL-MCNC: 0.71 MG/DL (ref 0.6–1.3)
EOSINOPHIL # BLD AUTO: 0.25 THOUSAND/ÂΜL (ref 0–0.61)
EOSINOPHIL NFR BLD AUTO: 5 % (ref 0–6)
ERYTHROCYTE [DISTWIDTH] IN BLOOD BY AUTOMATED COUNT: 13.2 % (ref 11.6–15.1)
ERYTHROCYTE [SEDIMENTATION RATE] IN BLOOD: 38 MM/HOUR (ref 0–29)
EST. AVERAGE GLUCOSE BLD GHB EST-MCNC: 197 MG/DL
GFR SERPL CREATININE-BSD FRML MDRD: 85 ML/MIN/1.73SQ M
GLUCOSE P FAST SERPL-MCNC: 139 MG/DL (ref 65–99)
HBA1C MFR BLD: 8.5 %
HCT VFR BLD AUTO: 41.7 % (ref 34.8–46.1)
HGB BLD-MCNC: 13.2 G/DL (ref 11.5–15.4)
IMM GRANULOCYTES # BLD AUTO: 0 THOUSAND/UL (ref 0–0.2)
IMM GRANULOCYTES NFR BLD AUTO: 0 % (ref 0–2)
LYMPHOCYTES # BLD AUTO: 1.58 THOUSANDS/ÂΜL (ref 0.6–4.47)
LYMPHOCYTES NFR BLD AUTO: 33 % (ref 14–44)
MCH RBC QN AUTO: 28.4 PG (ref 26.8–34.3)
MCHC RBC AUTO-ENTMCNC: 31.7 G/DL (ref 31.4–37.4)
MCV RBC AUTO: 90 FL (ref 82–98)
MONOCYTES # BLD AUTO: 0.35 THOUSAND/ÂΜL (ref 0.17–1.22)
MONOCYTES NFR BLD AUTO: 7 % (ref 4–12)
NEUTROPHILS # BLD AUTO: 2.59 THOUSANDS/ÂΜL (ref 1.85–7.62)
NEUTS SEG NFR BLD AUTO: 54 % (ref 43–75)
NRBC BLD AUTO-RTO: 0 /100 WBCS
PLATELET # BLD AUTO: 241 THOUSANDS/UL (ref 149–390)
PMV BLD AUTO: 9.1 FL (ref 8.9–12.7)
POTASSIUM SERPL-SCNC: 3.7 MMOL/L (ref 3.5–5.3)
PROT SERPL-MCNC: 7 G/DL (ref 6.4–8.4)
RBC # BLD AUTO: 4.64 MILLION/UL (ref 3.81–5.12)
SODIUM SERPL-SCNC: 140 MMOL/L (ref 135–147)
TSH SERPL DL<=0.05 MIU/L-ACNC: 3.97 UIU/ML (ref 0.45–4.5)
URATE SERPL-MCNC: 5.4 MG/DL (ref 2–7.5)
WBC # BLD AUTO: 4.8 THOUSAND/UL (ref 4.31–10.16)

## 2024-08-03 PROCEDURE — 84443 ASSAY THYROID STIM HORMONE: CPT

## 2024-08-03 PROCEDURE — 83036 HEMOGLOBIN GLYCOSYLATED A1C: CPT

## 2024-08-03 PROCEDURE — 36415 COLL VENOUS BLD VENIPUNCTURE: CPT

## 2024-08-03 PROCEDURE — 85652 RBC SED RATE AUTOMATED: CPT

## 2024-08-03 PROCEDURE — 86160 COMPLEMENT ANTIGEN: CPT

## 2024-08-03 PROCEDURE — 80053 COMPREHEN METABOLIC PANEL: CPT

## 2024-08-03 PROCEDURE — 85025 COMPLETE CBC W/AUTO DIFF WBC: CPT

## 2024-08-03 PROCEDURE — 86225 DNA ANTIBODY NATIVE: CPT

## 2024-08-03 PROCEDURE — 84550 ASSAY OF BLOOD/URIC ACID: CPT

## 2024-08-03 PROCEDURE — 72148 MRI LUMBAR SPINE W/O DYE: CPT

## 2024-08-05 ENCOUNTER — OFFICE VISIT (OUTPATIENT)
Dept: ENDOCRINOLOGY | Facility: CLINIC | Age: 74
End: 2024-08-05
Payer: MEDICARE

## 2024-08-05 VITALS
BODY MASS INDEX: 31.45 KG/M2 | HEIGHT: 60 IN | WEIGHT: 160.2 LBS | SYSTOLIC BLOOD PRESSURE: 110 MMHG | HEART RATE: 71 BPM | DIASTOLIC BLOOD PRESSURE: 68 MMHG | OXYGEN SATURATION: 95 %

## 2024-08-05 DIAGNOSIS — E11.649 TYPE 2 DIABETES MELLITUS WITH HYPOGLYCEMIA WITHOUT COMA, WITH LONG-TERM CURRENT USE OF INSULIN (HCC): Primary | ICD-10-CM

## 2024-08-05 DIAGNOSIS — E78.5 HYPERLIPIDEMIA, UNSPECIFIED HYPERLIPIDEMIA TYPE: ICD-10-CM

## 2024-08-05 DIAGNOSIS — Z79.4 TYPE 2 DIABETES MELLITUS WITH HYPERGLYCEMIA, WITH LONG-TERM CURRENT USE OF INSULIN (HCC): ICD-10-CM

## 2024-08-05 DIAGNOSIS — E11.65 TYPE 2 DIABETES MELLITUS WITH HYPERGLYCEMIA, WITH LONG-TERM CURRENT USE OF INSULIN (HCC): ICD-10-CM

## 2024-08-05 DIAGNOSIS — Z79.4 TYPE 2 DIABETES MELLITUS WITH HYPOGLYCEMIA WITHOUT COMA, WITH LONG-TERM CURRENT USE OF INSULIN (HCC): Primary | ICD-10-CM

## 2024-08-05 LAB — DSDNA AB SER-ACNC: 1 IU/ML (ref 0–9)

## 2024-08-05 PROCEDURE — 99214 OFFICE O/P EST MOD 30 MIN: CPT | Performed by: INTERNAL MEDICINE

## 2024-08-05 RX ORDER — INSULIN ASPART 100 [IU]/ML
INJECTION, SOLUTION INTRAVENOUS; SUBCUTANEOUS
Qty: 27 ML | Refills: 1 | Status: SHIPPED | OUTPATIENT
Start: 2024-08-05

## 2024-08-05 RX ORDER — INSULIN GLARGINE 100 [IU]/ML
INJECTION, SOLUTION SUBCUTANEOUS
Qty: 45 ML | Refills: 3 | Status: SHIPPED | OUTPATIENT
Start: 2024-08-05

## 2024-08-05 NOTE — PATIENT INSTRUCTIONS
Lantus 22 units daily   Novolog  6 units before meals     If keep having low sugars send in a log in 2-3 weeks

## 2024-08-05 NOTE — ASSESSMENT & PLAN NOTE
Lab Results   Component Value Date    HGBA1C 8.5 (H) 08/03/2024   Frequent hyperglycemia-decrease Lantus to 22 units daily and NovoLog to 6 units before meals.  Continue to monitor blood sugars 3 times a day and if she keeps having hypoglycemic episodes she is to call the office and let us know

## 2024-08-05 NOTE — PROGRESS NOTES
Becki Roman 73 y.o. female MRN: [id number]    Encounter: [id number]      Assessment & Plan     Problem List Items Addressed This Visit          Endocrine    Type 2 diabetes mellitus with hyperglycemia, with long-term current use of insulin ([facility])       Lab Results   Component Value Date    HGBA1C 8.5 (H) 08/03/2024   A1c has improved significantly but still above goal-insulin regimen adjusted, watch diet         Relevant Medications    Insulin Glargine Solostar (Lantus SoloStar) 100 UNIT/ML SOPN    insulin aspart (NovoLOG FlexPen) 100 UNIT/ML injection pen    Other Relevant Orders    Comprehensive metabolic panel    Type 2 diabetes mellitus with hypoglycemia without coma, with long-term current use of insulin ([facility]) - Primary       Lab Results   Component Value Date    HGBA1C 8.5 (H) 08/03/2024   Frequent hyperglycemia-decrease Lantus to 22 units daily and NovoLog to 6 units before meals.  Continue to monitor blood sugars 3 times a day and if she keeps having hypoglycemic episodes she is to call the office and let us know         Relevant Medications    Insulin Glargine Solostar (Lantus SoloStar) 100 UNIT/ML SOPN    insulin aspart (NovoLOG FlexPen) 100 UNIT/ML injection pen    Other Relevant Orders    Hemoglobin A1C    Comprehensive metabolic panel       Other    Hyperlipidemia     Continue statins         Relevant Orders    Comprehensive metabolic panel        CC: Diabetes    History of Present Illness     HPI:  73-year-old female with type 2 diabetes on insulin therapy seen in follow-up    Current regimen:   Metformin ER 500mg daily  Lantus 28 units daily at bedtime    Novolog 6- 10 with meals  Farxiga 10 mg         Did not tolerate ozempic due to severe GI side effects.       Checks f.s 3/day     Fasting   Pre lunch- 80-140s   Bedtime -100-140s     Hypoglycemia - a couple of times fasting   Pre dinner a few times as well     No polyuria , no polydipsia ,no blurry vision   Weight stable   No numbness and  tingling in feet   Keeping well hydrated , no dizziness         Review of Systems    Historical Information   Past Medical History:   Diagnosis Date    Anxiety     Anxiety     Arthritis of right knee     Cellulitis     Contact dermatitis     Cough     Diabetes mellitus (HCC)     GERD without esophagitis     Headache     Hiatal hernia     Hyperlipidemia     Knee sprain     Lyme disease     Tick bite      Past Surgical History:   Procedure Laterality Date    ABDOMINAL SURGERY      tummy tuck    CATARACT EXTRACTION, BILATERAL Bilateral  and     HYSTERECTOMY      age 49    SD COLONOSCOPY FLX DX W/COLLJ SPEC WHEN PFRMD N/A 2019    Procedure: COLONOSCOPY;  Surgeon: Gillian Cartagena MD;  Location: [facility];  Service: Gastroenterology    SD ESOPHAGOGASTRODUODENOSCOPY TRANSORAL DIAGNOSTIC N/A 2019    Procedure: ESOPHAGOGASTRODUODENOSCOPY (EGD);  Surgeon: Gillian Cartagena MD;  Location: [facility];  Service: Gastroenterology    TUBAL LIGATION       Social History   Social History     Substance and Sexual Activity   Alcohol Use Not Currently    Comment: seldom     Social History     Substance and Sexual Activity   Drug Use Yes    Types: Marijuana    Comment: smokes weed 3x per week     Social History     Tobacco Use   Smoking Status Former    Current packs/day: 0.00    Average packs/day: 0.5 packs/day for 25.0 years (12.5 ttl pk-yrs)    Types: Cigarettes    Start date: 1983    Quit date: 2008    Years since quittin.0   Smokeless Tobacco Never     Family History:   Family History   Problem Relation Age of Onset    Mental illness Mother     Heart disease Father     Colon cancer Father 72    No Known Problems Sister     No Known Problems Daughter     No Known Problems Maternal Grandmother     No Known Problems Maternal Grandfather     No Known Problems Paternal Grandmother     No Known Problems Paternal Grandfather     No Known Problems Son     No Known Problems Sister     No Known Problems  Sister     No Known Problems Sister     No Known Problems Sister     No Known Problems Sister     Lymphoma Brother 38    No Known Problems Brother     No Known Problems Paternal Aunt     No Known Problems Paternal Aunt     No Known Problems Paternal Aunt     No Known Problems Paternal Aunt     Breast cancer Neg Hx     Breast cancer additional onset Neg Hx     Endometrial cancer Neg Hx     Ovarian cancer Neg Hx     BRCA 1/2 Neg Hx     BRCA1 Negative Neg Hx     BRCA1 Positive Neg Hx     BRCA2 Negative Neg Hx     BRCA2 Positive Neg Hx        Meds/Allergies   Current Outpatient Medications   Medication Sig Dispense Refill    albuterol (2.5 mg/3 mL) 0.083 % nebulizer solution Take 3 mL (2.5 mg total) by nebulization every 6 (six) hours as needed for wheezing or shortness of breath 75 mL 2    amLODIPine (NORVASC) 2.5 mg tablet Take 1 tablet (2.5 mg total) by mouth daily at bedtime 90 tablet 1    artificial tear (LUBRIFRESH P.M.) 83-15 % ophthalmic ointment daily at bedtime      aspirin 81 mg chewable tablet Chew 81 mg daily      baclofen 10 mg tablet Take 10 mg by mouth daily at bedtime  4    Cholecalciferol (VITAMIN D PO) Take 1 tablet by mouth daily      dapagliflozin (Farxiga) 10 MG tablet 1 tab daily 90 tablet 1    Diclofenac Sodium (VOLTAREN) 1 % Apply 1 application topically 4 (four) times a day      ezetimibe (ZETIA) 10 mg tablet Take 0.5 tablets (5 mg total) by mouth daily 45 tablet 1    Ferrous Sulfate Dried (FERROUS SULFATE CR PO) Iron TABS    Refills: 0       Active      fluticasone-vilanterol (Breo Ellipta) 200-25 mcg/actuation inhaler Inhale 1 puff daily Rinse mouth after use. 28 each 5    hydroCHLOROthiazide 12.5 mg tablet Take 1 tablet (12.5 mg total) by mouth daily 90 tablet 1    hydroxychloroquine (PLAQUENIL) 200 mg tablet Take 200 mg by mouth 2 (two) times a day with meals      insulin aspart (NovoLOG FlexPen) 100 UNIT/ML injection pen 6  units before meals 27 mL 1    Insulin Glargine Solostar (Lantus  SoloStar) 100 UNIT/ML SOPN 22 units daily 45 mL 3    Insulin Pen Needle (BD Pen Needle Ester U/F) 32G X 4 MM MISC Inject under the skin 4 (four) times a day 400 each 1    Lifitegrast (XIIDRA OP) Apply to eye 2 vials a day        losartan (COZAAR) 50 mg tablet 1 tab twice per day 200 tablet 1    metFORMIN (GLUCOPHAGE-XR) 500 mg 24 hr tablet Take 1 tablet (500 mg total) by mouth daily with dinner 90 tablet 1    Microlet Lancets MISC Use 4 (four) times a day 400 each 1    sertraline (ZOLOFT) 50 mg tablet Take 1 tablet (50 mg total) by mouth daily 90 tablet 0    simvastatin (ZOCOR) 20 mg tablet TAKE ONE TABLET BY MOUTH EVERY DAY 90 tablet 3    traMADol (ULTRAM) 50 mg tablet TAKE 1 TABLET BY MOUTH EVERY 12 (TWELVE) HOURS AS NEEDED FOR MODERATE PAIN      dicyclomine (BENTYL) 10 mg capsule Take 1 capsule (10 mg total) by mouth 4 (four) times a day as needed (abdominal pain) (Patient not taking: Reported on 1/9/2024) 60 capsule 1    gabapentin (NEURONTIN) 300 mg capsule Take 1 capsule (300 mg total) by mouth daily at bedtime (Patient not taking: Reported on 4/15/2024) 30 capsule 0    glucose blood (Contour Next Test) test strip Use 1 each 4 (four) times a day (Patient not taking: Reported on 6/6/2024) 400 each 1    valACYclovir (VALTREX) 1,000 mg tablet Take 1,000 mg by mouth daily (Patient not taking: Reported on 1/9/2024)       No current facility-administered medications for this visit.     Allergies   Allergen Reactions    Augmentin [Amoxicillin-Pot Clavulanate] GI Intolerance    Paxlovid [Nirmatrelvir-Ritonavir] Diarrhea and Vomiting    Codeine Drowsiness    Macrobid [Nitrofurantoin] Other (See Comments)     Pt does not recall       Objective   Vitals: Blood pressure 110/68, pulse 71, height 5' (1.524 m), weight 72.7 kg (160 lb 3.2 oz), SpO2 95%.    Physical Exam  Vitals reviewed.   Constitutional:       General: She is not in acute distress.     Appearance: Normal appearance. She is not ill-appearing, toxic-appearing  or diaphoretic.   HENT:      Head: Normocephalic and atraumatic.   Eyes:      General: No scleral icterus.     Extraocular Movements: Extraocular movements intact.   Cardiovascular:      Rate and Rhythm: Normal rate and regular rhythm.      Heart sounds: Normal heart sounds. No murmur heard.  Pulmonary:      Effort: Pulmonary effort is normal. No respiratory distress.      Breath sounds: Normal breath sounds. No wheezing or rales.   Musculoskeletal:      Cervical back: Neck supple.      Right lower leg: No edema.      Left lower leg: No edema.   Lymphadenopathy:      Cervical: No cervical adenopathy.   Skin:     General: Skin is warm and dry.   Neurological:      General: No focal deficit present.      Mental Status: She is alert and oriented to person, place, and time.      Gait: Gait normal.   Psychiatric:         Mood and Affect: Mood normal.         Behavior: Behavior normal.         Thought Content: Thought content normal.         Judgment: Judgment normal.         The history was obtained from the review of the chart, patient.    Lab Results:   Lab Results   Component Value Date/Time    Hemoglobin A1C 8.5 (H) 08/03/2024 08:03 AM    Hemoglobin A1C 11.6 (H) 04/13/2024 08:13 AM    Hemoglobin A1C 10.7 (H) 12/07/2023 07:47 AM    Hemoglobin A1C 9.7 (H) 09/08/2023 08:18 AM    WBC 4.80 08/03/2024 08:03 AM    Hemoglobin 13.2 08/03/2024 08:03 AM    Hematocrit 41.7 08/03/2024 08:03 AM    MCV 90 08/03/2024 08:03 AM    Platelets 241 08/03/2024 08:03 AM    BUN 15 08/03/2024 08:03 AM    BUN 18 04/13/2024 08:13 AM    BUN 15 12/07/2023 07:47 AM    Potassium 3.7 08/03/2024 08:03 AM    Potassium 4.0 04/13/2024 08:13 AM    Potassium 4.3 12/07/2023 07:47 AM    Chloride 104 08/03/2024 08:03 AM    Chloride 101 04/13/2024 08:13 AM    Chloride 104 12/07/2023 07:47 AM    CO2 28 08/03/2024 08:03 AM    CO2 28 04/13/2024 08:13 AM    CO2 30 12/07/2023 07:47 AM    Creatinine 0.71 08/03/2024 08:03 AM    Creatinine 0.82 04/13/2024 08:13 AM     "Creatinine 0.67 12/07/2023 07:47 AM    AST 27 08/03/2024 08:03 AM    AST 24 04/13/2024 08:13 AM    AST 19 12/07/2023 07:47 AM    ALT 26 08/03/2024 08:03 AM    ALT 26 04/13/2024 08:13 AM    ALT 21 12/07/2023 07:47 AM    Total Protein 7.0 08/03/2024 08:03 AM    Total Protein 6.8 04/13/2024 08:13 AM    Protein, Total 6.3 12/07/2023 07:47 AM    Albumin 4.0 08/03/2024 08:03 AM    Albumin 3.9 04/13/2024 08:13 AM    Albumin 3.8 12/07/2023 07:47 AM    Globulin 2.5 12/07/2023 07:47 AM    HDL 86 12/07/2023 07:47 AM    HDL, Direct 80 04/13/2024 08:13 AM    Triglycerides 68 04/13/2024 08:13 AM    Triglycerides 53 12/07/2023 07:47 AM           Imaging Studies:     Portions of the record may have been created with voice recognition software. Occasional wrong word or \"sound a like\" substitutions may have occurred due to the inherent limitations of voice recognition software. Read the chart carefully and recognize, using context, where substitutions have occurred.    "

## 2024-08-05 NOTE — ASSESSMENT & PLAN NOTE
Lab Results   Component Value Date    HGBA1C 8.5 (H) 08/03/2024   A1c has improved significantly but still above goal-insulin regimen adjusted, watch diet

## 2024-08-11 ENCOUNTER — HOSPITAL ENCOUNTER (OUTPATIENT)
Dept: RADIOLOGY | Facility: HOSPITAL | Age: 74
Discharge: HOME | End: 2024-08-11
Payer: MEDICARE

## 2024-08-11 DIAGNOSIS — M25.472 SWOLLEN L ANKLE: ICD-10-CM

## 2024-08-11 PROCEDURE — 73721 MRI JNT OF LWR EXTRE W/O DYE: CPT

## 2024-08-29 DIAGNOSIS — E11.65 TYPE 2 DIABETES MELLITUS WITH HYPERGLYCEMIA, WITH LONG-TERM CURRENT USE OF INSULIN (HCC): ICD-10-CM

## 2024-08-29 DIAGNOSIS — F41.9 ANXIETY: Primary | ICD-10-CM

## 2024-08-29 DIAGNOSIS — I10 HYPERTENSION, UNSPECIFIED TYPE: ICD-10-CM

## 2024-08-29 DIAGNOSIS — Z79.4 TYPE 2 DIABETES MELLITUS WITH HYPERGLYCEMIA, WITH LONG-TERM CURRENT USE OF INSULIN (HCC): ICD-10-CM

## 2024-08-29 RX ORDER — METFORMIN HCL 500 MG
500 TABLET, EXTENDED RELEASE 24 HR ORAL
Qty: 90 TABLET | Refills: 1 | Status: SHIPPED | OUTPATIENT
Start: 2024-08-29

## 2024-08-29 RX ORDER — HYDROCHLOROTHIAZIDE 12.5 MG/1
12.5 TABLET ORAL DAILY
Qty: 90 TABLET | Refills: 1 | Status: SHIPPED | OUTPATIENT
Start: 2024-08-29

## 2024-08-29 NOTE — TELEPHONE ENCOUNTER
Reason for call:   [x] Refill   [] Prior Auth  [] Other:     Office:   [] PCP/Provider -   [x] Specialty/Provider - Endo/[facility]    Medication:     Pharmacy: [facility]    Does the patient have enough for 3 days?   [] Yes   [x] No - Send as HP to POD

## 2024-08-29 NOTE — TELEPHONE ENCOUNTER
3 weeks ago Petty Ball MD [facility] [place] Office Visit

## 2024-09-04 ENCOUNTER — TELEPHONE (OUTPATIENT)
Dept: ADMINISTRATIVE | Facility: OTHER | Age: 74
End: 2024-09-04

## 2024-09-04 NOTE — TELEPHONE ENCOUNTER
09/04/24 11:22 AM    Patient contacted to bring Advance Directive, POLST, or Living Will document to next scheduled pcp visit.[facility] Department left message.    Thank you.  Amy Jones MA  [facility]

## 2024-09-10 ENCOUNTER — OFFICE VISIT (OUTPATIENT)
Dept: FAMILY MEDICINE CLINIC | Facility: CLINIC | Age: 74
End: 2024-09-10
Payer: MEDICARE

## 2024-09-10 VITALS
TEMPERATURE: 97.3 F | OXYGEN SATURATION: 96 % | HEART RATE: 69 BPM | DIASTOLIC BLOOD PRESSURE: 80 MMHG | WEIGHT: 160.25 LBS | BODY MASS INDEX: 31.46 KG/M2 | HEIGHT: 60 IN | RESPIRATION RATE: 17 BRPM | SYSTOLIC BLOOD PRESSURE: 124 MMHG

## 2024-09-10 DIAGNOSIS — I10 HYPERTENSION, UNSPECIFIED TYPE: ICD-10-CM

## 2024-09-10 DIAGNOSIS — Z79.4 TYPE 2 DIABETES MELLITUS WITH HYPOGLYCEMIA WITHOUT COMA, WITH LONG-TERM CURRENT USE OF INSULIN (HCC): Primary | ICD-10-CM

## 2024-09-10 DIAGNOSIS — E11.649 TYPE 2 DIABETES MELLITUS WITH HYPOGLYCEMIA WITHOUT COMA, WITH LONG-TERM CURRENT USE OF INSULIN (HCC): Primary | ICD-10-CM

## 2024-09-10 DIAGNOSIS — J45.909 MODERATE ASTHMA WITHOUT COMPLICATION, UNSPECIFIED WHETHER PERSISTENT: ICD-10-CM

## 2024-09-10 PROBLEM — J06.9 UPPER RESPIRATORY TRACT INFECTION: Status: RESOLVED | Noted: 2023-12-14 | Resolved: 2024-09-10

## 2024-09-10 PROCEDURE — 99213 OFFICE O/P EST LOW 20 MIN: CPT | Performed by: FAMILY MEDICINE

## 2024-09-10 PROCEDURE — G2211 COMPLEX E/M VISIT ADD ON: HCPCS | Performed by: FAMILY MEDICINE

## 2024-09-10 RX ORDER — PREDNISONE 5 MG/1
TABLET ORAL
COMMUNITY
Start: 2024-08-13

## 2024-09-10 NOTE — PROGRESS NOTES
FAMILY PRACTICE OFFICE VISIT       NAME: Becki Roman  AGE: 73 y.o. SEX: female       : 1950        MRN: [id number]    DATE: 2024  TIME: 12:54 PM    Assessment and Plan     Problem List Items Addressed This Visit       Asthma     Asthma.  Fortunately patient has been stable in regards to her asthmatic condition.  She will continue with current regimen of medications.  She received her Prevnar 20 vaccination today         Hypertension     Hypertension.  The patient's blood pressure is stable at this time and he will continue current regimen of medications         Type 2 diabetes mellitus with hypoglycemia without coma, with long-term current use of insulin (HCC) - Primary     Diabetes.  Patient's latest A1c had improved down to 8.5 with the addition of Farxiga medication.  She will follow-up with her endocrinologist for further evaluation of this condition.  Lab Results   Component Value Date    HGBA1C 8.5 (H) 2024            Relevant Orders    Hemoglobin A1c (w/out EAG) ([facility] ONLY)           Chief Complaint     Chief Complaint   Patient presents with    Follow-up       History of Present Illness     Patient in the office to discuss chronic medical conditions.  She has been following up with orthopedist for Achilles tendinosis.  This makes it difficult for her to ambulate and she has been on oral steroids for the second time on a tapering dose.  Symptoms have started to improve slowly.  Patient has noticed increase in blood sugars for which she adjusts her insulin sliding scale as well as her Lantus insulin at night which she currently takes 26 units.  She denies any recent illness and feels her asthma has been fairly well well-controlled.  Patient will be receiving her COVID-19 booster vaccine as well as influenza vaccine next month.  Patient requested Prevnar 20 vaccination at this time which she states she has never had after her 66th birthday        Review of Systems   Review of Systems    Constitutional: Negative.    HENT: Negative.     Eyes: Negative.    Respiratory: Negative.     Cardiovascular: Negative.    Gastrointestinal: Negative.    Genitourinary: Negative.    Musculoskeletal:  Positive for arthralgias and gait problem.   Skin: Negative.    Psychiatric/Behavioral: Negative.         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 rheumatoid factor ([facility])    Sensorineural hearing loss (SNHL) of left ear with restricted hearing of right ear    Dry mouth    Hypertension    Hyperlipidemia    Type 2 diabetes mellitus with hypoglycemia without coma, with long-term current use of insulin ([facility])    Acute diverticulitis    Fibromyalgia    Achilles tendinitis of left lower extremity       Past Medical History:  Past Medical History:   Diagnosis Date    Anxiety     Anxiety     Arthritis of right knee     Cellulitis     Contact dermatitis     Cough     Diabetes mellitus (HCC)     GERD without esophagitis     Headache     Hiatal hernia     Hyperlipidemia     Knee sprain     Lyme disease     Tick bite        Past Surgical History:  Past Surgical History:   Procedure Laterality Date    ABDOMINAL SURGERY      tummy tuck    CATARACT EXTRACTION, BILATERAL Bilateral 11/21 and 12/21    HYSTERECTOMY      age 49    MI COLONOSCOPY FLX DX W/COLLJ SPEC WHEN PFRMD N/A 5/8/2019    Procedure: COLONOSCOPY;  Surgeon: Gillian Cartagena MD;  Location: [facility];  Service: Gastroenterology    MI ESOPHAGOGASTRODUODENOSCOPY TRANSORAL DIAGNOSTIC N/A 5/8/2019    Procedure: ESOPHAGOGASTRODUODENOSCOPY (EGD);  Surgeon: Gillian Cartagena MD;  Location: [facility];  Service: Gastroenterology    TUBAL LIGATION         Family History:  Family History   Problem Relation Age of Onset    Mental illness Mother     Heart disease Father     Colon cancer Father 72    No Known Problems Sister     No Known Problems Daughter     No Known Problems Maternal Grandmother     No Known Problems Maternal Grandfather     No Known Problems Paternal Grandmother     No Known Problems Paternal Grandfather     No Known Problems Son     No Known Problems Sister     No Known Problems Sister     No Known Problems Sister     No Known Problems Sister     No Known Problems Sister     Lymphoma Brother 38    No Known Problems Brother     No Known Problems Paternal Aunt     No Known Problems Paternal Aunt     No Known Problems Paternal Aunt     No Known Problems Paternal Aunt     Breast cancer Neg Hx     Breast cancer additional onset Neg Hx     Endometrial cancer Neg Hx     Ovarian cancer Neg Hx     BRCA 1/2 Neg Hx     BRCA1 Negative Neg Hx     BRCA1 Positive Neg Hx     BRCA2 Negative Neg Hx     BRCA2 Positive Neg Hx        Social History:  Social History     Socioeconomic History    Marital status:      Spouse name: Not on file    Number of children: Not on file    Years of education: Not on file    Highest education level: Not on file   Occupational History    Not on file   Tobacco Use    Smoking status: Former     Current packs/day: 0.00     Average packs/day: 0.5 packs/day for 25.0 years (12.5 ttl pk-yrs)     Types: Cigarettes     Start date: 1983     Quit date: 2008     Years since quittin.1    Smokeless tobacco: Never   Vaping Use    Vaping status: Never Used   Substance and Sexual Activity    Alcohol use: Not Currently     Comment: seldom    Drug use: Yes     Types: Marijuana     Comment: smokes weed 3x per week    Sexual activity: Not Currently   Other Topics Concern    Not on file   Social History Narrative    Not on file     Social Determinants of Health     Financial  Resource Strain: Low Risk  (12/14/2023)    Overall Financial Resource Strain (CARDIA)     Difficulty of Paying Living Expenses: Not hard at all   Food Insecurity: Not on file   Transportation Needs: No Transportation Needs (12/14/2023)    PRAPARE - Transportation     Lack of Transportation (Medical): No     Lack of Transportation (Non-Medical): No   Physical Activity: Not on file   Stress: Not on file   Social Connections: Not on file   Intimate Partner Violence: Not on file   Housing Stability: Not on file       Objective     Vitals:    09/10/24 0959   BP: 124/80   Pulse: 69   Resp: 17   Temp: (!) 97.3 °F (36.3 °C)   SpO2: 96%     Wt Readings from Last 3 Encounters:   09/10/24 72.7 kg (160 lb 4 oz)   08/05/24 72.7 kg (160 lb 3.2 oz)   07/18/24 72.6 kg (160 lb)       Physical Exam  Constitutional:       General: She is not in acute distress.     Appearance: Normal appearance. She is not ill-appearing.   HENT:      Head: Normocephalic and atraumatic.   Eyes:      General:         Right eye: No discharge.         Left eye: No discharge.      Extraocular Movements: Extraocular movements intact.      Conjunctiva/sclera: Conjunctivae normal.      Pupils: Pupils are equal, round, and reactive to light.   Neck:      Vascular: No carotid bruit.   Cardiovascular:      Rate and Rhythm: Normal rate and regular rhythm.      Heart sounds: Normal heart sounds. No murmur heard.  Pulmonary:      Effort: Pulmonary effort is normal.      Breath sounds: Normal breath sounds. No wheezing, rhonchi or rales.   Abdominal:      Tenderness: There is no guarding.   Musculoskeletal:      Right lower leg: No edema.      Left lower leg: No edema.   Lymphadenopathy:      Cervical: No cervical adenopathy.   Skin:     Findings: No rash.   Neurological:      General: No focal deficit present.      Mental Status: She is alert and oriented to person, place, and time.      Cranial Nerves: No cranial nerve deficit.   Psychiatric:         Mood and  "Affect: Mood normal.         Behavior: Behavior normal.         Thought Content: Thought content normal.         Judgment: Judgment normal.         Pertinent Laboratory/Diagnostic Studies:  Lab Results   Component Value Date    GLUCOSE 105 11/10/2015    BUN 15 08/03/2024    CREATININE 0.71 08/03/2024    CALCIUM 9.2 08/03/2024     11/10/2015    K 3.7 08/03/2024    CO2 28 08/03/2024     08/03/2024     Lab Results   Component Value Date    ALT 26 08/03/2024    AST 27 08/03/2024    ALKPHOS 71 08/03/2024       Lab Results   Component Value Date    WBC 4.80 08/03/2024    HGB 13.2 08/03/2024    HCT 41.7 08/03/2024    MCV 90 08/03/2024     08/03/2024       Lab Results   Component Value Date    TSH 3.03 01/13/2022       No results found for: \"CHOL\"  Lab Results   Component Value Date    TRIG 68 04/13/2024     Lab Results   Component Value Date    HDL 80 04/13/2024     Lab Results   Component Value Date    LDLCALC 46 04/13/2024     Lab Results   Component Value Date    HGBA1C 8.5 (H) 08/03/2024       Results for orders placed or performed in visit on 08/03/24   Hemoglobin A1C   Result Value Ref Range    Hemoglobin A1C 8.5 (H) Normal 4.0-5.6%; PreDiabetic 5.7-6.4%; Diabetic >=6.5%; Glycemic control for adults with diabetes <7.0% %     mg/dl   TSH, 3rd generation   Result Value Ref Range    TSH 3RD GENERATON 3.967 0.450 - 4.500 uIU/mL   CBC and differential   Result Value Ref Range    WBC 4.80 4.31 - 10.16 Thousand/uL    RBC 4.64 3.81 - 5.12 Million/uL    Hemoglobin 13.2 11.5 - 15.4 g/dL    Hematocrit 41.7 34.8 - 46.1 %    MCV 90 82 - 98 fL    MCH 28.4 26.8 - 34.3 pg    MCHC 31.7 31.4 - 37.4 g/dL    RDW 13.2 11.6 - 15.1 %    MPV 9.1 8.9 - 12.7 fL    Platelets 241 149 - 390 Thousands/uL    nRBC 0 /100 WBCs    Segmented % 54 43 - 75 %    Immature Grans % 0 0 - 2 %    Lymphocytes % 33 14 - 44 %    Monocytes % 7 4 - 12 %    Eosinophils Relative 5 0 - 6 %    Basophils Relative 1 0 - 1 %    Absolute " Neutrophils 2.59 1.85 - 7.62 Thousands/µL    Absolute Immature Grans 0.00 0.00 - 0.20 Thousand/uL    Absolute Lymphocytes 1.58 0.60 - 4.47 Thousands/µL    Absolute Monocytes 0.35 0.17 - 1.22 Thousand/µL    Eosinophils Absolute 0.25 0.00 - 0.61 Thousand/µL    Basophils Absolute 0.03 0.00 - 0.10 Thousands/µL   Sedimentation rate, automated   Result Value Ref Range    Sed Rate 38 (H) 0 - 29 mm/hour   C4 complement   Result Value Ref Range    C4, COMPLEMENT 45 19 - 52 mg/dL   C3 complement   Result Value Ref Range    C3 Complement 127 87 - 200 mg/dL   Comprehensive metabolic panel   Result Value Ref Range    Sodium 140 135 - 147 mmol/L    Potassium 3.7 3.5 - 5.3 mmol/L    Chloride 104 96 - 108 mmol/L    CO2 28 21 - 32 mmol/L    ANION GAP 8 4 - 13 mmol/L    BUN 15 5 - 25 mg/dL    Creatinine 0.71 0.60 - 1.30 mg/dL    Glucose, Fasting 139 (H) 65 - 99 mg/dL    Calcium 9.2 8.4 - 10.2 mg/dL    AST 27 13 - 39 U/L    ALT 26 7 - 52 U/L    Alkaline Phosphatase 71 34 - 104 U/L    Total Protein 7.0 6.4 - 8.4 g/dL    Albumin 4.0 3.5 - 5.0 g/dL    Total Bilirubin 0.40 0.20 - 1.00 mg/dL    eGFR 85 ml/min/1.73sq m   Uric acid   Result Value Ref Range    Uric Acid 5.4 2.0 - 7.5 mg/dL   Anti-DNA antibody, double-stranded   Result Value Ref Range    ds DNA Ab 1 0 - 9 IU/mL       Orders Placed This Encounter   Procedures    Hemoglobin A1c (w/out EAG) ([employer] ONLY)       ALLERGIES:  Allergies   Allergen Reactions    Augmentin [Amoxicillin-Pot Clavulanate] GI Intolerance    Paxlovid [Nirmatrelvir-Ritonavir] Diarrhea and Vomiting    Codeine Drowsiness    Macrobid [Nitrofurantoin] Other (See Comments)     Pt does not recall       Current Medications     Current Outpatient Medications   Medication Sig Dispense Refill    albuterol (2.5 mg/3 mL) 0.083 % nebulizer solution Take 3 mL (2.5 mg total) by nebulization every 6 (six) hours as needed for wheezing or shortness of breath 75 mL 2    amLODIPine (NORVASC) 2.5 mg tablet Take 1 tablet (2.5 mg  total) by mouth daily at bedtime 90 tablet 1    artificial tear (LUBRIFRESH P.M.) 83-15 % ophthalmic ointment daily at bedtime      aspirin 81 mg chewable tablet Chew 81 mg daily      baclofen 10 mg tablet Take 10 mg by mouth daily at bedtime  4    Cholecalciferol (VITAMIN D PO) Take 1 tablet by mouth daily      dapagliflozin (Farxiga) 10 MG tablet 1 tab daily 90 tablet 1    Diclofenac Sodium (VOLTAREN) 1 % Apply 1 application topically 4 (four) times a day      ezetimibe (ZETIA) 10 mg tablet Take 0.5 tablets (5 mg total) by mouth daily 45 tablet 1    Ferrous Sulfate Dried (FERROUS SULFATE CR PO) Iron TABS    Refills: 0       Active      fluticasone-vilanterol (Breo Ellipta) 200-25 mcg/actuation inhaler Inhale 1 puff daily Rinse mouth after use. 28 each 5    hydroCHLOROthiazide 12.5 mg tablet Take 1 tablet (12.5 mg total) by mouth daily 90 tablet 1    hydroxychloroquine (PLAQUENIL) 200 mg tablet Take 200 mg by mouth 2 (two) times a day with meals      insulin aspart (NovoLOG FlexPen) 100 UNIT/ML injection pen 6  units before meals 27 mL 1    Insulin Glargine Solostar (Lantus SoloStar) 100 UNIT/ML SOPN 22 units daily 45 mL 3    Insulin Pen Needle (BD Pen Needle Ester U/F) 32G X 4 MM MISC Inject under the skin 4 (four) times a day 400 each 1    Lifitegrast (XIIDRA OP) Apply to eye 2 vials a day        losartan (COZAAR) 50 mg tablet 1 tab twice per day 200 tablet 1    metFORMIN (GLUCOPHAGE-XR) 500 mg 24 hr tablet Take 1 tablet (500 mg total) by mouth daily with dinner 90 tablet 1    Microlet Lancets MISC Use 4 (four) times a day 400 each 1    predniSONE 5 mg tablet TAKE 1 TABLET BY MOUTH DAILY TO TWICE A DAY WITH FOOD AS NEEDED FOR NERVE INFLAMMATION      sertraline (ZOLOFT) 50 mg tablet Take 1 tablet (50 mg total) by mouth daily 30 tablet 0    simvastatin (ZOCOR) 20 mg tablet TAKE ONE TABLET BY MOUTH EVERY DAY 90 tablet 3    traMADol (ULTRAM) 50 mg tablet TAKE 1 TABLET BY MOUTH EVERY 12 (TWELVE) HOURS AS NEEDED FOR  MODERATE PAIN      dicyclomine (BENTYL) 10 mg capsule Take 1 capsule (10 mg total) by mouth 4 (four) times a day as needed (abdominal pain) (Patient not taking: Reported on 1/9/2024) 60 capsule 1    glucose blood (Contour Next Test) test strip Use 1 each 4 (four) times a day (Patient not taking: Reported on 6/6/2024) 400 each 1    metFORMIN (GLUCOPHAGE-XR) 500 mg 24 hr tablet Take 1 tablet (500 mg total) by mouth daily with dinner 30 tablet 0     No current facility-administered medications for this visit.         Health Maintenance     Health Maintenance   Topic Date Due    Hepatitis C Screening  Never done    Pneumococcal Vaccine: 65+ Years (1 of 2 - PCV) Never done    Osteoporosis Screening  Never done    Zoster Vaccine (1 of 2) Never done    RSV Vaccine Age 60+ Years (1 - 1-dose 60+ series) Never done    Falls: Plan of Care  Never done    PT PLAN OF CARE  06/07/2024    Influenza Vaccine (1) 09/01/2024    HEMOGLOBIN A1C  11/03/2024    Depression Screening  12/14/2024    Urinary Incontinence Screening  12/14/2024    Medicare Annual Wellness Visit (AWV)  12/14/2024    Breast Cancer Screening: Mammogram  02/06/2025    Diabetic Foot Exam  03/18/2025    Kidney Health Evaluation: Albumin/Creatinine Ratio  04/13/2025    Fall Risk  05/08/2025    Kidney Health Evaluation: GFR  08/03/2025    DM Eye Exam  06/20/2026    Colorectal Cancer Screening  04/18/2028    COVID-19 Vaccine  Completed    RSV Vaccine age 0-20 Months  Aged Out    HIB Vaccine  Aged Out    IPV Vaccine  Aged Out    Hepatitis A Vaccine  Aged Out    Meningococcal ACWY Vaccine  Aged Out    HPV Vaccine  Aged Out     Immunization History   Administered Date(s) Administered    COVID-19 MODERNA VACC 0.5 ML IM 01/25/2021, 02/22/2021, 11/22/2021, 06/17/2022    COVID-19 Moderna Vac BIVALENT 12 Yr+ IM 0.5 ML 12/18/2022    COVID-19 Pfizer mRNA vacc PF brody-sucrose 12 yr and older (Comirnaty) 10/30/2023    INFLUENZA 10/22/2022, 10/30/2023    Influenza Quadrivalent  Preservative Free 3 years and older IM 11/08/2014    Influenza Split High Dose Preservative Free IM 02/09/2018    Influenza, high dose seasonal 0.7 mL 10/04/2018, 10/22/2022       Tyler Mott MD

## 2024-09-10 NOTE — ASSESSMENT & PLAN NOTE
Asthma.  Fortunately patient has been stable in regards to her asthmatic condition.  She will continue with current regimen of medications.  She received her Prevnar 20 vaccination today

## 2024-09-12 DIAGNOSIS — Z79.4 TYPE 2 DIABETES MELLITUS WITH HYPERGLYCEMIA, WITH LONG-TERM CURRENT USE OF INSULIN (HCC): Primary | ICD-10-CM

## 2024-09-12 DIAGNOSIS — E11.65 TYPE 2 DIABETES MELLITUS WITH HYPERGLYCEMIA, WITH LONG-TERM CURRENT USE OF INSULIN (HCC): Primary | ICD-10-CM

## 2024-09-12 NOTE — TELEPHONE ENCOUNTER
This is not a duplicate. Patient is having an issue with her mail order and is asking if a script could be script to the local pharmacy.     Reason for call:   [x] Refill   [] Prior Auth  [] Other:     Office:   [] PCP/Provider -   [x] Specialty/Provider - ENDOCRINOLOGY POD  Authorized By: Petty Ball MD    Medication: metFORMIN (GLUCOPHAGE-XR) 500 mg 24 hr tablet    Dose/Frequency: Take 1 tablet (500 mg total) by mouth daily with dinner     Quantity: 30 tablets     Pharmacy: [facility]/pharmacy #[id number] [address]     Does the patient have enough for 3 days?   [] Yes   [x] No - Send as HP to POD

## 2024-09-13 RX ORDER — METFORMIN HCL 500 MG
500 TABLET, EXTENDED RELEASE 24 HR ORAL
Qty: 30 TABLET | Refills: 0 | Status: SHIPPED | OUTPATIENT
Start: 2024-09-13

## 2024-10-03 DIAGNOSIS — E11.65 TYPE 2 DIABETES MELLITUS WITH HYPERGLYCEMIA, WITH LONG-TERM CURRENT USE OF INSULIN (HCC): ICD-10-CM

## 2024-10-03 DIAGNOSIS — Z79.4 TYPE 2 DIABETES MELLITUS WITH HYPERGLYCEMIA, WITH LONG-TERM CURRENT USE OF INSULIN (HCC): ICD-10-CM

## 2024-10-03 RX ORDER — DAPAGLIFLOZIN 10 MG/1
TABLET, FILM COATED ORAL
Qty: 90 TABLET | Refills: 0 | Status: SHIPPED | OUTPATIENT
Start: 2024-10-03

## 2024-10-03 NOTE — TELEPHONE ENCOUNTER
Reason for call:   [x] Refill   [] Prior Auth  [] Other:     Office:   [] PCP/Provider -   [x] Specialty/Provider - Endocrinology    Medication: dapagliflozin (Farxiga) 10 MG tablet     Dose/Frequency: 1 tab daily     Quantity: 90 tablet    Pharmacy:   [facility] [address] [facility] [id number] [facility] [phone]       Does the patient have enough for 3 days?   [x] Yes   [] No - Send as HP to POD

## 2024-10-06 DIAGNOSIS — E11.65 TYPE 2 DIABETES MELLITUS WITH HYPERGLYCEMIA, WITH LONG-TERM CURRENT USE OF INSULIN (HCC): ICD-10-CM

## 2024-10-06 DIAGNOSIS — Z79.4 TYPE 2 DIABETES MELLITUS WITH HYPERGLYCEMIA, WITH LONG-TERM CURRENT USE OF INSULIN (HCC): ICD-10-CM

## 2024-10-07 RX ORDER — METFORMIN HCL 500 MG
500 TABLET, EXTENDED RELEASE 24 HR ORAL
Qty: 90 TABLET | Refills: 1 | Status: SHIPPED | OUTPATIENT
Start: 2024-10-07

## 2024-11-07 ENCOUNTER — APPOINTMENT (OUTPATIENT)
Dept: LAB | Facility: CLINIC | Age: 74
End: 2024-11-07
Payer: MEDICARE

## 2024-11-07 DIAGNOSIS — E09.9 DRUG OR CHEMICAL INDUCED DIABETES MELLITUS WITHOUT COMPLICATIONS (HCC): ICD-10-CM

## 2024-11-07 DIAGNOSIS — M06.09 RHEUMATOID ARTHRITIS OF MULTIPLE SITES WITHOUT RHEUMATOID FACTOR (HCC): ICD-10-CM

## 2024-11-07 DIAGNOSIS — J02.9 PHARYNGITIS, UNSPECIFIED ETIOLOGY: ICD-10-CM

## 2024-11-07 DIAGNOSIS — E08.9 DIABETES MELLITUS DUE TO UNDERLYING CONDITION WITHOUT COMPLICATION, WITH LONG-TERM CURRENT USE OF INSULIN (HCC): ICD-10-CM

## 2024-11-07 DIAGNOSIS — Z79.4 DIABETES MELLITUS DUE TO UNDERLYING CONDITION WITHOUT COMPLICATION, WITH LONG-TERM CURRENT USE OF INSULIN (HCC): ICD-10-CM

## 2024-11-07 LAB
ALBUMIN SERPL BCG-MCNC: 4.1 G/DL (ref 3.5–5)
ALP SERPL-CCNC: 71 U/L (ref 34–104)
ALT SERPL W P-5'-P-CCNC: 24 U/L (ref 7–52)
ANION GAP SERPL CALCULATED.3IONS-SCNC: 6 MMOL/L (ref 4–13)
AST SERPL W P-5'-P-CCNC: 26 U/L (ref 13–39)
BACTERIA UR QL AUTO: ABNORMAL /HPF
BASOPHILS # BLD AUTO: 0.06 THOUSANDS/ÂΜL (ref 0–0.1)
BASOPHILS NFR BLD AUTO: 1 % (ref 0–1)
BILIRUB SERPL-MCNC: 0.4 MG/DL (ref 0.2–1)
BILIRUB UR QL STRIP: NEGATIVE
BUDDING YEAST: PRESENT
BUN SERPL-MCNC: 18 MG/DL (ref 5–25)
C3 SERPL-MCNC: 124 MG/DL (ref 87–200)
C4 SERPL-MCNC: 40 MG/DL (ref 19–52)
CALCIUM SERPL-MCNC: 9.1 MG/DL (ref 8.4–10.2)
CHLORIDE SERPL-SCNC: 102 MMOL/L (ref 96–108)
CHOLEST SERPL-MCNC: 139 MG/DL
CLARITY UR: CLEAR
CO2 SERPL-SCNC: 31 MMOL/L (ref 21–32)
COLOR UR: ABNORMAL
CREAT SERPL-MCNC: 0.86 MG/DL (ref 0.6–1.3)
CREAT UR-MCNC: 117 MG/DL
CRP SERPL QL: 3.5 MG/L
EOSINOPHIL # BLD AUTO: 0.39 THOUSAND/ÂΜL (ref 0–0.61)
EOSINOPHIL NFR BLD AUTO: 7 % (ref 0–6)
ERYTHROCYTE [DISTWIDTH] IN BLOOD BY AUTOMATED COUNT: 13.5 % (ref 11.6–15.1)
ERYTHROCYTE [SEDIMENTATION RATE] IN BLOOD: 20 MM/HOUR (ref 0–29)
EST. AVERAGE GLUCOSE BLD GHB EST-MCNC: 220 MG/DL
GFR SERPL CREATININE-BSD FRML MDRD: 67 ML/MIN/1.73SQ M
GLUCOSE P FAST SERPL-MCNC: 110 MG/DL (ref 65–99)
GLUCOSE UR STRIP-MCNC: ABNORMAL MG/DL
HBA1C MFR BLD: 9.3 %
HCT VFR BLD AUTO: 41.6 % (ref 34.8–46.1)
HDLC SERPL-MCNC: 84 MG/DL
HGB BLD-MCNC: 13.2 G/DL (ref 11.5–15.4)
HGB UR QL STRIP.AUTO: NEGATIVE
IMM GRANULOCYTES # BLD AUTO: 0.01 THOUSAND/UL (ref 0–0.2)
IMM GRANULOCYTES NFR BLD AUTO: 0 % (ref 0–2)
KETONES UR STRIP-MCNC: NEGATIVE MG/DL
LDLC SERPL CALC-MCNC: 44 MG/DL (ref 0–100)
LEUKOCYTE ESTERASE UR QL STRIP: ABNORMAL
LYMPHOCYTES # BLD AUTO: 1.52 THOUSANDS/ÂΜL (ref 0.6–4.47)
LYMPHOCYTES NFR BLD AUTO: 25 % (ref 14–44)
MCH RBC QN AUTO: 29 PG (ref 26.8–34.3)
MCHC RBC AUTO-ENTMCNC: 31.7 G/DL (ref 31.4–37.4)
MCV RBC AUTO: 91 FL (ref 82–98)
MONOCYTES # BLD AUTO: 0.35 THOUSAND/ÂΜL (ref 0.17–1.22)
MONOCYTES NFR BLD AUTO: 6 % (ref 4–12)
MUCOUS THREADS UR QL AUTO: ABNORMAL
NEUTROPHILS # BLD AUTO: 3.68 THOUSANDS/ÂΜL (ref 1.85–7.62)
NEUTS SEG NFR BLD AUTO: 61 % (ref 43–75)
NITRITE UR QL STRIP: NEGATIVE
NON-SQ EPI CELLS URNS QL MICRO: ABNORMAL /HPF
NRBC BLD AUTO-RTO: 0 /100 WBCS
PH UR STRIP.AUTO: 6.5 [PH]
PLATELET # BLD AUTO: 250 THOUSANDS/UL (ref 149–390)
PMV BLD AUTO: 9.1 FL (ref 8.9–12.7)
POTASSIUM SERPL-SCNC: 4.1 MMOL/L (ref 3.5–5.3)
PROT SERPL-MCNC: 6.8 G/DL (ref 6.4–8.4)
PROT UR STRIP-MCNC: ABNORMAL MG/DL
PROT UR-MCNC: 14.2 MG/DL
PROT/CREAT UR: 0.1 MG/G{CREAT} (ref 0–0.1)
RBC # BLD AUTO: 4.55 MILLION/UL (ref 3.81–5.12)
RBC #/AREA URNS AUTO: ABNORMAL /HPF
SODIUM SERPL-SCNC: 139 MMOL/L (ref 135–147)
SP GR UR STRIP.AUTO: 1.03 (ref 1–1.03)
TRIGL SERPL-MCNC: 57 MG/DL
TSH SERPL DL<=0.05 MIU/L-ACNC: 2.4 UIU/ML (ref 0.45–4.5)
UROBILINOGEN UR STRIP-ACNC: <2 MG/DL
WBC # BLD AUTO: 6.01 THOUSAND/UL (ref 4.31–10.16)
WBC #/AREA URNS AUTO: ABNORMAL /HPF

## 2024-11-07 PROCEDURE — 86140 C-REACTIVE PROTEIN: CPT

## 2024-11-07 PROCEDURE — 85025 COMPLETE CBC W/AUTO DIFF WBC: CPT

## 2024-11-07 PROCEDURE — 85652 RBC SED RATE AUTOMATED: CPT

## 2024-11-07 PROCEDURE — 36415 COLL VENOUS BLD VENIPUNCTURE: CPT

## 2024-11-07 PROCEDURE — 80061 LIPID PANEL: CPT

## 2024-11-07 PROCEDURE — 86225 DNA ANTIBODY NATIVE: CPT

## 2024-11-07 PROCEDURE — 84443 ASSAY THYROID STIM HORMONE: CPT

## 2024-11-07 PROCEDURE — 83036 HEMOGLOBIN GLYCOSYLATED A1C: CPT

## 2024-11-07 PROCEDURE — 86160 COMPLEMENT ANTIGEN: CPT

## 2024-11-07 PROCEDURE — 80053 COMPREHEN METABOLIC PANEL: CPT

## 2024-11-07 PROCEDURE — 81001 URINALYSIS AUTO W/SCOPE: CPT

## 2024-11-08 LAB — DSDNA AB SER-ACNC: 1 IU/ML (ref 0–9)

## 2024-12-18 DIAGNOSIS — E78.5 HYPERLIPIDEMIA, UNSPECIFIED HYPERLIPIDEMIA TYPE: ICD-10-CM

## 2024-12-18 DIAGNOSIS — Z79.4 TYPE 2 DIABETES MELLITUS WITH HYPERGLYCEMIA, WITH LONG-TERM CURRENT USE OF INSULIN (HCC): ICD-10-CM

## 2024-12-18 DIAGNOSIS — R06.9 RESPIRATORY ABNORMALITIES: ICD-10-CM

## 2024-12-18 DIAGNOSIS — E11.65 TYPE 2 DIABETES MELLITUS WITH HYPERGLYCEMIA, WITH LONG-TERM CURRENT USE OF INSULIN (HCC): ICD-10-CM

## 2024-12-18 DIAGNOSIS — I10 HYPERTENSION, UNSPECIFIED TYPE: ICD-10-CM

## 2024-12-18 RX ORDER — LOSARTAN POTASSIUM 50 MG/1
TABLET ORAL
Qty: 180 TABLET | Refills: 1 | Status: SHIPPED | OUTPATIENT
Start: 2024-12-18

## 2024-12-18 RX ORDER — SIMVASTATIN 20 MG
20 TABLET ORAL DAILY
Qty: 90 TABLET | Refills: 1 | Status: SHIPPED | OUTPATIENT
Start: 2024-12-18

## 2024-12-18 RX ORDER — AMLODIPINE BESYLATE 2.5 MG/1
2.5 TABLET ORAL
Qty: 90 TABLET | Refills: 1 | Status: SHIPPED | OUTPATIENT
Start: 2024-12-18

## 2024-12-18 RX ORDER — PEN NEEDLE, DIABETIC 32GX 5/32"
NEEDLE, DISPOSABLE MISCELLANEOUS 4 TIMES DAILY
Qty: 400 EACH | Refills: 1 | Status: SHIPPED | OUTPATIENT
Start: 2024-12-18

## 2024-12-18 RX ORDER — FLUTICASONE FUROATE AND VILANTEROL 200; 25 UG/1; UG/1
1 POWDER RESPIRATORY (INHALATION) DAILY
Qty: 28 EACH | Refills: 5 | Status: SHIPPED | OUTPATIENT
Start: 2024-12-18

## 2024-12-18 RX ORDER — EZETIMIBE 10 MG/1
5 TABLET ORAL DAILY
Qty: 45 TABLET | Refills: 1 | Status: SHIPPED | OUTPATIENT
Start: 2024-12-18 | End: 2025-06-16

## 2024-12-18 RX ORDER — HYDROCHLOROTHIAZIDE 12.5 MG/1
12.5 TABLET ORAL DAILY
Qty: 90 TABLET | Refills: 1 | Status: SHIPPED | OUTPATIENT
Start: 2024-12-18

## 2024-12-18 RX ORDER — METFORMIN HYDROCHLORIDE 500 MG/1
500 TABLET, EXTENDED RELEASE ORAL
Qty: 90 TABLET | Refills: 1 | Status: SHIPPED | OUTPATIENT
Start: 2024-12-18

## 2024-12-18 NOTE — TELEPHONE ENCOUNTER
Reason for call:   [x] Refill   [] Prior Auth  [] Other:     Office:   [] PCP/Provider -   [x] Specialty/Provider - Endo    Medication:   Insulin Pen Needle (BD Pen Needle Ester U/F) 32G X 4 MM/ Inject under the skin 4 (four) times a day     glucose blood (Contour Next Test) test strip/Use 1 each 4 (four) times a day     losartan (COZAAR) 50 mg/1 tab twice per day    metFORMIN (GLUCOPHAGE-XR) 500 mg 24 hr/ Take 1 tablet (500 mg total) by mouth daily with dinner     amLODIPine (NORVASC) 2.5 mg/Take 1 tablet (2.5 mg total) by mouth daily at bedtime     ezetimibe (ZETIA) 10 mg/Take 0.5 tablets (5 mg total) by mouth daily     simvastatin (ZOCOR) 20 mg/TAKE ONE TABLET BY MOUTH EVERY DAY     hydroCHLOROthiazide 12.5 mg/Take 1 tablet (12.5 mg total) by mouth daily     Quantity:     Pharmacy: [facility] - [id number]     Does the patient have enough for 3 days?   [x] Yes   [] No - Send as HP to POD

## 2024-12-18 NOTE — TELEPHONE ENCOUNTER
Reason for call:   [x] Refill   [] Prior Auth  [] Other:     Office:   [x] PCP/Provider -   [] Specialty/Provider -     Medication: fluticasone-vilanterol (Breo Ellipta) 200-25 mcg/actuation     Dose/Frequency: Inhale 1 puff daily     Quantity: 28 each    Pharmacy: [facility] - [id number]     Does the patient have enough for 3 days?   [] Yes   [x] No - Send as HP to POD

## 2025-01-07 ENCOUNTER — OFFICE VISIT (OUTPATIENT)
Dept: FAMILY MEDICINE CLINIC | Facility: CLINIC | Age: 75
End: 2025-01-07
Payer: MEDICARE

## 2025-01-07 ENCOUNTER — NURSE TRIAGE (OUTPATIENT)
Age: 75
End: 2025-01-07

## 2025-01-07 VITALS
DIASTOLIC BLOOD PRESSURE: 70 MMHG | OXYGEN SATURATION: 96 % | WEIGHT: 158 LBS | BODY MASS INDEX: 31.02 KG/M2 | SYSTOLIC BLOOD PRESSURE: 100 MMHG | HEIGHT: 60 IN | TEMPERATURE: 97.3 F | HEART RATE: 62 BPM | RESPIRATION RATE: 16 BRPM

## 2025-01-07 DIAGNOSIS — E11.42 DIABETIC POLYNEUROPATHY ASSOCIATED WITH TYPE 2 DIABETES MELLITUS (HCC): ICD-10-CM

## 2025-01-07 DIAGNOSIS — M06.09 RHEUMATOID ARTHRITIS OF MULTIPLE SITES WITH NEGATIVE RHEUMATOID FACTOR (HCC): ICD-10-CM

## 2025-01-07 DIAGNOSIS — E11.65 TYPE 2 DIABETES MELLITUS WITH HYPERGLYCEMIA, WITH LONG-TERM CURRENT USE OF INSULIN (HCC): ICD-10-CM

## 2025-01-07 DIAGNOSIS — Z79.4 TYPE 2 DIABETES MELLITUS WITH HYPERGLYCEMIA, WITH LONG-TERM CURRENT USE OF INSULIN (HCC): ICD-10-CM

## 2025-01-07 DIAGNOSIS — J06.9 UPPER RESPIRATORY TRACT INFECTION, UNSPECIFIED TYPE: Primary | ICD-10-CM

## 2025-01-07 DIAGNOSIS — B37.31 VAGINAL CANDIDIASIS: ICD-10-CM

## 2025-01-07 PROCEDURE — 99214 OFFICE O/P EST MOD 30 MIN: CPT | Performed by: NURSE PRACTITIONER

## 2025-01-07 RX ORDER — DULOXETIN HYDROCHLORIDE 30 MG/1
1 CAPSULE, DELAYED RELEASE ORAL DAILY
COMMUNITY
Start: 2024-11-20

## 2025-01-07 RX ORDER — CEFUROXIME AXETIL 500 MG/1
500 TABLET ORAL EVERY 12 HOURS SCHEDULED
Qty: 14 TABLET | Refills: 0 | Status: SHIPPED | OUTPATIENT
Start: 2025-01-07 | End: 2025-01-15 | Stop reason: ALTCHOICE

## 2025-01-07 RX ORDER — FLUCONAZOLE 150 MG/1
150 TABLET ORAL ONCE
Qty: 2 TABLET | Refills: 0 | Status: SHIPPED | OUTPATIENT
Start: 2025-01-07 | End: 2025-01-07

## 2025-01-07 NOTE — TELEPHONE ENCOUNTER
"Pt called in and warm transferred for dizziness, and a cough. Patient said that she's been sick since rakel and about 3 to 4 days ago developed a bad cough, and fatigue. [place] the [place] and while standing and waiting she said that she got dizzy, and got a slight headache. Scheduled her for appt today.           Reason for Disposition   Patient wants to be seen    Answer Assessment - Initial Assessment Questions  1. ONSET: \"When did the nasal discharge start?\"       No nasal discharge  2. AMOUNT: \"How much discharge is there?\"       Minimal   3. COUGH: \"Do you have a cough?\" If Yes, ask: \"Describe the color of your mucus.\" (e.g., clear, white, yellow, green)      Yes   4. RESPIRATORY DISTRESS: \"Describe your breathing.\"       denies  5. FEVER: \"Do you have a fever?\" If Yes, ask: \"What is your temperature, how was it measured, and when did it start?\"      denies  6. SEVERITY: \"Overall, how bad are you feeling right now?\" (e.g., doesn't interfere with normal activities, staying home from school/work, staying in bed)       Very fatigued  7. OTHER SYMPTOMS: \"Do you have any other symptoms?\" (e.g., earache, mouth sores, sore throat, wheezing)      dizziness  8. PREGNANCY: \"Is there any chance you are pregnant?\" \"When was your last menstrual period?\"      N.a    Protocols used: Common Cold-Adult-[place]    "

## 2025-01-07 NOTE — ASSESSMENT & PLAN NOTE
Lab Results   Component Value Date    HGBA1C 9.3 (H) 11/07/2024     Follows with podiatry, Dr. Craig for foot care.

## 2025-01-07 NOTE — PROGRESS NOTES
Name: Becki Roman      : 1950      MRN: [id number]  Encounter Provider: PAWAN Dee  Encounter Date: 2025   Encounter department: [facility]  :  Assessment & Plan  Upper respiratory tract infection, unspecified type  Start Ceftin. Take with food.   Call me if symptoms are not improving over the next 3-4 days.     Orders:  •  cefuroxime (CEFTIN) 500 mg tablet; Take 1 tablet (500 mg total) by mouth every 12 (twelve) hours for 7 days    Rheumatoid arthritis of multiple sites with negative rheumatoid factor ([facility])  [facility]   Taking Plaquenil, prednisone as needed.   Tramadol as needed.          Diabetic polyneuropathy associated with type 2 diabetes mellitus ([facility])    Lab Results   Component Value Date    HGBA1C 9.3 (H) 2024     Follows with podiatry, Dr. Craig for foot care.          Vaginal candidiasis  Discussed may be related to high A1c.   Continue monistat for now, will be starting antibiotics.   After taking 3-4 days of antibiotics, if symptoms have not resolved with monistat, start Diflucan, take one dose and may repeat in 3 days for any persisting symptoms. Hold simvastatin for 3 days after taking each diflucan tablet. Diflucan can increase effects of simvastatin--increase potential side effects of simvastatin.     Orders:  •  fluconazole (DIFLUCAN) 150 mg tablet; Take 1 tablet (150 mg total) by mouth once for 1 dose May repeat in 3 days if symptoms are persisting    Type 2 diabetes mellitus with hyperglycemia, with long-term current use of insulin ([facility])    Lab Results   Component Value Date    HGBA1C 9.3 (H) 2024     A1c above goal.   Continue endocrinology care., has follow up on 25.               History of Present Illness     Becki Roman is a 74 year old female presenting for cold symptoms that started about 10 days ago.     Symptoms include:  Weak, tired, runny nose, cough.   No chest tightness, wheezing, or pain.   Little chest congestion.  Not short of breath.     Today headaches, and dizziness.   Shaky, waiting in line today.     Taking Tylenol.     No known sick contacts.   Large family gathering at [place].   Some family members got sick within 2 days after.     A1c 9.3%.   Vaginal yeast infection.   Has not been on antibiotics recently.   Using monistat.         Review of Systems   Constitutional:  Positive for fatigue. Negative for chills, diaphoresis and fever.   HENT:  Positive for rhinorrhea. Negative for congestion, ear pain and sore throat.    Respiratory:  Positive for cough. Negative for chest tightness, shortness of breath and wheezing.    Cardiovascular: Negative.    Neurological:  Positive for dizziness, weakness and headaches.       Objective   /70   Pulse 62   Temp (!) 97.3 °F (36.3 °C) (Temporal)   Resp 16   Ht 5' (1.524 m)   Wt 71.7 kg (158 lb)   SpO2 96%   BMI 30.86 kg/m²      Physical Exam  Vitals and nursing note reviewed.   Constitutional:       General: She is not in acute distress.     Appearance: Normal appearance. She is not ill-appearing.   HENT:      Head: Atraumatic.      Right Ear: Tympanic membrane normal.      Left Ear: Tympanic membrane normal.      Nose: Nose normal.      Mouth/Throat:      Mouth: Mucous membranes are moist.      Pharynx: Oropharynx is clear.   Cardiovascular:      Rate and Rhythm: Normal rate and regular rhythm.      Heart sounds: No murmur heard.  Pulmonary:      Effort: Pulmonary effort is normal.      Breath sounds: Normal breath sounds.   Musculoskeletal:      Cervical back: Normal range of motion and neck supple.   Lymphadenopathy:      Cervical: No cervical adenopathy.   Neurological:      Mental Status: She is alert.   Psychiatric:         Mood and Affect: Mood normal.         Current Outpatient Medications:   •  albuterol (2.5 mg/3 mL) 0.083 % nebulizer solution, Take 3 mL (2.5 mg total) by nebulization every 6 (six) hours as needed for wheezing or shortness of breath, Disp:  75 mL, Rfl: 2  •  amLODIPine (NORVASC) 2.5 mg tablet, Take 1 tablet (2.5 mg total) by mouth daily at bedtime, Disp: 90 tablet, Rfl: 1  •  artificial tear (LUBRIFRESH P.M.) 83-15 % ophthalmic ointment, daily at bedtime, Disp: , Rfl:   •  aspirin 81 mg chewable tablet, Chew 81 mg daily, Disp: , Rfl:   •  baclofen 10 mg tablet, Take 10 mg by mouth daily at bedtime, Disp: , Rfl: 4  •  cefuroxime (CEFTIN) 500 mg tablet, Take 1 tablet (500 mg total) by mouth every 12 (twelve) hours for 7 days, Disp: 14 tablet, Rfl: 0  •  Cholecalciferol (VITAMIN D PO), Take 1 tablet by mouth daily, Disp: , Rfl:   •  dapagliflozin (Farxiga) 10 MG tablet, 1 tab daily, Disp: 90 tablet, Rfl: 0  •  Diclofenac Sodium (VOLTAREN) 1 %, Apply 1 application topically 4 (four) times a day, Disp: , Rfl:   •  DULoxetine (CYMBALTA) 30 mg delayed release capsule, Take 1 capsule by mouth in the morning, Disp: , Rfl:   •  ezetimibe (ZETIA) 10 mg tablet, Take 0.5 tablets (5 mg total) by mouth daily, Disp: 45 tablet, Rfl: 1  •  Ferrous Sulfate Dried (FERROUS SULFATE CR PO), Iron TABS   Refills: 0     Active, Disp: , Rfl:   •  fluconazole (DIFLUCAN) 150 mg tablet, Take 1 tablet (150 mg total) by mouth once for 1 dose May repeat in 3 days if symptoms are persisting, Disp: 2 tablet, Rfl: 0  •  fluticasone-vilanterol (Breo Ellipta) 200-25 mcg/actuation inhaler, Inhale 1 puff daily Rinse mouth after use., Disp: 28 each, Rfl: 5  •  glucose blood (Contour Next Test) test strip, Use 1 each 4 (four) times a day, Disp: 400 each, Rfl: 1  •  hydroCHLOROthiazide 12.5 mg tablet, Take 1 tablet (12.5 mg total) by mouth daily, Disp: 90 tablet, Rfl: 1  •  hydroxychloroquine (PLAQUENIL) 200 mg tablet, Take 200 mg by mouth 2 (two) times a day with meals, Disp: , Rfl:   •  insulin aspart (NovoLOG FlexPen) 100 UNIT/ML injection pen, 6  units before meals, Disp: 27 mL, Rfl: 1  •  Insulin Glargine Solostar (Lantus SoloStar) 100 UNIT/ML SOPN, 22 units daily, Disp: 45 mL, Rfl: 3  •   Insulin Pen Needle (BD Pen Needle Estre U/F) 32G X 4 MM MISC, Inject under the skin 4 (four) times a day, Disp: 400 each, Rfl: 1  •  Lifitegrast (XIIDRA OP), Apply to eye 2 vials a day  , Disp: , Rfl:   •  losartan (COZAAR) 50 mg tablet, 1 tab twice per day, Disp: 180 tablet, Rfl: 1  •  metFORMIN (GLUCOPHAGE-XR) 500 mg 24 hr tablet, Take 1 tablet (500 mg total) by mouth daily with dinner, Disp: 90 tablet, Rfl: 1  •  metFORMIN (GLUCOPHAGE-XR) 500 mg 24 hr tablet, Take 1 tablet (500 mg total) by mouth daily with dinner, Disp: 90 tablet, Rfl: 1  •  Microlet Lancets MISC, Use 4 (four) times a day, Disp: 400 each, Rfl: 1  •  predniSONE 5 mg tablet, TAKE 1 TABLET BY MOUTH DAILY TO TWICE A DAY WITH FOOD AS NEEDED FOR NERVE INFLAMMATION, Disp: , Rfl:   •  sertraline (ZOLOFT) 50 mg tablet, Take 1 tablet (50 mg total) by mouth daily, Disp: 30 tablet, Rfl: 0  •  simvastatin (ZOCOR) 20 mg tablet, Take 1 tablet (20 mg total) by mouth daily, Disp: 90 tablet, Rfl: 1  •  traMADol (ULTRAM) 50 mg tablet, TAKE 1 TABLET BY MOUTH EVERY 12 (TWELVE) HOURS AS NEEDED FOR MODERATE PAIN, Disp: , Rfl:

## 2025-01-08 NOTE — ASSESSMENT & PLAN NOTE
Lab Results   Component Value Date    HGBA1C 9.3 (H) 11/07/2024     A1c above goal.   Continue endocrinology care., has follow up on 1/14/25.

## 2025-01-15 ENCOUNTER — TELEPHONE (OUTPATIENT)
Age: 75
End: 2025-01-15

## 2025-01-15 ENCOUNTER — OFFICE VISIT (OUTPATIENT)
Dept: FAMILY MEDICINE CLINIC | Facility: CLINIC | Age: 75
End: 2025-01-15
Payer: MEDICARE

## 2025-01-15 VITALS
HEART RATE: 87 BPM | OXYGEN SATURATION: 99 % | TEMPERATURE: 98.6 F | DIASTOLIC BLOOD PRESSURE: 80 MMHG | WEIGHT: 153 LBS | HEIGHT: 60 IN | BODY MASS INDEX: 30.04 KG/M2 | SYSTOLIC BLOOD PRESSURE: 120 MMHG | RESPIRATION RATE: 16 BRPM

## 2025-01-15 DIAGNOSIS — J40 BRONCHITIS: Primary | ICD-10-CM

## 2025-01-15 PROCEDURE — 99213 OFFICE O/P EST LOW 20 MIN: CPT | Performed by: NURSE PRACTITIONER

## 2025-01-15 RX ORDER — PREDNISONE 10 MG/1
TABLET ORAL
Qty: 20 TABLET | Refills: 0 | Status: SHIPPED | OUTPATIENT
Start: 2025-01-15

## 2025-01-15 NOTE — TELEPHONE ENCOUNTER
Pt seen on 1/7/25 for cough and dizziness. Pt was placed on ABX, but cough is more severe.  Pt continues to have cough, dizziness and unable to walk, feels symptoms are getting worse. Pt now has body aches, chills, unsure if fever. ABX are complete. Pt requesting call [phone] to advise.

## 2025-01-15 NOTE — PROGRESS NOTES
Name: Becki Roman      : 1950      MRN: [id number]  Encounter Provider: PAWAN Dee  Encounter Date: 1/15/2025   Encounter department: [facility]  :  Assessment & Plan  Bronchitis  Ceftin was not effective for resolution of symptoms.   I would like to prescribe doxycycline or azithromycin to cover atypical respiratory pathogens, however, she declines any further antibiotics.   Immunocompromised--diabetes, RA, on Plaquenil.  Will trial course of prednisone taper.   She will call for persisting symptoms.     Orders:  •  predniSONE 10 mg tablet; Take 4 tablets for 2 days, 3 tablets for 2 days, 2 tablets for 2 days, 1 tablet for 2 days.          Depression Screening and Follow-up Plan: Patient was screened for depression during today's encounter. They screened negative with a PHQ-2 score of 0.      History of Present Illness     Becki Roman is a 74 year old female presenting today for cold symptoms.     She was seen in office on  for URI symptoms. Treated with Ceftin.   Symptoms have now been present for about 18 days.     2 days ago started feeling worse.   Cough has increased.  Rhinorrhea. Clear.   Coughing so hard ribs hurt.   No strength.     Chills. Sweats.  Does not have a thermometer.   Took Tylenol this morning.     Wheezing at times.     Took Ceftin, finished it yesterday. Did not get any better with Ceftin.   Feels worse.     No sore throat.  Has nasal congestion.   Headache due to coughing so much.     Sugars have not been higher than usual.   Today queasy stomach, has not been eating.     Left ear clogged           Review of Systems   Constitutional:  Positive for chills, diaphoresis and fatigue.   HENT:  Positive for congestion and ear pain. Negative for sore throat.    Respiratory:  Positive for cough and wheezing.    Cardiovascular:  Positive for chest pain (rib pain from cough).   Gastrointestinal: Negative.    Musculoskeletal:  Positive for arthralgias.    Neurological:  Positive for weakness (generalized) and headaches (from coughing).       Objective   /80   Pulse 87   Temp 98.6 °F (37 °C) (Tympanic)   Resp 16   Ht 5' (1.524 m)   Wt 69.4 kg (153 lb)   SpO2 99%   BMI 29.88 kg/m²      Physical Exam  Vitals and nursing note reviewed.   Constitutional:       General: She is not in acute distress.     Appearance: Normal appearance. She is not ill-appearing.   HENT:      Head: Atraumatic.      Right Ear: Tympanic membrane normal.      Left Ear: Tympanic membrane normal.      Nose: Nose normal.      Mouth/Throat:      Mouth: Mucous membranes are moist.      Pharynx: Oropharynx is clear.   Cardiovascular:      Rate and Rhythm: Normal rate and regular rhythm.      Heart sounds: No murmur heard.  Pulmonary:      Effort: Pulmonary effort is normal. No respiratory distress.      Breath sounds: Wheezing (expiratory wheezes in bases, clears with additional deep breaths) present. No rales.   Musculoskeletal:      Cervical back: Normal range of motion and neck supple.      Right lower leg: No edema.      Left lower leg: No edema.   Lymphadenopathy:      Cervical: No cervical adenopathy.   Neurological:      Mental Status: She is alert.   Psychiatric:         Mood and Affect: Mood normal.         Current Outpatient Medications:   •  albuterol (2.5 mg/3 mL) 0.083 % nebulizer solution, Take 3 mL (2.5 mg total) by nebulization every 6 (six) hours as needed for wheezing or shortness of breath, Disp: 75 mL, Rfl: 2  •  amLODIPine (NORVASC) 2.5 mg tablet, Take 1 tablet (2.5 mg total) by mouth daily at bedtime, Disp: 90 tablet, Rfl: 1  •  artificial tear (LUBRIFRESH P.M.) 83-15 % ophthalmic ointment, daily at bedtime, Disp: , Rfl:   •  aspirin 81 mg chewable tablet, Chew 81 mg daily, Disp: , Rfl:   •  baclofen 10 mg tablet, Take 10 mg by mouth daily at bedtime, Disp: , Rfl: 4  •  Cholecalciferol (VITAMIN D PO), Take 1 tablet by mouth daily, Disp: , Rfl:   •  dapagliflozin  (Farxiga) 10 MG tablet, 1 tab daily, Disp: 90 tablet, Rfl: 0  •  Diclofenac Sodium (VOLTAREN) 1 %, Apply 1 application topically 4 (four) times a day, Disp: , Rfl:   •  DULoxetine (CYMBALTA) 30 mg delayed release capsule, Take 1 capsule by mouth in the morning, Disp: , Rfl:   •  ezetimibe (ZETIA) 10 mg tablet, Take 0.5 tablets (5 mg total) by mouth daily, Disp: 45 tablet, Rfl: 1  •  Ferrous Sulfate Dried (FERROUS SULFATE CR PO), Iron TABS   Refills: 0     Active, Disp: , Rfl:   •  fluticasone-vilanterol (Breo Ellipta) 200-25 mcg/actuation inhaler, Inhale 1 puff daily Rinse mouth after use., Disp: 28 each, Rfl: 5  •  glucose blood (Contour Next Test) test strip, Use 1 each 4 (four) times a day, Disp: 400 each, Rfl: 1  •  hydroCHLOROthiazide 12.5 mg tablet, Take 1 tablet (12.5 mg total) by mouth daily, Disp: 90 tablet, Rfl: 1  •  hydroxychloroquine (PLAQUENIL) 200 mg tablet, Take 200 mg by mouth 2 (two) times a day with meals, Disp: , Rfl:   •  insulin aspart (NovoLOG FlexPen) 100 UNIT/ML injection pen, 6  units before meals, Disp: 27 mL, Rfl: 1  •  Insulin Glargine Solostar (Lantus SoloStar) 100 UNIT/ML SOPN, 22 units daily, Disp: 45 mL, Rfl: 3  •  Insulin Pen Needle (BD Pen Needle Ester U/F) 32G X 4 MM MISC, Inject under the skin 4 (four) times a day, Disp: 400 each, Rfl: 1  •  Lifitegrast (XIIDRA OP), Apply to eye 2 vials a day  , Disp: , Rfl:   •  losartan (COZAAR) 50 mg tablet, 1 tab twice per day, Disp: 180 tablet, Rfl: 1  •  metFORMIN (GLUCOPHAGE-XR) 500 mg 24 hr tablet, Take 1 tablet (500 mg total) by mouth daily with dinner, Disp: 90 tablet, Rfl: 1  •  metFORMIN (GLUCOPHAGE-XR) 500 mg 24 hr tablet, Take 1 tablet (500 mg total) by mouth daily with dinner, Disp: 90 tablet, Rfl: 1  •  Microlet Lancets MISC, Use 4 (four) times a day, Disp: 400 each, Rfl: 1  •  predniSONE 10 mg tablet, Take 4 tablets for 2 days, 3 tablets for 2 days, 2 tablets for 2 days, 1 tablet for 2 days., Disp: 20 tablet, Rfl: 0  •  sertraline  (ZOLOFT) 50 mg tablet, Take 1 tablet (50 mg total) by mouth daily, Disp: 30 tablet, Rfl: 0  •  simvastatin (ZOCOR) 20 mg tablet, Take 1 tablet (20 mg total) by mouth daily, Disp: 90 tablet, Rfl: 1  •  traMADol (ULTRAM) 50 mg tablet, TAKE 1 TABLET BY MOUTH EVERY 12 (TWELVE) HOURS AS NEEDED FOR MODERATE PAIN, Disp: , Rfl:

## 2025-01-30 DIAGNOSIS — E11.65 TYPE 2 DIABETES MELLITUS WITH HYPERGLYCEMIA, WITH LONG-TERM CURRENT USE OF INSULIN (HCC): ICD-10-CM

## 2025-01-30 DIAGNOSIS — Z79.4 TYPE 2 DIABETES MELLITUS WITH HYPERGLYCEMIA, WITH LONG-TERM CURRENT USE OF INSULIN (HCC): ICD-10-CM

## 2025-01-30 RX ORDER — DAPAGLIFLOZIN 10 MG/1
10 TABLET, FILM COATED ORAL DAILY
Qty: 10 TABLET | Refills: 0 | Status: SHIPPED | OUTPATIENT
Start: 2025-01-30

## 2025-01-30 RX ORDER — DAPAGLIFLOZIN 10 MG/1
10 TABLET, FILM COATED ORAL DAILY
Qty: 90 TABLET | Refills: 1 | Status: SHIPPED | OUTPATIENT
Start: 2025-01-30

## 2025-01-30 NOTE — TELEPHONE ENCOUNTER
Reason for call: Patient is requesting refill to mail order pharmacy and a short supply to local pharmacy until mail order pharmacy arrives.   [x] Refill   [] Prior Auth  [] Other:     Office:   [] PCP/Provider -   [x] Specialty/Provider - Endo    Medication: dapagliflozin (Farxiga) 10 MG tablet      Dose/Frequency: 1 tab daily     Quantity: 90    Pharmacy: [facility]      Quantity 10    Pharmacy [facility]/pharmacy #[id number] - [place] - [address]     Does the patient have enough for 3 days?   [] Yes   [x] No - Send as HP to POD

## 2025-02-13 ENCOUNTER — TELEPHONE (OUTPATIENT)
Age: 75
End: 2025-02-13

## 2025-02-13 NOTE — TELEPHONE ENCOUNTER
Patient called stating she started taking Farxiga back in April of 2024, a few weeks after starting the medication she started to experience dizzy spells in the AM after taking the medication, she is also experiencing frequent yeast infections and constipation. Patient reports wanting to stop Farxiga. She is not interested in an alternative, she is asking if Dr. Tovar is ok with her discontinuing farxiga?  Please advise

## 2025-02-25 ENCOUNTER — OFFICE VISIT (OUTPATIENT)
Dept: ENDOCRINOLOGY | Facility: CLINIC | Age: 75
End: 2025-02-25
Payer: MEDICARE

## 2025-02-25 VITALS
WEIGHT: 159 LBS | BODY MASS INDEX: 31.22 KG/M2 | DIASTOLIC BLOOD PRESSURE: 82 MMHG | OXYGEN SATURATION: 97 % | SYSTOLIC BLOOD PRESSURE: 122 MMHG | HEIGHT: 60 IN | HEART RATE: 86 BPM

## 2025-02-25 DIAGNOSIS — E78.5 HYPERLIPIDEMIA, UNSPECIFIED HYPERLIPIDEMIA TYPE: ICD-10-CM

## 2025-02-25 DIAGNOSIS — Z79.4 TYPE 2 DIABETES MELLITUS WITH HYPERGLYCEMIA, WITH LONG-TERM CURRENT USE OF INSULIN (HCC): Primary | ICD-10-CM

## 2025-02-25 DIAGNOSIS — E11.9 TYPE 2 DIABETES MELLITUS WITHOUT COMPLICATION, WITHOUT LONG-TERM CURRENT USE OF INSULIN (HCC): ICD-10-CM

## 2025-02-25 DIAGNOSIS — I10 HYPERTENSION, UNSPECIFIED TYPE: ICD-10-CM

## 2025-02-25 DIAGNOSIS — E11.65 TYPE 2 DIABETES MELLITUS WITH HYPERGLYCEMIA, WITH LONG-TERM CURRENT USE OF INSULIN (HCC): Primary | ICD-10-CM

## 2025-02-25 LAB — SL AMB POCT HEMOGLOBIN AIC: 8.8 (ref ?–6.5)

## 2025-02-25 PROCEDURE — 99214 OFFICE O/P EST MOD 30 MIN: CPT

## 2025-02-25 PROCEDURE — 83036 HEMOGLOBIN GLYCOSYLATED A1C: CPT

## 2025-02-25 RX ORDER — INSULIN GLARGINE 100 [IU]/ML
INJECTION, SOLUTION SUBCUTANEOUS
Qty: 45 ML | Refills: 3 | Status: SHIPPED | OUTPATIENT
Start: 2025-02-25

## 2025-02-25 RX ORDER — INSULIN ASPART 100 [IU]/ML
INJECTION, SOLUTION INTRAVENOUS; SUBCUTANEOUS
Qty: 27 ML | Refills: 1 | Status: SHIPPED | OUTPATIENT
Start: 2025-02-25

## 2025-02-25 RX ORDER — METFORMIN HYDROCHLORIDE 500 MG/1
500 TABLET, EXTENDED RELEASE ORAL 2 TIMES DAILY WITH MEALS
Qty: 180 TABLET | Refills: 1 | Status: SHIPPED | OUTPATIENT
Start: 2025-02-25

## 2025-02-25 NOTE — PATIENT INSTRUCTIONS
Add sliding scale of Novolog before meals 1 unit for every 40 points above 180    180 - 1 unit  220 - 2 units  260 - 3 units  300 or > - 4 units     Increase Lantus to 30 units daily  Increase Metformin to one with breakfast and one with dinner

## 2025-02-25 NOTE — PROGRESS NOTES
Established Patient Progress Note      Chief Complaint   Patient presents with    Diabetes Type 2        Impression & Plan:    Assessment & Plan  Type 2 diabetes mellitus with hyperglycemia, with long-term current use of insulin (HCC)  Diabetes control:  HGA1C uncontrolled  BGs are up and down  Treatment regimen:   Will add sliding scale 1 unit for every 40 above 180   Increase Metformin to 500 mg BID  Increase Lantus to 30 units daily  Recommended MNT, she will think about it   Not interested in CGM - continue to monitor BGs 3x daily   Discussed risks/complications associated with uncontrolled diabetes.    Advised to adhere to diabetic diet, and recommended staying active/exercising routinely.  Keep carbohydrates consistent to limit blood glucose fluctuations.  Advised to call if blood sugars less than 70 mg/dl or over 300 mg/dl.   Discussed symptoms and treatment of hypoglycemia.    Recommended routine follow-up with podiatry and ophthalmology.   Ordered blood work to complete prior to next visit.   Lab Results   Component Value Date    HGBA1C 8.8 (A) 02/25/2025       Orders:    POCT hemoglobin A1c    Insulin Glargine Solostar (Lantus SoloStar) 100 UNIT/ML SOPN; 30 units daily    insulin aspart (NovoLOG FlexPen) 100 UNIT/ML injection pen; 6  units before meals plus sliding scale - max dose 30 units daily.    metFORMIN (GLUCOPHAGE-XR) 500 mg 24 hr tablet; Take 1 tablet (500 mg total) by mouth 2 (two) times a day with meals    Hyperlipidemia, unspecified hyperlipidemia type  Lipid panel at goal  Continue statin        Hypertension, unspecified type  BP at goal  Continue current medication regimen          History of Present Illness:   Becki Roman is a 74 y.o. female with type 2 diabetes seen in follow up. Reports complications of none.  POCT A1C was 8.8. Home glucose monitoring: are performed regularly.     Has tried CGM in the past and did not like it   Farxiga was stopped - was having dizziness and headaches -  yeast infections - stopped on 2/13 - symptoms improved   Did not tolerate ozempic due to GI side effects     Home blood glucose readings:   Before breakfast: 130-200  Before lunch:   Before dinner:      Current regimen:   Metformin ER 500mg daily  Lantus 28 units daily at bedtime    Novolog 6-10 units TID with meals    Last Eye Exam: UTD  Last Foot Exam: UTD, follows with podiatry     Has hypertension: Taking amlodipine and HCTZ  Has hyperlipidemia: Taking simvastain       Patient Active Problem List   Diagnosis    Allergic rhinitis    Asthma    Generalized anxiety disorder    Stress incontinence, female    Lumbar radiculopathy    Type 2 diabetes mellitus with hyperglycemia, with long-term current use of insulin (HCC)    Colitis    Chronic fatigue    Gastroesophageal reflux disease without esophagitis    Hiatal hernia    Rheumatoid arthritis of multiple sites with negative rheumatoid factor (HCC)    Sensorineural hearing loss (SNHL) of left ear with restricted hearing of right ear    Dry mouth    Hypertension    Hyperlipidemia    Acute diverticulitis    Fibromyalgia    Achilles tendinitis of left lower extremity    Diabetic polyneuropathy associated with type 2 diabetes mellitus (HCC)      Past Medical History:   Diagnosis Date    Anxiety     Anxiety     Arthritis of right knee     Cellulitis     Contact dermatitis     Cough     Diabetes mellitus (HCC)     GERD without esophagitis     Headache     Hiatal hernia     Hyperlipidemia     Knee sprain     Lyme disease     Tick bite       Past Surgical History:   Procedure Laterality Date    ABDOMINAL SURGERY      tummy tuck    CATARACT EXTRACTION, BILATERAL Bilateral 11/21 and 12/21    HYSTERECTOMY      age 49    WA COLONOSCOPY FLX DX W/COLLJ SPEC WHEN PFRMD N/A 5/8/2019    Procedure: COLONOSCOPY;  Surgeon: Gillian Cartagena MD;  Location: [facility];  Service: Gastroenterology    WA ESOPHAGOGASTRODUODENOSCOPY TRANSORAL DIAGNOSTIC N/A 5/8/2019    Procedure:  ESOPHAGOGASTRODUODENOSCOPY (EGD);  Surgeon: Gillian Cartagena MD;  Location: [facility];  Service: Gastroenterology    TUBAL LIGATION        Social History     Tobacco Use    Smoking status: Former     Current packs/day: 0.00     Average packs/day: 0.5 packs/day for 25.0 years (12.5 ttl pk-yrs)     Types: Cigarettes     Start date: 1983     Quit date: 2008     Years since quittin.6    Smokeless tobacco: Never   Substance Use Topics    Alcohol use: Not Currently     Comment: seldom     Allergies   Allergen Reactions    Augmentin [Amoxicillin-Pot Clavulanate] GI Intolerance    Paxlovid [Nirmatrelvir-Ritonavir] Diarrhea and Vomiting    Codeine Drowsiness    Macrobid [Nitrofurantoin] Other (See Comments)     Pt does not recall         Current Outpatient Medications:     albuterol (2.5 mg/3 mL) 0.083 % nebulizer solution, Take 3 mL (2.5 mg total) by nebulization every 6 (six) hours as needed for wheezing or shortness of breath, Disp: 75 mL, Rfl: 2    amLODIPine (NORVASC) 2.5 mg tablet, Take 1 tablet (2.5 mg total) by mouth daily at bedtime, Disp: 90 tablet, Rfl: 1    artificial tear (LUBRIFRESH P.M.) 83-15 % ophthalmic ointment, daily at bedtime, Disp: , Rfl:     aspirin 81 mg chewable tablet, Chew 81 mg daily, Disp: , Rfl:     baclofen 10 mg tablet, Take 10 mg by mouth daily at bedtime, Disp: , Rfl: 4    Cholecalciferol (VITAMIN D PO), Take 1 tablet by mouth daily, Disp: , Rfl:     Diclofenac Sodium (VOLTAREN) 1 %, Apply 1 application topically 4 (four) times a day, Disp: , Rfl:     DULoxetine (CYMBALTA) 30 mg delayed release capsule, Take 1 capsule by mouth in the morning, Disp: , Rfl:     ezetimibe (ZETIA) 10 mg tablet, Take 0.5 tablets (5 mg total) by mouth daily, Disp: 45 tablet, Rfl: 1    Ferrous Sulfate Dried (FERROUS SULFATE CR PO), Iron TABS   Refills: 0     Active, Disp: , Rfl:     fluticasone-vilanterol (Breo Ellipta) 200-25 mcg/actuation inhaler, Inhale 1 puff daily Rinse mouth after use., Disp:  28 each, Rfl: 5    glucose blood (Contour Next Test) test strip, Use 1 each 4 (four) times a day, Disp: 400 each, Rfl: 1    hydroCHLOROthiazide 12.5 mg tablet, Take 1 tablet (12.5 mg total) by mouth daily, Disp: 90 tablet, Rfl: 1    hydroxychloroquine (PLAQUENIL) 200 mg tablet, Take 200 mg by mouth 2 (two) times a day with meals, Disp: , Rfl:     insulin aspart (NovoLOG FlexPen) 100 UNIT/ML injection pen, 6  units before meals plus sliding scale - max dose 30 units daily., Disp: 27 mL, Rfl: 1    Insulin Glargine Solostar (Lantus SoloStar) 100 UNIT/ML SOPN, 30 units daily, Disp: 45 mL, Rfl: 3    Insulin Pen Needle (BD Pen Needle Ester U/F) 32G X 4 MM MISC, Inject under the skin 4 (four) times a day, Disp: 400 each, Rfl: 1    Lifitegrast (XIIDRA OP), Apply to eye 2 vials a day  , Disp: , Rfl:     losartan (COZAAR) 50 mg tablet, 1 tab twice per day, Disp: 180 tablet, Rfl: 1    metFORMIN (GLUCOPHAGE-XR) 500 mg 24 hr tablet, Take 1 tablet (500 mg total) by mouth 2 (two) times a day with meals, Disp: 180 tablet, Rfl: 1    Microlet Lancets MISC, Use 4 (four) times a day, Disp: 400 each, Rfl: 1    predniSONE 10 mg tablet, Take 4 tablets for 2 days, 3 tablets for 2 days, 2 tablets for 2 days, 1 tablet for 2 days., Disp: 20 tablet, Rfl: 0    sertraline (ZOLOFT) 50 mg tablet, Take 1 tablet (50 mg total) by mouth daily, Disp: 30 tablet, Rfl: 0    simvastatin (ZOCOR) 20 mg tablet, Take 1 tablet (20 mg total) by mouth daily, Disp: 90 tablet, Rfl: 1    traMADol (ULTRAM) 50 mg tablet, TAKE 1 TABLET BY MOUTH EVERY 12 (TWELVE) HOURS AS NEEDED FOR MODERATE PAIN, Disp: , Rfl:     Review of Systems   Constitutional:  Negative for fatigue, fever and unexpected weight change.   Eyes:  Negative for visual disturbance.   Respiratory:  Negative for shortness of breath.    Cardiovascular:  Negative for chest pain.   Gastrointestinal:  Negative for abdominal pain, constipation, diarrhea, nausea and vomiting.   Endocrine: Negative for polydipsia  and polyuria.   Skin:  Negative for wound.   Neurological:  Negative for dizziness, syncope and numbness.   All other systems reviewed and are negative.      Physical Exam:  Body mass index is 31.05 kg/m².  /82   Pulse 86   Ht 5' (1.524 m)   Wt 72.1 kg (159 lb)   SpO2 97%   BMI 31.05 kg/m²    Wt Readings from Last 3 Encounters:   02/25/25 72.1 kg (159 lb)   01/15/25 69.4 kg (153 lb)   01/07/25 71.7 kg (158 lb)       Physical Exam  Vitals reviewed.   Constitutional:       Appearance: Normal appearance. She is obese.   Cardiovascular:      Rate and Rhythm: Normal rate.   Pulmonary:      Effort: Pulmonary effort is normal.   Neurological:      Mental Status: She is alert and oriented to person, place, and time.   Psychiatric:         Mood and Affect: Mood normal.       Labs:   Lab Results   Component Value Date    HGBA1C 8.8 (A) 02/25/2025    HGBA1C 9.3 (H) 11/07/2024    HGBA1C 8.5 (H) 08/03/2024     Lab Results   Component Value Date    CREATININE 0.86 11/07/2024    CREATININE 0.71 08/03/2024    CREATININE 0.82 04/13/2024    BUN 18 11/07/2024     11/10/2015    K 4.1 11/07/2024     11/07/2024    CO2 31 11/07/2024     eGFRcr   Date Value Ref Range Status   08/19/2022 95 > OR = 60 mL/min/1.73m2 Final     Comment:     The eGFR is based on the CKD-EPI 2021 equation. To calculate   the new eGFR from a previous Creatinine or Cystatin C  result, go to https://www.kidney.org/professionals/  kdoqi/gfr%5Fcalculator     eGFR   Date Value Ref Range Status   11/07/2024 67 ml/min/1.73sq m Final     Lab Results   Component Value Date    HDL 84 11/07/2024    TRIG 57 11/07/2024     Lab Results   Component Value Date    ALT 24 11/07/2024    AST 26 11/07/2024    ALKPHOS 71 11/07/2024     Lab Results   Component Value Date    TOG6AGYHEKQN 2.400 11/07/2024    HYO9ESDWGCOT 3.967 08/03/2024       Patient Instructions   Add sliding scale of Novolog before meals 1 unit for every 40 points above 180    180 - 1 unit  220 -  2 units  260 - 3 units  300 or > - 4 units     Increase Lantus to 30 units daily  Increase Metformin to one with breakfast and one with dinner     Discussed with the patient and all questioned fully answered. She will call me if any problems arise.    PAWAN Long

## 2025-02-25 NOTE — ASSESSMENT & PLAN NOTE
Diabetes control:  HGA1C uncontrolled  BGs are up and down  Treatment regimen:   Will add sliding scale 1 unit for every 40 above 180   Increase Metformin to 500 mg BID  Increase Lantus to 30 units daily  Recommended MNT, she will think about it   Not interested in CGM - continue to monitor BGs 3x daily   Discussed risks/complications associated with uncontrolled diabetes.    Advised to adhere to diabetic diet, and recommended staying active/exercising routinely.  Keep carbohydrates consistent to limit blood glucose fluctuations.  Advised to call if blood sugars less than 70 mg/dl or over 300 mg/dl.   Discussed symptoms and treatment of hypoglycemia.    Recommended routine follow-up with podiatry and ophthalmology.   Ordered blood work to complete prior to next visit.   Lab Results   Component Value Date    HGBA1C 8.8 (A) 02/25/2025       Orders:    POCT hemoglobin A1c    Insulin Glargine Solostar (Lantus SoloStar) 100 UNIT/ML SOPN; 30 units daily    insulin aspart (NovoLOG FlexPen) 100 UNIT/ML injection pen; 6  units before meals plus sliding scale - max dose 30 units daily.    metFORMIN (GLUCOPHAGE-XR) 500 mg 24 hr tablet; Take 1 tablet (500 mg total) by mouth 2 (two) times a day with meals

## 2025-03-20 ENCOUNTER — RA CDI HCC (OUTPATIENT)
Dept: OTHER | Facility: HOSPITAL | Age: 75
End: 2025-03-20

## 2025-03-24 ENCOUNTER — OFFICE VISIT (OUTPATIENT)
Dept: FAMILY MEDICINE CLINIC | Facility: CLINIC | Age: 75
End: 2025-03-24
Payer: MEDICARE

## 2025-03-24 VITALS
HEART RATE: 86 BPM | DIASTOLIC BLOOD PRESSURE: 70 MMHG | OXYGEN SATURATION: 97 % | TEMPERATURE: 97.5 F | BODY MASS INDEX: 31.68 KG/M2 | SYSTOLIC BLOOD PRESSURE: 110 MMHG | RESPIRATION RATE: 17 BRPM | HEIGHT: 60 IN | WEIGHT: 161.38 LBS

## 2025-03-24 DIAGNOSIS — M25.572 CHRONIC PAIN OF BOTH ANKLES: ICD-10-CM

## 2025-03-24 DIAGNOSIS — E11.65 TYPE 2 DIABETES MELLITUS WITH HYPERGLYCEMIA, WITH LONG-TERM CURRENT USE OF INSULIN (HCC): Primary | ICD-10-CM

## 2025-03-24 DIAGNOSIS — Z79.4 TYPE 2 DIABETES MELLITUS WITH HYPERGLYCEMIA, WITH LONG-TERM CURRENT USE OF INSULIN (HCC): Primary | ICD-10-CM

## 2025-03-24 DIAGNOSIS — M17.0 PRIMARY OSTEOARTHRITIS OF BOTH KNEES: ICD-10-CM

## 2025-03-24 DIAGNOSIS — E78.49 OTHER HYPERLIPIDEMIA: ICD-10-CM

## 2025-03-24 DIAGNOSIS — G89.29 CHRONIC PAIN OF BOTH ANKLES: ICD-10-CM

## 2025-03-24 DIAGNOSIS — J45.20 MILD INTERMITTENT ASTHMA WITHOUT COMPLICATION: ICD-10-CM

## 2025-03-24 DIAGNOSIS — I10 PRIMARY HYPERTENSION: ICD-10-CM

## 2025-03-24 DIAGNOSIS — B07.0 PLANTAR WART: ICD-10-CM

## 2025-03-24 DIAGNOSIS — M25.571 CHRONIC PAIN OF BOTH ANKLES: ICD-10-CM

## 2025-03-24 LAB
CREAT UR-MCNC: 118.9 MG/DL
MICROALBUMIN UR-MCNC: 8.6 MG/L
MICROALBUMIN/CREAT 24H UR: 7 MG/G CREATININE (ref 0–30)

## 2025-03-24 PROCEDURE — G0439 PPPS, SUBSEQ VISIT: HCPCS | Performed by: FAMILY MEDICINE

## 2025-03-24 PROCEDURE — 99213 OFFICE O/P EST LOW 20 MIN: CPT | Performed by: FAMILY MEDICINE

## 2025-03-24 PROCEDURE — 82570 ASSAY OF URINE CREATININE: CPT | Performed by: FAMILY MEDICINE

## 2025-03-24 PROCEDURE — G2211 COMPLEX E/M VISIT ADD ON: HCPCS | Performed by: FAMILY MEDICINE

## 2025-03-24 PROCEDURE — 82043 UR ALBUMIN QUANTITATIVE: CPT | Performed by: FAMILY MEDICINE

## 2025-03-24 NOTE — PROGRESS NOTES
Name: Becki Roman      : 1950      MRN: [id number]  Encounter Provider: Tyler Mott MD  Encounter Date: 3/24/2025   Encounter department: [facility]    Assessment & Plan  Plantar wart    Orders:    Ambulatory Referral to Podiatry; Future    Chronic pain of both ankles    Orders:    Ambulatory Referral to Podiatry; Future    Type 2 diabetes mellitus with hyperglycemia, with long-term current use of insulin (HCC)  Diabetes.  Latest A1c was still elevated at 8.8 but improved from previously at 9.3.  She will discuss with her endocrinologist regarding continuous glucose monitoring system to better track her blood sugars.  Her eye and foot exam is up-to-date  Lab Results   Component Value Date    HGBA1C 8.8 (A) 2025       Orders:    Albumin / creatinine urine ratio    Mild intermittent asthma without complication  Asthma.  Patient feels condition has been stable on current regimen of medications.         Primary hypertension  Hypertension.  The patient's blood pressure is stable at this time and he will continue current regimen of medications         Other hyperlipidemia  Hyperlipidemia.  Lipid panel is stable on current dose of statin therapy         Primary osteoarthritis of both knees  Knee pain.  Patient will proceed with gel injections as recommended by orthopedist.            Preventive health issues were discussed with patient, and age appropriate screening tests were ordered as noted in patient's After Visit Summary. Personalized health advice and appropriate referrals for health education or preventive services given if needed, as noted in patient's After Visit Summary.    History of Present Illness     Patient in the office to review chronic medical condition.  She denies any recent illness.  She states most of her complaints are related to her back and knee pains from degenerative joint disease.  Pain management and orthopedics are reluctant to provide therapeutic  injections with steroids due to her A1c being at 8.8.  She is slowly making progress with her endocrinologist to lower this value.  Patient would like to restart trying to use continuous glucose monitor instead of her usual glucometer to monitor her blood sugars.  Patient is scheduled to initiate gel injections to her knees in the future to see if this would help with her chronic pain    Patient also describes pain in her bilateral ankles.  She believes she may have a plantar wart of her left foot.  Patient requests referral to podiatry whom she has seen in the past with Dr. Craig.       Patient Care Team:  Tyler Mott MD as PCP - General  Petty Ball MD as PCP - Endocrinology (Endocrinology)  MD Gillian Buenrostro MD as Endoscopist  Ramya Villar MD as Surgeon (Thoracic Surgery)  Terence Daniel MD (Ophthalmology)  [facility] (Ophthalmology)  Terence Craig DPM (Podiatry)  PAWAN Conway as Nurse Practitioner (Endocrinology)    Review of Systems   Constitutional: Negative.    HENT: Negative.     Eyes: Negative.    Respiratory: Negative.     Cardiovascular: Negative.    Gastrointestinal: Negative.    Genitourinary: Negative.    Musculoskeletal:  Positive for arthralgias and gait problem.   Skin: Negative.    Psychiatric/Behavioral: Negative.       Medical History Reviewed by provider this encounter:  Meds       Annual Wellness Visit Questionnaire   Iris is here for her Subsequent Wellness visit.     Health Risk Assessment:   Patient rates overall health as fair. Patient feels that their physical health rating is same. Patient is satisfied with their life. Eyesight was rated as same. Hearing was rated as same. Patient feels that their emotional and mental health rating is same. Patients states they are sometimes angry. Patient states they are often unusually tired/fatigued. Pain experienced in the last 7 days has been a lot. Patient's pain rating has been 7/10.  Patient states that she has experienced no weight loss or gain in last 6 months.     Depression Screening:   PHQ-2 Score: 0      Fall Risk Screening:   In the past year, patient has experienced: no history of falling in past year      Urinary Incontinence Screening:   Patient has leaked urine accidently in the last six months.     Home Safety:  Patient does not have trouble with stairs inside or outside of their home. Patient has working smoke alarms and has no working carbon monoxide detector. Home safety hazards include: none.     Medications:   Patient is currently taking over-the-counter supplements. OTC medications include: see medication list. Patient is able to manage medications.     Activities of Daily Living (ADLs)/Instrumental Activities of Daily Living (IADLs):   Walk and transfer into and out of bed and chair?: Yes  Dress and groom yourself?: Yes    Bathe or shower yourself?: Yes    Feed yourself? Yes  Do your laundry/housekeeping?: Yes  Manage your money, pay your bills and track your expenses?: Yes  Make your own meals?: Yes    Do your own shopping?: Yes    Previous Hospitalizations:   Any hospitalizations or ED visits within the last 12 months?: No      Advance Care Planning:   Living will: Yes    Advanced directive: Yes      PREVENTIVE SCREENINGS      Cardiovascular Screening:    General: Screening Not Indicated and History Lipid Disorder      Diabetes Screening:     General: Screening Not Indicated and History Diabetes      Colorectal Cancer Screening:     General: Screening Current      Breast Cancer Screening:     General: Screening Current      Cervical Cancer Screening:    General: Screening Not Indicated      Lung Cancer Screening:     General: Screening Not Indicated    Screening, Brief Intervention, and Referral to Treatment (SBIRT)     Screening  Typical number of drinks in a day: 0  Typical number of drinks in a week: 0  Interpretation: Low risk drinking behavior.    Single Item Drug  Screening:  How often have you used an illegal drug (including marijuana) or a prescription medication for non-medical reasons in the past year? never    Single Item Drug Screen Score: 0  Interpretation: Negative screen for possible drug use disorder    Social Drivers of Health     Financial Resource Strain: Low Risk  (12/14/2023)    Overall Financial Resource Strain (CARDIA)     Difficulty of Paying Living Expenses: Not hard at all   Transportation Needs: No Transportation Needs (12/14/2023)    PRAPARE - Transportation     Lack of Transportation (Medical): No     Lack of Transportation (Non-Medical): No     No results found.    Objective   /70 (Patient Position: Sitting, Cuff Size: Adult)   Pulse 86   Temp 97.5 °F (36.4 °C) (Temporal)   Resp 17   Ht 5' (1.524 m)   Wt 73.2 kg (161 lb 6 oz)   SpO2 97%   BMI 31.52 kg/m²     Physical Exam  Vitals and nursing note reviewed.   Constitutional:       General: She is not in acute distress.     Appearance: Normal appearance. She is well-developed. She is not ill-appearing.   HENT:      Head: Normocephalic and atraumatic.   Eyes:      General:         Right eye: No discharge.         Left eye: No discharge.      Extraocular Movements: Extraocular movements intact.      Conjunctiva/sclera: Conjunctivae normal.      Pupils: Pupils are equal, round, and reactive to light.   Neck:      Vascular: No carotid bruit.   Cardiovascular:      Rate and Rhythm: Normal rate and regular rhythm.      Pulses: no weak pulses.           Dorsalis pedis pulses are 2+ on the right side and 2+ on the left side.        Posterior tibial pulses are 2+ on the right side and 2+ on the left side.      Heart sounds: No murmur heard.  Pulmonary:      Effort: Pulmonary effort is normal. No respiratory distress.      Breath sounds: Normal breath sounds.   Abdominal:      General: Abdomen is flat. Bowel sounds are normal.      Palpations: Abdomen is soft.      Tenderness: There is no abdominal  tenderness. There is no guarding or rebound.   Musculoskeletal:      Right lower leg: No edema.      Left lower leg: No edema.   Feet:      Right foot:      Skin integrity: Callus and dry skin present. No ulcer, skin breakdown, erythema or warmth.      Left foot:      Skin integrity: Callus and dry skin present. No ulcer, skin breakdown, erythema or warmth.   Lymphadenopathy:      Cervical: No cervical adenopathy.   Skin:     General: Skin is warm and dry.      Capillary Refill: Capillary refill takes less than 2 seconds.   Neurological:      Mental Status: She is alert. Mental status is at baseline.   Psychiatric:         Mood and Affect: Mood normal.         Behavior: Behavior normal.         Thought Content: Thought content normal.         Judgment: Judgment normal.     Patient's shoes and socks removed.    Right Foot/Ankle   Right Foot Inspection  Skin Exam: skin normal, skin intact, dry skin, callus and callus. No warmth, no erythema, no maceration, no abnormal color, no pre-ulcer and no ulcer.     Toe Exam: ROM and strength within normal limits and right toe deformity (Patient with small plantar wart along plantar surface near fourth toe.  Minimal tenderness to palpation).     Sensory   Vibration: intact  Proprioception: intact  Monofilament testing: intact    Vascular  The right DP pulse is 2+. The right PT pulse is 2+.     Left Foot/Ankle  Left Foot Inspection  Skin Exam: skin normal, skin intact, dry skin and callus. No warmth, no erythema, no maceration, normal color, no pre-ulcer and no ulcer.     Toe Exam: ROM and strength within normal limits.     Sensory   Vibration: intact  Proprioception: intact  Monofilament testing: intact    Vascular  The left DP pulse is 2+. The left PT pulse is 2+.     Assign Risk Category  No deformity present  No loss of protective sensation  No weak pulses  Risk: 0

## 2025-03-24 NOTE — ASSESSMENT & PLAN NOTE
Diabetes.  Latest A1c was still elevated at 8.8 but improved from previously at 9.3.  She will discuss with her endocrinologist regarding continuous glucose monitoring system to better track her blood sugars.  Her eye and foot exam is up-to-date  Lab Results   Component Value Date    HGBA1C 8.8 (A) 02/25/2025       Orders:    Albumin / creatinine urine ratio

## 2025-05-12 ENCOUNTER — DOCUMENTATION (OUTPATIENT)
Dept: ADMINISTRATIVE | Facility: OTHER | Age: 75
End: 2025-05-12

## 2025-05-12 NOTE — PROGRESS NOTES
05/12/25 1:39 PM    Annual Wellness Visit outreach is not required, an AWV was completed at the PCP office.    Thank you.  Pelon Crisostomo MA  [facility]

## 2025-05-14 DIAGNOSIS — Z79.4 TYPE 2 DIABETES MELLITUS WITH HYPERGLYCEMIA, WITH LONG-TERM CURRENT USE OF INSULIN (HCC): ICD-10-CM

## 2025-05-14 DIAGNOSIS — E11.65 TYPE 2 DIABETES MELLITUS WITH HYPERGLYCEMIA, WITH LONG-TERM CURRENT USE OF INSULIN (HCC): ICD-10-CM

## 2025-05-14 NOTE — TELEPHONE ENCOUNTER
Reason for call:   [x] Refill   [] Prior Auth  [] Other:     Office:   [] PCP/Provider -   [x] Specialty/Provider - endo     Medication: contour next test strips     Dose/Frequency: pt tests sugars 4 times a day     Quantity: 400    Pharmacy: [facility]   Does the patient have enough for 3 days?   [] Yes   [] No - Send as HP to POD    [facility]   Does the patient have enough for 10 days?   [x] Yes - pt stated she has about 3-4 weeks left [facility] that she's worried about running out and having to pay out of pocket for them   [] No - Send as HP to POD

## 2025-05-20 ENCOUNTER — APPOINTMENT (EMERGENCY)
Dept: RADIOLOGY | Facility: HOSPITAL | Age: 75
End: 2025-05-20
Payer: MEDICARE

## 2025-05-20 ENCOUNTER — HOSPITAL ENCOUNTER (EMERGENCY)
Facility: HOSPITAL | Age: 75
Discharge: HOME | End: 2025-05-20
Attending: EMERGENCY MEDICINE
Payer: MEDICARE

## 2025-05-20 VITALS
SYSTOLIC BLOOD PRESSURE: 165 MMHG | TEMPERATURE: 97.7 F | RESPIRATION RATE: 18 BRPM | DIASTOLIC BLOOD PRESSURE: 82 MMHG | HEART RATE: 82 BPM | WEIGHT: 162.7 LBS | OXYGEN SATURATION: 95 % | BODY MASS INDEX: 31.78 KG/M2

## 2025-05-20 DIAGNOSIS — J45.901 ASTHMA EXACERBATION: Primary | ICD-10-CM

## 2025-05-20 LAB
ALBUMIN SERPL BCG-MCNC: 4.1 G/DL (ref 3.5–5)
ALP SERPL-CCNC: 75 U/L (ref 34–104)
ALT SERPL W P-5'-P-CCNC: 24 U/L (ref 7–52)
ANION GAP SERPL CALCULATED.3IONS-SCNC: 9 MMOL/L (ref 4–13)
AST SERPL W P-5'-P-CCNC: 25 U/L (ref 13–39)
BASOPHILS # BLD AUTO: 0.03 THOUSANDS/ÂΜL (ref 0–0.1)
BASOPHILS NFR BLD AUTO: 1 % (ref 0–1)
BILIRUB SERPL-MCNC: 0.38 MG/DL (ref 0.2–1)
BUN SERPL-MCNC: 11 MG/DL (ref 5–25)
CALCIUM SERPL-MCNC: 8.7 MG/DL (ref 8.4–10.2)
CHLORIDE SERPL-SCNC: 107 MMOL/L (ref 96–108)
CO2 SERPL-SCNC: 23 MMOL/L (ref 21–32)
CREAT SERPL-MCNC: 0.68 MG/DL (ref 0.6–1.3)
EOSINOPHIL # BLD AUTO: 0.08 THOUSAND/ÂΜL (ref 0–0.61)
EOSINOPHIL NFR BLD AUTO: 2 % (ref 0–6)
ERYTHROCYTE [DISTWIDTH] IN BLOOD BY AUTOMATED COUNT: 13.4 % (ref 11.6–15.1)
FLUAV AG UPPER RESP QL IA.RAPID: NEGATIVE
FLUBV AG UPPER RESP QL IA.RAPID: NEGATIVE
GFR SERPL CREATININE-BSD FRML MDRD: 87 ML/MIN/1.73SQ M
GLUCOSE SERPL-MCNC: 177 MG/DL (ref 65–140)
HCT VFR BLD AUTO: 39.4 % (ref 34.8–46.1)
HGB BLD-MCNC: 12.4 G/DL (ref 11.5–15.4)
IMM GRANULOCYTES # BLD AUTO: 0.02 THOUSAND/UL (ref 0–0.2)
IMM GRANULOCYTES NFR BLD AUTO: 0 % (ref 0–2)
LYMPHOCYTES # BLD AUTO: 0.92 THOUSANDS/ÂΜL (ref 0.6–4.47)
LYMPHOCYTES NFR BLD AUTO: 20 % (ref 14–44)
MCH RBC QN AUTO: 29 PG (ref 26.8–34.3)
MCHC RBC AUTO-ENTMCNC: 31.5 G/DL (ref 31.4–37.4)
MCV RBC AUTO: 92 FL (ref 82–98)
MONOCYTES # BLD AUTO: 0.39 THOUSAND/ÂΜL (ref 0.17–1.22)
MONOCYTES NFR BLD AUTO: 8 % (ref 4–12)
NEUTROPHILS # BLD AUTO: 3.18 THOUSANDS/ÂΜL (ref 1.85–7.62)
NEUTS SEG NFR BLD AUTO: 69 % (ref 43–75)
NRBC BLD AUTO-RTO: 0 /100 WBCS
PLATELET # BLD AUTO: 209 THOUSANDS/UL (ref 149–390)
PMV BLD AUTO: 8.8 FL (ref 8.9–12.7)
POTASSIUM SERPL-SCNC: 4 MMOL/L (ref 3.5–5.3)
PROT SERPL-MCNC: 6.6 G/DL (ref 6.4–8.4)
RBC # BLD AUTO: 4.27 MILLION/UL (ref 3.81–5.12)
SARS-COV+SARS-COV-2 AG RESP QL IA.RAPID: NEGATIVE
SODIUM SERPL-SCNC: 139 MMOL/L (ref 135–147)
WBC # BLD AUTO: 4.62 THOUSAND/UL (ref 4.31–10.16)

## 2025-05-20 PROCEDURE — 87811 SARS-COV-2 COVID19 W/OPTIC: CPT

## 2025-05-20 PROCEDURE — 99285 EMERGENCY DEPT VISIT HI MDM: CPT | Performed by: EMERGENCY MEDICINE

## 2025-05-20 PROCEDURE — 99285 EMERGENCY DEPT VISIT HI MDM: CPT

## 2025-05-20 PROCEDURE — 94640 AIRWAY INHALATION TREATMENT: CPT

## 2025-05-20 PROCEDURE — 80053 COMPREHEN METABOLIC PANEL: CPT

## 2025-05-20 PROCEDURE — 93005 ELECTROCARDIOGRAM TRACING: CPT

## 2025-05-20 PROCEDURE — 36415 COLL VENOUS BLD VENIPUNCTURE: CPT

## 2025-05-20 PROCEDURE — 87804 INFLUENZA ASSAY W/OPTIC: CPT

## 2025-05-20 PROCEDURE — 71045 X-RAY EXAM CHEST 1 VIEW: CPT

## 2025-05-20 PROCEDURE — 85025 COMPLETE CBC W/AUTO DIFF WBC: CPT

## 2025-05-20 RX ORDER — IPRATROPIUM BROMIDE AND ALBUTEROL SULFATE 2.5; .5 MG/3ML; MG/3ML
3 SOLUTION RESPIRATORY (INHALATION) ONCE
Status: COMPLETED | OUTPATIENT
Start: 2025-05-20 | End: 2025-05-20

## 2025-05-20 RX ORDER — ALBUTEROL SULFATE 0.83 MG/ML
2.5 SOLUTION RESPIRATORY (INHALATION) EVERY 6 HOURS PRN
Qty: 75 ML | Refills: 0 | Status: SHIPPED | OUTPATIENT
Start: 2025-05-20 | End: 2025-06-19

## 2025-05-20 RX ADMIN — IPRATROPIUM BROMIDE AND ALBUTEROL SULFATE 3 ML: 2.5; .5 SOLUTION RESPIRATORY (INHALATION) at 21:22

## 2025-05-21 LAB
ATRIAL RATE: 73 BPM
P AXIS: 34 DEGREES
PR INTERVAL: 152 MS
QRS AXIS: -19 DEGREES
QRSD INTERVAL: 72 MS
QT INTERVAL: 370 MS
QTC INTERVAL: 407 MS
T WAVE AXIS: 0 DEGREES
VENTRICULAR RATE: 73 BPM

## 2025-05-21 PROCEDURE — 93010 ELECTROCARDIOGRAM REPORT: CPT | Performed by: INTERNAL MEDICINE

## 2025-05-21 NOTE — ED ATTENDING ATTESTATION
2025  I, Jarred Napier MD, saw and evaluated the patient. I have discussed the patient with the resident/non-physician practitioner and agree with the resident's/non-physician practitioner's findings, Plan of Care, and MDM as documented in the resident's/non-physician practitioner's note, except where noted. All available labs and Radiology studies were reviewed.  I was present for key portions of any procedure(s) performed by the resident/non-physician practitioner and I was immediately available to provide assistance.       At this point I agree with the current assessment done in the Emergency Department.  I have conducted an independent evaluation of this patient a history and physical is as follows: Mild asthma exacerbation.  Patient states she would have stayed at home however her nebulizer medication is .  Denies any chest pain or abdominal pain.  Denies any fever.  ED workup unrevealing.  Feels better after DuoNeb treatment.  Will discharge home with refill of nebulizer medication.    Results Reviewed       Procedure Component Value Units Date/Time    FLU/COVID Rapid Antigen (30 min. TAT) - Preferred screening test in ED [[id number]]  (Normal) Collected: 25    Lab Status: Final result Specimen: Nares from Nose Updated: 25     SARS COV Rapid Antigen Negative     Influenza A Rapid Antigen Negative     Influenza B Rapid Antigen Negative    Narrative:      This test has been performed using the [employer] 2 FLU+SARS Antigen test under the Emergency Use Authorization (EUA). This test has been validated by the  and verified by the performing laboratory. The Monica uses lateral flow immunofluorescent sandwich assay to detect SARS-COV, Influenza A and Influenza B Antigen.     The [employer] 2 SARS Antigen test does not differentiate between SARS-CoV and SARS-CoV-2.     Negative results are presumptive and may be confirmed with a molecular assay, if necessary,  for patient management. Negative results do not rule out SARS-CoV-2 or influenza infection and should not be used as the sole basis for treatment or patient management decisions. A negative test result may occur if the level of antigen in a sample is below the limit of detection of this test.     Positive results are indicative of the presence of viral antigens, but do not rule out bacterial infection or co-infection with other viruses.     All test results should be used as an adjunct to clinical observations and other information available to the provider.    FOR PEDIATRIC PATIENTS - copy/paste COVID Guidelines URL to browser: [email or web address]    Comprehensive metabolic panel [[id number]]  (Abnormal) Collected: 05/20/25 2124    Lab Status: Final result Specimen: Blood from Arm, Right Updated: 05/20/25 2152     Sodium 139 mmol/L      Potassium 4.0 mmol/L      Chloride 107 mmol/L      CO2 23 mmol/L      ANION GAP 9 mmol/L      BUN 11 mg/dL      Creatinine 0.68 mg/dL      Glucose 177 mg/dL      Calcium 8.7 mg/dL      AST 25 U/L      ALT 24 U/L      Alkaline Phosphatase 75 U/L      Total Protein 6.6 g/dL      Albumin 4.1 g/dL      Total Bilirubin 0.38 mg/dL      eGFR 87 ml/min/1.73sq m     Narrative:      [employer] guidelines for Chronic Kidney Disease (CKD):     Stage 1 with normal or high GFR (GFR > 90 mL/min/1.73 square meters)    Stage 2 Mild CKD (GFR = 60-89 mL/min/1.73 square meters)    Stage 3A Moderate CKD (GFR = 45-59 mL/min/1.73 square meters)    Stage 3B Moderate CKD (GFR = 30-44 mL/min/1.73 square meters)    Stage 4 Severe CKD (GFR = 15-29 mL/min/1.73 square meters)    Stage 5 End Stage CKD (GFR <15 mL/min/1.73 square meters)  Note: GFR calculation is accurate only with a steady state creatinine    CBC and differential [[id number]]  (Abnormal) Collected: 05/20/25 2124    Lab Status: Final result Specimen: Blood from Arm, Right  Updated: 05/20/25 2134     WBC 4.62 Thousand/uL      RBC 4.27 Million/uL      Hemoglobin 12.4 g/dL      Hematocrit 39.4 %      MCV 92 fL      MCH 29.0 pg      MCHC 31.5 g/dL      RDW 13.4 %      MPV 8.8 fL      Platelets 209 Thousands/uL      nRBC 0 /100 WBCs      Segmented % 69 %      Immature Grans % 0 %      Lymphocytes % 20 %      Monocytes % 8 %      Eosinophils Relative 2 %      Basophils Relative 1 %      Absolute Neutrophils 3.18 Thousands/µL      Absolute Immature Grans 0.02 Thousand/uL      Absolute Lymphocytes 0.92 Thousands/µL      Absolute Monocytes 0.39 Thousand/µL      Eosinophils Absolute 0.08 Thousand/µL      Basophils Absolute 0.03 Thousands/µL           XR chest 1 view portable   ED Interpretation by Jarred Lamb MD (05/20 2151)   My personal interpretation-no acute cardiopulmonary disease appreciated.            ED Course         Critical Care Time  Procedures

## 2025-05-21 NOTE — ED PROVIDER NOTES
Time reflects when diagnosis was documented in both MDM as applicable and the Disposition within this note       Time User Action Codes Description Comment    5/20/2025  9:52 PM Adonis, Jarred VELA Add [J45.901] Asthma exacerbation           ED Disposition       ED Disposition   Discharge    Condition   Stable    Date/Time   Tue May 20, 2025 10:03 PM    Comment   Becki Abel discharge to home/self care.                   Assessment & Plan       Medical Decision Making  74-year-old female history of asthma presenting due to 2 days of cough and concern for asthma exacerbation with no medications at home.  Patient states cough started 2 days ago and has been worsening since then.  Patient does feel wheezy, would typically treat herself at home with her albuterol nebulizer but ran out of albuterol.  Denies any chest pain, fever, chills.    Vitals reviewed, hypertensive but otherwise unremarkable.  Patient does have mild expiratory wheeze bilaterally.  Treated with DuoNeb and felt much better afterwards.  Lab work unremarkable.  No acute pathology found on chest x-ray.  Plan for discharge with albuterol nebulizer solution sent to pharmacy.    Amount and/or Complexity of Data Reviewed  Labs: ordered.  Radiology: ordered and independent interpretation performed. Decision-making details documented in ED Course.    Risk  Prescription drug management.        ED Course as of 05/20/25 2316   Tue May 20, 2025   2110 Cough 2 days ago, worsening today after dinner. Feels wheezy but didn't have it at home.      Denies SOB, CP   2114 Blood Pressure: 165/82   2115 Temperature: 97.7 °F (36.5 °C)   2115 Pulse: 82   2115 Respirations: 18   2115 SpO2: 95 %   2151 XR chest 1 view portable  My personal interpretation-no acute cardiopulmonary disease appreciated.   2204 On reassessment, patient no longer wheezing.  Viral swab negative, CMP unremarkable.  Will send nebulizer solution to pharmacy.  Patient is agreeable and appropriate for  "discharge at this time.  Return precautions discussed.       Medications   ipratropium-albuterol (DUO-NEB) 0.5-2.5 mg/3 mL inhalation solution 3 mL (3 mL Nebulization Given 5/20/25 2122)       ED Risk Strat Scores                    (ISAR) Identification of Seniors at Risk  Before the illness or injury that brought you to the Emergency, did you need someone to help you on a regular basis?: 0  In the last 24 hours, have you needed more help than usual?: 0  Have you been hospitalized for one or more nights during the past 6 months?: 0  In general, do you see well?: 1  In general, do you have serious problems with your memory?: 0  Do you take more than three different medications every day?: 1  ISAR Score: 2            SBIRT 20yo+      Flowsheet Row Most Recent Value   Initial Alcohol Screen: US AUDIT-C     1. How often do you have a drink containing alcohol? 0 Filed at: 05/20/2025 2046   2. How many drinks containing alcohol do you have on a typical day you are drinking?  0 Filed at: 05/20/2025 2046   3b. FEMALE Any Age, or MALE 65+: How often do you have 4 or more drinks on one occassion? 0 Filed at: 05/20/2025 2046   Audit-C Score 0 Filed at: 05/20/2025 2046                            History of Present Illness       Chief Complaint   Patient presents with    Shortness of Breath     Patient thought she was having asthma attack. Reports not being able to catch her breath and cough \"got really weird\". +generalized weakness, +HA       Past Medical History:   Diagnosis Date    Anxiety     Anxiety     Arthritis of right knee     Cellulitis     Contact dermatitis     Cough     Diabetes mellitus (HCC)     GERD without esophagitis     Headache     Hiatal hernia     Hyperlipidemia     Knee sprain     Lyme disease     Tick bite       Past Surgical History:   Procedure Laterality Date    ABDOMINAL SURGERY      tummy tuck    CATARACT EXTRACTION, BILATERAL Bilateral 11/21 and 12/21    HYSTERECTOMY      age 49    OR COLONOSCOPY " FLX DX W/COLLJ SPEC WHEN PFRMD N/A 5/8/2019    Procedure: COLONOSCOPY;  Surgeon: Gillian Cartagena MD;  Location: [facility];  Service: Gastroenterology    MA ESOPHAGOGASTRODUODENOSCOPY TRANSORAL DIAGNOSTIC N/A 5/8/2019    Procedure: ESOPHAGOGASTRODUODENOSCOPY (EGD);  Surgeon: Gillian Cartagena MD;  Location: [facility];  Service: Gastroenterology    TUBAL LIGATION        Family History   Problem Relation Age of Onset    Mental illness Mother     Heart disease Father     Colon cancer Father 72    No Known Problems Sister     No Known Problems Daughter     No Known Problems Maternal Grandmother     No Known Problems Maternal Grandfather     No Known Problems Paternal Grandmother     No Known Problems Paternal Grandfather     No Known Problems Son     No Known Problems Sister     No Known Problems Sister     No Known Problems Sister     No Known Problems Sister     No Known Problems Sister     Lymphoma Brother 38    No Known Problems Brother     No Known Problems Paternal Aunt     No Known Problems Paternal Aunt     No Known Problems Paternal Aunt     No Known Problems Paternal Aunt     Breast cancer Neg Hx     Breast cancer additional onset Neg Hx     Endometrial cancer Neg Hx     Ovarian cancer Neg Hx     BRCA 1/2 Neg Hx     BRCA1 Negative Neg Hx     BRCA1 Positive Neg Hx     BRCA2 Negative Neg Hx     BRCA2 Positive Neg Hx       Social History[1]   E-Cigarette/Vaping    E-Cigarette Use Never User       E-Cigarette/Vaping Substances    Nicotine No     THC No     CBD No     Flavoring No     Other No     Unknown No       I have reviewed and agree with the history as documented.     74-year-old female history of asthma presenting due to 2 days of cough and concern for asthma exacerbation with no medications at home.  Patient states cough started 2 days ago and has been worsening since then.  Patient does feel wheezy, would typically treat herself at home with her albuterol nebulizer but ran out of albuterol.  Denies any  chest pain, fever, chills.        Review of Systems   Constitutional:  Negative for chills and fever.   HENT:  Negative for ear pain and sore throat.    Respiratory:  Positive for cough and wheezing. Negative for shortness of breath.    Cardiovascular:  Negative for chest pain and palpitations.   Gastrointestinal:  Positive for nausea and vomiting. Negative for abdominal pain.   Genitourinary:  Negative for dysuria and hematuria.   Musculoskeletal:  Negative for arthralgias and back pain.   Skin:  Negative for color change and rash.   Neurological:  Negative for dizziness, seizures, syncope, light-headedness and headaches.   All other systems reviewed and are negative.          Objective       ED Triage Vitals [05/20/25 2043]   Temperature Pulse Blood Pressure Respirations SpO2 Patient Position - Orthostatic VS   97.7 °F (36.5 °C) 82 165/82 18 95 % Lying      Temp Source Heart Rate Source BP Location FiO2 (%) Pain Score    Oral Monitor Right arm -- --      Vitals      Date and Time Temp Pulse SpO2 Resp BP Pain Score FACES Pain Rating User   05/20/25 2043 97.7 °F (36.5 °C) 82 95 % 18 165/82 -- -- MD            Physical Exam  Vitals and nursing note reviewed.   Constitutional:       General: She is not in acute distress.     Appearance: She is well-developed.   HENT:      Head: Normocephalic and atraumatic.      Nose: Nose normal. No congestion.     Eyes:      Extraocular Movements: Extraocular movements intact.      Conjunctiva/sclera: Conjunctivae normal.      Pupils: Pupils are equal, round, and reactive to light.       Cardiovascular:      Rate and Rhythm: Normal rate and regular rhythm.      Pulses: Normal pulses.      Heart sounds: Normal heart sounds. No murmur heard.  Pulmonary:      Effort: Pulmonary effort is normal. No respiratory distress.      Breath sounds: Wheezing present.   Chest:      Chest wall: No tenderness.   Abdominal:      General: Abdomen is flat. Bowel sounds are normal.      Palpations:  Abdomen is soft.      Tenderness: There is no abdominal tenderness. There is no right CVA tenderness or left CVA tenderness.     Musculoskeletal:         General: No deformity or signs of injury. Normal range of motion.      Cervical back: Normal range of motion and neck supple. No rigidity or tenderness.      Right lower leg: No edema.      Left lower leg: No edema.     Skin:     General: Skin is warm and dry.      Findings: No bruising, lesion or rash.     Neurological:      General: No focal deficit present.      Mental Status: She is alert and oriented to person, place, and time.         Results Reviewed       Procedure Component Value Units Date/Time    FLU/COVID Rapid Antigen (30 min. TAT) - Preferred screening test in ED [[id number]]  (Normal) Collected: 05/20/25 2124    Lab Status: Final result Specimen: Nares from Nose Updated: 05/20/25 2152     SARS COV Rapid Antigen Negative     Influenza A Rapid Antigen Negative     Influenza B Rapid Antigen Negative    Narrative:      This test has been performed using the [employer] Monica 2 FLU+SARS Antigen test under the Emergency Use Authorization (EUA). This test has been validated by the  and verified by the performing laboratory. The Monica uses lateral flow immunofluorescent sandwich assay to detect SARS-COV, Influenza A and Influenza B Antigen.     The Quidel Monica 2 SARS Antigen test does not differentiate between SARS-CoV and SARS-CoV-2.     Negative results are presumptive and may be confirmed with a molecular assay, if necessary, for patient management. Negative results do not rule out SARS-CoV-2 or influenza infection and should not be used as the sole basis for treatment or patient management decisions. A negative test result may occur if the level of antigen in a sample is below the limit of detection of this test.     Positive results are indicative of the presence of viral antigens, but do not rule out bacterial infection or co-infection with  other viruses.     All test results should be used as an adjunct to clinical observations and other information available to the provider.    FOR PEDIATRIC PATIENTS - copy/paste COVID Guidelines URL to browser: [email or web address]    Comprehensive metabolic panel [[id number]]  (Abnormal) Collected: 05/20/25 2124    Lab Status: Final result Specimen: Blood from Arm, Right Updated: 05/20/25 2152     Sodium 139 mmol/L      Potassium 4.0 mmol/L      Chloride 107 mmol/L      CO2 23 mmol/L      ANION GAP 9 mmol/L      BUN 11 mg/dL      Creatinine 0.68 mg/dL      Glucose 177 mg/dL      Calcium 8.7 mg/dL      AST 25 U/L      ALT 24 U/L      Alkaline Phosphatase 75 U/L      Total Protein 6.6 g/dL      Albumin 4.1 g/dL      Total Bilirubin 0.38 mg/dL      eGFR 87 ml/min/1.73sq m     Narrative:      National Kidney Disease Foundation guidelines for Chronic Kidney Disease (CKD):     Stage 1 with normal or high GFR (GFR > 90 mL/min/1.73 square meters)    Stage 2 Mild CKD (GFR = 60-89 mL/min/1.73 square meters)    Stage 3A Moderate CKD (GFR = 45-59 mL/min/1.73 square meters)    Stage 3B Moderate CKD (GFR = 30-44 mL/min/1.73 square meters)    Stage 4 Severe CKD (GFR = 15-29 mL/min/1.73 square meters)    Stage 5 End Stage CKD (GFR <15 mL/min/1.73 square meters)  Note: GFR calculation is accurate only with a steady state creatinine    CBC and differential [[id number]]  (Abnormal) Collected: 05/20/25 2124    Lab Status: Final result Specimen: Blood from Arm, Right Updated: 05/20/25 2134     WBC 4.62 Thousand/uL      RBC 4.27 Million/uL      Hemoglobin 12.4 g/dL      Hematocrit 39.4 %      MCV 92 fL      MCH 29.0 pg      MCHC 31.5 g/dL      RDW 13.4 %      MPV 8.8 fL      Platelets 209 Thousands/uL      nRBC 0 /100 WBCs      Segmented % 69 %      Immature Grans % 0 %      Lymphocytes % 20 %      Monocytes % 8 %      Eosinophils Relative 2 %      Basophils Relative 1 %      Absolute  Neutrophils 3.18 Thousands/µL      Absolute Immature Grans 0.02 Thousand/uL      Absolute Lymphocytes 0.92 Thousands/µL      Absolute Monocytes 0.39 Thousand/µL      Eosinophils Absolute 0.08 Thousand/µL      Basophils Absolute 0.03 Thousands/µL             XR chest 1 view portable   ED Interpretation by Jarred Lamb MD ([id number])   My personal interpretation-no acute cardiopulmonary disease appreciated.          ECG 12 Lead Documentation Only    Date/Time: 2025 10:48 PM    Performed by: Jarred Lamb MD  Authorized by: Jarred Lamb MD    Patient location:  ED  Interpretation:     Interpretation: abnormal    Rate:     ECG rate:  73    ECG rate assessment: normal    Rhythm:     Rhythm: sinus rhythm    Ectopy:     Ectopy: none    QRS:     QRS axis:  Normal    QRS intervals:  Normal  Conduction:     Conduction: normal    ST segments:     ST segments:  Normal  T waves:     T waves: normal        ED Medication and Procedure Management   Prior to Admission Medications   Prescriptions Last Dose Informant Patient Reported? Taking?   Cholecalciferol (VITAMIN D PO)  Self Yes No   Sig: Take 1 tablet by mouth daily   DULoxetine (CYMBALTA) 30 mg delayed release capsule  Self Yes No   Sig: Take 1 capsule by mouth in the morning   Diclofenac Sodium (VOLTAREN) 1 %  Self Yes No   Sig: Apply 1 application topically 4 (four) times a day   Ferrous Sulfate Dried (FERROUS SULFATE CR PO) Not Taking Self Yes No   Sig: Iron TABS    Refills: 0       Active   Patient not taking: Reported on 2025   Insulin Glargine Solostar (Lantus SoloStar) 100 UNIT/ML SOPN   No No   Si units daily   Insulin Pen Needle (BD Pen Needle Ester U/F) 32G X 4 MM MISC  Self No No   Sig: Inject under the skin 4 (four) times a day   Lifitegrast (XIIDRA OP)  Self Yes No   Sig: Apply to eye 2 vials a day     Microlet Lancets MISC  Self No No   Sig: Use 4 (four) times a day   albuterol (2.5 mg/3 mL) 0.083 % nebulizer solution  Self No No   Sig: Take 3 mL  (2.5 mg total) by nebulization every 6 (six) hours as needed for wheezing or shortness of breath   amLODIPine (NORVASC) 2.5 mg tablet  Self No No   Sig: Take 1 tablet (2.5 mg total) by mouth daily at bedtime   artificial tear (LUBRIFRESH P.M.) 83-15 % ophthalmic ointment  Self Yes No   Sig: daily at bedtime   aspirin 81 mg chewable tablet  Self Yes No   Sig: Chew 81 mg daily   baclofen 10 mg tablet  Self Yes No   Sig: Take 10 mg by mouth daily at bedtime   ezetimibe (ZETIA) 10 mg tablet  Self No No   Sig: Take 0.5 tablets (5 mg total) by mouth daily   fluticasone-vilanterol (Breo Ellipta) 200-25 mcg/actuation inhaler  Self No No   Sig: Inhale 1 puff daily Rinse mouth after use.   glucose blood (Contour Next Test) test strip   No No   Sig: Use 1 each 4 (four) times a day   hydroCHLOROthiazide 12.5 mg tablet  Self No No   Sig: Take 1 tablet (12.5 mg total) by mouth daily   hydroxychloroquine (PLAQUENIL) 200 mg tablet  Self Yes No   Sig: Take 200 mg by mouth 2 (two) times a day with meals   insulin aspart (NovoLOG FlexPen) 100 UNIT/ML injection pen   No No   Si  units before meals plus sliding scale - max dose 30 units daily.   losartan (COZAAR) 50 mg tablet  Self No No   Si tab twice per day   metFORMIN (GLUCOPHAGE-XR) 500 mg 24 hr tablet   No No   Sig: Take 1 tablet (500 mg total) by mouth 2 (two) times a day with meals   predniSONE 10 mg tablet Not Taking Self No No   Sig: Take 4 tablets for 2 days, 3 tablets for 2 days, 2 tablets for 2 days, 1 tablet for 2 days.   Patient not taking: Reported on 3/24/2025   sertraline (ZOLOFT) 50 mg tablet Not Taking Self No No   Sig: Take 1 tablet (50 mg total) by mouth daily   Patient not taking: Reported on 3/24/2025   simvastatin (ZOCOR) 20 mg tablet  Self No No   Sig: Take 1 tablet (20 mg total) by mouth daily   traMADol (ULTRAM) 50 mg tablet  Self Yes No   Sig: TAKE 1 TABLET BY MOUTH EVERY 12 (TWELVE) HOURS AS NEEDED FOR MODERATE PAIN      Facility-Administered  Medications: None     Discharge Medication List as of 5/20/2025 10:04 PM        START taking these medications    Details   !! albuterol (2.5 mg/3 mL) 0.083 % nebulizer solution Take 3 mL (2.5 mg total) by nebulization every 6 (six) hours as needed for wheezing or shortness of breath, Starting Tue 5/20/2025, Until Thu 6/19/2025 at 2359, Normal       !! - Potential duplicate medications found. Please discuss with provider.        CONTINUE these medications which have NOT CHANGED    Details   !! albuterol (2.5 mg/3 mL) 0.083 % nebulizer solution Take 3 mL (2.5 mg total) by nebulization every 6 (six) hours as needed for wheezing or shortness of breath, Starting Wed 12/7/2022, Normal      amLODIPine (NORVASC) 2.5 mg tablet Take 1 tablet (2.5 mg total) by mouth daily at bedtime, Starting Wed 12/18/2024, Normal      artificial tear (LUBRIFRESH P.M.) 83-15 % ophthalmic ointment daily at bedtime, Historical Med      aspirin 81 mg chewable tablet Chew 81 mg daily, Historical Med      baclofen 10 mg tablet Take 10 mg by mouth daily at bedtime, Starting Tue 6/25/2019, Historical Med      Cholecalciferol (VITAMIN D PO) Take 1 tablet by mouth daily, Historical Med      Diclofenac Sodium (VOLTAREN) 1 % Apply 1 application topically 4 (four) times a day, Historical Med      DULoxetine (CYMBALTA) 30 mg delayed release capsule Take 1 capsule by mouth in the morning, Starting Wed 11/20/2024, Historical Med      ezetimibe (ZETIA) 10 mg tablet Take 0.5 tablets (5 mg total) by mouth daily, Starting Wed 12/18/2024, Until Mon 6/16/2025, Normal      Ferrous Sulfate Dried (FERROUS SULFATE CR PO) Iron TABS    Refills: 0       Active, Historical Med      fluticasone-vilanterol (Breo Ellipta) 200-25 mcg/actuation inhaler Inhale 1 puff daily Rinse mouth after use., Starting Wed 12/18/2024, Normal      glucose blood (Contour Next Test) test strip Use 1 each 4 (four) times a day, Starting Wed 5/14/2025, Normal      hydroCHLOROthiazide 12.5 mg  tablet Take 1 tablet (12.5 mg total) by mouth daily, Starting Wed 12/18/2024, Normal      hydroxychloroquine (PLAQUENIL) 200 mg tablet Take 200 mg by mouth 2 (two) times a day with meals, Historical Med      insulin aspart (NovoLOG FlexPen) 100 UNIT/ML injection pen 6  units before meals plus sliding scale - max dose 30 units daily., Fill Later      Insulin Glargine Solostar (Lantus SoloStar) 100 UNIT/ML SOPN 30 units daily, Fill Later      Insulin Pen Needle (BD Pen Needle Ester U/F) 32G X 4 MM MISC Inject under the skin 4 (four) times a day, Starting Wed 12/18/2024, Normal      Lifitegrast (XIIDRA OP) Apply to eye 2 vials a day  , Historical Med      losartan (COZAAR) 50 mg tablet 1 tab twice per day, Normal      metFORMIN (GLUCOPHAGE-XR) 500 mg 24 hr tablet Take 1 tablet (500 mg total) by mouth 2 (two) times a day with meals, Starting Tue 2/25/2025, Normal      Microlet Lancets MISC Use 4 (four) times a day, Starting Fri 1/14/2022, Normal      predniSONE 10 mg tablet Take 4 tablets for 2 days, 3 tablets for 2 days, 2 tablets for 2 days, 1 tablet for 2 days., Normal      sertraline (ZOLOFT) 50 mg tablet Take 1 tablet (50 mg total) by mouth daily, Starting Thu 8/29/2024, Normal      simvastatin (ZOCOR) 20 mg tablet Take 1 tablet (20 mg total) by mouth daily, Starting Wed 12/18/2024, Normal      traMADol (ULTRAM) 50 mg tablet TAKE 1 TABLET BY MOUTH EVERY 12 (TWELVE) HOURS AS NEEDED FOR MODERATE PAIN, Historical Med       !! - Potential duplicate medications found. Please discuss with provider.        No discharge procedures on file.  ED SEPSIS DOCUMENTATION   Time reflects when diagnosis was documented in both MDM as applicable and the Disposition within this note       Time User Action Codes Description Comment    5/20/2025  9:52 PM Jarred Lamb [J45.901] Asthma exacerbation                    [1]   Social History  Tobacco Use    Smoking status: Former     Current packs/day: 0.00     Average packs/day: 0.5  packs/day for 25.0 years (12.5 ttl pk-yrs)     Types: Cigarettes     Start date: 1983     Quit date: 2008     Years since quittin.8    Smokeless tobacco: Never   Vaping Use    Vaping status: Never Used   Substance Use Topics    Alcohol use: Not Currently     Comment: seldom    Drug use: Not Currently     Types: Marijuana     Comment: smokes weed 3x per week        Jarred Lamb MD  [phone]

## 2025-05-22 ENCOUNTER — OFFICE VISIT (OUTPATIENT)
Dept: PODIATRY | Facility: CLINIC | Age: 75
End: 2025-05-22
Payer: MEDICARE

## 2025-05-22 VITALS — WEIGHT: 160 LBS | RESPIRATION RATE: 18 BRPM | HEIGHT: 60 IN | BODY MASS INDEX: 31.41 KG/M2

## 2025-05-22 DIAGNOSIS — M76.62 ACHILLES TENDINITIS OF LEFT LOWER EXTREMITY: Primary | ICD-10-CM

## 2025-05-22 DIAGNOSIS — E11.9 CONTROLLED TYPE 2 DIABETES MELLITUS WITHOUT COMPLICATION, WITHOUT LONG-TERM CURRENT USE OF INSULIN (HCC): ICD-10-CM

## 2025-05-22 DIAGNOSIS — M72.2 PLANTAR FASCIITIS: ICD-10-CM

## 2025-05-22 PROCEDURE — 99213 OFFICE O/P EST LOW 20 MIN: CPT | Performed by: PODIATRIST

## 2025-05-22 NOTE — PROGRESS NOTES
Name: Becki Roman      : 1950      MRN: [id number]  Encounter Provider: Terence Craig DPM  Encounter Date: 2025   Encounter department: [facility] [place]    Explained to patient that the left Achilles tendon pain is due to scarring/fibrosis about the Achilles tendon.  Surgical intervention needed to remove this fibrotic tissue but not recommended due to patient's blood sugar and medical history.  Patient advised to have her physical therapist work on this while she is having knees treated.  She is going to physical therapy at [facility].  Also recommended continued usage of Voltaren gel.    Also explained that she is dealing with plantar fasciitis of the right heel.  Again recommended physical therapy.  Reappoint as needed.  :  Assessment & Plan  Achilles tendinitis of left lower extremity  Physical therapy       Plantar fasciitis  Physical therapy       Controlled type 2 diabetes mellitus without complication, without long-term current use of insulin ([facility])    Lab Results   Component Value Date    HGBA1C 8.8 (A) 2025                History of Present Illness   HPI  Becki Roman is a 74 y.o. female who presents for pedal assessment.  Patient is a poorly controlled diabetic female complaining of thickening and pain along her left Achilles tendon.  She also relates pain deep inside her right heel.  Patient states that she utilizes Voltaren gel on a regular basis for the left Achilles pain but has not found it successful.  Patient currently going to physical therapy for knee pain.  The patient denies numbness or tingling in her feet.    I personally reviewed an A1c dated 2024.  It was 9.3    I personally viewed an A1c dated 2025.  It was 8.8.          Review of Systems   Gastrointestinal:         GERD   Musculoskeletal:  Positive for arthralgias.   Neurological:  Negative for weakness and numbness.   Psychiatric/Behavioral: Negative.                Objective   Resp 18   Ht  5' (1.524 m)   Wt 72.6 kg (160 lb)   BMI 31.25 kg/m²      Physical Exam  Constitutional:       Appearance: Normal appearance.     Cardiovascular:      Pulses: Normal pulses.     Musculoskeletal:         General: Tenderness present.      Comments: Left Achilles tendon exhibits fibrosis and thickening proximal to its insertion into the calcaneus.    Pain with palpation central aspect right heel at fascia insertion into calcaneus.     Skin:     General: Skin is warm.     Neurological:      General: No focal deficit present.      Mental Status: She is oriented to person, place, and time.      Sensory: No sensory deficit.      Comments: Sensorium intact per [place] monofilament.

## 2025-06-03 ENCOUNTER — APPOINTMENT (OUTPATIENT)
Dept: LAB | Facility: CLINIC | Age: 75
End: 2025-06-03
Payer: MEDICARE

## 2025-06-03 ENCOUNTER — RESULTS FOLLOW-UP (OUTPATIENT)
Dept: ENDOCRINOLOGY | Facility: CLINIC | Age: 75
End: 2025-06-03

## 2025-06-03 DIAGNOSIS — E11.9 TYPE 2 DIABETES MELLITUS WITHOUT COMPLICATION, WITHOUT LONG-TERM CURRENT USE OF INSULIN (HCC): ICD-10-CM

## 2025-06-03 LAB
ALBUMIN SERPL BCG-MCNC: 4 G/DL (ref 3.5–5)
ALP SERPL-CCNC: 77 U/L (ref 34–104)
ALT SERPL W P-5'-P-CCNC: 23 U/L (ref 7–52)
ANION GAP SERPL CALCULATED.3IONS-SCNC: 6 MMOL/L (ref 4–13)
AST SERPL W P-5'-P-CCNC: 21 U/L (ref 13–39)
BILIRUB SERPL-MCNC: 0.45 MG/DL (ref 0.2–1)
BUN SERPL-MCNC: 18 MG/DL (ref 5–25)
CALCIUM SERPL-MCNC: 9.1 MG/DL (ref 8.4–10.2)
CHLORIDE SERPL-SCNC: 103 MMOL/L (ref 96–108)
CO2 SERPL-SCNC: 32 MMOL/L (ref 21–32)
CREAT SERPL-MCNC: 0.74 MG/DL (ref 0.6–1.3)
EST. AVERAGE GLUCOSE BLD GHB EST-MCNC: 235 MG/DL
GFR SERPL CREATININE-BSD FRML MDRD: 80 ML/MIN/1.73SQ M
GLUCOSE P FAST SERPL-MCNC: 87 MG/DL (ref 65–99)
HBA1C MFR BLD: 9.8 %
POTASSIUM SERPL-SCNC: 3.8 MMOL/L (ref 3.5–5.3)
PROT SERPL-MCNC: 6.8 G/DL (ref 6.4–8.4)
SODIUM SERPL-SCNC: 141 MMOL/L (ref 135–147)

## 2025-06-03 PROCEDURE — 83036 HEMOGLOBIN GLYCOSYLATED A1C: CPT

## 2025-06-03 PROCEDURE — 36415 COLL VENOUS BLD VENIPUNCTURE: CPT

## 2025-06-03 PROCEDURE — 80053 COMPREHEN METABOLIC PANEL: CPT

## 2025-06-04 ENCOUNTER — OFFICE VISIT (OUTPATIENT)
Dept: ENDOCRINOLOGY | Facility: CLINIC | Age: 75
End: 2025-06-04
Payer: MEDICARE

## 2025-06-04 VITALS
BODY MASS INDEX: 31.45 KG/M2 | HEIGHT: 60 IN | HEART RATE: 80 BPM | SYSTOLIC BLOOD PRESSURE: 148 MMHG | WEIGHT: 160.2 LBS | DIASTOLIC BLOOD PRESSURE: 80 MMHG

## 2025-06-04 DIAGNOSIS — E78.5 HYPERLIPIDEMIA, UNSPECIFIED HYPERLIPIDEMIA TYPE: ICD-10-CM

## 2025-06-04 DIAGNOSIS — I10 HYPERTENSION, UNSPECIFIED TYPE: ICD-10-CM

## 2025-06-04 DIAGNOSIS — E11.65 TYPE 2 DIABETES MELLITUS WITH HYPERGLYCEMIA, WITH LONG-TERM CURRENT USE OF INSULIN (HCC): Primary | ICD-10-CM

## 2025-06-04 DIAGNOSIS — Z79.4 TYPE 2 DIABETES MELLITUS WITH HYPERGLYCEMIA, WITH LONG-TERM CURRENT USE OF INSULIN (HCC): Primary | ICD-10-CM

## 2025-06-04 PROCEDURE — G2211 COMPLEX E/M VISIT ADD ON: HCPCS | Performed by: PHYSICIAN ASSISTANT

## 2025-06-04 PROCEDURE — 99214 OFFICE O/P EST MOD 30 MIN: CPT | Performed by: PHYSICIAN ASSISTANT

## 2025-06-04 RX ORDER — METFORMIN HYDROCHLORIDE 500 MG/1
500 TABLET, EXTENDED RELEASE ORAL 2 TIMES DAILY WITH MEALS
Qty: 180 TABLET | Refills: 1 | Status: SHIPPED | OUTPATIENT
Start: 2025-06-04

## 2025-06-04 RX ORDER — INSULIN ASPART 100 [IU]/ML
INJECTION, SOLUTION INTRAVENOUS; SUBCUTANEOUS
Qty: 27 ML | Refills: 1 | Status: SHIPPED | OUTPATIENT
Start: 2025-06-04

## 2025-06-04 RX ORDER — PEN NEEDLE, DIABETIC 32GX 5/32"
NEEDLE, DISPOSABLE MISCELLANEOUS 4 TIMES DAILY
Qty: 400 EACH | Refills: 1 | Status: SHIPPED | OUTPATIENT
Start: 2025-06-04

## 2025-06-04 RX ORDER — INSULIN GLARGINE 100 [IU]/ML
INJECTION, SOLUTION SUBCUTANEOUS
Qty: 45 ML | Refills: 3 | Status: SHIPPED | OUTPATIENT
Start: 2025-06-04

## 2025-06-04 RX ORDER — AMLODIPINE BESYLATE 5 MG/1
5 TABLET ORAL
Qty: 90 TABLET | Refills: 1 | Status: SHIPPED | OUTPATIENT
Start: 2025-06-04

## 2025-06-04 RX ORDER — SIMVASTATIN 20 MG
20 TABLET ORAL DAILY
Qty: 90 TABLET | Refills: 1 | Status: SHIPPED | OUTPATIENT
Start: 2025-06-04

## 2025-06-04 RX ORDER — LOSARTAN POTASSIUM 50 MG/1
TABLET ORAL
Qty: 180 TABLET | Refills: 1 | Status: SHIPPED | OUTPATIENT
Start: 2025-06-04

## 2025-06-04 NOTE — ASSESSMENT & PLAN NOTE
BP above goal today  Continue current medication with an increase of Norvasc to 5mg daily  Orders:  •  amLODIPine (NORVASC) 5 mg tablet; Take 1 tablet (5 mg total) by mouth daily at bedtime  •  losartan (COZAAR) 50 mg tablet; 1 tab twice per day  •  simvastatin (ZOCOR) 20 mg tablet; Take 1 tablet (20 mg total) by mouth daily

## 2025-06-04 NOTE — PATIENT INSTRUCTIONS
Use sliding scale:1 unit for every 40 above 180     181 - 220: +1 unit  221 - 260: +2 units  261 - 300: +3 units  301 - 340: +4 units  341 - 380: +5 units  381 - 420: +6 units  421 + : +7 units

## 2025-06-04 NOTE — PROGRESS NOTES
Name: Becki Roman      : 1950      MRN: [id number]  Encounter Provider: Miesha Santana PA-C  Encounter Date: 2025   Encounter department: [facility] [place]    Chief Complaint   Patient presents with   • Diabetes Type 2   :  Assessment & Plan  Type 2 diabetes mellitus with hyperglycemia, with long-term current use of insulin (HCC)    Lab Results   Component Value Date    HGBA1C 9.8 (H) 2025   Current HbA1c represents poor control. Blood sugars demonstrating variability likely due to patient driven insulin dosing.   Continue current regimen with the following adjustments:  Encouraged compliance with 1:40 for BG >180 corrective scale  Patient not interested in MNT at this time  Advised to adhere to diabetic diet, and recommended staying active/exercising routinely.  Discussed symptoms and treatment of hypoglycemia.    Recommended routine follow-up with Ophthalmology and foot exams.   Ordered blood work to complete prior to next visit.  Orders:  •  Comprehensive metabolic panel; Future  •  Hemoglobin A1C; Future  •  insulin aspart (NovoLOG FlexPen) 100 UNIT/ML injection pen; 6  units before meals plus sliding scale - max dose 30 units daily.  •  Insulin Glargine Solostar (Lantus SoloStar) 100 UNIT/ML SOPN; 30 units daily  •  Insulin Pen Needle (BD Pen Needle Ester U/F) 32G X 4 MM MISC; Inject under the skin 4 (four) times a day  •  metFORMIN (GLUCOPHAGE-XR) 500 mg 24 hr tablet; Take 1 tablet (500 mg total) by mouth 2 (two) times a day with meals    Hypertension, unspecified type  BP above goal today  Continue current medication with an increase of Norvasc to 5mg daily  Orders:  •  amLODIPine (NORVASC) 5 mg tablet; Take 1 tablet (5 mg total) by mouth daily at bedtime  •  losartan (COZAAR) 50 mg tablet; 1 tab twice per day  •  simvastatin (ZOCOR) 20 mg tablet; Take 1 tablet (20 mg total) by mouth daily    Hyperlipidemia, unspecified hyperlipidemia type  Continue  statin.   Check routine lipid panel.              Pertinent Medical History   Patient diagnosed with type 2 diabetes in her 50s.  She has tried CGM in the past and did not like it. Farxiga was stopped due to dizziness, headaches and yeast infections. She did not tolerate Ozempic due to GI side effects         History of Present Illness {?Quick Links Encounters * My Last Note * Last Note in Specialty * Snapshot * Since Last Visit * History :02663}  History of Present Illness  Becki Roman is a 74-year-old female with a past medical history of hypertension, hyperlipidemia, congestive heart failure (CHF), and type 2 diabetes, presenting for a follow-up visit.    She reports experiencing both hyperglycemic and hypoglycemic episodes, with a recent instance of her blood glucose level dropping to 53 while at a grocery store, during which she experienced visual disturbances. Her insulin dosage is typically reduced when she anticipates increased physical activity, such as shopping. She reports struggling with carbohydrate counting and does not consistently use the sliding scale provided by Karin during her last visit. Insulin dosage is adjusted based on her blood glucose levels, with a standard dose of 6 units, increasing to 7 or 8 units if her blood glucose exceeds 200, and up to 10 units if it surpasses 300.     She also considers her morning blood glucose levels when determining her lunchtime insulin dose. She expresses difficulty understanding the sliding scale and requests a written explanation. She declines the use of a continuous glucose monitor (CGM), reporting this would require too much involvement from her in regards of carb-coutning. She reports no polydipsia, polyuria, or peripheral neuropathy. She has not consulted a dietitian and does not express interest in doing so. Her diet mirrors that of her family, consisting of protein, carbohydrates, and an attempt to include two servings of vegetables. She does not  measure her carbohydrate intake and reports this would be difficult for her to do. Her current medication regimen includes metformin 500 mg twice daily, Lantus 30 units at bedtime, and NovoLog 6 to 10 units with meals.    She does not monitor her blood pressure at home and reports that her hypertension was diagnosed subsequent to her diabetes diagnosis. Her hypertension is currently managed with losartan and Norvasc.      Home blood glucometer readings:   Before breakfast: 92 - 206  Before lunch: 103 - 258  Before dinner: 134 - 334    Current diabetic regimen:   Metformin ER 500mg BID  Lantus 30 units daily at bedtime    Novolog 6-10 units TID with meals plus sliding scale 1:40 for BG >180      Review of Systems as per HPI         Medical History Reviewed by provider this encounter:     .    Objective {?Quick Links Trend Vitals * Enter New Vitals * Results Review * Timeline (Adult) * Labs * Imaging * Cardiology * Procedures * Lung Cancer Screening * Surgical eConsent :08075}  /80 (BP Location: Left arm, Patient Position: Sitting, Cuff Size: Adult)   Pulse 80   Ht 5' (1.524 m)   Wt 72.7 kg (160 lb 3.2 oz)   BMI 31.29 kg/m²      Body mass index is 31.29 kg/m².  Wt Readings from Last 3 Encounters:   06/04/25 72.7 kg (160 lb 3.2 oz)   05/22/25 72.6 kg (160 lb)   05/20/25 73.8 kg (162 lb 11.2 oz)     Physical Exam  Vital Signs: Blood pressure is elevated.  Physical Exam  Vitals reviewed.   Constitutional:       General: She is not in acute distress.     Appearance: She is well-developed.   HENT:      Head: Normocephalic and atraumatic.     Eyes:      General: No scleral icterus.     Conjunctiva/sclera: Conjunctivae normal.     Pulmonary:      Effort: Pulmonary effort is normal.     Neurological:      Mental Status: She is alert.     Psychiatric:         Attention and Perception: Attention normal.       Last Eye Exam: 06/20/2024  Last Foot Exam: 03/24/2025 - Follows with Podiatry  Health Maintenance   Topic Date  Due   • Diabetic Foot Exam  03/24/2026   • Diabetic Eye Exam  06/20/2026       Results  Labs:  A1c was 9.8.  Labs: I have reviewed pertinent labs including:   Lab Results   Component Value Date    HGBA1C 9.8 (H) 06/03/2025    HGBA1C 8.8 (A) 02/25/2025    HGBA1C 9.3 (H) 11/07/2024      Lab Results   Component Value Date    CREATININE 0.74 06/03/2025    CREATININE 0.68 05/20/2025    CREATININE 0.86 11/07/2024    BUN 18 06/03/2025     11/10/2015    K 3.8 06/03/2025     06/03/2025    CO2 32 06/03/2025      eGFRcr   Date Value Ref Range Status   08/19/2022 95 > OR = 60 mL/min/1.73m2 Final     Comment:     The eGFR is based on the CKD-EPI 2021 equation. To calculate   the new eGFR from a previous Creatinine or Cystatin C  result, go to https://www.kidney.org/professionals/  kdoqi/[email or web address]     eGFR   Date Value Ref Range Status   06/03/2025 80 ml/min/1.73sq m Final      HDL   Date Value Ref Range Status   12/07/2023 86 > OR = 50 mg/dL Final     HDL, Direct   Date Value Ref Range Status   11/07/2024 84 >=50 mg/dL Final     Triglycerides   Date Value Ref Range Status   11/07/2024 57 See Comment mg/dL Final     Comment:     Triglyceride:     0-9Y            <75mg/dL     10Y-17Y         <90 mg/dL       >=18Y     Normal          <150 mg/dL     Borderline High 150-199 mg/dL     High            200-499 mg/dL        Very High       >499 mg/dL    Specimen collection should occur prior to Metamizole administration due to the potential for falsely depressed results.   12/07/2023 53 <150 mg/dL Final      ALT   Date Value Ref Range Status   06/03/2025 23 7 - 52 U/L Final     Comment:     Specimen collection should occur prior to Sulfasalazine administration due to the potential for falsely depressed results.    12/07/2023 21 6 - 29 U/L Final   08/19/2022 15 6 - 29 U/L Final     AST   Date Value Ref Range Status   06/03/2025 21 13 - 39 U/L Final   12/07/2023 19 10 - 35 U/L Final   08/19/2022 20 10 - 35 U/L Final      Alkaline Phosphatase   Date Value Ref Range Status   06/03/2025 77 34 - 104 U/L Final   12/07/2023 79 37 - 153 U/L Final   08/19/2022 72 37 - 153 U/L Final        Patient Instructions   Use sliding scale:1 unit for every 40 above 180     181 - 220: +1 unit  221 - 260: +2 units  261 - 300: +3 units  301 - 340: +4 units  341 - 380: +5 units  381 - 420: +6 units  421 + : +7 units    Discussed with the patient and all questioned fully answered. She will call me if any problems arise.

## 2025-06-04 NOTE — ASSESSMENT & PLAN NOTE
Lab Results   Component Value Date    HGBA1C 9.8 (H) 06/03/2025   Current HbA1c represents poor control. Blood sugars demonstrating variability likely due to patient driven insulin dosing.   Continue current regimen with the following adjustments:  Encouraged compliance with 1:40 for BG >180 corrective scale  Patient not interested in MNT at this time  Advised to adhere to diabetic diet, and recommended staying active/exercising routinely.  Discussed symptoms and treatment of hypoglycemia.    Recommended routine follow-up with Ophthalmology and foot exams.   Ordered blood work to complete prior to next visit.  Orders:  •  Comprehensive metabolic panel; Future  •  Hemoglobin A1C; Future  •  insulin aspart (NovoLOG FlexPen) 100 UNIT/ML injection pen; 6  units before meals plus sliding scale - max dose 30 units daily.  •  Insulin Glargine Solostar (Lantus SoloStar) 100 UNIT/ML SOPN; 30 units daily  •  Insulin Pen Needle (BD Pen Needle Ester U/F) 32G X 4 MM MISC; Inject under the skin 4 (four) times a day  •  metFORMIN (GLUCOPHAGE-XR) 500 mg 24 hr tablet; Take 1 tablet (500 mg total) by mouth 2 (two) times a day with meals

## 2025-06-23 LAB
LEFT EYE DIABETIC RETINOPATHY: NORMAL
RIGHT EYE DIABETIC RETINOPATHY: NORMAL

## 2025-06-26 ENCOUNTER — APPOINTMENT (OUTPATIENT)
Dept: LAB | Facility: CLINIC | Age: 75
End: 2025-06-26
Attending: PHYSICIAN ASSISTANT
Payer: MEDICARE

## 2025-06-26 DIAGNOSIS — M06.09 RHEUMATOID ARTHRITIS OF MULTIPLE SITES WITHOUT RHEUMATOID FACTOR (HCC): ICD-10-CM

## 2025-06-26 LAB
ALBUMIN SERPL BCG-MCNC: 4 G/DL (ref 3.5–5)
ALP SERPL-CCNC: 76 U/L (ref 34–104)
ALT SERPL W P-5'-P-CCNC: 16 U/L (ref 7–52)
ANION GAP SERPL CALCULATED.3IONS-SCNC: 4 MMOL/L (ref 4–13)
AST SERPL W P-5'-P-CCNC: 19 U/L (ref 13–39)
BASOPHILS # BLD AUTO: 0.05 THOUSANDS/ÂΜL (ref 0–0.1)
BASOPHILS NFR BLD AUTO: 1 % (ref 0–1)
BILIRUB SERPL-MCNC: 0.44 MG/DL (ref 0.2–1)
BUN SERPL-MCNC: 15 MG/DL (ref 5–25)
CALCIUM SERPL-MCNC: 8.9 MG/DL (ref 8.4–10.2)
CHLORIDE SERPL-SCNC: 101 MMOL/L (ref 96–108)
CO2 SERPL-SCNC: 31 MMOL/L (ref 21–32)
CREAT SERPL-MCNC: 0.7 MG/DL (ref 0.6–1.3)
CRP SERPL QL: 1.8 MG/L
EOSINOPHIL # BLD AUTO: 0.36 THOUSAND/ÂΜL (ref 0–0.61)
EOSINOPHIL NFR BLD AUTO: 7 % (ref 0–6)
ERYTHROCYTE [DISTWIDTH] IN BLOOD BY AUTOMATED COUNT: 12.9 % (ref 11.6–15.1)
ERYTHROCYTE [SEDIMENTATION RATE] IN BLOOD: 14 MM/HOUR (ref 0–29)
GFR SERPL CREATININE-BSD FRML MDRD: 86 ML/MIN/1.73SQ M
GLUCOSE P FAST SERPL-MCNC: 273 MG/DL (ref 65–99)
HCT VFR BLD AUTO: 39.8 % (ref 34.8–46.1)
HGB BLD-MCNC: 12.9 G/DL (ref 11.5–15.4)
IMM GRANULOCYTES # BLD AUTO: 0.01 THOUSAND/UL (ref 0–0.2)
IMM GRANULOCYTES NFR BLD AUTO: 0 % (ref 0–2)
LYMPHOCYTES # BLD AUTO: 1.53 THOUSANDS/ÂΜL (ref 0.6–4.47)
LYMPHOCYTES NFR BLD AUTO: 28 % (ref 14–44)
MCH RBC QN AUTO: 29 PG (ref 26.8–34.3)
MCHC RBC AUTO-ENTMCNC: 32.4 G/DL (ref 31.4–37.4)
MCV RBC AUTO: 89 FL (ref 82–98)
MONOCYTES # BLD AUTO: 0.34 THOUSAND/ÂΜL (ref 0.17–1.22)
MONOCYTES NFR BLD AUTO: 6 % (ref 4–12)
NEUTROPHILS # BLD AUTO: 3.16 THOUSANDS/ÂΜL (ref 1.85–7.62)
NEUTS SEG NFR BLD AUTO: 58 % (ref 43–75)
NRBC BLD AUTO-RTO: 0 /100 WBCS
PLATELET # BLD AUTO: 254 THOUSANDS/UL (ref 149–390)
PMV BLD AUTO: 8.7 FL (ref 8.9–12.7)
POTASSIUM SERPL-SCNC: 4.1 MMOL/L (ref 3.5–5.3)
PROT SERPL-MCNC: 6.8 G/DL (ref 6.4–8.4)
RBC # BLD AUTO: 4.45 MILLION/UL (ref 3.81–5.12)
SODIUM SERPL-SCNC: 136 MMOL/L (ref 135–147)
WBC # BLD AUTO: 5.45 THOUSAND/UL (ref 4.31–10.16)

## 2025-06-26 PROCEDURE — 85652 RBC SED RATE AUTOMATED: CPT

## 2025-06-26 PROCEDURE — 85025 COMPLETE CBC W/AUTO DIFF WBC: CPT

## 2025-06-26 PROCEDURE — 80053 COMPREHEN METABOLIC PANEL: CPT

## 2025-06-26 PROCEDURE — 36415 COLL VENOUS BLD VENIPUNCTURE: CPT

## 2025-06-26 PROCEDURE — 86140 C-REACTIVE PROTEIN: CPT

## 2025-07-16 ENCOUNTER — OFFICE VISIT (OUTPATIENT)
Dept: FAMILY MEDICINE CLINIC | Facility: CLINIC | Age: 75
End: 2025-07-16
Payer: MEDICARE

## 2025-07-16 ENCOUNTER — HOSPITAL ENCOUNTER (OUTPATIENT)
Dept: RADIOLOGY | Facility: HOSPITAL | Age: 75
Discharge: HOME | End: 2025-07-16
Payer: MEDICARE

## 2025-07-16 VITALS
RESPIRATION RATE: 16 BRPM | HEART RATE: 76 BPM | TEMPERATURE: 98 F | HEIGHT: 60 IN | DIASTOLIC BLOOD PRESSURE: 84 MMHG | SYSTOLIC BLOOD PRESSURE: 142 MMHG | WEIGHT: 160.8 LBS | OXYGEN SATURATION: 93 % | BODY MASS INDEX: 31.57 KG/M2

## 2025-07-16 DIAGNOSIS — S99.922A FOOT INJURY, LEFT, INITIAL ENCOUNTER: ICD-10-CM

## 2025-07-16 DIAGNOSIS — S99.922A FOOT INJURY, LEFT, INITIAL ENCOUNTER: Primary | ICD-10-CM

## 2025-07-16 PROCEDURE — 73630 X-RAY EXAM OF FOOT: CPT

## 2025-07-16 PROCEDURE — G2211 COMPLEX E/M VISIT ADD ON: HCPCS | Performed by: NURSE PRACTITIONER

## 2025-07-16 PROCEDURE — 99213 OFFICE O/P EST LOW 20 MIN: CPT | Performed by: NURSE PRACTITIONER

## 2025-07-16 NOTE — PROGRESS NOTES
Name: Becki Roman      : 1950      MRN: [id number]  Encounter Provider: PAWAN Dee  Encounter Date: 2025   Encounter department: [facility]  :  Assessment & Plan  Foot injury, left, initial encounter  Stubbed left 4th toe yesterday.  Swelling, ecchymosis, pain.   Has diabetes A1c 9.8%, with peripheral neuropathy.   Podiatrist is Dr. Craig.   Endocrinologist--Dr. Ball.  Will send for x-ray of her foot to assess for fractures.   Further plan of care pending results.   Reviewed importance of good follow up care for any foot injuries or wounds due to diabetes, she verbalizes understanding.     Orders:  •  XR foot 3+ vw left; Future           History of Present Illness   Becki Roman is a 74 year old female presenting today for left toe 4th problem.     Occurred yesterday.   Stubbed her left 4th toe--she did not feel it was that bad initially.  But then later, she noted bruising, and swelling develop.  Still hurts today.     She is diabetic, last A1c is 9.8%. Noted to have peripheral neuropathy.   Follows with Dr. Craig for podiatry care.                   Review of Systems   Constitutional: Negative.    Musculoskeletal:  Positive for arthralgias.       Objective   /84 (BP Location: Left arm, Patient Position: Sitting, Cuff Size: Standard)   Pulse 76   Temp 98 °F (36.7 °C) (Temporal)   Resp 16   Ht 5' (1.524 m)   Wt 72.9 kg (160 lb 12.8 oz)   SpO2 93%   BMI 31.40 kg/m²      Physical Exam  Vitals and nursing note reviewed.   Constitutional:       Appearance: Normal appearance.     Cardiovascular:      Pulses:           Dorsalis pedis pulses are 1+ on the right side and 1+ on the left side.     Musculoskeletal:      Comments: Left 4th toe with moderate swelling, particularly at the tip of toe.   Ecchymosis of tip of toe with mild erythema.   No tenderness to palpation. Has full motion of all toes on left foot.   There is no skin breakdown.      Skin:     Comments:  Bilateral lower extremity peripheral vascular discoloration     Neurological:      Mental Status: She is alert.         Current Medications[1]          [1]    Current Outpatient Medications:   •  albuterol (2.5 mg/3 mL) 0.083 % nebulizer solution, Take 3 mL (2.5 mg total) by nebulization every 6 (six) hours as needed for wheezing or shortness of breath, Disp: 75 mL, Rfl: 2  •  amLODIPine (NORVASC) 5 mg tablet, Take 1 tablet (5 mg total) by mouth daily at bedtime, Disp: 90 tablet, Rfl: 1  •  artificial tear (LUBRIFRESH P.M.) 83-15 % ophthalmic ointment, daily at bedtime, Disp: , Rfl:   •  aspirin 81 mg chewable tablet, Chew 81 mg in the morning., Disp: , Rfl:   •  baclofen 10 mg tablet, Take 10 mg by mouth daily at bedtime, Disp: , Rfl: 4  •  Cholecalciferol (VITAMIN D PO), Take 1 tablet by mouth in the morning., Disp: , Rfl:   •  DULoxetine (CYMBALTA) 30 mg delayed release capsule, Take 1 capsule by mouth in the morning, Disp: , Rfl:   •  ezetimibe (ZETIA) 10 mg tablet, Take 0.5 tablets (5 mg total) by mouth daily, Disp: 45 tablet, Rfl: 1  •  Ferrous Sulfate Dried (FERROUS SULFATE CR PO), , Disp: , Rfl:   •  fluticasone-vilanterol (Breo Ellipta) 200-25 mcg/actuation inhaler, Inhale 1 puff daily Rinse mouth after use., Disp: 28 each, Rfl: 5  •  glucose blood (Contour Next Test) test strip, Use 1 each 4 (four) times a day, Disp: 400 each, Rfl: 1  •  hydroCHLOROthiazide 12.5 mg tablet, Take 1 tablet (12.5 mg total) by mouth daily, Disp: 90 tablet, Rfl: 1  •  hydroxychloroquine (PLAQUENIL) 200 mg tablet, Take 200 mg by mouth in the morning and 200 mg in the evening. Take with meals., Disp: , Rfl:   •  insulin aspart (NovoLOG FlexPen) 100 UNIT/ML injection pen, 6  units before meals plus sliding scale - max dose 30 units daily., Disp: 27 mL, Rfl: 1  •  Insulin Glargine Solostar (Lantus SoloStar) 100 UNIT/ML SOPN, 30 units daily, Disp: 45 mL, Rfl: 3  •  Insulin Pen Needle (BD Pen Needle Ester U/F) 32G X 4 MM MISC, Inject  under the skin 4 (four) times a day, Disp: 400 each, Rfl: 1  •  Lifitegrast (XIIDRA OP), Apply to eye 2 vials a day, Disp: , Rfl:   •  losartan (COZAAR) 50 mg tablet, 1 tab twice per day, Disp: 180 tablet, Rfl: 1  •  metFORMIN (GLUCOPHAGE-XR) 500 mg 24 hr tablet, Take 1 tablet (500 mg total) by mouth 2 (two) times a day with meals, Disp: 180 tablet, Rfl: 1  •  Microlet Lancets MISC, Use 4 (four) times a day, Disp: 400 each, Rfl: 1  •  sertraline (ZOLOFT) 50 mg tablet, Take 1 tablet (50 mg total) by mouth daily, Disp: 30 tablet, Rfl: 0  •  simvastatin (ZOCOR) 20 mg tablet, Take 1 tablet (20 mg total) by mouth daily, Disp: 90 tablet, Rfl: 1  •  traMADol (ULTRAM) 50 mg tablet, , Disp: , Rfl:

## 2025-07-17 ENCOUNTER — TELEPHONE (OUTPATIENT)
Age: 75
End: 2025-07-17

## 2025-07-17 NOTE — TELEPHONE ENCOUNTER
Patient would like the results of the xray she had done yesterday at [facility].  Please advise.

## 2025-08-03 ENCOUNTER — HOSPITAL ENCOUNTER (EMERGENCY)
Facility: HOSPITAL | Age: 75
Discharge: HOME | End: 2025-08-03
Attending: EMERGENCY MEDICINE | Admitting: EMERGENCY MEDICINE
Payer: MEDICARE

## 2025-08-03 ENCOUNTER — APPOINTMENT (EMERGENCY)
Dept: RADIOLOGY | Facility: HOSPITAL | Age: 75
End: 2025-08-03
Payer: MEDICARE

## 2025-08-03 VITALS
DIASTOLIC BLOOD PRESSURE: 71 MMHG | TEMPERATURE: 97.8 F | HEART RATE: 75 BPM | SYSTOLIC BLOOD PRESSURE: 143 MMHG | RESPIRATION RATE: 17 BRPM

## 2025-08-03 DIAGNOSIS — R10.13 EPIGASTRIC PAIN: ICD-10-CM

## 2025-08-03 DIAGNOSIS — R07.9 CHEST PAIN: Primary | ICD-10-CM

## 2025-08-03 LAB
ALBUMIN SERPL BCG-MCNC: 4.1 G/DL (ref 3.5–5)
ALP SERPL-CCNC: 75 U/L (ref 34–104)
ALT SERPL W P-5'-P-CCNC: 31 U/L (ref 7–52)
ANION GAP SERPL CALCULATED.3IONS-SCNC: 8 MMOL/L (ref 4–13)
AST SERPL W P-5'-P-CCNC: 27 U/L (ref 13–39)
ATRIAL RATE: 76 BPM
BASOPHILS # BLD AUTO: 0.04 THOUSANDS/ÂΜL (ref 0–0.1)
BASOPHILS NFR BLD AUTO: 1 % (ref 0–1)
BILIRUB SERPL-MCNC: 0.52 MG/DL (ref 0.2–1)
BUN SERPL-MCNC: 17 MG/DL (ref 5–25)
CALCIUM SERPL-MCNC: 9.2 MG/DL (ref 8.4–10.2)
CARDIAC TROPONIN I PNL SERPL HS: 3 NG/L (ref ?–50)
CHLORIDE SERPL-SCNC: 101 MMOL/L (ref 96–108)
CO2 SERPL-SCNC: 27 MMOL/L (ref 21–32)
CREAT SERPL-MCNC: 0.74 MG/DL (ref 0.6–1.3)
EOSINOPHIL # BLD AUTO: 0.26 THOUSAND/ÂΜL (ref 0–0.61)
EOSINOPHIL NFR BLD AUTO: 4 % (ref 0–6)
ERYTHROCYTE [DISTWIDTH] IN BLOOD BY AUTOMATED COUNT: 13.1 % (ref 11.6–15.1)
GFR SERPL CREATININE-BSD FRML MDRD: 80 ML/MIN/1.73SQ M
GLUCOSE SERPL-MCNC: 217 MG/DL (ref 65–140)
HCT VFR BLD AUTO: 42.8 % (ref 34.8–46.1)
HGB BLD-MCNC: 13.9 G/DL (ref 11.5–15.4)
IMM GRANULOCYTES # BLD AUTO: 0.01 THOUSAND/UL (ref 0–0.2)
IMM GRANULOCYTES NFR BLD AUTO: 0 % (ref 0–2)
LYMPHOCYTES # BLD AUTO: 1.53 THOUSANDS/ÂΜL (ref 0.6–4.47)
LYMPHOCYTES NFR BLD AUTO: 23 % (ref 14–44)
MCH RBC QN AUTO: 28.8 PG (ref 26.8–34.3)
MCHC RBC AUTO-ENTMCNC: 32.5 G/DL (ref 31.4–37.4)
MCV RBC AUTO: 89 FL (ref 82–98)
MONOCYTES # BLD AUTO: 0.33 THOUSAND/ÂΜL (ref 0.17–1.22)
MONOCYTES NFR BLD AUTO: 5 % (ref 4–12)
NEUTROPHILS # BLD AUTO: 4.46 THOUSANDS/ÂΜL (ref 1.85–7.62)
NEUTS SEG NFR BLD AUTO: 67 % (ref 43–75)
NRBC BLD AUTO-RTO: 0 /100 WBCS
P AXIS: 36 DEGREES
PLATELET # BLD AUTO: 250 THOUSANDS/UL (ref 149–390)
PMV BLD AUTO: 8.6 FL (ref 8.9–12.7)
POTASSIUM SERPL-SCNC: 3.9 MMOL/L (ref 3.5–5.3)
PR INTERVAL: 154 MS
PROT SERPL-MCNC: 7 G/DL (ref 6.4–8.4)
QRS AXIS: -21 DEGREES
QRSD INTERVAL: 70 MS
QT INTERVAL: 372 MS
QTC INTERVAL: 418 MS
RBC # BLD AUTO: 4.83 MILLION/UL (ref 3.81–5.12)
SODIUM SERPL-SCNC: 136 MMOL/L (ref 135–147)
T WAVE AXIS: 3 DEGREES
VENTRICULAR RATE: 76 BPM
WBC # BLD AUTO: 6.63 THOUSAND/UL (ref 4.31–10.16)

## 2025-08-03 PROCEDURE — 80053 COMPREHEN METABOLIC PANEL: CPT

## 2025-08-03 PROCEDURE — 71046 X-RAY EXAM CHEST 2 VIEWS: CPT

## 2025-08-03 PROCEDURE — 99285 EMERGENCY DEPT VISIT HI MDM: CPT

## 2025-08-03 PROCEDURE — 85025 COMPLETE CBC W/AUTO DIFF WBC: CPT

## 2025-08-03 PROCEDURE — 93010 ELECTROCARDIOGRAM REPORT: CPT | Performed by: STUDENT IN AN ORGANIZED HEALTH CARE EDUCATION/TRAINING PROGRAM

## 2025-08-03 PROCEDURE — 84484 ASSAY OF TROPONIN QUANT: CPT

## 2025-08-03 PROCEDURE — 93005 ELECTROCARDIOGRAM TRACING: CPT

## 2025-08-03 PROCEDURE — 36415 COLL VENOUS BLD VENIPUNCTURE: CPT

## 2025-08-03 RX ORDER — PANTOPRAZOLE SODIUM 20 MG/1
40 TABLET, DELAYED RELEASE ORAL DAILY
Qty: 28 TABLET | Refills: 0 | Status: SHIPPED | OUTPATIENT
Start: 2025-08-03 | End: 2025-08-17

## 2025-08-03 RX ORDER — FAMOTIDINE 20 MG/1
20 TABLET, FILM COATED ORAL 2 TIMES DAILY
Qty: 30 TABLET | Refills: 0 | Status: SHIPPED | OUTPATIENT
Start: 2025-08-03

## 2025-08-07 ENCOUNTER — TELEPHONE (OUTPATIENT)
Age: 75
End: 2025-08-07

## 2025-08-07 DIAGNOSIS — E78.5 HYPERLIPIDEMIA, UNSPECIFIED HYPERLIPIDEMIA TYPE: ICD-10-CM

## 2025-08-07 RX ORDER — EZETIMIBE 10 MG/1
5 TABLET ORAL DAILY
Qty: 45 TABLET | Refills: 1 | Status: SHIPPED | OUTPATIENT
Start: 2025-08-07

## 2025-08-08 ENCOUNTER — TELEPHONE (OUTPATIENT)
Age: 75
End: 2025-08-08

## 2025-08-08 DIAGNOSIS — E78.5 HYPERLIPIDEMIA, UNSPECIFIED HYPERLIPIDEMIA TYPE: ICD-10-CM

## 2025-08-08 DIAGNOSIS — I10 HYPERTENSION, UNSPECIFIED TYPE: Primary | ICD-10-CM

## 2025-08-08 RX ORDER — AMLODIPINE BESYLATE 10 MG/1
10 TABLET ORAL DAILY
Qty: 90 TABLET | Refills: 1 | Status: SHIPPED | OUTPATIENT
Start: 2025-08-08

## 2025-08-19 ENCOUNTER — OFFICE VISIT (OUTPATIENT)
Dept: GASTROENTEROLOGY | Facility: CLINIC | Age: 75
End: 2025-08-19
Payer: MEDICARE

## 2025-08-19 VITALS
TEMPERATURE: 97.4 F | DIASTOLIC BLOOD PRESSURE: 66 MMHG | WEIGHT: 160.6 LBS | BODY MASS INDEX: 31.53 KG/M2 | SYSTOLIC BLOOD PRESSURE: 118 MMHG | HEIGHT: 60 IN

## 2025-08-19 DIAGNOSIS — R10.13 EPIGASTRIC PAIN: ICD-10-CM

## 2025-08-19 DIAGNOSIS — K44.9 HIATAL HERNIA: ICD-10-CM

## 2025-08-19 DIAGNOSIS — R07.9 CHEST PAIN: ICD-10-CM

## 2025-08-19 DIAGNOSIS — Z86.0100 HISTORY OF COLON POLYPS: ICD-10-CM

## 2025-08-19 DIAGNOSIS — R94.31 ABNORMAL EKG: Primary | ICD-10-CM

## 2025-08-19 DIAGNOSIS — R19.7 DIARRHEA, UNSPECIFIED TYPE: ICD-10-CM

## 2025-08-19 PROCEDURE — 99214 OFFICE O/P EST MOD 30 MIN: CPT | Performed by: INTERNAL MEDICINE

## 2025-08-19 PROCEDURE — G2211 COMPLEX E/M VISIT ADD ON: HCPCS | Performed by: INTERNAL MEDICINE

## (undated) DEVICE — ENDOCUFF VISION MED BLUE ID 11.0

## (undated) RX ORDER — HYDROXYZINE HYDROCHLORIDE 25 MG/1
1 TABLET, FILM COATED ORAL
Start: 2021-11-10

## (undated) RX ORDER — BROMFENAC SODIUM 0.7 MG/ML
1 SOLUTION/ DROPS OPHTHALMIC ONCE A DAY
Start: 2021-11-10